# Patient Record
Sex: FEMALE | Race: WHITE | NOT HISPANIC OR LATINO | Employment: OTHER | URBAN - METROPOLITAN AREA
[De-identification: names, ages, dates, MRNs, and addresses within clinical notes are randomized per-mention and may not be internally consistent; named-entity substitution may affect disease eponyms.]

---

## 2017-03-10 ENCOUNTER — OFFICE VISIT (OUTPATIENT)
Dept: FAMILY MEDICINE | Facility: CLINIC | Age: 72
End: 2017-03-10
Payer: MEDICARE

## 2017-03-10 VITALS
BODY MASS INDEX: 36.49 KG/M2 | HEIGHT: 63 IN | RESPIRATION RATE: 18 BRPM | HEART RATE: 82 BPM | DIASTOLIC BLOOD PRESSURE: 82 MMHG | WEIGHT: 205.94 LBS | SYSTOLIC BLOOD PRESSURE: 136 MMHG

## 2017-03-10 DIAGNOSIS — M85.80 OSTEOPENIA: Primary | ICD-10-CM

## 2017-03-10 DIAGNOSIS — M85.80 OSTEOPENIA: ICD-10-CM

## 2017-03-10 PROCEDURE — 99999 PR PBB SHADOW E&M-EST. PATIENT-LVL III: CPT | Mod: PBBFAC,,, | Performed by: FAMILY MEDICINE

## 2017-03-10 PROCEDURE — 1157F ADVNC CARE PLAN IN RCRD: CPT | Mod: S$GLB,,, | Performed by: FAMILY MEDICINE

## 2017-03-10 PROCEDURE — 1159F MED LIST DOCD IN RCRD: CPT | Mod: S$GLB,,, | Performed by: FAMILY MEDICINE

## 2017-03-10 PROCEDURE — 99213 OFFICE O/P EST LOW 20 MIN: CPT | Mod: S$GLB,,, | Performed by: FAMILY MEDICINE

## 2017-03-10 PROCEDURE — 1160F RVW MEDS BY RX/DR IN RCRD: CPT | Mod: S$GLB,,, | Performed by: FAMILY MEDICINE

## 2017-03-10 PROCEDURE — 1126F AMNT PAIN NOTED NONE PRSNT: CPT | Mod: S$GLB,,, | Performed by: FAMILY MEDICINE

## 2017-03-10 RX ORDER — OMEPRAZOLE 40 MG/1
40 CAPSULE, DELAYED RELEASE ORAL EVERY MORNING
Qty: 90 CAPSULE | Refills: 3 | Status: SHIPPED | OUTPATIENT
Start: 2017-03-10 | End: 2017-03-10 | Stop reason: SDUPTHER

## 2017-03-10 RX ORDER — METOPROLOL SUCCINATE 50 MG/1
50 TABLET, EXTENDED RELEASE ORAL DAILY
Qty: 90 TABLET | Refills: 3 | Status: SHIPPED | OUTPATIENT
Start: 2017-03-10 | End: 2017-03-10 | Stop reason: SDUPTHER

## 2017-03-10 RX ORDER — METOPROLOL SUCCINATE 50 MG/1
50 TABLET, EXTENDED RELEASE ORAL DAILY
Qty: 90 TABLET | Refills: 3 | Status: SHIPPED | OUTPATIENT
Start: 2017-03-10 | End: 2018-03-19 | Stop reason: SDUPTHER

## 2017-03-10 RX ORDER — IBANDRONATE SODIUM 150 MG/1
150 TABLET, FILM COATED ORAL
Qty: 3 TABLET | Refills: 3 | Status: CANCELLED | OUTPATIENT
Start: 2017-03-10 | End: 2017-04-09

## 2017-03-10 RX ORDER — METOPROLOL SUCCINATE 50 MG/1
50 TABLET, EXTENDED RELEASE ORAL DAILY
Qty: 90 TABLET | Refills: 3 | Status: CANCELLED | OUTPATIENT
Start: 2017-03-10

## 2017-03-10 RX ORDER — OMEPRAZOLE 40 MG/1
40 CAPSULE, DELAYED RELEASE ORAL EVERY MORNING
Qty: 90 CAPSULE | Refills: 3 | Status: CANCELLED | OUTPATIENT
Start: 2017-03-10

## 2017-03-10 RX ORDER — OMEPRAZOLE 40 MG/1
40 CAPSULE, DELAYED RELEASE ORAL EVERY MORNING
Qty: 90 CAPSULE | Refills: 3 | Status: SHIPPED | OUTPATIENT
Start: 2017-03-10 | End: 2018-03-19 | Stop reason: SDUPTHER

## 2017-03-10 RX ORDER — IBANDRONATE SODIUM 150 MG/1
150 TABLET, FILM COATED ORAL
Qty: 3 TABLET | Refills: 3 | Status: SHIPPED | OUTPATIENT
Start: 2017-03-10 | End: 2017-03-10 | Stop reason: SDUPTHER

## 2017-03-10 RX ORDER — IBANDRONATE SODIUM 150 MG/1
150 TABLET, FILM COATED ORAL
Qty: 3 TABLET | Refills: 3 | Status: SHIPPED | OUTPATIENT
Start: 2017-03-10 | End: 2017-06-02

## 2017-03-10 NOTE — PROGRESS NOTES
Subjective:       Patient ID: Nicole Medina is a 71 y.o. female    Chief Complaint: Osteopenia (follow up; hm due: tetanus, zoster, flu)    HPI  Here today to review treatment for osteopenia.    No new complaints    ROS      Objective:   Physical Exam   Constitutional: She is oriented to person, place, and time. She appears well-developed and well-nourished.   Neurological: She is alert and oriented to person, place, and time.   Vitals reviewed.        Assessment:       1. Osteopenia           Plan:       Osteopenia  - Continue current therapy

## 2017-03-10 NOTE — MR AVS SNAPSHOT
[facility]  [address]  [facility] [address]  Phone: [phone]  Fax: [phone]                  Nicole Medina   3/10/2017 9:00 AM   Office Visit    Description:  Female : 1945   Provider:  Milan Jain MD   Department:  [facility]           Reason for Visit     Osteopenia           Diagnoses this Visit        Comments    Osteopenia    -  Primary            To Do List           Goals (5 Years of Data)     None      Follow-Up and Disposition     Return in about 6 months (around 9/10/2017).    Follow-up and Disposition History       These Medications        Disp Refills Start End    metoprolol succinate (TOPROL XL) 50 MG 24 hr tablet 90 tablet 3 3/10/2017     Take 1 tablet (50 mg total) by mouth once daily. - Oral    Pharmacy: [facility] [place] [id number] AT [facility] [address] Ph #: [phone]       ibandronate (BONIVA) 150 mg tablet 3 tablet 3 3/10/2017 2017    Take 1 tablet (150 mg total) by mouth every 30 days. - Oral    Pharmacy: [place] [id number] AT [facility] OF [facility] [address] Ph #: [phone]       omeprazole (PRILOSEC) 40 MG capsule 90 capsule 3 3/10/2017     Take 1 capsule (40 mg total) by mouth every morning. - Oral    Pharmacy: [place] [id number] AT [facility] OF [facility] [address] Ph #: [phone]         [facility] On Call     [facility] On [facility] Nurse Care Line -  Assistance  Registered nurses in the [facility] Center provide clinical advisement, health education, appointment booking, and other advisory services.  Call for this free service at [phone].             Medications           Message regarding Medications     Verify the changes and/or additions to your medication regime listed below are the same as discussed with your clinician today.  If any of these changes or additions are  "incorrect, please notify your healthcare provider.        STOP taking these medications     DYMISTA 137-50 mcg/spray Spry            Verify that the below list of medications is an accurate representation of the medications you are currently taking.  If none reported, the list may be blank. If incorrect, please contact your healthcare provider. Carry this list with you in case of emergency.           Current Medications     albuterol (ACCUNEB) 0.63 mg/3 mL Nebu Take 2.5 mg by nebulization every 8 (eight) hours.    albuterol (PROVENTIL HFA) 90 mcg/actuation inhaler Inhale 2 puffs into the lungs every 6 (six) hours as needed for Wheezing.    albuterol (PROVENTIL) 2.5 mg /3 mL (0.083 %) nebulizer solution Take 2.5 mg by nebulization every 6 (six) hours as needed for Wheezing.    aspirin (ASPIRIN CHILDRENS) 81 MG Chew Take 81 mg by mouth once daily.    DULERA 200-5 mcg/actuation inhaler     fluticasone (FLONASE) 50 mcg/actuation nasal spray     metoprolol succinate (TOPROL XL) 50 MG 24 hr tablet Take 1 tablet (50 mg total) by mouth once daily.    omeprazole (PRILOSEC) 40 MG capsule Take 1 capsule (40 mg total) by mouth every morning.    predniSONE (DELTASONE) 5 MG tablet     ibandronate (BONIVA) 150 mg tablet Take 1 tablet (150 mg total) by mouth every 30 days.           Clinical Reference Information           Your Vitals Were     BP Pulse Resp Height Weight BMI    136/82 (BP Location: Right arm, Patient Position: Sitting, BP Method: Manual) 82 18 5' 3" (1.6 m) 93.4 kg (205 lb 14.6 oz) 36.48 kg/m2      Blood Pressure          Most Recent Value    BP  136/82      Allergies as of 3/10/2017     Latex      Immunizations Administered on Date of Encounter - 3/10/2017     None      [facility] Sign-Up     Activating your [facility] account is as easy as 1-2-3!     1) Visit [email or web address], select Sign Up Now, enter this activation code and your date of birth, then select Next.  [email or web address]  Expires: 4/24/2017  9:43 AM  "     2) Create a username and password to use when you visit [facility] in the future and select a security question in case you lose your password and select Next.    3) Enter your e-mail address and click Sign Up!    Additional Information  If you have questions, please e-mail [email or web address] or call [phone] to talk to our [employer] staff. Remember, [facility] is NOT to be used for urgent needs. For medical emergencies, dial 911.         Language Assistance Services     ATTENTION: Language assistance services are available, free of charge. Please call [phone].      ATENCIÓN: Si habla español, tiene a rosas disposición servicios gratuitos de asistencia lingüística. Llame al [phone].     CHÚ Ý: N?u b?n nói Ti?ng Vi?t, có các d?ch v? h? tr? ngôn ng? mi?n phí dành cho b?n. G?i s? [phone].         [facility] complies with applicable Federal civil rights laws and does not discriminate on the basis of race, color, national origin, age, disability, or sex.

## 2017-06-02 PROBLEM — G45.9 TRANSIENT CEREBRAL ISCHEMIA: Status: ACTIVE | Noted: 2017-06-02

## 2017-06-03 PROBLEM — I63.9 ACUTE CVA (CEREBROVASCULAR ACCIDENT): Status: ACTIVE | Noted: 2017-06-02

## 2017-06-05 ENCOUNTER — TELEPHONE (OUTPATIENT)
Dept: NEUROLOGY | Facility: CLINIC | Age: 72
End: 2017-06-05

## 2017-06-05 ENCOUNTER — TELEPHONE (OUTPATIENT)
Dept: FAMILY MEDICINE | Facility: CLINIC | Age: 72
End: 2017-06-05

## 2017-06-05 NOTE — TELEPHONE ENCOUNTER
----- Message from Missy Jain sent at 6/5/2017  8:10 AM CDT -----  Contact:  - Jenaro Adam  States that the patient was in the hospital overnight for observation and an MRI was done.  The patient had a TIA and Dr Simons requested for the patient to be seen within a week.  You can call Jenaro back at [phone].  Thank you

## 2017-06-05 NOTE — TELEPHONE ENCOUNTER
Called pt  Jenaro, pt was in [facility] for TIA, and Dr. Simons and Dr. Magallanes said that pt needs to see Dr. Jain with in a week or two from her discharge. Was able to schedule this week as a pt canceled. He verbally understood.

## 2017-06-05 NOTE — TELEPHONE ENCOUNTER
----- Message from Jennifer Saleem sent at 6/5/2017  1:15 PM CDT -----  Contact: Jenaro  Patient's  is calling again as waiting on call back regarding hospital follow up visit from [facility] admitted 6/2/17 discharged 6/3/17; Dx TIA. States only to see Dr Jain. Please call [phone]. Thanks!

## 2017-06-05 NOTE — TELEPHONE ENCOUNTER
----- Message from Jennifer Saleem sent at 6/5/2017  1:20 PM CDT -----  Contact: Jenaro  Patient's  is checking on appointment from [facility] stay from 6/2/17 to 6/3/17; Dx TIA. Please call [phone]. Thanks!

## 2017-06-07 ENCOUNTER — OFFICE VISIT (OUTPATIENT)
Dept: FAMILY MEDICINE | Facility: CLINIC | Age: 72
End: 2017-06-07
Payer: MEDICARE

## 2017-06-07 VITALS
BODY MASS INDEX: 35.47 KG/M2 | HEIGHT: 63 IN | DIASTOLIC BLOOD PRESSURE: 68 MMHG | WEIGHT: 200.19 LBS | HEART RATE: 64 BPM | RESPIRATION RATE: 18 BRPM | SYSTOLIC BLOOD PRESSURE: 138 MMHG

## 2017-06-07 DIAGNOSIS — I63.9 ACUTE CVA (CEREBROVASCULAR ACCIDENT): Primary | ICD-10-CM

## 2017-06-07 PROCEDURE — 1126F AMNT PAIN NOTED NONE PRSNT: CPT | Mod: S$GLB,,, | Performed by: FAMILY MEDICINE

## 2017-06-07 PROCEDURE — 1159F MED LIST DOCD IN RCRD: CPT | Mod: S$GLB,,, | Performed by: FAMILY MEDICINE

## 2017-06-07 PROCEDURE — 99214 OFFICE O/P EST MOD 30 MIN: CPT | Mod: S$GLB,,, | Performed by: FAMILY MEDICINE

## 2017-06-07 PROCEDURE — 99999 PR PBB SHADOW E&M-EST. PATIENT-LVL III: CPT | Mod: PBBFAC,,, | Performed by: FAMILY MEDICINE

## 2017-06-07 RX ORDER — ATORVASTATIN CALCIUM 40 MG/1
40 TABLET, FILM COATED ORAL DAILY
Qty: 90 TABLET | Refills: 3 | Status: SHIPPED | OUTPATIENT
Start: 2017-06-07 | End: 2017-06-07 | Stop reason: SDUPTHER

## 2017-06-07 RX ORDER — ATORVASTATIN CALCIUM 40 MG/1
40 TABLET, FILM COATED ORAL DAILY
Qty: 90 TABLET | Refills: 3 | Status: SHIPPED | OUTPATIENT
Start: 2017-06-07 | End: 2018-01-02 | Stop reason: SDUPTHER

## 2017-06-11 NOTE — PROGRESS NOTES
"Subjective:       Patient ID: Nicole Medina is a 71 y.o. female    Chief Complaint: Transient Ischemic Attack (pt was in [facility] this past weekend for a TIA)    HPI  Patient with recent episode of left arm numbness/weakness with discoordination followed by slurring of her speech.  She was seen at [facility] where she underwent MRI that demonstrated recent CVA.  She was started on Lipitor and ASA 325mg.  She also underwent normal carotid evaluation and normal 2D ECHO.  She continues with mild intermittent "tingling" in her left arm, ulnar distribution    Review of Systems      Objective:   Physical Exam   Constitutional: She is oriented to person, place, and time. She appears well-developed and well-nourished.   HENT:   Head: Normocephalic and atraumatic.   Eyes: Conjunctivae and EOM are normal. Pupils are equal, round, and reactive to light. No scleral icterus.   Neck: Normal range of motion. Neck supple. No thyromegaly present.   Cardiovascular: Normal rate, regular rhythm and normal heart sounds.  Exam reveals no gallop and no friction rub.    No murmur heard.  Pulmonary/Chest: Effort normal and breath sounds normal. No respiratory distress. She has no wheezes. She has no rales.   Lymphadenopathy:     She has no cervical adenopathy.   Neurological: She is alert and oriented to person, place, and time. She displays normal reflexes. No cranial nerve deficit. She exhibits normal muscle tone. Coordination normal.   Vitals reviewed.        Assessment:       1. Acute CVA (cerebrovascular accident)           Plan:       Acute CVA (cerebrovascular accident)  - Continue ASA 325mg  - Continue risk factor modification (diet, exercise, weight loss, blood pressure control, lipid control)  - Return in about 6 months (around 12/7/2017).    Other orders  -     Discontinue: atorvastatin (LIPITOR) 40 MG tablet; Take 1 tablet (40 mg total) by mouth once daily.  Dispense: 90 tablet; Refill: 3  -     atorvastatin (LIPITOR) 40 MG tablet; Take 1 " tablet (40 mg total) by mouth once daily.  Dispense: 90 tablet; Refill: 3

## 2017-07-08 ENCOUNTER — OFFICE VISIT (OUTPATIENT)
Dept: OPTOMETRY | Facility: CLINIC | Age: 72
End: 2017-07-08
Payer: MEDICARE

## 2017-07-08 DIAGNOSIS — H52.203 HYPEROPIA WITH ASTIGMATISM AND PRESBYOPIA, BILATERAL: ICD-10-CM

## 2017-07-08 DIAGNOSIS — H52.03 HYPEROPIA WITH ASTIGMATISM AND PRESBYOPIA, BILATERAL: ICD-10-CM

## 2017-07-08 DIAGNOSIS — D86.9 SARCOIDOSIS: ICD-10-CM

## 2017-07-08 DIAGNOSIS — H25.13 NUCLEAR SCLEROSIS, BILATERAL: Primary | ICD-10-CM

## 2017-07-08 DIAGNOSIS — H52.4 HYPEROPIA WITH ASTIGMATISM AND PRESBYOPIA, BILATERAL: ICD-10-CM

## 2017-07-08 PROCEDURE — 92014 COMPRE OPH EXAM EST PT 1/>: CPT | Mod: S$GLB,,, | Performed by: OPTOMETRIST

## 2017-07-08 PROCEDURE — 99499 UNLISTED E&M SERVICE: CPT | Mod: S$GLB,,, | Performed by: OPTOMETRIST

## 2017-07-08 PROCEDURE — 99999 PR PBB SHADOW E&M-EST. PATIENT-LVL III: CPT | Mod: PBBFAC,,, | Performed by: OPTOMETRIST

## 2017-07-08 RX ORDER — BUDESONIDE AND FORMOTEROL FUMARATE DIHYDRATE 160; 4.5 UG/1; UG/1
2 AEROSOL RESPIRATORY (INHALATION) 2 TIMES DAILY PRN
COMMUNITY
Start: 2017-06-21 | End: 2021-09-30 | Stop reason: SDUPTHER

## 2017-07-08 RX ORDER — IBANDRONATE SODIUM 150 MG/1
150 TABLET, FILM COATED ORAL
COMMUNITY
End: 2018-03-19 | Stop reason: SDUPTHER

## 2017-07-08 NOTE — PROGRESS NOTES
HPI     Macular Degeneration    Additional comments: macular deg, pt family has//           Comments   No blurred va // TIA on 6/2/2017//  No flashes// pos for floaters in the   past x none in a while//  Was working outside in the yard//  No assoc blur with TIA         Last edited by Jorge Mason, OD on 7/8/2017  9:47 AM. (History)        ROS     Negative for: Constitutional, Gastrointestinal, Neurological, Skin,   Genitourinary, Musculoskeletal, HENT, Endocrine, Cardiovascular, Eyes,   Respiratory, Psychiatric, Allergic/Imm, Heme/Lymph    Last edited by Jorge Mason, OD on 7/8/2017  9:26 AM. (History)        Assessment /Plan     For exam results, see Encounter Report.    Nuclear sclerosis, bilateral    Sarcoidosis    Hyperopia with astigmatism and presbyopia, bilateral      1. Refer to Dr. Higginbotham for cataract evaluation and possible removal.   2. No ocular signs of inflammation.  3. Spec Rx given. In case decides against surgery.

## 2017-07-21 ENCOUNTER — OFFICE VISIT (OUTPATIENT)
Dept: OPHTHALMOLOGY | Facility: CLINIC | Age: 72
End: 2017-07-21
Payer: MEDICARE

## 2017-07-21 DIAGNOSIS — H25.11 NUCLEAR SCLEROTIC CATARACT OF RIGHT EYE: Primary | ICD-10-CM

## 2017-07-21 DIAGNOSIS — H18.463 PELLUCID MARGINAL DEGENERATION OF BOTH CORNEAS: ICD-10-CM

## 2017-07-21 DIAGNOSIS — H25.12 NUCLEAR SCLEROTIC CATARACT OF LEFT EYE: ICD-10-CM

## 2017-07-21 PROCEDURE — 92025 CPTRIZED CORNEAL TOPOGRAPHY: CPT | Mod: S$GLB,,, | Performed by: OPHTHALMOLOGY

## 2017-07-21 PROCEDURE — 92136 OPHTHALMIC BIOMETRY: CPT | Mod: LT,S$GLB,, | Performed by: OPHTHALMOLOGY

## 2017-07-21 PROCEDURE — 92014 COMPRE OPH EXAM EST PT 1/>: CPT | Mod: S$GLB,,, | Performed by: OPHTHALMOLOGY

## 2017-07-21 PROCEDURE — 99999 PR PBB SHADOW E&M-EST. PATIENT-LVL I: CPT | Mod: PBBFAC,,, | Performed by: OPHTHALMOLOGY

## 2017-07-21 RX ORDER — KETOROLAC TROMETHAMINE 5 MG/ML
1 SOLUTION OPHTHALMIC 3 TIMES DAILY
Qty: 5 ML | Refills: 1 | Status: SHIPPED | OUTPATIENT
Start: 2017-07-21 | End: 2018-06-13

## 2017-07-21 RX ORDER — OFLOXACIN 3 MG/ML
1 SOLUTION/ DROPS OPHTHALMIC 3 TIMES DAILY
Qty: 5 ML | Refills: 1 | Status: SHIPPED | OUTPATIENT
Start: 2017-07-21 | End: 2017-08-20

## 2017-07-21 RX ORDER — PREDNISOLONE ACETATE 10 MG/ML
1 SUSPENSION/ DROPS OPHTHALMIC 3 TIMES DAILY
Qty: 5 ML | Refills: 1 | Status: SHIPPED | OUTPATIENT
Start: 2017-07-21 | End: 2017-08-21

## 2017-07-21 NOTE — LETTER
July 21, 2017      Jorge Mason, OD  [address]  [place] [address]           [facility]  [address]  [place] [address]  Phone: [phone]  Fax: [phone]          Patient: Nicole Medina   MR Number: [id number]   YOB: 1945   Date of Visit: 7/21/2017       Dear Dr. Jorge Mason:    Thank you for referring Nicole Medina to me for evaluation. Attached you will find relevant portions of my assessment and plan of care.    If you have questions, please do not hesitate to call me. I look forward to following Nicole Medina along with you.    Sincerely,    Veena Higginbotham MD    Enclosure  CC:  No Recipients    If you would like to receive this communication electronically, please contact [email or web address] or [phone] to request more information on [employer] Link access.    For providers and/or their staff who would like to refer a patient to [facility], please contact us through our one-stop-shop provider referral line, [facility] , at [phone].    If you feel you have received this communication in error or would no longer like to receive these types of communications, please e-mail [email or web address]

## 2017-07-21 NOTE — PROGRESS NOTES
HPI     Jorge Mason, OD    Last edited by Kristian Wright MA on 7/21/2017 10:27 AM. (History)            Assessment /Plan     For exam results, see Encounter Report.    Nuclear sclerotic cataract of right eye    Nuclear sclerotic cataract of left eye  -     IOL Master - OU - Both Eyes  -     Computerized corneal topography    Pellucid marginal degeneration of both corneas    Other orders  -     prednisoLONE acetate (PRED FORTE) 1 % DrpS; Place 1 drop into the left eye 3 (three) times daily.  Dispense: 5 mL; Refill: 1  -     ofloxacin (OCUFLOX) 0.3 % ophthalmic solution; Place 1 drop into the left eye 3 (three) times daily.  Dispense: 5 mL; Refill: 1  -     ketorolac 0.5% (ACULAR) 0.5 % Drop; Place 1 drop into the left eye 3 (three) times daily.  Dispense: 5 mL; Refill: 1      Visually significant nuclear sclerotic cataract   - Interfering with activities of daily living.  Pt desires cataract surgery for Va rehabilitation.   - R/B/A discussed and pt agrees to proceed with surgery.   - IOL options discussed according to patient's goals and concomitant ocular pathology; and pt content with monofocal lens.    - Target: plano.    Pellucid marginal degeneration  - discussed risks with TORIC IOL and irreg astig -- pt understands VA may not be perfect after sx and may still need glasses at certain times, wishes to proceed with TORIC

## 2017-07-24 ENCOUNTER — TELEPHONE (OUTPATIENT)
Dept: OPHTHALMOLOGY | Facility: CLINIC | Age: 72
End: 2017-07-24

## 2017-07-24 NOTE — TELEPHONE ENCOUNTER
----- Message from Adriana Huizar MA sent at 7/24/2017  3:35 PM CDT -----  Contact: pt   Hey Cheryle!  Did you call this pt?  Adriana   ----- Message -----  From: Adriana Gutierrez  Sent: 7/24/2017   2:38 PM  To: Neftaly DOUGLAS Staff    Missed call   Call back

## 2017-07-24 NOTE — TELEPHONE ENCOUNTER
Called pt to schedule her for cat sx in [place] with Dr Higginbotham. Penciled her in on 9/12 but if she can get cleared before 8/8 she will got then. Pt did have TIA on 6/2 and will ck with Luli in surgery if there is a waiting period for this and call pt back tomorrow to let her know.

## 2017-07-25 ENCOUNTER — TELEPHONE (OUTPATIENT)
Dept: FAMILY MEDICINE | Facility: CLINIC | Age: 72
End: 2017-07-25

## 2017-07-25 ENCOUNTER — TELEPHONE (OUTPATIENT)
Dept: OPHTHALMOLOGY | Facility: CLINIC | Age: 72
End: 2017-07-25

## 2017-07-25 NOTE — TELEPHONE ENCOUNTER
----- Message from Adriana Gutierrez sent at 7/25/2017  9:23 AM CDT -----  Contact:    gurdeep    Sept 12, surgery cataract  Needs clearace   Call back   Please schedule

## 2017-07-25 NOTE — TELEPHONE ENCOUNTER
Called pt, pt has surgery 9/12/17, needs pre op. Scheduled pre op with pcp and she verbally understood.

## 2017-07-25 NOTE — TELEPHONE ENCOUNTER
----- Message from Veena Higginbotham MD sent at 7/21/2017 12:50 PM CDT -----  pls call to schedule sx on NS. Pt wants MAYUR MCCANN. Thanks.

## 2017-07-25 NOTE — TELEPHONE ENCOUNTER
Mayorga pt today to let her know that there is no waiting period with a TIA for cat sx. Pt wants to keep surgery on 9/13 and will send out drop sheet today. Also told her to come in on any Tue or Wed to pay the 1st payment of $750 for her toric lens. Have the  let me know she is here and I would come up to help with payment.

## 2017-07-25 NOTE — TELEPHONE ENCOUNTER
----- Message from Pavithra Gibbons sent at 7/24/2017  4:17 PM CDT -----  Contact: [phone]  Patient is requesting a call back from the nurse requesting preop clearance for eye surgery, need an appt sooner than what's available.    Please call the patient upon request at phone number [phone].

## 2017-08-02 ENCOUNTER — TELEPHONE (OUTPATIENT)
Dept: OPHTHALMOLOGY | Facility: CLINIC | Age: 72
End: 2017-08-02

## 2017-08-14 ENCOUNTER — TELEPHONE (OUTPATIENT)
Dept: OPHTHALMOLOGY | Facility: CLINIC | Age: 72
End: 2017-08-14

## 2017-08-14 DIAGNOSIS — H25.12 NUCLEAR SCLEROTIC CATARACT OF LEFT EYE: Primary | ICD-10-CM

## 2017-08-21 ENCOUNTER — OFFICE VISIT (OUTPATIENT)
Dept: NEUROLOGY | Facility: CLINIC | Age: 72
End: 2017-08-21
Payer: MEDICARE

## 2017-08-21 ENCOUNTER — TELEPHONE (OUTPATIENT)
Dept: OPHTHALMOLOGY | Facility: CLINIC | Age: 72
End: 2017-08-21

## 2017-08-21 VITALS
WEIGHT: 205.88 LBS | HEART RATE: 69 BPM | DIASTOLIC BLOOD PRESSURE: 60 MMHG | HEIGHT: 63 IN | BODY MASS INDEX: 36.48 KG/M2 | RESPIRATION RATE: 20 BRPM | SYSTOLIC BLOOD PRESSURE: 124 MMHG

## 2017-08-21 DIAGNOSIS — H25.13 NUCLEAR SCLEROSIS, BILATERAL: ICD-10-CM

## 2017-08-21 DIAGNOSIS — I63.511 ACUTE ISCHEMIC RIGHT MCA STROKE: Primary | ICD-10-CM

## 2017-08-21 DIAGNOSIS — E78.5 HYPERLIPIDEMIA LDL GOAL <70: ICD-10-CM

## 2017-08-21 DIAGNOSIS — I10 ESSENTIAL HYPERTENSION: ICD-10-CM

## 2017-08-21 PROCEDURE — 1159F MED LIST DOCD IN RCRD: CPT | Mod: S$GLB,,, | Performed by: PSYCHIATRY & NEUROLOGY

## 2017-08-21 PROCEDURE — 99999 PR PBB SHADOW E&M-EST. PATIENT-LVL III: CPT | Mod: PBBFAC,,, | Performed by: PSYCHIATRY & NEUROLOGY

## 2017-08-21 PROCEDURE — 3008F BODY MASS INDEX DOCD: CPT | Mod: S$GLB,,, | Performed by: PSYCHIATRY & NEUROLOGY

## 2017-08-21 PROCEDURE — 1126F AMNT PAIN NOTED NONE PRSNT: CPT | Mod: S$GLB,,, | Performed by: PSYCHIATRY & NEUROLOGY

## 2017-08-21 PROCEDURE — 99214 OFFICE O/P EST MOD 30 MIN: CPT | Mod: S$GLB,,, | Performed by: PSYCHIATRY & NEUROLOGY

## 2017-08-21 PROCEDURE — 99499 UNLISTED E&M SERVICE: CPT | Mod: S$GLB,,, | Performed by: PSYCHIATRY & NEUROLOGY

## 2017-08-21 NOTE — LETTER
August 21, 2017      Donavan Simons Jr., MD  [address] [facility] [address] [place] [address]           [facility]  [address] [facility] [address] [place] [address]  Phone: [phone]  Fax: [phone]          Patient: Nicole Medina   MR Number: [id number]   YOB: 1945   Date of Visit: 8/21/2017       Dear Dr. Donavan Simons Jr.:    Thank you for referring Nicole Medina to me for evaluation. Attached you will find relevant portions of my assessment and plan of care.    If you have questions, please do not hesitate to call me. I look forward to following Nicole Medina along with you.    Sincerely,    Justus Babb, DO    Enclosure  CC:  No Recipients    If you would like to receive this communication electronically, please contact [email or web address] or [phone] to request more information on [employer] Link access.    For providers and/or their staff who would like to refer a patient to [facility], please contact us through our one-stop-shop provider referral line, [facility], at [phone].    If you feel you have received this communication in error or would no longer like to receive these types of communications, please e-mail [email or web address]

## 2017-08-21 NOTE — PROGRESS NOTES
Subjective:         Patient ID: Nicole Medina is a 72 y.o.  female who presents for follow up of mild right MCA stroke    Initial History of Present Illness:  Reviewed hospital records from June 2017.  She presented to [facility] with a mild headache, intermittent slurred speech and left arm weakness and numbness which resolved after a few hours.  Mildly hypertensive in the emergency room, EKG showed sinus rhythm.    Studies obtained during her hospital admission included MRI of the brain which showed a slight subacute right MCA infarct in the right precentral gyrus.     MRA of the head and neck were normal.  Carotid ultrasound was also performed which did not show any significant stenosis.  LDL was 101.  She was placed on aspirin 325 mg and atorvastatin 40 mg.  Had recent follow-up with Dr. Jain encouraging lifestyle and risk factor modification.    She is here today with her .  Sx had started just the morning of presentation; left arm kept falling from her hip (she tends to put her hand on her hip when talking).  Lasted 15 minutes. 1-2 hours later, speech became slurred,  could not understand her.  Again lasted 10 minutes and speech resolved. Got to [facility] around 2 PM.     About a week prior, she had a very brief (5 seconds or so) sharp pain right side of the head.       Interval history since last visit:    Review of patient's allergies indicates:   Allergen Reactions    Latex      Current Outpatient Prescriptions   Medication Sig Dispense Refill    albuterol (PROVENTIL HFA) 90 mcg/actuation inhaler Inhale 2 puffs into the lungs every 6 (six) hours as needed for Wheezing.      albuterol (PROVENTIL) 2.5 mg /3 mL (0.083 %) nebulizer solution Take 2.5 mg by nebulization every 6 (six) hours as needed for Wheezing.      aspirin 325 MG tablet Take 1 tablet (325 mg total) by mouth once daily.  0    atorvastatin (LIPITOR) 40 MG tablet Take 1 tablet (40 mg total) by mouth once daily.  90 tablet 3    calcium-vitamin D 600 mg(1,500mg) -400 unit Tab Take by mouth.      cholecalciferol, vitamin D3, (VITAMIN D3) 5,000 unit Tab Take 5,000 Units by mouth once daily.      fluticasone (FLONASE) 50 mcg/actuation nasal spray       ibandronate (BONIVA) 150 mg tablet Take 150 mg by mouth every 30 days.      ketorolac 0.5% (ACULAR) 0.5 % Drop Place 1 drop into the left eye 3 (three) times daily. 5 mL 1    metoprolol succinate (TOPROL XL) 50 MG 24 hr tablet Take 1 tablet (50 mg total) by mouth once daily. 90 tablet 3    mv,Ca,min-folic acid-vit K1 (ONE-A-DAY WOMEN'S 50 PLUS) 400-20 mcg Tab Take 1 tablet by mouth once daily at 6am.      omeprazole (PRILOSEC) 40 MG capsule Take 1 capsule (40 mg total) by mouth every morning. 90 capsule 3    predniSONE (DELTASONE) 5 MG tablet       SYMBICORT 160-4.5 mcg/actuation HFAA       prednisoLONE acetate (PRED FORTE) 1 % DrpS Place 1 drop into the left eye 3 (three) times daily. 5 mL 1     No current facility-administered medications for this visit.          Review of Systems  Review of Systems   Constitutional: Negative for chills, fatigue and fever.   HENT: Negative for congestion.    Eyes: Negative for visual disturbance.   Respiratory: Negative for cough, shortness of breath and wheezing.    Cardiovascular: Negative for chest pain and palpitations.   Gastrointestinal: Negative for abdominal pain, constipation, diarrhea, nausea and vomiting.   Endocrine: Negative for cold intolerance and heat intolerance.   Genitourinary: Negative for difficulty urinating, dysuria and hematuria.   Musculoskeletal: Negative for arthralgias and myalgias.   Skin: Negative for rash and wound.   Allergic/Immunologic: Negative for immunocompromised state.   Neurological: Negative for dizziness, tremors, seizures, weakness, numbness and headaches.   Hematological: Does not bruise/bleed easily.   Psychiatric/Behavioral: Negative for dysphoric mood and sleep disturbance. The patient is  not nervous/anxious.        Objective:        Vitals:    08/21/17 1309   BP: 124/60   Pulse: 69   Resp: 20     Body mass index is 36.47 kg/m².  Neurologic Exam     Mental Status   Oriented to person, place, and time.   Attention: normal. Concentration: normal.   Speech: speech is normal   Level of consciousness: alert  Knowledge: good.   Able to name object. Able to repeat. Normal comprehension.     Cranial Nerves   Cranial nerves II through XII intact.     Motor Exam   Overall muscle tone: normal  Right arm tone: normal  Left arm tone: normal  Right arm pronator drift: absent    Strength   Right deltoid: 5/5  Left deltoid: 5/5  Right biceps: 5/5  Left biceps: 5/5  Right triceps: 5/5  Left triceps: 5/5  Right wrist flexion: 5/5  Left wrist flexion: 5/5  Right wrist extension: 5/5  Left wrist extension: 5/5  Right interossei: 5/5  Left interossei: 5/5    Sensory Exam   Right arm light touch: normal  Left arm light touch: normal  Right arm proprioception: normal  Left arm proprioception: normal  Right arm pinprick: normal  Left arm pinprick: normal    Gait, Coordination, and Reflexes     Gait  Gait: normal    Tremor   Resting tremor: absent    Reflexes   Right brachioradialis: 2+  Left brachioradialis: 2+  Right biceps: 2+  Left biceps: 2+  Right triceps: 2+  Left triceps: 2+      Physical Exam   Constitutional: She is oriented to person, place, and time. She appears well-developed and well-nourished.   HENT:   Head: Normocephalic and atraumatic.   Cardiovascular: Normal rate.    Neurological: She is oriented to person, place, and time. Gait normal.   Reflex Scores:       Tricep reflexes are 2+ on the right side and 2+ on the left side.       Bicep reflexes are 2+ on the right side and 2+ on the left side.       Brachioradialis reflexes are 2+ on the right side and 2+ on the left side.  Psychiatric: She has a normal mood and affect. Her speech is normal and behavior is normal. Judgment and thought content normal.    Vitals reviewed.        Data Review:  Narrative     CPT: 86245 (without)         INDICATION   Acute onset left upper extremity weakness.     TECHNIQUE   Sequences performed included axial and sagittal T1 weighted, axial T2   weighted, axial FLAIR, axial proton density, and axial ADC and diffusion   weighted images.      FINDINGS   There is a focal area of restricted diffusion within the right precentral   gyrus in the mid convexity best seen image 38 diffusion sequence. There is   corresponding FLAIR hyperintensity in this region.   There is normal brain formation. There is scattered periventricular and   subcortical T2 FLAIR hyperintensity. There is susceptibility artifact to   suggest hemorrhage. There is no hydrocephalus. There is no herniation. There   is intra or extra-axial fluid collection. Sella is. There is no evidence of   intracranial hypotension. Bilateral orbits are normal. The paranasal sinuses   and mastoid air cells normally developed and well aerated.     IMPRESSION   1. Subacute infarct within the right precentral gyrus in the mid convexity.   There is no evidence of hemorrhage.   2. Chronic microvascular involutional changes.         Electronically signed by: Henok Carreno (Jun 02, 2017 20:49:30)       Assessment:       Personally reviewed MRI and MRA images.  Small region of restricted diffusion with corresponding dark area on ADC s/w small acute to subacute infarct. Few scatted nonspecific white matter ischemic changes subcortically (chronic). There is no major vessel stenosis, nut on comparison of right to left sided intracranial vessels there is some slight narrowing and a mild degree of slightly decreased filling on the right compared to the left in the M3 branches         1. Acute ischemic right MCA stroke    2. Nuclear sclerosis, bilateral    3. Essential hypertension    4. Hyperlipidemia LDL goal <70            Plan:         Problem List Items Addressed This Visit        Neuro    Acute  ischemic right MCA stroke - Primary    Current Assessment & Plan     Most likely small vessel etiology, small subacute (not chronic) right MCA distribution cerebral infarct.  Agree with current medication regimen, glucose control, blood pressure control, lifestyle modification efforts.     Clinically presented with TIA, however in this setting it is not unusual in 30-50% of cases for there to be an area of acute ischemia on MRI despite resolution of clinical symptoms.     Considering the fact that this was in fact a stroke, not a TIA, I would recommend that she wait for 9 months to have her elective eye surgery as she is going to have to be off the aspirin prior to the procedure.             Ophtho    Nuclear sclerosis - Both Eyes       Cardiac/Vascular    Essential hypertension    Current Assessment & Plan     Well controlled         Hyperlipidemia LDL goal <70    Current Assessment & Plan     Continue atorvastatin           Other Visit Diagnoses    None.         Return in about 4 months (around 12/21/2017).       Thank you very much for the opportunity to assist in this patient's care.  If you have any questions or concerns, please do not hesitate to contact me at any time.     Sincerely,  Justus Babb, DO

## 2017-08-21 NOTE — ASSESSMENT & PLAN NOTE
Most likely small vessel etiology, small subacute (not chronic) right MCA distribution cerebral infarct.  Agree with current medication regimen, glucose control, blood pressure control, lifestyle modification efforts.     Clinically presented with TIA, however in this setting it is not unusual in 30-50% of cases for there to be an area of acute ischemia on MRI despite resolution of clinical symptoms.     Considering the fact that this was in fact a stroke, not a TIA, I would recommend that she wait for 9 months to have her elective eye surgery as she is going to have to be off the aspirin prior to the procedure.

## 2017-08-22 NOTE — PROGRESS NOTES
Patient, Nicole Medina (MRN #[id number]), presented with a recorded BMI of 36.47 kg/m^2 and a documented comorbidity(s):  - Hypertension  - Hyperlipidemia  to which the severe obesity is a contributing factor. This is consistent with the definition of severe obesity (BMI 35.0-35.9) with comorbidity (ICD-10 E66.01, Z68.35). The patient's severe obesity was monitored, evaluated, addressed and/or treated. This addendum to the medical record is made on 08/22/2017.

## 2017-09-11 ENCOUNTER — TELEPHONE (OUTPATIENT)
Dept: OPHTHALMOLOGY | Facility: CLINIC | Age: 72
End: 2017-09-11

## 2017-09-11 NOTE — TELEPHONE ENCOUNTER
----- Message from Cheryle Quintana sent at 8/30/2017  8:58 AM CDT -----  Call pt in Jan 2018 to schedule for reeval. Signed consent in holding file.

## 2017-09-17 PROBLEM — I63.511 ACUTE ISCHEMIC RIGHT MCA STROKE: Status: RESOLVED | Noted: 2017-06-02 | Resolved: 2017-09-17

## 2017-09-18 ENCOUNTER — OFFICE VISIT (OUTPATIENT)
Dept: FAMILY MEDICINE | Facility: CLINIC | Age: 72
End: 2017-09-18
Payer: MEDICARE

## 2017-09-18 VITALS
BODY MASS INDEX: 36.6 KG/M2 | WEIGHT: 206.56 LBS | HEART RATE: 70 BPM | DIASTOLIC BLOOD PRESSURE: 82 MMHG | SYSTOLIC BLOOD PRESSURE: 120 MMHG | RESPIRATION RATE: 18 BRPM | HEIGHT: 63 IN

## 2017-09-18 DIAGNOSIS — I10 ESSENTIAL HYPERTENSION: Primary | ICD-10-CM

## 2017-09-18 DIAGNOSIS — Z86.73 HISTORY OF CVA (CEREBROVASCULAR ACCIDENT): ICD-10-CM

## 2017-09-18 PROCEDURE — 99499 UNLISTED E&M SERVICE: CPT | Mod: S$GLB,,, | Performed by: FAMILY MEDICINE

## 2017-09-18 PROCEDURE — 99213 OFFICE O/P EST LOW 20 MIN: CPT | Mod: 25,S$GLB,, | Performed by: FAMILY MEDICINE

## 2017-09-18 PROCEDURE — 3008F BODY MASS INDEX DOCD: CPT | Mod: S$GLB,,, | Performed by: FAMILY MEDICINE

## 2017-09-18 PROCEDURE — 1159F MED LIST DOCD IN RCRD: CPT | Mod: S$GLB,,, | Performed by: FAMILY MEDICINE

## 2017-09-18 PROCEDURE — G0008 ADMIN INFLUENZA VIRUS VAC: HCPCS | Mod: S$GLB,,, | Performed by: FAMILY MEDICINE

## 2017-09-18 PROCEDURE — 99999 PR PBB SHADOW E&M-EST. PATIENT-LVL III: CPT | Mod: PBBFAC,,, | Performed by: FAMILY MEDICINE

## 2017-09-18 PROCEDURE — 90662 IIV NO PRSV INCREASED AG IM: CPT | Mod: S$GLB,,, | Performed by: FAMILY MEDICINE

## 2017-09-18 PROCEDURE — 1126F AMNT PAIN NOTED NONE PRSNT: CPT | Mod: S$GLB,,, | Performed by: FAMILY MEDICINE

## 2017-09-18 NOTE — PROGRESS NOTES
Subjective:       Patient ID: Nicole Medina is a 72 y.o. female    Chief Complaint: No chief complaint on file.    HPI  Here today for interval evaluation  No evidence of CVA recurrence  Seen by Neurology, recommends risk factor modification.  Blood pressure stable.    Review of Systems      Objective:   Physical Exam   Constitutional: She is oriented to person, place, and time. She appears well-developed and well-nourished.   Neurological: She is alert and oriented to person, place, and time.   Vitals reviewed.    Results for orders placed or performed during the hospital encounter of 06/02/17   CBC auto differential   Result Value Ref Range    WBC 9.69 3.90 - 12.70 K/uL    RBC 4.97 4.00 - 5.40 M/uL    Hemoglobin 13.8 12.0 - 16.0 g/dL    Hematocrit 43.0 37.0 - 48.5 %    MCV 87 82 - 98 fL    MCH 27.8 27.0 - 31.0 pg    MCHC 32.1 32.0 - 36.0 %    RDW 13.9 11.5 - 14.5 %    Platelets 234 150 - 350 K/uL    MPV 10.9 9.2 - 12.9 fL    Gran # 6.1 1.8 - 7.7 K/uL    Lymph # 2.3 1.0 - 4.8 K/uL    Mono # 0.7 0.3 - 1.0 K/uL    Eos # 0.5 0.0 - 0.5 K/uL    Baso # 0.07 0.00 - 0.20 K/uL    nRBC 0 0 /100 WBC    Gran% 63.4 38.0 - 73.0 %    Lymph% 24.1 18.0 - 48.0 %    Mono% 7.1 4.0 - 15.0 %    Eosinophil% 4.7 0.0 - 8.0 %    Basophil% 0.7 0.0 - 1.9 %    Differential Method Automated    Comprehensive metabolic panel   Result Value Ref Range    Sodium 144 136 - 145 mmol/L    Potassium 3.9 3.5 - 5.1 mmol/L    Chloride 102 95 - 110 mmol/L    CO2 32 (H) 22 - 31 mmol/L    Glucose 104 70 - 110 mg/dL    BUN, Bld 18 7 - 18 mg/dL    Creatinine 0.70 0.50 - 1.40 mg/dL    Calcium 9.3 8.4 - 10.2 mg/dL    Total Protein 8.0 6.0 - 8.4 g/dL    Albumin 4.4 3.5 - 5.2 g/dL    Total Bilirubin 0.6 0.2 - 1.3 mg/dL    Alkaline Phosphatase 65 38 - 145 U/L    AST 28 14 - 36 U/L    ALT 30 10 - 44 U/L    Anion Gap 10 8 - 16 mmol/L    eGFR if African American >60 >60 mL/min/1.73 m^2    eGFR if non African American >60 >60 mL/min/1.73 m^2   Protime-INR   Result Value  Ref Range    PT 13.4 11.8 - 14.7 sec    INR 1.1    Troponin I   Result Value Ref Range    Troponin I <0.012 0.012 - 0.034 ng/mL   Troponin I   Result Value Ref Range    Troponin I <0.012 0.012 - 0.034 ng/mL   Troponin I   Result Value Ref Range    Troponin I <0.012 0.012 - 0.034 ng/mL   CBC auto differential   Result Value Ref Range    WBC 7.77 3.90 - 12.70 K/uL    RBC 4.46 4.00 - 5.40 M/uL    Hemoglobin 12.2 12.0 - 16.0 g/dL    Hematocrit 39.5 37.0 - 48.5 %    MCV 89 82 - 98 fL    MCH 27.4 27.0 - 31.0 pg    MCHC 30.9 (L) 32.0 - 36.0 %    RDW 13.9 11.5 - 14.5 %    Platelets 212 150 - 350 K/uL    MPV 11.1 9.2 - 12.9 fL    Gran # 4.0 1.8 - 7.7 K/uL    Lymph # 2.5 1.0 - 4.8 K/uL    Mono # 0.6 0.3 - 1.0 K/uL    Eos # 0.5 0.0 - 0.5 K/uL    Baso # 0.05 0.00 - 0.20 K/uL    nRBC 0 0 /100 WBC    Gran% 52.1 38.0 - 73.0 %    Lymph% 32.4 18.0 - 48.0 %    Mono% 8.2 4.0 - 15.0 %    Eosinophil% 6.7 0.0 - 8.0 %    Basophil% 0.6 0.0 - 1.9 %    Differential Method Automated    Comprehensive metabolic panel   Result Value Ref Range    Sodium 144 136 - 145 mmol/L    Potassium 4.5 3.5 - 5.1 mmol/L    Chloride 104 95 - 110 mmol/L    CO2 31 22 - 31 mmol/L    Glucose 88 70 - 110 mg/dL    BUN, Bld 18 7 - 18 mg/dL    Creatinine 0.64 0.50 - 1.40 mg/dL    Calcium 8.9 8.4 - 10.2 mg/dL    Total Protein 7.0 6.0 - 8.4 g/dL    Albumin 3.7 3.5 - 5.2 g/dL    Total Bilirubin 0.6 0.2 - 1.3 mg/dL    Alkaline Phosphatase 57 38 - 145 U/L    AST 26 14 - 36 U/L    ALT 36 10 - 44 U/L    Anion Gap 9 8 - 16 mmol/L    eGFR if African American >60 >60 mL/min/1.73 m^2    eGFR if non African American >60 >60 mL/min/1.73 m^2   Lipid panel   Result Value Ref Range    Cholesterol 164 120 - 199 mg/dL    Triglycerides 55 30 - 150 mg/dL    HDL 52 40 - 75 mg/dL    LDL Cholesterol 101.0 63.0 - 159.0 mg/dL    HDL/Chol Ratio 31.7 20.0 - 50.0 %    Total Cholesterol/HDL Ratio 3.2 2.0 - 5.0    Non-HDL Cholesterol 112 mg/dL   Hemoglobin A1c   Result Value Ref Range     Hemoglobin A1C 6.0 (H) 0.0 - 5.6 %    Estimated Avg Glucose 126 68 - 131 mg/dL   Lipid panel   Result Value Ref Range    Cholesterol 164 120 - 199 mg/dL    Triglycerides 55 30 - 150 mg/dL    HDL 54 40 - 75 mg/dL    LDL Cholesterol 99.0 63.0 - 159.0 mg/dL    HDL/Chol Ratio 32.9 20.0 - 50.0 %    Total Cholesterol/HDL Ratio 3.0 2.0 - 5.0    Non-HDL Cholesterol 110 mg/dL   TSH   Result Value Ref Range    TSH 3.440 0.400 - 4.000 uIU/mL   2D echo with color flow doppler and bubble contrast   Result Value Ref Range    EF 60 55 - 65    Mitral Valve Regurgitation MILD     Aortic Valve Regurgitation MILD     Est. PA Systolic Pressure 35.72     Pericardial Effusion NONE     Mitral Valve Mobility NORMAL     Tricuspid Valve Regurgitation MILD    POCT glucose   Result Value Ref Range    POCT Glucose 100 70 - 110 mg/dL          Assessment:       1. Essential hypertension     2. History of CVA (cerebrovascular accident)           Plan:       Essential hypertension with History of CVA (cerebrovascular accident)  - Continue current therapy  - Serial blood pressure monitoring  - Diet and exercise education.   - Return in about 6 months (around 3/18/2018).

## 2017-12-05 ENCOUNTER — OFFICE VISIT (OUTPATIENT)
Dept: NEUROLOGY | Facility: CLINIC | Age: 72
End: 2017-12-05
Payer: MEDICARE

## 2017-12-05 VITALS
BODY MASS INDEX: 35.98 KG/M2 | HEIGHT: 63 IN | HEART RATE: 82 BPM | SYSTOLIC BLOOD PRESSURE: 157 MMHG | RESPIRATION RATE: 19 BRPM | DIASTOLIC BLOOD PRESSURE: 70 MMHG | WEIGHT: 203.06 LBS

## 2017-12-05 DIAGNOSIS — E78.5 HYPERLIPIDEMIA LDL GOAL <70: ICD-10-CM

## 2017-12-05 DIAGNOSIS — Z86.73 HISTORY OF ISCHEMIC RIGHT MCA STROKE: Primary | ICD-10-CM

## 2017-12-05 DIAGNOSIS — I10 ESSENTIAL HYPERTENSION: ICD-10-CM

## 2017-12-05 PROCEDURE — 99213 OFFICE O/P EST LOW 20 MIN: CPT | Mod: S$GLB,,, | Performed by: PSYCHIATRY & NEUROLOGY

## 2017-12-05 PROCEDURE — 99999 PR PBB SHADOW E&M-EST. PATIENT-LVL III: CPT | Mod: PBBFAC,,, | Performed by: PSYCHIATRY & NEUROLOGY

## 2017-12-05 PROCEDURE — 99499 UNLISTED E&M SERVICE: CPT | Mod: S$GLB,,, | Performed by: PSYCHIATRY & NEUROLOGY

## 2017-12-05 NOTE — PROGRESS NOTES
Subjective:         Patient ID: Nicole Medina is a 72 y.o.  female who presents for follow up of mild right MCA stroke    Initial / Last History of Present Illness:  Reviewed hospital records from June 2017.  She presented to [facility] with a mild headache, intermittent slurred speech and left arm weakness and numbness which resolved after a few hours.  Mildly hypertensive in the emergency room, EKG showed sinus rhythm.     Studies obtained during her hospital admission included MRI of the brain which showed a slight subacute right MCA infarct in the right precentral gyrus.      MRA of the head and neck were normal.  Carotid ultrasound was also performed which did not show any significant stenosis.  LDL was 101.  She was placed on aspirin 325 mg and atorvastatin 40 mg.  Had recent follow-up with Dr. Jain encouraging lifestyle and risk factor modification.     She is here today with her .  Sx had started just the morning of presentation; left arm kept falling from her hip (she tends to put her hand on her hip when talking).  Lasted 15 minutes. 1-2 hours later, speech became slurred,  could not understand her.  Again lasted 10 minutes and speech resolved. Got to [facility] around 2 PM.      About a week prior, she had a very brief (5 seconds or so) sharp pain right side of the head.     Interval history since last visit:    Following our last visit, recommend she delay eye surgery (cataracts) given recent stroke.  Scheduled for Jan 2018.     Notes indicate she is taking Lipitor 40 mg, aspirin 325 mg, metoprolol 50 mg.    Has been very busy with home renovations,  says she is like a tornado.      Review of patient's allergies indicates:   Allergen Reactions    Latex      Current Outpatient Prescriptions   Medication Sig Dispense Refill    albuterol (PROVENTIL HFA) 90 mcg/actuation inhaler Inhale 2 puffs into the lungs every 6 (six) hours as needed for Wheezing.      albuterol  (PROVENTIL) 2.5 mg /3 mL (0.083 %) nebulizer solution Take 2.5 mg by nebulization every 6 (six) hours as needed for Wheezing.      aspirin 325 MG tablet Take 1 tablet (325 mg total) by mouth once daily.  0    atorvastatin (LIPITOR) 40 MG tablet Take 1 tablet (40 mg total) by mouth once daily. 90 tablet 3    calcium-vitamin D 600 mg(1,500mg) -400 unit Tab Take by mouth.      cholecalciferol, vitamin D3, (VITAMIN D3) 5,000 unit Tab Take 5,000 Units by mouth once daily.      fluticasone (FLONASE) 50 mcg/actuation nasal spray       ibandronate (BONIVA) 150 mg tablet Take 150 mg by mouth every 30 days.      ketorolac 0.5% (ACULAR) 0.5 % Drop Place 1 drop into the left eye 3 (three) times daily. 5 mL 1    metoprolol succinate (TOPROL XL) 50 MG 24 hr tablet Take 1 tablet (50 mg total) by mouth once daily. 90 tablet 3    mv,Ca,min-folic acid-vit K1 (ONE-A-DAY WOMEN'S 50 PLUS) 400-20 mcg Tab Take 1 tablet by mouth once daily at 6am.      omeprazole (PRILOSEC) 40 MG capsule Take 1 capsule (40 mg total) by mouth every morning. 90 capsule 3    predniSONE (DELTASONE) 5 MG tablet       SYMBICORT 160-4.5 mcg/actuation HFAA        No current facility-administered medications for this visit.          Review of Systems  Review of Systems    Objective:        Vitals:    12/05/17 1359   BP: (!) 157/70   Pulse: 82   Resp: 19     Body mass index is 35.97 kg/m².  Neurologic Exam     Mental Status   Oriented to person, place, and time.   Attention: normal. Concentration: normal.   Speech: speech is normal   Level of consciousness: alert  Knowledge: good.   Able to name object. Able to repeat. Normal comprehension.     Cranial Nerves   Cranial nerves II through XII intact.     Motor Exam   Overall muscle tone: normal  Right arm tone: normal  Left arm tone: normal  Right arm pronator drift: absent    Strength   Right deltoid: 5/5  Left deltoid: 5/5  Right biceps: 5/5  Left biceps: 5/5  Right triceps: 5/5  Left triceps: 5/5  Right  wrist flexion: 5/5  Left wrist flexion: 5/5  Right wrist extension: 5/5  Left wrist extension: 5/5  Right interossei: 5/5  Left interossei: 5/5    Sensory Exam   Right arm light touch: normal  Left arm light touch: normal  Right arm proprioception: normal  Left arm proprioception: normal  Right arm pinprick: normal  Left arm pinprick: normal    Gait, Coordination, and Reflexes     Gait  Gait: normal    Tremor   Resting tremor: absent    Reflexes   Right brachioradialis: 2+  Left brachioradialis: 2+  Right biceps: 2+  Left biceps: 2+  Right triceps: 2+  Left triceps: 2+      Physical Exam   Constitutional: She is oriented to person, place, and time.   Neurological: She is oriented to person, place, and time. Gait normal.   Reflex Scores:       Tricep reflexes are 2+ on the right side and 2+ on the left side.       Bicep reflexes are 2+ on the right side and 2+ on the left side.       Brachioradialis reflexes are 2+ on the right side and 2+ on the left side.  Psychiatric: Her speech is normal.         Data Review:    Assessment:        1. History of ischemic right MCA stroke    2. Hyperlipidemia LDL goal <70    3. Essential hypertension           Plan:   Try taking a supplement called [id number]  200 mg once a day, which can help with muscle cramps and specifically that related to statin medicines.        Problem List Items Addressed This Visit        Neuro    History of ischemic right MCA stroke - Primary    Current Assessment & Plan     Clinically doing very well, encouraged continued risk factor modification            Cardiac/Vascular    Essential hypertension    Hyperlipidemia LDL goal <70    Relevant Orders    Lipid panel (Completed)          Return in about 3 months (around 3/5/2018).       Thank you very much for the opportunity to assist in this patient's care.  If you have any questions or concerns, please do not hesitate to contact me at any time.     Sincerely,  Justus Babb, DO

## 2017-12-06 ENCOUNTER — LAB VISIT (OUTPATIENT)
Dept: LAB | Facility: HOSPITAL | Age: 72
End: 2017-12-06
Attending: PSYCHIATRY & NEUROLOGY
Payer: MEDICARE

## 2017-12-06 DIAGNOSIS — E78.5 HYPERLIPIDEMIA LDL GOAL <70: ICD-10-CM

## 2017-12-06 LAB
CHOLEST SERPL-MCNC: 143 MG/DL
CHOLEST/HDLC SERPL: 2 {RATIO}
HDLC SERPL-MCNC: 73 MG/DL
HDLC SERPL: 51 %
LDLC SERPL CALC-MCNC: 59.6 MG/DL
NONHDLC SERPL-MCNC: 70 MG/DL
TRIGL SERPL-MCNC: 52 MG/DL

## 2017-12-06 PROCEDURE — 80061 LIPID PANEL: CPT

## 2017-12-06 PROCEDURE — 36415 COLL VENOUS BLD VENIPUNCTURE: CPT | Mod: PO

## 2017-12-14 ENCOUNTER — TELEPHONE (OUTPATIENT)
Dept: OPHTHALMOLOGY | Facility: CLINIC | Age: 72
End: 2017-12-14

## 2017-12-14 NOTE — TELEPHONE ENCOUNTER
----- Message from Adriana Huizar MA sent at 12/14/2017 10:57 AM CST -----  Contact: self      ----- Message -----  From: Natty Guo  Sent: 12/14/2017  10:53 AM  To: [facility]GL[facility] Staff    Patient called regarding her procedure being cancelled. Stating cleared by her physician. Please contact [phone] (home)

## 2017-12-26 ENCOUNTER — TELEPHONE (OUTPATIENT)
Dept: NEUROLOGY | Facility: CLINIC | Age: 72
End: 2017-12-26

## 2017-12-26 NOTE — TELEPHONE ENCOUNTER
----- Message from Laron Kerns sent at 12/26/2017 11:33 AM CST -----  Contact: pt   Pt is calling for her test results  Call Back#[phone]  Thanks

## 2017-12-26 NOTE — TELEPHONE ENCOUNTER
Patient is requesting results of Lipid panel.  Does she need to stay on the Lipitor?  Please advise.

## 2018-01-02 RX ORDER — ATORVASTATIN CALCIUM 10 MG/1
10 TABLET, FILM COATED ORAL DAILY
Qty: 90 TABLET | Refills: 3 | Status: SHIPPED | OUTPATIENT
Start: 2018-01-02 | End: 2018-01-03 | Stop reason: SDUPTHER

## 2018-01-02 NOTE — TELEPHONE ENCOUNTER
Her LDL was very good - just below 60.  Still want her on Lipitor but will drop the dose from 40 to 10, should help quite a bit with any side effects.

## 2018-01-03 ENCOUNTER — TELEPHONE (OUTPATIENT)
Dept: NEUROLOGY | Facility: CLINIC | Age: 73
End: 2018-01-03

## 2018-01-03 NOTE — TELEPHONE ENCOUNTER
----- Message from Janette Turner sent at 1/3/2018 10:10 AM CST -----  Contact: Self  Patient is returning your call, please call [phone].  Thank you!

## 2018-01-03 NOTE — TELEPHONE ENCOUNTER
----- Message from Pavithra Gibbons sent at 1/2/2018  4:54 PM CST -----  Contact: [phone]  Patient is returning nurse's phone call.  Please call patient back at [phone].

## 2018-01-04 RX ORDER — ATORVASTATIN CALCIUM 10 MG/1
10 TABLET, FILM COATED ORAL DAILY
Qty: 90 TABLET | Refills: 3 | Status: SHIPPED | OUTPATIENT
Start: 2018-01-04 | End: 2018-03-19 | Stop reason: SDUPTHER

## 2018-01-09 ENCOUNTER — TELEPHONE (OUTPATIENT)
Dept: NEUROLOGY | Facility: CLINIC | Age: 73
End: 2018-01-09

## 2018-01-09 NOTE — TELEPHONE ENCOUNTER
----- Message from Ruby Portillo sent at 1/8/2018 11:31 AM CST -----  Patient states that she need to speak to you about her medications.  Please call patient at [phone].

## 2018-01-09 NOTE — TELEPHONE ENCOUNTER
----- Message from Ledy Alvarado sent at 1/8/2018  2:06 PM CST -----  Contact: [phone]  Pt is requesting a call back in regards to her medications that keep going to the wrong place Please call pt at your earliest convenience.  Thanks !

## 2018-01-09 NOTE — TELEPHONE ENCOUNTER
Spoke with [facility] scripts Lipitor 10 mg 1 daily with a 90 day supply and 3 refills called to the pharmacy.

## 2018-02-28 ENCOUNTER — OFFICE VISIT (OUTPATIENT)
Dept: NEUROLOGY | Facility: CLINIC | Age: 73
End: 2018-02-28
Payer: MEDICARE

## 2018-02-28 VITALS
DIASTOLIC BLOOD PRESSURE: 58 MMHG | HEART RATE: 66 BPM | BODY MASS INDEX: 36.35 KG/M2 | RESPIRATION RATE: 18 BRPM | HEIGHT: 63 IN | WEIGHT: 205.13 LBS | SYSTOLIC BLOOD PRESSURE: 126 MMHG

## 2018-02-28 DIAGNOSIS — Z86.73 HISTORY OF ISCHEMIC RIGHT MCA STROKE: Primary | ICD-10-CM

## 2018-02-28 DIAGNOSIS — E78.5 HYPERLIPIDEMIA LDL GOAL <70: ICD-10-CM

## 2018-02-28 DIAGNOSIS — I10 ESSENTIAL HYPERTENSION: ICD-10-CM

## 2018-02-28 PROCEDURE — 3074F SYST BP LT 130 MM HG: CPT | Mod: S$GLB,,, | Performed by: PSYCHIATRY & NEUROLOGY

## 2018-02-28 PROCEDURE — 99999 PR PBB SHADOW E&M-EST. PATIENT-LVL V: CPT | Mod: PBBFAC,,, | Performed by: PSYCHIATRY & NEUROLOGY

## 2018-02-28 PROCEDURE — 99213 OFFICE O/P EST LOW 20 MIN: CPT | Mod: S$GLB,,, | Performed by: PSYCHIATRY & NEUROLOGY

## 2018-02-28 PROCEDURE — 99499 UNLISTED E&M SERVICE: CPT | Mod: S$GLB,,, | Performed by: PSYCHIATRY & NEUROLOGY

## 2018-02-28 PROCEDURE — 3078F DIAST BP <80 MM HG: CPT | Mod: S$GLB,,, | Performed by: PSYCHIATRY & NEUROLOGY

## 2018-02-28 RX ORDER — ONDANSETRON 8 MG/1
1 TABLET, ORALLY DISINTEGRATING ORAL DAILY PRN
COMMUNITY
Start: 2018-02-23 | End: 2018-11-22

## 2018-02-28 NOTE — PROGRESS NOTES
Subjective:          Patient ID: Nicole Medina is a 72 y.o.  female who presents for follow up of R MCA stroke    Initial / Last History of Present Illness 8/21/17:    Reviewed hospital records from June 2017.  [place] with a mild headache, intermittent slurred speech and left arm weakness and numbness which resolved after a few hours.  Mildly hypertensive in the emergency room, EKG showed sinus rhythm.     Studies obtained during her hospital admission included MRI of the brain which showed a slight subacute right MCA infarct in the right precentral gyrus.      MRA of the head and neck were normal.  Carotid ultrasound was also performed which did not show any significant stenosis.  LDL was 101.  She was placed on aspirin 325 mg and atorvastatin 40 mg.  Had recent follow-up with Dr. Jain encouraging lifestyle and risk factor modification.     She is here today with her .  Sx had started just the morning of presentation; left arm kept falling from her hip (she tends to put her hand on her hip when talking).  Lasted 15 minutes. 1-2 hours later, speech became slurred,  could not understand her.  Again lasted 10 minutes and speech resolved. Got to [facility] around 2 PM.      About a week prior, she had a very brief (5 seconds or so) sharp pain right side of the head.     12/12/17:    Following our last visit, recommend she delay eye surgery (cataracts) given recent stroke.  Scheduled for Jan 2018.      Notes indicate she is taking Lipitor 40 mg, aspirin 325 mg, metoprolol 50 mg.     Has been very busy with home renovations,  says she is like a tornado.     Interval history since last visit:    At last visit, recommended CoQ10 supplement, otherwise was doing well with respect to modification. We had recommended delaying her cataract surgery due to the stroke, recommended delay for 9 months post event. This will place her for having cataract surgery this coming  month, March 2018.     She is scheduled for March 27.  No further stroke sx, no residual symptoms.      She checks her BP at home, 130's over 60-70's    LDL is within range, < 70, taking CO-q-10    On aspirin 325, lipitor 10, metoprolol XL 50 daily    She and her  go to the gym 3 days a week, eat a healthy diet    Review of patient's allergies indicates:   Allergen Reactions    Latex      Current Outpatient Prescriptions   Medication Sig Dispense Refill    albuterol (PROVENTIL HFA) 90 mcg/actuation inhaler Inhale 2 puffs into the lungs every 6 (six) hours as needed for Wheezing.      albuterol (PROVENTIL) 2.5 mg /3 mL (0.083 %) nebulizer solution Take 2.5 mg by nebulization every 6 (six) hours as needed for Wheezing.      aspirin 325 MG tablet Take 1 tablet (325 mg total) by mouth once daily.  0    atorvastatin (LIPITOR) 10 MG tablet Take 1 tablet (10 mg total) by mouth once daily. 90 tablet 3    calcium-vitamin D 600 mg(1,500mg) -400 unit Tab Take by mouth.      cholecalciferol, vitamin D3, (VITAMIN D3) 5,000 unit Tab Take 5,000 Units by mouth once daily.      fluticasone (FLONASE) 50 mcg/actuation nasal spray       ibandronate (BONIVA) 150 mg tablet Take 150 mg by mouth every 30 days.      ketorolac 0.5% (ACULAR) 0.5 % Drop Place 1 drop into the left eye 3 (three) times daily. 5 mL 1    metoprolol succinate (TOPROL XL) 50 MG 24 hr tablet Take 1 tablet (50 mg total) by mouth once daily. 90 tablet 3    mv,Ca,min-folic acid-vit K1 (ONE-A-DAY WOMEN'S 50 PLUS) 400-20 mcg Tab Take 1 tablet by mouth once daily at 6am.      omeprazole (PRILOSEC) 40 MG capsule Take 1 capsule (40 mg total) by mouth every morning. 90 capsule 3    ondansetron (ZOFRAN-ODT) 8 MG TbDL Take 1 tablet by mouth daily as needed.      predniSONE (DELTASONE) 5 MG tablet       SYMBICORT 160-4.5 mcg/actuation HFAA        No current facility-administered medications for this visit.          Review of Systems  Review of Systems     Objective:        Vitals:    02/28/18 0919   BP: (!) 126/58   Pulse: 66   Resp:      Body mass index is 36.33 kg/m².  Neurologic Exam     Mental Status   Oriented to person, place, and time.   Registration: recalls 3 of 3 objects. Recall at 5 minutes: recalls 3 of 3 objects.   Attention: normal. Concentration: normal.   Speech: speech is normal   Level of consciousness: alert  Knowledge: good and consistent with education. Able to perform simple calculations.   Able to name object. Able to repeat. Normal comprehension.   Speech fluent and appropriate, no neglect or apraxia     Cranial Nerves     CN II   Visual fields full to confrontation.     CN III, IV, VI   Pupils are equal, round, and reactive to light.  Extraocular motions are normal.   Right pupil: Reactivity: brisk.   Left pupil: Reactivity: brisk.   CN III: no CN III palsy  CN VI: no CN VI palsy  Nystagmus: none   Diplopia: none  Ophthalmoparesis: none  Conjugate gaze: present  Vestibulo-ocular reflex: present    CN V   Facial sensation intact.     CN VII   Facial expression full, symmetric.     CN VIII   Hearing: intact    CN IX, X   Palate: symmetric    CN XI   CN XI normal.     CN XII   Tongue deviation: none    Motor Exam   Muscle bulk: normal  Overall muscle tone: normal  Right arm pronator drift: absent  Left arm pronator drift: absent    Strength   Strength 5/5 except as noted.     Sensory Exam   Light touch normal.   Vibration normal.   Proprioception normal.   Pinprick normal.     Gait, Coordination, and Reflexes     Gait  Gait: normal    Coordination   Romberg: negative    Tremor   Resting tremor: absent  Action tremor: absent    Reflexes   Reflexes 2+ except as noted.   Right plantar: normal  Left plantar: normal      Physical Exam   Constitutional: She is oriented to person, place, and time.   Eyes: EOM are normal. Pupils are equal, round, and reactive to light.   Neurological: She is oriented to person, place, and time. She has a normal  Romberg Test. Gait normal.   Psychiatric: Her speech is normal.         Data Review:    Results for CHAPARRO GEORGES (MRN [id number]) as of 2/28/2018 08:59   Ref. Range 12/6/2017 07:54   Cholesterol Latest Ref Range: 120 - 199 mg/dL 143   HDL Latest Ref Range: 40 - 75 mg/dL 73   LDL Cholesterol Latest Ref Range: 63.0 - 159.0 mg/dL 59.6 (L)   Total Cholesterol/HDL Ratio Latest Ref Range: 2.0 - 5.0  2.0   Triglycerides Latest Ref Range: 30 - 150 mg/dL 52     Assessment:        1. History of ischemic right MCA stroke    2. Hyperlipidemia LDL goal <70    3. Essential hypertension           Plan:         Problem List Items Addressed This Visit        Neuro    History of ischemic right MCA stroke - Primary    Current Assessment & Plan     Clinically doing very well.  Risk factors are being addressed.  She is clear from a neurological standpoint to proceed with her cataract surgery this coming month.  Okay to hold aspirin for duration indicated by her ophthalmologist prior to the procedure, resume once safe to do so.            Cardiac/Vascular    Essential hypertension    Hyperlipidemia LDL goal <70          Follow-up in about 6 months (around 8/28/2018).       Thank you very much for the opportunity to assist in this patient's care.  If you have any questions or concerns, please do not hesitate to contact me at any time.     Sincerely,  Justus Babb, DO

## 2018-03-02 NOTE — ASSESSMENT & PLAN NOTE
Clinically doing very well.  Risk factors are being addressed.  She is clear from a neurological standpoint to proceed with her cataract surgery this coming month.  Okay to hold aspirin for duration indicated by her ophthalmologist prior to the procedure, resume once safe to do so.

## 2018-03-14 ENCOUNTER — OFFICE VISIT (OUTPATIENT)
Dept: OPHTHALMOLOGY | Facility: CLINIC | Age: 73
End: 2018-03-14
Payer: MEDICARE

## 2018-03-14 DIAGNOSIS — H18.463 PELLUCID MARGINAL DEGENERATION OF BOTH CORNEAS: ICD-10-CM

## 2018-03-14 DIAGNOSIS — H25.12 NUCLEAR SCLEROTIC CATARACT OF LEFT EYE: ICD-10-CM

## 2018-03-14 DIAGNOSIS — H25.11 NUCLEAR SCLEROTIC CATARACT OF RIGHT EYE: Primary | ICD-10-CM

## 2018-03-14 PROCEDURE — 99999 PR PBB SHADOW E&M-EST. PATIENT-LVL III: CPT | Mod: PBBFAC,,, | Performed by: OPHTHALMOLOGY

## 2018-03-14 PROCEDURE — 92136 OPHTHALMIC BIOMETRY: CPT | Mod: LT,S$GLB,, | Performed by: OPHTHALMOLOGY

## 2018-03-14 PROCEDURE — 92014 COMPRE OPH EXAM EST PT 1/>: CPT | Mod: S$GLB,,, | Performed by: OPHTHALMOLOGY

## 2018-03-14 PROCEDURE — 92025 CPTRIZED CORNEAL TOPOGRAPHY: CPT | Mod: S$GLB,,, | Performed by: OPHTHALMOLOGY

## 2018-03-14 RX ORDER — OFLOXACIN 3 MG/ML
1 SOLUTION/ DROPS OPHTHALMIC 3 TIMES DAILY
Qty: 5 ML | Refills: 1 | Status: SHIPPED | OUTPATIENT
Start: 2018-03-14 | End: 2018-04-13

## 2018-03-14 NOTE — PROGRESS NOTES
HPI     Pre-op Exam    Additional comments: phaco iol OS to be done 3/27/2018//  [facility]   3/19/2018//           Comments   [facility] ref for cat eval    Sarcoidosis   Dry eye- uses OTC a. tears   NSC OU  irreg astig    Pt here for a re-ch of cat.  Had cx'd sx due to minor stroke.  Pt is   missing Ofloxacin will meed that called in to [facility]  [facility]//    Per [facility] IOL's. [place] and va.//       Last edited by Veena Higginbotham MD on 3/14/2018 10:04 AM. (History)            Assessment /Plan     For exam results, see Encounter Report.    Nuclear sclerotic cataract of right eye    Nuclear sclerotic cataract of left eye  -     IOL Master - OU - Both Eyes  -     Computerized corneal topography    Pellucid marginal degeneration of both corneas    Other orders  -     ofloxacin (OCUFLOX) 0.3 % ophthalmic solution; Place 1 drop into the left eye 3 (three) times daily.  Dispense: 5 mL; Refill: 1      Visually significant nuclear sclerotic cataract   - Interfering with activities of daily living.  Pt desires cataract surgery for Va rehabilitation.   - R/B/A discussed and pt agrees to proceed with surgery.   - IOL options discussed according to patient's goals and concomitant ocular pathology; and pt content with TORIC lens.    - Target: plano.    Pellucid marginal degeneration  - discussed risks with TORIC IOL and irreg astig -- pt understands VA may not be perfect after sx and may still need glasses at certain times, wishes to proceed with TORIC    OS first =- [id number]  27.0 @ 6-9    (OD - [id number] 27.0 @ 148 - 157' )

## 2018-03-15 ENCOUNTER — TELEPHONE (OUTPATIENT)
Dept: OPHTHALMOLOGY | Facility: CLINIC | Age: 73
End: 2018-03-15

## 2018-03-15 DIAGNOSIS — H25.12 NUCLEAR SCLEROTIC CATARACT OF LEFT EYE: Primary | ICD-10-CM

## 2018-03-19 ENCOUNTER — OFFICE VISIT (OUTPATIENT)
Dept: FAMILY MEDICINE | Facility: CLINIC | Age: 73
End: 2018-03-19
Payer: MEDICARE

## 2018-03-19 VITALS
HEART RATE: 62 BPM | RESPIRATION RATE: 16 BRPM | BODY MASS INDEX: 36.83 KG/M2 | SYSTOLIC BLOOD PRESSURE: 128 MMHG | WEIGHT: 207.88 LBS | DIASTOLIC BLOOD PRESSURE: 72 MMHG | HEIGHT: 63 IN

## 2018-03-19 DIAGNOSIS — I70.0 AORTIC ATHEROSCLEROSIS: ICD-10-CM

## 2018-03-19 DIAGNOSIS — D86.0 SARCOIDOSIS, LUNG: ICD-10-CM

## 2018-03-19 DIAGNOSIS — I10 ESSENTIAL HYPERTENSION: ICD-10-CM

## 2018-03-19 DIAGNOSIS — Z01.818 PREOP EXAMINATION: Primary | ICD-10-CM

## 2018-03-19 DIAGNOSIS — I47.10 PSVT (PAROXYSMAL SUPRAVENTRICULAR TACHYCARDIA): ICD-10-CM

## 2018-03-19 DIAGNOSIS — H26.9 CATARACT, UNSPECIFIED CATARACT TYPE, UNSPECIFIED LATERALITY: ICD-10-CM

## 2018-03-19 PROCEDURE — 3074F SYST BP LT 130 MM HG: CPT | Mod: CPTII,S$GLB,, | Performed by: FAMILY MEDICINE

## 2018-03-19 PROCEDURE — 99999 PR PBB SHADOW E&M-EST. PATIENT-LVL III: CPT | Mod: PBBFAC,,, | Performed by: FAMILY MEDICINE

## 2018-03-19 PROCEDURE — 99499 UNLISTED E&M SERVICE: CPT | Mod: S$GLB,,, | Performed by: FAMILY MEDICINE

## 2018-03-19 PROCEDURE — 99214 OFFICE O/P EST MOD 30 MIN: CPT | Mod: S$GLB,,, | Performed by: FAMILY MEDICINE

## 2018-03-19 PROCEDURE — 3078F DIAST BP <80 MM HG: CPT | Mod: CPTII,S$GLB,, | Performed by: FAMILY MEDICINE

## 2018-03-19 RX ORDER — OMEPRAZOLE 40 MG/1
40 CAPSULE, DELAYED RELEASE ORAL EVERY MORNING
Qty: 90 CAPSULE | Refills: 3 | Status: SHIPPED | OUTPATIENT
Start: 2018-03-19 | End: 2019-03-25 | Stop reason: SDUPTHER

## 2018-03-19 RX ORDER — METOPROLOL SUCCINATE 50 MG/1
50 TABLET, EXTENDED RELEASE ORAL DAILY
Qty: 90 TABLET | Refills: 3 | Status: SHIPPED | OUTPATIENT
Start: 2018-03-19 | End: 2019-03-25 | Stop reason: SDUPTHER

## 2018-03-19 RX ORDER — ATORVASTATIN CALCIUM 10 MG/1
10 TABLET, FILM COATED ORAL DAILY
Qty: 90 TABLET | Refills: 3 | Status: SHIPPED | OUTPATIENT
Start: 2018-03-19 | End: 2019-03-25

## 2018-03-19 RX ORDER — IBANDRONATE SODIUM 150 MG/1
150 TABLET, FILM COATED ORAL
Qty: 3 TABLET | Refills: 3 | Status: SHIPPED | OUTPATIENT
Start: 2018-03-19 | End: 2019-03-25 | Stop reason: SDUPTHER

## 2018-03-19 NOTE — PROGRESS NOTES
Patient, Nicole Medina (MRN #[id number]), presented with a recorded BMI of 36.83 kg/m^2 and a documented comorbidity(s):  - Hypertension  to which the severe obesity is a contributing factor. This is consistent with the definition of severe obesity (BMI 35.0-35.9) with comorbidity (ICD-10 E66.01, Z68.35). The patient's severe obesity was monitored, evaluated, addressed and/or treated. This addendum to the medical record is made on 03/19/2018.

## 2018-03-19 NOTE — PROGRESS NOTES
HPI  Nicole Medina is a 72 y.o. female with multiple medical diagnoses as listed in the medical history and problem list that presents for Hypertension (follow up) and Pre-op Exam (cataract surgery )  .      HPI  Here for preoperative clearance prior to cataract surgery with Dr. Higginbotham    PAST MEDICAL HISTORY:  Past Medical History:   Diagnosis Date    Acute ischemic right MCA stroke 6/2/2017    Cataract     Essential hypertension 8/21/2017    GERD (gastroesophageal reflux disease)     Presbyopia     PSVT (paroxysmal supraventricular tachycardia)     Sarcoidosis     Sarcoidosis of lung 2/10/2015    Sciatica     [facility]//    Vertigo        PAST SURGICAL HISTORY:  Past Surgical History:   Procedure Laterality Date    CHOLECYSTECTOMY      TOTAL ABDOMINAL HYSTERECTOMY      VARICOSE VEIN SURGERY         SOCIAL HISTORY:  Social History     Social History    Marital status:      Spouse name: N/A    Number of children: N/A    Years of education: N/A     Occupational History    Not on file.     Social History Main Topics    Smoking status: Former Smoker    Smokeless tobacco: Never Used    Alcohol use No    Drug use: No    Sexual activity: Not on file     Other Topics Concern    Not on file     Social History Narrative    No narrative on file       FAMILY HISTORY:  Family History   Problem Relation Age of Onset    Cataracts Mother     Macular degeneration Mother     Cancer Mother      lymphoma    Macular degeneration Sister     Cancer Sister      breast    Diabetes Father     Hypertension Father     Thyroid disease Daughter     Cancer Daughter      thyroid    Amblyopia Neg Hx     Blindness Neg Hx     Glaucoma Neg Hx     Retinal detachment Neg Hx     Strabismus Neg Hx     Stroke Neg Hx        ALLERGIES AND MEDICATIONS: updated and reviewed.  Review of patient's allergies indicates:   Allergen Reactions    Latex       Current Outpatient Prescriptions   Medication Sig Dispense Refill    albuterol (PROVENTIL HFA) 90 mcg/actuation inhaler Inhale 2 puffs into the lungs every 6 (six) hours as needed for Wheezing.      albuterol (PROVENTIL) 2.5 mg /3 mL (0.083 %) nebulizer solution Take 2.5 mg by nebulization every 6 (six) hours as needed for Wheezing.      aspirin 325 MG tablet Take 1 tablet (325 mg total) by mouth once daily.  0    atorvastatin (LIPITOR) 10 MG tablet Take 1 tablet (10 mg total) by mouth once daily. 90 tablet 3    calcium-vitamin D 600 mg(1,500mg) -400 unit Tab Take by mouth.      cholecalciferol, vitamin D3, (VITAMIN D3) 5,000 unit Tab Take 5,000 Units by mouth once daily.      fluticasone (FLONASE) 50 mcg/actuation nasal spray       ibandronate (BONIVA) 150 mg tablet Take 150 mg by mouth every 30 days.      ketorolac 0.5% (ACULAR) 0.5 % Drop Place 1 drop into the left eye 3 (three) times daily. 5 mL 1    metoprolol succinate (TOPROL XL) 50 MG 24 hr tablet Take 1 tablet (50 mg total) by mouth once daily. 90 tablet 3    mv,Ca,min-folic acid-vit K1 (ONE-A-DAY WOMEN'S 50 PLUS) 400-20 mcg Tab Take 1 tablet by mouth once daily at 6am.      ofloxacin (OCUFLOX) 0.3 % ophthalmic solution Place 1 drop into the left eye 3 (three) times daily. 5 mL 1    omeprazole (PRILOSEC) 40 MG capsule Take 1 capsule (40 mg total) by mouth every morning. 90 capsule 3    ondansetron (ZOFRAN-ODT) 8 MG TbDL Take 1 tablet by mouth daily as needed.      predniSONE (DELTASONE) 5 MG tablet       SYMBICORT 160-4.5 mcg/actuation HFAA        No current facility-administered medications for this visit.        ROS  Review of Systems   Constitutional: Negative for fatigue, fever and unexpected weight change.   HENT: Negative for congestion, hearing loss, rhinorrhea and sore throat.    Eyes: Negative for visual disturbance.   Respiratory: Negative for cough, chest tightness, shortness of breath and wheezing.    Cardiovascular: Negative  "for chest pain, palpitations and leg swelling.   Gastrointestinal: Negative for abdominal distention, abdominal pain, blood in stool, constipation, diarrhea, nausea and vomiting.   Genitourinary: Negative for difficulty urinating, dysuria, frequency, hematuria, menstrual problem, pelvic pain, urgency and vaginal bleeding.   Musculoskeletal: Negative for back pain, joint swelling and neck pain.   Skin: Negative for rash.   Neurological: Negative for dizziness, tremors, weakness, light-headedness, numbness and headaches.   Psychiatric/Behavioral: Negative for confusion, dysphoric mood and sleep disturbance. The patient is not nervous/anxious.        Physical Exam  Vitals:    03/19/18 1123   BP: 128/72   Pulse: 62   Resp: 16    Body mass index is 36.83 kg/m².  Weight: 94.3 kg (207 lb 14.3 oz)   Height: 5' 3" (160 cm)     Physical Exam   Constitutional: She is oriented to person, place, and time. She appears well-developed and well-nourished.   HENT:   Head: Normocephalic and atraumatic.   Right Ear: External ear normal.   Left Ear: External ear normal.   Nose: Nose normal.   Mouth/Throat: Oropharynx is clear and moist.   Eyes: Conjunctivae and EOM are normal. Pupils are equal, round, and reactive to light. No scleral icterus.   Neck: Normal range of motion. Neck supple. No JVD present. No thyromegaly present.   Cardiovascular: Normal rate, regular rhythm and normal heart sounds.  Exam reveals no gallop and no friction rub.    No murmur heard.  Pulmonary/Chest: Effort normal and breath sounds normal. She has no wheezes. She has no rales.   Abdominal: Soft. Bowel sounds are normal. She exhibits no distension and no mass. There is no tenderness. There is no rebound and no guarding.   Musculoskeletal: Normal range of motion. She exhibits no edema.   Lymphadenopathy:     She has no cervical adenopathy.   Neurological: She is alert and oriented to person, place, and time. She has normal strength. No cranial nerve deficit or " sensory deficit.   Skin: Skin is warm and dry. No rash noted.   Vitals reviewed.      Health Maintenance       Date Due Completion Date    TETANUS VACCINE 06/27/1965 ---    Zoster Vaccine 06/27/2007 ---    Colonoscopy 11/22/2017 11/22/2010    DEXA SCAN 02/10/2018 2/10/2015    Override on 2/7/2013: (N/S)    Override on 8/5/2008: Done (Dr. Luna Pedro - osteopenia by this report from [facility].)    Mammogram 01/23/2018 1/23/2017    Override on 6/9/2015: (N/S) (per patient report)    Override on 2/7/2013: (N/S)    Override on 7/29/2009: Done (Dr. Luna Pedro - routine follow up in one year was recommended (in [facility] report from [facility]))    High Dose Statin 03/14/2019 3/14/2018    Lipid Panel 12/06/2022 12/6/2017          Assessment & Plan    Preop examination for Cataract, unspecified cataract type, unspecified laterality  - Patient cleared for surgery and anesthesia    Essential hypertension with PSVT (paroxysmal supraventricular tachycardia) and Aortic atherosclerosis  - Continue current therapy  - Serial blood pressure monitoring  - Diet and exercise education.    Sarcoidosis, lung  - Stable, Continue current therapy  - Continue Pulmonary    Other orders  -     metoprolol succinate (TOPROL XL) 50 MG 24 hr tablet; Take 1 tablet (50 mg total) by mouth once daily.  Dispense: 90 tablet; Refill: 3  -     ibandronate (BONIVA) 150 mg tablet; Take 1 tablet (150 mg total) by mouth every 30 days.  -     omeprazole (PRILOSEC) 40 MG capsule; Take 1 capsule (40 mg total) by mouth every morning.  Dispense: 90 capsule; Refill: 3  -     atorvastatin (LIPITOR) 10 MG tablet; Take 1 tablet (10 mg total) by mouth once daily.  Dispense: 90 tablet; Refill: 3        Follow-up in about 6 months (around 9/19/2018).

## 2018-03-26 ENCOUNTER — ANESTHESIA EVENT (OUTPATIENT)
Dept: SURGERY | Facility: HOSPITAL | Age: 73
End: 2018-03-26
Payer: MEDICARE

## 2018-03-27 ENCOUNTER — ANESTHESIA (OUTPATIENT)
Dept: SURGERY | Facility: HOSPITAL | Age: 73
End: 2018-03-27
Payer: MEDICARE

## 2018-03-27 ENCOUNTER — SURGERY (OUTPATIENT)
Age: 73
End: 2018-03-27

## 2018-03-27 ENCOUNTER — HOSPITAL ENCOUNTER (OUTPATIENT)
Facility: HOSPITAL | Age: 73
Discharge: HOME | End: 2018-03-27
Attending: OPHTHALMOLOGY | Admitting: OPHTHALMOLOGY
Payer: MEDICARE

## 2018-03-27 VITALS
HEIGHT: 63 IN | SYSTOLIC BLOOD PRESSURE: 163 MMHG | TEMPERATURE: 97 F | BODY MASS INDEX: 33.66 KG/M2 | DIASTOLIC BLOOD PRESSURE: 73 MMHG | WEIGHT: 190 LBS | OXYGEN SATURATION: 98 % | RESPIRATION RATE: 16 BRPM | HEART RATE: 74 BPM

## 2018-03-27 DIAGNOSIS — H25.10 SENILE NUCLEAR SCLEROSIS: ICD-10-CM

## 2018-03-27 DIAGNOSIS — H25.12 NUCLEAR SENILE CATARACT OF LEFT EYE: ICD-10-CM

## 2018-03-27 DIAGNOSIS — H25.12 NUCLEAR SCLEROTIC CATARACT OF LEFT EYE: Primary | ICD-10-CM

## 2018-03-27 PROCEDURE — 66984 XCAPSL CTRC RMVL W/O ECP: CPT | Mod: LT,,, | Performed by: OPHTHALMOLOGY

## 2018-03-27 PROCEDURE — 37000009 HC ANESTHESIA EA ADD 15 MINS: Mod: PO | Performed by: OPHTHALMOLOGY

## 2018-03-27 PROCEDURE — 63600175 PHARM REV CODE 636 W HCPCS: Mod: PO | Performed by: NURSE ANESTHETIST, CERTIFIED REGISTERED

## 2018-03-27 PROCEDURE — 71000033 HC RECOVERY, INTIAL HOUR: Mod: PO | Performed by: OPHTHALMOLOGY

## 2018-03-27 PROCEDURE — D9220A PRA ANESTHESIA: Mod: ANES,,, | Performed by: ANESTHESIOLOGY

## 2018-03-27 PROCEDURE — 63600175 PHARM REV CODE 636 W HCPCS: Mod: PO | Performed by: OPHTHALMOLOGY

## 2018-03-27 PROCEDURE — V2787 ASTIGMATISM-CORRECT FUNCTION: HCPCS | Mod: PO | Performed by: OPHTHALMOLOGY

## 2018-03-27 PROCEDURE — 99499 UNLISTED E&M SERVICE: CPT | Mod: ,,, | Performed by: OPHTHALMOLOGY

## 2018-03-27 PROCEDURE — 36000707: Mod: PO | Performed by: OPHTHALMOLOGY

## 2018-03-27 PROCEDURE — D9220A PRA ANESTHESIA: Mod: CRNA,,, | Performed by: NURSE ANESTHETIST, CERTIFIED REGISTERED

## 2018-03-27 PROCEDURE — 36000706: Mod: PO | Performed by: OPHTHALMOLOGY

## 2018-03-27 PROCEDURE — 25000003 PHARM REV CODE 250: Mod: PO | Performed by: OPHTHALMOLOGY

## 2018-03-27 PROCEDURE — 37000008 HC ANESTHESIA 1ST 15 MINUTES: Mod: PO | Performed by: OPHTHALMOLOGY

## 2018-03-27 DEVICE — IMPLANTABLE DEVICE: Type: IMPLANTABLE DEVICE | Site: EYE | Status: FUNCTIONAL

## 2018-03-27 RX ORDER — PROPARACAINE HYDROCHLORIDE 5 MG/ML
1 SOLUTION/ DROPS OPHTHALMIC
Status: DISPENSED | OUTPATIENT
Start: 2018-03-27 | End: 2018-03-27

## 2018-03-27 RX ORDER — ONDANSETRON 2 MG/ML
INJECTION INTRAMUSCULAR; INTRAVENOUS
Status: DISCONTINUED | OUTPATIENT
Start: 2018-03-27 | End: 2018-03-27

## 2018-03-27 RX ORDER — EPINEPHRINE 1 MG/ML
INJECTION INTRAMUSCULAR; INTRAVENOUS; SUBCUTANEOUS
Status: DISCONTINUED | OUTPATIENT
Start: 2018-03-27 | End: 2018-03-27 | Stop reason: HOSPADM

## 2018-03-27 RX ORDER — LIDOCAINE HYDROCHLORIDE 10 MG/ML
INJECTION, SOLUTION EPIDURAL; INFILTRATION; INTRACAUDAL; PERINEURAL
Status: DISCONTINUED | OUTPATIENT
Start: 2018-03-27 | End: 2018-03-27 | Stop reason: HOSPADM

## 2018-03-27 RX ORDER — TROPICAMIDE 10 MG/ML
1 SOLUTION/ DROPS OPHTHALMIC
Status: ACTIVE | OUTPATIENT
Start: 2018-03-27 | End: 2018-03-27

## 2018-03-27 RX ORDER — PROPARACAINE HYDROCHLORIDE 5 MG/ML
1 SOLUTION/ DROPS OPHTHALMIC ONCE
Status: COMPLETED | OUTPATIENT
Start: 2018-03-27 | End: 2018-03-27

## 2018-03-27 RX ORDER — PREDNISOLONE ACETATE 10 MG/ML
SUSPENSION/ DROPS OPHTHALMIC
Status: DISCONTINUED | OUTPATIENT
Start: 2018-03-27 | End: 2018-03-27 | Stop reason: HOSPADM

## 2018-03-27 RX ORDER — PHENYLEPHRINE HYDROCHLORIDE 25 MG/ML
1 SOLUTION/ DROPS OPHTHALMIC
Status: DISPENSED | OUTPATIENT
Start: 2018-03-27 | End: 2018-03-27

## 2018-03-27 RX ORDER — SODIUM CHLORIDE, SODIUM LACTATE, POTASSIUM CHLORIDE, CALCIUM CHLORIDE 600; 310; 30; 20 MG/100ML; MG/100ML; MG/100ML; MG/100ML
INJECTION, SOLUTION INTRAVENOUS CONTINUOUS
Status: DISCONTINUED | OUTPATIENT
Start: 2018-03-27 | End: 2018-03-27 | Stop reason: HOSPADM

## 2018-03-27 RX ORDER — KETOROLAC TROMETHAMINE 5 MG/ML
1 SOLUTION OPHTHALMIC ONCE
Status: COMPLETED | OUTPATIENT
Start: 2018-03-27 | End: 2018-03-27

## 2018-03-27 RX ORDER — MIDAZOLAM HYDROCHLORIDE 1 MG/ML
INJECTION, SOLUTION INTRAMUSCULAR; INTRAVENOUS
Status: DISCONTINUED | OUTPATIENT
Start: 2018-03-27 | End: 2018-03-27

## 2018-03-27 RX ORDER — LIDOCAINE HYDROCHLORIDE 40 MG/ML
1 INJECTION, SOLUTION RETROBULBAR
Status: ACTIVE | OUTPATIENT
Start: 2018-03-27 | End: 2018-03-27

## 2018-03-27 RX ORDER — ACETAMINOPHEN 325 MG/1
650 TABLET ORAL EVERY 4 HOURS PRN
Status: DISCONTINUED | OUTPATIENT
Start: 2018-03-27 | End: 2018-03-27 | Stop reason: HOSPADM

## 2018-03-27 RX ORDER — MOXIFLOXACIN 5 MG/ML
SOLUTION/ DROPS OPHTHALMIC
Status: DISCONTINUED | OUTPATIENT
Start: 2018-03-27 | End: 2018-03-27 | Stop reason: HOSPADM

## 2018-03-27 RX ORDER — OFLOXACIN 3 MG/ML
1 SOLUTION/ DROPS OPHTHALMIC
Status: DISPENSED | OUTPATIENT
Start: 2018-03-27 | End: 2018-03-27

## 2018-03-27 RX ORDER — LIDOCAINE HYDROCHLORIDE 10 MG/ML
0.5 INJECTION, SOLUTION EPIDURAL; INFILTRATION; INTRACAUDAL; PERINEURAL ONCE AS NEEDED
Status: DISCONTINUED | OUTPATIENT
Start: 2018-03-27 | End: 2018-03-27 | Stop reason: HOSPADM

## 2018-03-27 RX ADMIN — TROPICAMIDE 1 DROP: 10 SOLUTION/ DROPS OPHTHALMIC at 08:03

## 2018-03-27 RX ADMIN — OFLOXACIN 1 DROP: 3 SOLUTION/ DROPS OPHTHALMIC at 08:03

## 2018-03-27 RX ADMIN — MIDAZOLAM HYDROCHLORIDE 1 MG: 1 INJECTION, SOLUTION INTRAMUSCULAR; INTRAVENOUS at 09:03

## 2018-03-27 RX ADMIN — PREDNISOLONE ACETATE 1 DROP: 10 SUSPENSION OPHTHALMIC at 09:03

## 2018-03-27 RX ADMIN — SODIUM HYALURONATE 0.8 MG: 10 INJECTION INTRAOCULAR at 09:03

## 2018-03-27 RX ADMIN — Medication 0.5 ML: at 09:03

## 2018-03-27 RX ADMIN — LIDOCAINE HYDROCHLORIDE 1 ML: 10 INJECTION, SOLUTION EPIDURAL; INFILTRATION; INTRACAUDAL; PERINEURAL at 09:03

## 2018-03-27 RX ADMIN — PHENYLEPHRINE HYDROCHLORIDE 1 DROP: 25 SOLUTION/ DROPS OPHTHALMIC at 08:03

## 2018-03-27 RX ADMIN — PROPARACAINE HYDROCHLORIDE 1 DROP: 5 SOLUTION/ DROPS OPHTHALMIC at 08:03

## 2018-03-27 RX ADMIN — ONDANSETRON 4 MG: 2 INJECTION, SOLUTION INTRAMUSCULAR; INTRAVENOUS at 09:03

## 2018-03-27 RX ADMIN — LIDOCAINE HYDROCHLORIDE 4 MG: 40 INJECTION, SOLUTION RETROBULBAR; TOPICAL at 08:03

## 2018-03-27 RX ADMIN — EPINEPHRINE 0.3 MG: 1 INJECTION, SOLUTION INTRAMUSCULAR; INTRAVENOUS; SUBCUTANEOUS at 09:03

## 2018-03-27 RX ADMIN — BALANCED SALT SOLUTION 500 ML: 6.4; .75; .48; .3; 3.9; 1.7 SOLUTION OPHTHALMIC at 09:03

## 2018-03-27 RX ADMIN — KETOROLAC TROMETHAMINE 1 DROP: 5 SOLUTION/ DROPS OPHTHALMIC at 08:03

## 2018-03-27 RX ADMIN — SODIUM CHLORIDE, SODIUM LACTATE, POTASSIUM CHLORIDE, CALCIUM CHLORIDE: 600; 310; 30; 20 INJECTION, SOLUTION INTRAVENOUS at 08:03

## 2018-03-27 RX ADMIN — MOXIFLOXACIN HYDROCHLORIDE 1 DROP: 5 SOLUTION/ DROPS OPHTHALMIC at 09:03

## 2018-03-27 NOTE — ANESTHESIA PREPROCEDURE EVALUATION
03/27/2018  Nicole Medina is a 72 y.o., female.    Anesthesia Evaluation      I have reviewed the Medications.   Prednisone    Review of Systems  Anesthesia Hx:  No problems with previous Anesthesia   Social:  Non-Smoker, No Alcohol Use    Cardiovascular:   Hypertension Dysrhythmias (PSVT) hyperlipidemia    Pulmonary:  Pulmonary Normal sarcoidosis   Renal/:  Renal/ Normal     Hepatic/GI:   GERD    Neurological:   CVA    Endocrine:  Endocrine Normal        Physical Exam  General:  Obesity                 Anesthesia Plan  Type of Anesthesia, risks & benefits discussed:  Anesthesia Type:  MAC  Patient's Preference:   Intra-op Monitoring Plan:   Intra-op Monitoring Plan Comments:   Post Op Pain Control Plan:   Post Op Pain Control Plan Comments:   Induction:    Beta Blocker:  Patient is on a Beta-Blocker and has received one dose within the past 24 hours (No further documentation required).       Informed Consent: Patient understands risks and agrees with Anesthesia plan.  Questions answered. Anesthesia consent signed with patient.  ASA Score: 3     Day of Surgery Review of History & Physical:            Ready For Surgery From Anesthesia Perspective.

## 2018-03-27 NOTE — DISCHARGE SUMMARY
BRIEF DISCHARGE NOTE:    Reason for hospitalization -  Cataract surgery     Final Diagnosis - Visually significant Cataract    Procedures and treatment provided - Status post phacoemulsification with placement of intraocular lens     Diet - Advance to regular as tolerated    Activity - as tolerated    Disposition at the end of the case - Good.    Discharge: to home    The patient tolerated the procedure well and knows to follow up with me tomorrow morning in the eye clinic, sooner if needed.    Patient and family instructions (as appropriate) - Given to patient on discharge    Veena Higginbotham MD

## 2018-03-27 NOTE — H&P
History    Chief complaint:  Painless progressive vision loss    Present Ilness/Diagnosis: Nuclear sclerotic Cataract    Past Medical History:  has a past medical history of Acute ischemic right MCA stroke (6/2/2017); Cataract; Essential hypertension (8/21/2017); GERD (gastroesophageal reflux disease); On home oxygen therapy; Presbyopia; PSVT (paroxysmal supraventricular tachycardia); Sarcoidosis; Sarcoidosis of lung (2/10/2015); Sciatica; TIA (transient ischemic attack) (06/02/2017); and Vertigo.    Family History/Social History: refer to chart    Allergies:   Review of patient's allergies indicates:   Allergen Reactions    Latex        Current Medications: No current facility-administered medications for this encounter.     Current Outpatient Prescriptions:     albuterol (PROVENTIL HFA) 90 mcg/actuation inhaler, Inhale 2 puffs into the lungs every 6 (six) hours as needed for Wheezing., Disp: , Rfl:     aspirin 325 MG tablet, Take 1 tablet (325 mg total) by mouth once daily., Disp: , Rfl: 0    atorvastatin (LIPITOR) 10 MG tablet, Take 1 tablet (10 mg total) by mouth once daily., Disp: 90 tablet, Rfl: 3    calcium-vitamin D 600 mg(1,500mg) -400 unit Tab, Take by mouth., Disp: , Rfl:     cholecalciferol, vitamin D3, (VITAMIN D3) 5,000 unit Tab, Take 5,000 Units by mouth once daily., Disp: , Rfl:     fluticasone (FLONASE) 50 mcg/actuation nasal spray, , Disp: , Rfl:     metoprolol succinate (TOPROL XL) 50 MG 24 hr tablet, Take 1 tablet (50 mg total) by mouth once daily., Disp: 90 tablet, Rfl: 3    mv,Ca,min-folic acid-vit K1 (ONE-A-DAY WOMEN'S 50 PLUS) 400-20 mcg Tab, Take 1 tablet by mouth once daily at 6am., Disp: , Rfl:     omeprazole (PRILOSEC) 40 MG capsule, Take 1 capsule (40 mg total) by mouth every morning., Disp: 90 capsule, Rfl: 3    SYMBICORT 160-4.5 mcg/actuation HFAA, , Disp: , Rfl:     albuterol (PROVENTIL) 2.5 mg /3 mL (0.083 %) nebulizer solution, Take 2.5 mg by nebulization every 6 (six)  hours as needed for Wheezing., Disp: , Rfl:     ibandronate (BONIVA) 150 mg tablet, Take 1 tablet (150 mg total) by mouth every 30 days., Disp: 3 tablet, Rfl: 3    ketorolac 0.5% (ACULAR) 0.5 % Drop, Place 1 drop into the left eye 3 (three) times daily., Disp: 5 mL, Rfl: 1    ofloxacin (OCUFLOX) 0.3 % ophthalmic solution, Place 1 drop into the left eye 3 (three) times daily., Disp: 5 mL, Rfl: 1    ondansetron (ZOFRAN-ODT) 8 MG TbDL, Take 1 tablet by mouth daily as needed., Disp: , Rfl:     predniSONE (DELTASONE) 5 MG tablet, , Disp: , Rfl:     Physical Exam    BP: Vital signs stable  General: No apparent distress  HEENT: nuclear sclerotic cataract  Lungs: adequate respirations  Heart: + pulses  Abdomen: soft  Rectal/pelvic: deferred    Impression: Visually significant Cataract    Plan: Phacoemulsification with implantation of Intraocular lens

## 2018-03-27 NOTE — OP NOTE
DATE OF PROCEDURE: 03/27/2018    SURGEON: MIKAYLA SCHRADER MD    PREOPERATIVE DIAGNOSIS:  1. Senile nuclear sclerotic cataract left eye. 2. Astigmatism left eye.    POSTOPERATIVE DIAGNOSIS: Senile nuclear sclerotic cataract left eye. 2. Astigmatism left eye.    PROCEDURE PERFORMED:  Phacoemulsification with placement of TORIC intraocular lens, left eye.    IMPLANT:  [id number] POWER 27.0    ANESTHESIA:  Topical and MAC    COMPLICATIONS: none    ESTIMATED BLOOD LOSS: <1cc    SPECIMENS: none    INDICATIONS FOR PROCEDURE:  This patient presented to the clinic with decreased vision in the left eye and was found to have a cataract.  The risks, benefits, and alternatives were discussed and the patient agreed to proceed with phacoemulsification and implantation of a lens in the left eye. A TORIC IOL was decided upon to mitigate the patient's pre-existing astigmatism.     PROCEDURE IN DETAIL:  The patient was met in the preop holding area.  Consent was confirmed to be signed.  The operative site was marked.  The patient was brought into the operating room by the anesthesia team and placed under monitored anesthesia care.  The left eye was marked at 3 and 9 o'clock with the patient upright prior to prepping the patient. Then the left eye was prepped and draped in a sterile ophthalmic fashion.  A Felix speculum was placed into the left eye.  The appropriate axis was marked at the corneal limbus for the TORIC IOL lens position.  A paracentesis site was made and 1% preservative-free lidocaine was injected into the anterior chamber.  Viscoelastic  material was injected into the anterior chamber.  A keratome blade was used to make a clear corneal incision.  A cystotome was used to initiate the continuous curvilinear capsulorrhexis which was completed with Utrata forceps.  BSS on a king cannula was used to perform hydrodissection.  The phacoemulsification tip was introduced into the eye and the nucleus was removed in a standard  divide-and-conquer fashion.  Remaining cortical material was removed from the eye using irrigation-aspiration.  The capsular bag was filled with viscoelastic material and the intraocular lens was injected and positioned into place, aligning the correct axis.. Remaining viscoelastic material was removed from the eye using irrigation and aspiration.  The corneal wounds were hydrated.  The eye was filled to physiologic pressure. The wounds were found to be watertight. Drops of Vigamox and prednisilone were placed into the eye.  The eye was washed, dried, and shielded.  The patient tolerated the procedure well and knows to follow up with me tomorrow morning, sooner if needed.

## 2018-03-27 NOTE — DISCHARGE INSTRUCTIONS
Recovery After Procedural Sedation (Adult)  You have been given medicine by vein to make you sleep during your surgery. This may have included both a pain medicine and sleeping medicine. Most of the effects have worn off. But you may still have some drowsiness for the next 6 to 8 hours.  Home care  Follow these guidelines when you get home:  · For the next 8 hours, you should be watched by a responsible adult. This person should make sure your condition is not getting worse.  · Don't drink any alcohol for the next 24 hours.  · Don't drive, operate dangerous machinery, or make important business or personal decisions during the next 24 hours.  Note: Your healthcare provider may tell you not to take any medicine by mouth for pain or sleep in the next 4 hours. These medicines may react with the medicines you were given in the hospital. This could cause a much stronger response than usual.  Follow-up care  Follow up with your healthcare provider if you are not alert and back to your usual level of activity within 12 hours.  When to seek medical advice  Call your healthcare provider right away if any of these occur:  · Drowsiness gets worse  · Weakness or dizziness gets worse  · Repeated vomiting  · You can't be awakened   Date Last Reviewed: 10/18/2016  © 1071-9228 [employer]. [address] [place] [address], [place] [address]. All rights reserved. This information is not intended as a substitute for professional medical care. Always follow your healthcare professional's instructions.

## 2018-03-27 NOTE — TRANSFER OF CARE
"Anesthesia Transfer of Care Note    Patient: Nicole Medina    Procedure(s) Performed: Procedure(s) (LRB):  PHACOEMULSIFICATION-ASPIRATION-CATARACT (Left)  INSERTION-INTRAOCULAR LENS (IOL) (Left)    Patient location: PACU    Anesthesia Type: MAC    Transport from OR: Transported from OR on room air with adequate spontaneous ventilation    Post pain: adequate analgesia    Post assessment: no apparent anesthetic complications    Post vital signs: stable    Level of consciousness: awake    Nausea/Vomiting: no nausea/vomiting    Complications: none    Transfer of care protocol was followed      Last vitals:   Visit Vitals  BP (!) 180/84 (BP Location: Right arm, Patient Position: Sitting)   Pulse 81   Temp 36.3 °C (97.3 °F) (Skin)   Resp 16   Ht 5' 3" (1.6 m)   Wt 86.2 kg (190 lb)   SpO2 97%   Breastfeeding? No   BMI 33.66 kg/m²     "

## 2018-03-28 ENCOUNTER — OFFICE VISIT (OUTPATIENT)
Dept: OPHTHALMOLOGY | Facility: CLINIC | Age: 73
End: 2018-03-28
Payer: MEDICARE

## 2018-03-28 DIAGNOSIS — Z98.42 STATUS POST CATARACT EXTRACTION AND INSERTION OF INTRAOCULAR LENS, LEFT: Primary | ICD-10-CM

## 2018-03-28 DIAGNOSIS — H18.463 PELLUCID MARGINAL DEGENERATION OF BOTH CORNEAS: ICD-10-CM

## 2018-03-28 DIAGNOSIS — H25.11 NUCLEAR SCLEROTIC CATARACT OF RIGHT EYE: ICD-10-CM

## 2018-03-28 DIAGNOSIS — Z96.1 STATUS POST CATARACT EXTRACTION AND INSERTION OF INTRAOCULAR LENS, LEFT: Primary | ICD-10-CM

## 2018-03-28 PROCEDURE — 92136 OPHTHALMIC BIOMETRY: CPT | Mod: 26,RT,S$GLB, | Performed by: OPHTHALMOLOGY

## 2018-03-28 PROCEDURE — 99024 POSTOP FOLLOW-UP VISIT: CPT | Mod: S$GLB,,, | Performed by: OPHTHALMOLOGY

## 2018-03-28 PROCEDURE — 99999 PR PBB SHADOW E&M-EST. PATIENT-LVL III: CPT | Mod: PBBFAC,,, | Performed by: OPHTHALMOLOGY

## 2018-03-28 PROCEDURE — 92025 CPTRIZED CORNEAL TOPOGRAPHY: CPT | Mod: S$GLB,,, | Performed by: OPHTHALMOLOGY

## 2018-03-28 NOTE — PROGRESS NOTES
HPI     Post-op Evaluation    Additional comments: 1 day s/p phaco iol OS 3/27           Ludivina Mason ref for cat samantha     s/p phaco iol OS 3/27/18 - TORIC  hyperopia    Sarcoidosis   Dry eye- uses OTC a. tears   NSC OD  irreg astig    Pt states no pain or irritation today. Va still a little blurry today.    Gtts: Prednisolone, Ketorolac, Ofloxacin TID OS       Last edited by Veena Higginbotham MD on 3/28/2018  1:29 PM. (History)            Assessment /Plan     For exam results, see Encounter Report.    Status post cataract extraction and insertion of intraocular lens, left    Nuclear sclerotic cataract of right eye  -     IOL Master - OD - Right Eye  -     Computerized corneal topography    Pellucid marginal degeneration of both corneas      Slit Lamp Exam  L/L - normal  C/s - quiet  Cornea - clear  A/C - 1+ cell  Lens - PCIOL    POD #1 s/p phaco/IOL  - doing well  - continue the following drops:    vigamox or ocuflox TID x 1 wk then stop  Pred forte or durezol or dexamethasone TID x  4 wks  Ketorolac TID until runs out    Appropriate precautions and post op medications reviewed.  Patient instructed to call or come in if symptoms of redness, decreased vision, or pain are experienced.    -f/up 1-2wks, sooner PRN --> with Dr. Mason. ARx OS. Consent signed for  OD.    Visually significant nuclear sclerotic cataract   - Interfering with activities of daily living.  Pt desires cataract surgery for Va rehabilitation.   - R/B/A discussed and pt agrees to proceed with surgery.   - IOL options discussed according to patient's goals and concomitant ocular pathology; and pt content with monofocal lens.    - Target: plano.      (OD - ZSH935 27.0 @ 148 - 157' )

## 2018-04-02 ENCOUNTER — TELEPHONE (OUTPATIENT)
Dept: OPHTHALMOLOGY | Facility: CLINIC | Age: 73
End: 2018-04-02

## 2018-04-02 DIAGNOSIS — H25.11 NUCLEAR SCLEROTIC CATARACT OF RIGHT EYE: Primary | ICD-10-CM

## 2018-04-04 ENCOUNTER — OFFICE VISIT (OUTPATIENT)
Dept: OPTOMETRY | Facility: CLINIC | Age: 73
End: 2018-04-04
Payer: MEDICARE

## 2018-04-04 DIAGNOSIS — Z98.890 POST-OPERATIVE STATE: Primary | ICD-10-CM

## 2018-04-04 PROCEDURE — 99999 PR PBB SHADOW E&M-EST. PATIENT-LVL II: CPT | Mod: PBBFAC,,, | Performed by: OPTOMETRIST

## 2018-04-04 PROCEDURE — 99024 POSTOP FOLLOW-UP VISIT: CPT | Mod: S$GLB,,, | Performed by: OPTOMETRIST

## 2018-04-04 NOTE — PROGRESS NOTES
HPI     1 week s/p phaco iol OS 3/27. Pt denies any eye pain. Using gtts as   directed  Doing well, happy with results    Gtts: Prednisolone, KetorolacTID OS    Last edited by Jorge Mason, OD on 4/4/2018  3:02 PM. (History)            Assessment /Plan     For exam results, see Encounter Report.    Post-operative state      1. Doing well, happy with results, iop fine, no pain, RTC for other eye with Neftaly.

## 2018-04-16 ENCOUNTER — TELEPHONE (OUTPATIENT)
Dept: FAMILY MEDICINE | Facility: CLINIC | Age: 73
End: 2018-04-16

## 2018-05-03 NOTE — H&P
History    Chief complaint:  Painless progressive vision loss    Present Ilness/Diagnosis: Nuclear sclerotic Cataract    Past Medical History:  has a past medical history of Acute ischemic right MCA stroke (6/2/2017); Cataract; Essential hypertension (8/21/2017); GERD (gastroesophageal reflux disease); On home oxygen therapy; Presbyopia; PSVT (paroxysmal supraventricular tachycardia); Sarcoidosis; Sarcoidosis of lung (2/10/2015); Sciatica; TIA (transient ischemic attack) (06/02/2017); and Vertigo.    Family History/Social History: refer to chart    Allergies:   Review of patient's allergies indicates:   Allergen Reactions    Latex        Current Medications: No current facility-administered medications for this encounter.     Current Outpatient Prescriptions:     albuterol (PROVENTIL HFA) 90 mcg/actuation inhaler, Inhale 2 puffs into the lungs every 6 (six) hours as needed for Wheezing., Disp: , Rfl:     albuterol (PROVENTIL) 2.5 mg /3 mL (0.083 %) nebulizer solution, Take 2.5 mg by nebulization every 6 (six) hours as needed for Wheezing., Disp: , Rfl:     aspirin 325 MG tablet, Take 1 tablet (325 mg total) by mouth once daily., Disp: , Rfl: 0    atorvastatin (LIPITOR) 10 MG tablet, Take 1 tablet (10 mg total) by mouth once daily., Disp: 90 tablet, Rfl: 3    calcium-vitamin D 600 mg(1,500mg) -400 unit Tab, Take by mouth., Disp: , Rfl:     cholecalciferol, vitamin D3, (VITAMIN D3) 5,000 unit Tab, Take 5,000 Units by mouth once daily., Disp: , Rfl:     fluticasone (FLONASE) 50 mcg/actuation nasal spray, , Disp: , Rfl:     ibandronate (BONIVA) 150 mg tablet, Take 1 tablet (150 mg total) by mouth every 30 days., Disp: 3 tablet, Rfl: 3    ketorolac 0.5% (ACULAR) 0.5 % Drop, Place 1 drop into the left eye 3 (three) times daily., Disp: 5 mL, Rfl: 1    metoprolol succinate (TOPROL XL) 50 MG 24 hr tablet, Take 1 tablet (50 mg total) by mouth once daily., Disp: 90 tablet, Rfl: 3    mv,Ca,min-folic acid-vit K1  (ONE-A-DAY WOMEN'S 50 PLUS) 400-20 mcg Tab, Take 1 tablet by mouth once daily at 6am., Disp: , Rfl:     omeprazole (PRILOSEC) 40 MG capsule, Take 1 capsule (40 mg total) by mouth every morning., Disp: 90 capsule, Rfl: 3    ondansetron (ZOFRAN-ODT) 8 MG TbDL, Take 1 tablet by mouth daily as needed., Disp: , Rfl:     predniSONE (DELTASONE) 5 MG tablet, , Disp: , Rfl:     SYMBICORT 160-4.5 mcg/actuation HFAA, , Disp: , Rfl:     Physical Exam    BP: Vital signs stable  General: No apparent distress  HEENT: nuclear sclerotic cataract  Lungs: adequate respirations  Heart: + pulses  Abdomen: soft  Rectal/pelvic: deferred    Impression: Visually significant Cataract    Plan: Phacoemulsification with implantation of Intraocular lens

## 2018-05-07 ENCOUNTER — ANESTHESIA EVENT (OUTPATIENT)
Dept: SURGERY | Facility: HOSPITAL | Age: 73
End: 2018-05-07
Payer: MEDICARE

## 2018-05-07 RX ORDER — PREDNISOLONE ACETATE 10 MG/ML
1 SUSPENSION/ DROPS OPHTHALMIC 3 TIMES DAILY
COMMUNITY
End: 2018-06-13

## 2018-05-07 RX ORDER — OFLOXACIN 3 MG/ML
1 SOLUTION/ DROPS OPHTHALMIC 3 TIMES DAILY
COMMUNITY
End: 2018-11-22

## 2018-05-08 ENCOUNTER — SURGERY (OUTPATIENT)
Age: 73
End: 2018-05-08

## 2018-05-08 ENCOUNTER — HOSPITAL ENCOUNTER (OUTPATIENT)
Facility: HOSPITAL | Age: 73
Discharge: HOME | End: 2018-05-08
Attending: OPHTHALMOLOGY | Admitting: OPHTHALMOLOGY
Payer: MEDICARE

## 2018-05-08 ENCOUNTER — ANESTHESIA (OUTPATIENT)
Dept: SURGERY | Facility: HOSPITAL | Age: 73
End: 2018-05-08
Payer: MEDICARE

## 2018-05-08 VITALS
BODY MASS INDEX: 32.44 KG/M2 | TEMPERATURE: 98 F | WEIGHT: 190 LBS | DIASTOLIC BLOOD PRESSURE: 83 MMHG | HEIGHT: 64 IN | SYSTOLIC BLOOD PRESSURE: 174 MMHG | OXYGEN SATURATION: 100 % | RESPIRATION RATE: 18 BRPM | HEART RATE: 88 BPM

## 2018-05-08 DIAGNOSIS — H25.10 SENILE NUCLEAR SCLEROSIS: ICD-10-CM

## 2018-05-08 DIAGNOSIS — H25.11 NUCLEAR SENILE CATARACT OF RIGHT EYE: Primary | ICD-10-CM

## 2018-05-08 PROCEDURE — 37000009 HC ANESTHESIA EA ADD 15 MINS: Mod: PO | Performed by: OPHTHALMOLOGY

## 2018-05-08 PROCEDURE — D9220A PRA ANESTHESIA: Mod: CRNA,,, | Performed by: NURSE ANESTHETIST, CERTIFIED REGISTERED

## 2018-05-08 PROCEDURE — 36000707: Mod: PO | Performed by: OPHTHALMOLOGY

## 2018-05-08 PROCEDURE — 63600175 PHARM REV CODE 636 W HCPCS: Mod: PO | Performed by: NURSE ANESTHETIST, CERTIFIED REGISTERED

## 2018-05-08 PROCEDURE — 37000008 HC ANESTHESIA 1ST 15 MINUTES: Mod: PO | Performed by: OPHTHALMOLOGY

## 2018-05-08 PROCEDURE — 71000033 HC RECOVERY, INTIAL HOUR: Mod: PO | Performed by: OPHTHALMOLOGY

## 2018-05-08 PROCEDURE — 66984 XCAPSL CTRC RMVL W/O ECP: CPT | Mod: 79,RT,, | Performed by: OPHTHALMOLOGY

## 2018-05-08 PROCEDURE — D9220A PRA ANESTHESIA: Mod: ANES,,, | Performed by: ANESTHESIOLOGY

## 2018-05-08 PROCEDURE — 36000706: Mod: PO | Performed by: OPHTHALMOLOGY

## 2018-05-08 PROCEDURE — V2787 ASTIGMATISM-CORRECT FUNCTION: HCPCS | Mod: PO | Performed by: OPHTHALMOLOGY

## 2018-05-08 PROCEDURE — 25000003 PHARM REV CODE 250: Mod: PO | Performed by: OPHTHALMOLOGY

## 2018-05-08 PROCEDURE — 99499 UNLISTED E&M SERVICE: CPT | Mod: ,,, | Performed by: OPHTHALMOLOGY

## 2018-05-08 PROCEDURE — 63600175 PHARM REV CODE 636 W HCPCS: Mod: PO | Performed by: OPHTHALMOLOGY

## 2018-05-08 RX ORDER — ONDANSETRON 2 MG/ML
INJECTION INTRAMUSCULAR; INTRAVENOUS
Status: DISCONTINUED | OUTPATIENT
Start: 2018-05-08 | End: 2018-05-08

## 2018-05-08 RX ORDER — SODIUM CHLORIDE 0.9 % (FLUSH) 0.9 %
3 SYRINGE (ML) INJECTION
Status: DISCONTINUED | OUTPATIENT
Start: 2018-05-08 | End: 2018-05-08 | Stop reason: HOSPADM

## 2018-05-08 RX ORDER — MOXIFLOXACIN 5 MG/ML
1 SOLUTION/ DROPS OPHTHALMIC
Status: DISCONTINUED | OUTPATIENT
Start: 2018-05-08 | End: 2018-05-08

## 2018-05-08 RX ORDER — LIDOCAINE HYDROCHLORIDE 10 MG/ML
INJECTION, SOLUTION EPIDURAL; INFILTRATION; INTRACAUDAL; PERINEURAL
Status: DISCONTINUED | OUTPATIENT
Start: 2018-05-08 | End: 2018-05-08 | Stop reason: HOSPADM

## 2018-05-08 RX ORDER — EPINEPHRINE 1 MG/ML
INJECTION INTRAMUSCULAR; INTRAVENOUS; SUBCUTANEOUS
Status: DISCONTINUED | OUTPATIENT
Start: 2018-05-08 | End: 2018-05-08 | Stop reason: HOSPADM

## 2018-05-08 RX ORDER — LIDOCAINE HYDROCHLORIDE 10 MG/ML
1 INJECTION, SOLUTION EPIDURAL; INFILTRATION; INTRACAUDAL; PERINEURAL ONCE
Status: DISCONTINUED | OUTPATIENT
Start: 2018-05-08 | End: 2018-05-08 | Stop reason: HOSPADM

## 2018-05-08 RX ORDER — KETOROLAC TROMETHAMINE 5 MG/ML
1 SOLUTION OPHTHALMIC ONCE
Status: COMPLETED | OUTPATIENT
Start: 2018-05-08 | End: 2018-05-08

## 2018-05-08 RX ORDER — LIDOCAINE HYDROCHLORIDE 40 MG/ML
1 INJECTION, SOLUTION RETROBULBAR
Status: COMPLETED | OUTPATIENT
Start: 2018-05-08 | End: 2018-05-08

## 2018-05-08 RX ORDER — ACETAMINOPHEN 325 MG/1
650 TABLET ORAL EVERY 4 HOURS PRN
Status: DISCONTINUED | OUTPATIENT
Start: 2018-05-08 | End: 2018-05-08 | Stop reason: HOSPADM

## 2018-05-08 RX ORDER — OFLOXACIN 3 MG/ML
1 SOLUTION/ DROPS OPHTHALMIC
Status: COMPLETED | OUTPATIENT
Start: 2018-05-08 | End: 2018-05-08

## 2018-05-08 RX ORDER — MIDAZOLAM HYDROCHLORIDE 1 MG/ML
INJECTION, SOLUTION INTRAMUSCULAR; INTRAVENOUS
Status: DISCONTINUED | OUTPATIENT
Start: 2018-05-08 | End: 2018-05-08

## 2018-05-08 RX ORDER — TROPICAMIDE 10 MG/ML
1 SOLUTION/ DROPS OPHTHALMIC
Status: COMPLETED | OUTPATIENT
Start: 2018-05-08 | End: 2018-05-08

## 2018-05-08 RX ORDER — PROPARACAINE HYDROCHLORIDE 5 MG/ML
1 SOLUTION/ DROPS OPHTHALMIC
Status: DISCONTINUED | OUTPATIENT
Start: 2018-05-08 | End: 2018-05-08 | Stop reason: HOSPADM

## 2018-05-08 RX ORDER — PREDNISOLONE ACETATE 10 MG/ML
SUSPENSION/ DROPS OPHTHALMIC
Status: DISCONTINUED | OUTPATIENT
Start: 2018-05-08 | End: 2018-05-08 | Stop reason: HOSPADM

## 2018-05-08 RX ORDER — PHENYLEPHRINE HYDROCHLORIDE 25 MG/ML
1 SOLUTION/ DROPS OPHTHALMIC
Status: COMPLETED | OUTPATIENT
Start: 2018-05-08 | End: 2018-05-08

## 2018-05-08 RX ORDER — MOXIFLOXACIN 5 MG/ML
SOLUTION/ DROPS OPHTHALMIC
Status: DISCONTINUED | OUTPATIENT
Start: 2018-05-08 | End: 2018-05-08 | Stop reason: HOSPADM

## 2018-05-08 RX ORDER — SODIUM CHLORIDE, SODIUM LACTATE, POTASSIUM CHLORIDE, CALCIUM CHLORIDE 600; 310; 30; 20 MG/100ML; MG/100ML; MG/100ML; MG/100ML
INJECTION, SOLUTION INTRAVENOUS CONTINUOUS
Status: DISCONTINUED | OUTPATIENT
Start: 2018-05-08 | End: 2018-05-08 | Stop reason: HOSPADM

## 2018-05-08 RX ADMIN — LIDOCAINE HYDROCHLORIDE: 40 INJECTION, SOLUTION RETROBULBAR; TOPICAL at 01:05

## 2018-05-08 RX ADMIN — TROPICAMIDE 1 DROP: 10 SOLUTION/ DROPS OPHTHALMIC at 12:05

## 2018-05-08 RX ADMIN — MOXIFLOXACIN HYDROCHLORIDE 1 DROP: 5 SOLUTION/ DROPS OPHTHALMIC at 01:05

## 2018-05-08 RX ADMIN — PREDNISOLONE ACETATE 1 DROP: 10 SUSPENSION OPHTHALMIC at 01:05

## 2018-05-08 RX ADMIN — KETOROLAC TROMETHAMINE 1 DROP: 5 SOLUTION/ DROPS OPHTHALMIC at 12:05

## 2018-05-08 RX ADMIN — BALANCED SALT SOLUTION 15 ML: 6.4; .75; .48; .3; 3.9; 1.7 SOLUTION OPHTHALMIC at 01:05

## 2018-05-08 RX ADMIN — LIDOCAINE HYDROCHLORIDE 1 ML: 10 INJECTION, SOLUTION EPIDURAL; INFILTRATION; INTRACAUDAL; PERINEURAL at 01:05

## 2018-05-08 RX ADMIN — PHENYLEPHRINE HYDROCHLORIDE 1 DROP: 25 SOLUTION/ DROPS OPHTHALMIC at 12:05

## 2018-05-08 RX ADMIN — MIDAZOLAM HYDROCHLORIDE 1 MG: 1 INJECTION, SOLUTION INTRAMUSCULAR; INTRAVENOUS at 01:05

## 2018-05-08 RX ADMIN — SODIUM HYALURONATE 0.85 MG: 10 INJECTION INTRAOCULAR at 01:05

## 2018-05-08 RX ADMIN — OFLOXACIN 1 DROP: 3 SOLUTION/ DROPS OPHTHALMIC at 01:05

## 2018-05-08 RX ADMIN — PROPARACAINE HYDROCHLORIDE 1 DROP: 5 SOLUTION/ DROPS OPHTHALMIC at 12:05

## 2018-05-08 RX ADMIN — ONDANSETRON 4 MG: 2 INJECTION, SOLUTION INTRAMUSCULAR; INTRAVENOUS at 01:05

## 2018-05-08 RX ADMIN — SODIUM CHLORIDE, SODIUM LACTATE, POTASSIUM CHLORIDE, CALCIUM CHLORIDE: 600; 310; 30; 20 INJECTION, SOLUTION INTRAVENOUS at 01:05

## 2018-05-08 RX ADMIN — EPINEPHRINE 0.3 MG: 1 INJECTION, SOLUTION INTRAMUSCULAR; INTRAVENOUS; SUBCUTANEOUS at 01:05

## 2018-05-08 RX ADMIN — Medication 0.5 ML: at 01:05

## 2018-05-08 NOTE — ANESTHESIA PREPROCEDURE EVALUATION
05/08/2018  Nicoel Medina is a 72 y.o., female.    Anesthesia Evaluation    I have reviewed the Patient Summary Reports.    I have reviewed the Nursing Notes.   I have reviewed the Medications.     Review of Systems  Anesthesia Hx:  No problems with previous Anesthesia    Social:  Non-Smoker    Cardiovascular:   Hypertension, poorly controlled    Hepatic/GI:   GERD, poorly controlled    Neurological:   TIA, CVA Neuromuscular Disease,    Endocrine:   Diabetes        Physical Exam  General:  Obesity    Airway/Jaw/Neck:  Airway Findings: Mouth Opening: Normal Tongue: Normal  General Airway Assessment: Adult, Good  Mallampati: II  Improves to II with phonation.  TM Distance: 4-6 cm      Dental:  Dental Findings: In tact   Chest/Lungs:  Chest/Lungs Findings: Clear to auscultation, Normal Respiratory Rate     Heart/Vascular:  Heart Findings: Rate: Normal  Rhythm: Regular Rhythm  Sounds: Normal  Heart murmur: negative       Mental Status:  Mental Status Findings:  Cooperative, Alert and Oriented         Anesthesia Plan  Type of Anesthesia, risks & benefits discussed:  Anesthesia Type:  MAC  Patient's Preference:   Intra-op Monitoring Plan: standard ASA monitors  Intra-op Monitoring Plan Comments:   Post Op Pain Control Plan:   Post Op Pain Control Plan Comments:   Induction:    Beta Blocker:  Patient is on a Beta-Blocker and has received one dose within the past 24 hours (No further documentation required).       Informed Consent: Patient understands risks and agrees with Anesthesia plan.  Questions answered. Anesthesia consent signed with patient.  ASA Score: 3     Day of Surgery Review of History & Physical: I have interviewed and examined the patient. I have reviewed the patient's H&P dated:  There are no significant changes.          Ready For Surgery From Anesthesia Perspective.

## 2018-05-08 NOTE — OP NOTE
DATE OF PROCEDURE: 05/08/2018    SURGEON: MIKAYLA SCHRADER MD    PREOPERATIVE DIAGNOSIS:  1. Senile nuclear sclerotic cataract right eye. 2. Astigmatism right eye.    POSTOPERATIVE DIAGNOSIS: 1. Senile nuclear sclerotic cataract right eye. 2. Astigmatism right eye.    PROCEDURE PERFORMED:  Phacoemulsification with placement of TORIC intraocular lens, right eye.    IMPLANT:  [id number] POWER 27.0    ANESTHESIA:  Topical and MAC    COMPLICATIONS: none    ESTIMATED BLOOD LOSS: <1cc    SPECIMENS: none    INDICATIONS FOR PROCEDURE:  This patient presented to the clinic with decreased vision in the right eye and was found to have a cataract.  The risks, benefits, and alternatives were discussed and the patient agreed to proceed with phacoemulsification and implantation of a lens in the right eye. A TORIC IOL was decided upon to mitigate the patient's pre-existing astigmatism.     PROCEDURE IN DETAIL:  The patient was met in the preop holding area.  Consent was confirmed to be signed.  The operative site was marked.  The patient was brought into the operating room by the anesthesia team and placed under monitored anesthesia care.  The right eye was marked at 3 and 9 o'clock with the patient upright prior to prepping the patient. Then the right eye was prepped and draped in a sterile ophthalmic fashion.  A Felix speculum was placed into the right eye.  The appropriate axis was marked at the corneal limbus for the TORIC IOL lens position.  A paracentesis site was made and 1% preservative-free lidocaine was injected into the anterior chamber.  Viscoelastic  material was injected into the anterior chamber.  A keratome blade was used to make a clear corneal incision.  A cystotome was used to initiate the continuous curvilinear capsulorrhexis which was completed with Utrata forceps.  BSS on a king cannula was used to perform hydrodissection.  The phacoemulsification tip was introduced into the eye and the nucleus was removed in a  standard divide-and-conquer fashion.  Remaining cortical material was removed from the eye using irrigation-aspiration.  The capsular bag was filled with viscoelastic material and the intraocular lens was injected and positioned into place, aligning the correct axis.. Remaining viscoelastic material was removed from the eye using irrigation and aspiration.  The corneal wounds were hydrated.  The eye was filled to physiologic pressure. The wounds were found to be watertight. Drops of Vigamox and prednisilone were placed into the eye.  The eye was washed, dried, and shielded.  The patient tolerated the procedure well and knows to follow up with me tomorrow morning, sooner if needed.

## 2018-05-08 NOTE — TRANSFER OF CARE
"Anesthesia Transfer of Care Note    Patient: Nicole Medina    Procedure(s) Performed: Procedure(s) (LRB):  PHACOEMULSIFICATION-ASPIRATION-CATARACT (Right)  INSERTION-INTRAOCULAR LENS (IOL) (Right)    Patient location: PACU    Anesthesia Type: MAC    Transport from OR: Transported from OR on room air with adequate spontaneous ventilation    Post pain: adequate analgesia    Post assessment: no apparent anesthetic complications    Post vital signs: stable    Level of consciousness: awake, alert and oriented    Nausea/Vomiting: no nausea/vomiting    Complications: none    Transfer of care protocol was followed      Last vitals:   Visit Vitals  BP (!) 176/87 (BP Location: Right arm, Patient Position: Lying)   Pulse 84   Temp 36.4 °C (97.5 °F) (Skin)   Resp 18   Ht 5' 4" (1.626 m)   Wt 86.2 kg (190 lb)   SpO2 100%   Breastfeeding? No   BMI 32.61 kg/m²     "

## 2018-05-08 NOTE — DISCHARGE INSTRUCTIONS
Recovery After Procedural Sedation (Adult)  You have been given medicine by vein to make you sleep during your surgery. This may have included both a pain medicine and sleeping medicine. Most of the effects have worn off. But you may still have some drowsiness for the next 6 to 8 hours.  Home care  Follow these guidelines when you get home:  · For the next 8 hours, you should be watched by a responsible adult. This person should make sure your condition is not getting worse.  · Don't drink any alcohol for the next 24 hours.  · Don't drive, operate dangerous machinery, or make important business or personal decisions during the next 24 hours.  Note: Your healthcare provider may tell you not to take any medicine by mouth for pain or sleep in the next 4 hours. These medicines may react with the medicines you were given in the hospital. This could cause a much stronger response than usual.  Follow-up care  Follow up with your healthcare provider if you are not alert and back to your usual level of activity within 12 hours.  When to seek medical advice  Call your healthcare provider right away if any of these occur:  · Drowsiness gets worse  · Weakness or dizziness gets worse  · Repeated vomiting  · You can't be awakened   Date Last Reviewed: 10/18/2016  © 0224-7920 [employer]. [address] [place] [address], [place] [address]. All rights reserved. This information is not intended as a substitute for professional medical care. Always follow your healthcare professional's instructions.    See eye instructions

## 2018-05-09 ENCOUNTER — OFFICE VISIT (OUTPATIENT)
Dept: OPHTHALMOLOGY | Facility: CLINIC | Age: 73
End: 2018-05-09
Payer: MEDICARE

## 2018-05-09 DIAGNOSIS — Z96.1 STATUS POST CATARACT EXTRACTION AND INSERTION OF INTRAOCULAR LENS, RIGHT: Primary | ICD-10-CM

## 2018-05-09 DIAGNOSIS — Z96.1 STATUS POST CATARACT EXTRACTION AND INSERTION OF INTRAOCULAR LENS, LEFT: ICD-10-CM

## 2018-05-09 DIAGNOSIS — Z98.42 STATUS POST CATARACT EXTRACTION AND INSERTION OF INTRAOCULAR LENS, LEFT: ICD-10-CM

## 2018-05-09 DIAGNOSIS — Z98.41 STATUS POST CATARACT EXTRACTION AND INSERTION OF INTRAOCULAR LENS, RIGHT: Primary | ICD-10-CM

## 2018-05-09 PROCEDURE — 99024 POSTOP FOLLOW-UP VISIT: CPT | Mod: S$GLB,,, | Performed by: OPHTHALMOLOGY

## 2018-05-09 PROCEDURE — 99999 PR PBB SHADOW E&M-EST. PATIENT-LVL III: CPT | Mod: PBBFAC,,, | Performed by: OPHTHALMOLOGY

## 2018-05-09 NOTE — PROGRESS NOTES
HPI     [place] ref     S/p phaco iol OD 5/8/18- TORIC  s/p phaco iol OS 3/27/18 - TORIC   Sarcoidosis   Dry eye- uses OTC a. tears   irreg astig     Pt denies any eye pain. Using gtts as directed.     Last edited by Veena Higginbotham MD on 5/9/2018  1:24 PM. (History)            Assessment /Plan     For exam results, see Encounter Report.    Status post cataract extraction and insertion of intraocular lens, right    Status post cataract extraction and insertion of intraocular lens, left      Slit Lamp Exam  L/L - normal  C/s - quiet  Cornea - clear  A/C - 1+ cell  Lens - PCIOL    POD #1 s/p phaco/IOL  - doing well  - continue the following drops:    vigamox or ocuflox TID x 1 wk then stop  Pred forte or durezol or dexamethasone TID x  4 wks  Ketorolac TID until runs out    Appropriate precautions and post op medications reviewed.  Patient instructed to call or come in if symptoms of redness, decreased vision, or pain are experienced.    -f/up 4 wks, sooner PRN.     arx / mrx OU, DFE OU with me

## 2018-06-13 ENCOUNTER — OFFICE VISIT (OUTPATIENT)
Dept: OPHTHALMOLOGY | Facility: CLINIC | Age: 73
End: 2018-06-13
Payer: MEDICARE

## 2018-06-13 ENCOUNTER — TELEPHONE (OUTPATIENT)
Dept: FAMILY MEDICINE | Facility: CLINIC | Age: 73
End: 2018-06-13

## 2018-06-13 DIAGNOSIS — H18.463 PELLUCID MARGINAL DEGENERATION OF BOTH CORNEAS: ICD-10-CM

## 2018-06-13 DIAGNOSIS — Z96.1 STATUS POST CATARACT EXTRACTION AND INSERTION OF INTRAOCULAR LENS, LEFT: ICD-10-CM

## 2018-06-13 DIAGNOSIS — Z98.41 STATUS POST CATARACT EXTRACTION AND INSERTION OF INTRAOCULAR LENS, RIGHT: Primary | ICD-10-CM

## 2018-06-13 DIAGNOSIS — Z96.1 STATUS POST CATARACT EXTRACTION AND INSERTION OF INTRAOCULAR LENS, RIGHT: Primary | ICD-10-CM

## 2018-06-13 DIAGNOSIS — Z98.42 STATUS POST CATARACT EXTRACTION AND INSERTION OF INTRAOCULAR LENS, LEFT: ICD-10-CM

## 2018-06-13 PROCEDURE — 99024 POSTOP FOLLOW-UP VISIT: CPT | Mod: S$GLB,,, | Performed by: OPHTHALMOLOGY

## 2018-06-13 PROCEDURE — 99999 PR PBB SHADOW E&M-EST. PATIENT-LVL III: CPT | Mod: PBBFAC,,, | Performed by: OPHTHALMOLOGY

## 2018-06-13 NOTE — PROGRESS NOTES
HPI     Post-op Evaluation    Additional comments: 1 month s/p phaco iol OU           Comments   Is[facility] ref     S/p phaco iol OD 5/8/18- TORIC   s/p phaco iol OS 3/27/18 - TORIC   Sarcoidosis   Dry eye- uses OTC a. tears   irreg astig     Pt states seeing well at dist at near. Uses some OTC for small print.        Last edited by Cheryle Quintana on 6/13/2018  2:17 PM. (History)            Assessment /Plan     For exam results, see Encounter Report.    Status post cataract extraction and insertion of intraocular lens, right    Status post cataract extraction and insertion of intraocular lens, left    Pellucid marginal degeneration of both corneas      S/p phaco iol OD 5/8/18- TORIC   s/p phaco iol OS 3/27/18 - TORIC     Doing well, content with VA sc.  Irreg K astig however pt doing well s/p TORIC and VA sc.    F/up 1 yr dr. Mason, sooner PRN

## 2018-08-31 ENCOUNTER — TELEPHONE (OUTPATIENT)
Dept: FAMILY MEDICINE | Facility: CLINIC | Age: 73
End: 2018-08-31

## 2018-09-05 ENCOUNTER — TELEPHONE (OUTPATIENT)
Dept: FAMILY MEDICINE | Facility: CLINIC | Age: 73
End: 2018-09-05

## 2018-09-05 DIAGNOSIS — I10 ESSENTIAL HYPERTENSION: ICD-10-CM

## 2018-09-05 DIAGNOSIS — E78.5 HYPERLIPIDEMIA LDL GOAL <70: Primary | ICD-10-CM

## 2018-09-05 DIAGNOSIS — Z00.00 BLOOD TESTS FOR ROUTINE GENERAL PHYSICAL EXAMINATION: ICD-10-CM

## 2018-09-05 NOTE — TELEPHONE ENCOUNTER
----- Message from Kristie Kim sent at 9/5/2018  1:47 PM CDT -----  Contact: self [phone]  Please enter the lab orders for the tests she needs to have performed before her annual visit in March 2019.  Also please notify her so she can schedule them.  Thank you!

## 2018-09-13 ENCOUNTER — OFFICE VISIT (OUTPATIENT)
Dept: NEUROLOGY | Facility: CLINIC | Age: 73
End: 2018-09-13
Payer: MEDICARE

## 2018-09-13 VITALS
HEIGHT: 64 IN | WEIGHT: 212.81 LBS | DIASTOLIC BLOOD PRESSURE: 64 MMHG | BODY MASS INDEX: 36.33 KG/M2 | SYSTOLIC BLOOD PRESSURE: 132 MMHG | HEART RATE: 68 BPM | RESPIRATION RATE: 18 BRPM

## 2018-09-13 DIAGNOSIS — M25.511 CHRONIC PAIN OF BOTH SHOULDERS: ICD-10-CM

## 2018-09-13 DIAGNOSIS — Z86.73 HISTORY OF ISCHEMIC RIGHT MCA STROKE: Primary | ICD-10-CM

## 2018-09-13 DIAGNOSIS — I10 ESSENTIAL HYPERTENSION: ICD-10-CM

## 2018-09-13 DIAGNOSIS — E78.5 HYPERLIPIDEMIA LDL GOAL <70: ICD-10-CM

## 2018-09-13 DIAGNOSIS — M25.512 CHRONIC PAIN OF BOTH SHOULDERS: ICD-10-CM

## 2018-09-13 DIAGNOSIS — G89.29 CHRONIC PAIN OF BOTH SHOULDERS: ICD-10-CM

## 2018-09-13 PROCEDURE — 3075F SYST BP GE 130 - 139MM HG: CPT | Mod: CPTII,,, | Performed by: PSYCHIATRY & NEUROLOGY

## 2018-09-13 PROCEDURE — 99999 PR PBB SHADOW E&M-EST. PATIENT-LVL III: CPT | Mod: PBBFAC,,, | Performed by: PSYCHIATRY & NEUROLOGY

## 2018-09-13 PROCEDURE — 3078F DIAST BP <80 MM HG: CPT | Mod: CPTII,,, | Performed by: PSYCHIATRY & NEUROLOGY

## 2018-09-13 PROCEDURE — 1101F PT FALLS ASSESS-DOCD LE1/YR: CPT | Mod: CPTII,,, | Performed by: PSYCHIATRY & NEUROLOGY

## 2018-09-13 PROCEDURE — 99213 OFFICE O/P EST LOW 20 MIN: CPT | Mod: PBBFAC,PN | Performed by: PSYCHIATRY & NEUROLOGY

## 2018-09-13 PROCEDURE — 99214 OFFICE O/P EST MOD 30 MIN: CPT | Mod: S$PBB,,, | Performed by: PSYCHIATRY & NEUROLOGY

## 2018-09-13 RX ORDER — NAPROXEN SODIUM 220 MG/1
81 TABLET, FILM COATED ORAL DAILY
Qty: 30 TABLET | Refills: 11 | Status: ON HOLD | OUTPATIENT
Start: 2018-09-13 | End: 2023-10-10 | Stop reason: HOSPADM

## 2018-09-13 NOTE — PROGRESS NOTES
Subjective:          Patient ID: Nicole Medina is a 73 y.o.  female who presents for follow up of R MCA stroke    Initial / Last History of Present Illness 8/21/17:     Reviewed hospital records from June 2017.  She presented to [facility] with a mild headache, intermittent slurred speech and left arm weakness and numbness which resolved after a few hours.  Mildly hypertensive in the emergency room, EKG showed sinus rhythm.     Studies obtained during her hospital admission included MRI of the brain which showed a slight subacute right MCA infarct in the right precentral gyrus.      MRA of the head and neck were normal.  Carotid ultrasound was also performed which did not show any significant stenosis.  LDL was 101.  She was placed on aspirin 325 mg and atorvastatin 40 mg.  Had recent follow-up with Dr. Jain encouraging lifestyle and risk factor modification.     She is here today with her .  Sx had started just the morning of presentation; left arm kept falling from her hip (she tends to put her hand on her hip when talking).  Lasted 15 minutes. 1-2 hours later, speech became slurred,  could not understand her.  Again lasted 10 minutes and speech resolved. Got to [facility] around 2 PM.      About a week prior, she had a very brief (5 seconds or so) sharp pain right side of the head.      12/12/17:     Following our last visit, recommend she delay eye surgery (cataracts) given recent stroke.  Scheduled for Jan 2018.      Notes indicate she is taking Lipitor 40 mg, aspirin 325 mg, metoprolol 50 mg.     Has been very busy with home renovations,  says she is like a tornado.      Interval history 2/28/18:     At last visit, recommended CoQ10 supplement, otherwise was doing well with respect to modification. We had recommended delaying her cataract surgery due to the stroke, recommended delay for 9 months post event. This will place her for having cataract surgery this coming month,  March 2018.      She is scheduled for March 27.  No further stroke sx, no residual symptoms.       She checks her BP at home, 130's over 60-70's     LDL is within range, < 70, taking CO-q-10     On aspirin 325, lipitor 10, metoprolol XL 50 daily     She and her  go to the gym 3 days a week, eat a healthy diet    Interval history 9/13/2018:    Has surgery for cataract in the left eye March 27, 2018.  No complications. Right eye cataract surgery done 5/8/18, also without complication.     No concerns regarding the old stroke. No residual deficits, her  is not seeing any concerns. Does not see any changes in her day to day activity or capacity to function. Speech is completely resolved. She does have some GI irritation and GERD.  Asked about reducing 325 mg to 81 mg of aspirin.    She has some intermittent pain radiating down both arms on occasion.  May be daily, worse with activity, but not too severe, can often ignore it. Occasional electrical shock on the right scalp, also some tingling left posterior scalp.    Review of patient's allergies indicates:   Allergen Reactions    Latex Itching and Swelling     Itching and swelling to site (local reaction)     Current Outpatient Medications   Medication Sig Dispense Refill    albuterol (PROVENTIL HFA) 90 mcg/actuation inhaler Inhale 2 puffs into the lungs every 6 (six) hours as needed for Wheezing.      albuterol (PROVENTIL) 2.5 mg /3 mL (0.083 %) nebulizer solution Take 2.5 mg by nebulization every 6 (six) hours as needed for Wheezing.      atorvastatin (LIPITOR) 10 MG tablet Take 1 tablet (10 mg total) by mouth once daily. 90 tablet 3    calcium-vitamin D 600 mg(1,500mg) -400 unit Tab Take by mouth.      cholecalciferol, vitamin D3, (VITAMIN D3) 5,000 unit Tab Take 5,000 Units by mouth once daily.      fluticasone (FLONASE) 50 mcg/actuation nasal spray       ibandronate (BONIVA) 150 mg tablet Take 1 tablet (150 mg total) by mouth every 30 days. 3  tablet 3    metoprolol succinate (TOPROL XL) 50 MG 24 hr tablet Take 1 tablet (50 mg total) by mouth once daily. 90 tablet 3    mv,Ca,min-folic acid-vit K1 (ONE-A-DAY WOMEN'S 50 PLUS) 400-20 mcg Tab Take 1 tablet by mouth once daily at 6am.      ofloxacin (OCUFLOX) 0.3 % ophthalmic solution 1 drop 3 (three) times daily.      omeprazole (PRILOSEC) 40 MG capsule Take 1 capsule (40 mg total) by mouth every morning. 90 capsule 3    ondansetron (ZOFRAN-ODT) 8 MG TbDL Take 1 tablet by mouth daily as needed.      predniSONE (DELTASONE) 5 MG tablet Take 5 mg by mouth once daily.       SYMBICORT 160-4.5 mcg/actuation HFAA Inhale 2 puffs into the lungs every 12 (twelve) hours.       aspirin 81 MG Chew Take 1 tablet (81 mg total) by mouth once daily. 30 tablet 11     No current facility-administered medications for this visit.          Review of Systems  Review of Systems    Objective:        Vitals:    09/13/18 1013   BP: 132/64   Pulse: 68   Resp: 18     Body mass index is 36.53 kg/m².  Neurologic Exam     Mental Status   Oriented to person, place, and time.   Registration: recalls 3 of 3 objects. Recall at 5 minutes: recalls 3 of 3 objects.   Attention: normal. Concentration: normal.   Speech: speech is normal   Level of consciousness: alert  Knowledge: good and consistent with education. Able to perform simple calculations.   Able to name object. Able to repeat. Normal comprehension.   Speech fluent and appropriate, no neglect or apraxia     Cranial Nerves   Cranial nerves II through XII intact.     CN II   Visual fields full to confrontation.     CN III, IV, VI   Pupils are equal, round, and reactive to light.  Extraocular motions are normal.   Right pupil: Reactivity: brisk.   Left pupil: Reactivity: brisk.   CN III: no CN III palsy  CN VI: no CN VI palsy  Nystagmus: none   Diplopia: none  Ophthalmoparesis: none  Conjugate gaze: present  Vestibulo-ocular reflex: present    CN V   Facial sensation intact.     CN  VII   Facial expression full, symmetric.     CN VIII   Hearing: intact    CN IX, X   Palate: symmetric    CN XI   CN XI normal.     CN XII   Tongue deviation: none    Motor Exam   Muscle bulk: normal  Overall muscle tone: normal  Right arm pronator drift: absent  Left arm pronator drift: absent    Strength   Strength 5/5 except as noted.     Sensory Exam   Light touch normal.   Vibration normal.   Proprioception normal.   Pinprick normal.     Gait, Coordination, and Reflexes     Gait  Gait: normal    Coordination   Romberg: negative    Tremor   Resting tremor: absent  Action tremor: absent    Reflexes   Right brachioradialis: 2+  Left brachioradialis: 2+  Right biceps: 2+  Left biceps: 2+  Right triceps: 2+  Left triceps: 2+  Right patellar: 2+  Left patellar: 2+  Right achilles: 2+  Left achilles: 2+  Right plantar: normal  Left plantar: normal      Physical Exam   Constitutional: She is oriented to person, place, and time.   Eyes: EOM are normal. Pupils are equal, round, and reactive to light.   Neurological: She is oriented to person, place, and time. She has a normal Romberg Test. Gait normal.   Reflex Scores:       Tricep reflexes are 2+ on the right side and 2+ on the left side.       Bicep reflexes are 2+ on the right side and 2+ on the left side.       Brachioradialis reflexes are 2+ on the right side and 2+ on the left side.       Patellar reflexes are 2+ on the right side and 2+ on the left side.       Achilles reflexes are 2+ on the right side and 2+ on the left side.  Psychiatric: Her speech is normal.         Data Review:    Assessment:        1. History of ischemic right MCA stroke    2. Hyperlipidemia LDL goal <70    3. Essential hypertension           Plan:         Problem List Items Addressed This Visit        Neuro    History of ischemic right MCA stroke - Primary    Relevant Medications    aspirin 81 MG Chew       Cardiac/Vascular    Essential hypertension    Hyperlipidemia LDL goal <70        I  think it would be fine to switch from the aspirin 325 to 81 mg, find one that is coated (enteric coated aspirin).     It's not clear that the pain in your shoulders and down your arms is related to a nerve impingement or not - I think the first step would be to work on some physical therapy or at least doing some exercise on your own.  If we need to do more specific testing, I would recommend an EMG and nerve conduction studies.      Follow-up in about 1 year (around 9/13/2019).       Thank you very much for the opportunity to assist in this patient's care.  If you have any questions or concerns, please do not hesitate to contact me at any time.     Sincerely,  Justus Babb, DO

## 2018-09-13 NOTE — PATIENT INSTRUCTIONS
I think it would be fine to switch from the aspirin 325 to 81 mg, find one that is coated (enteric coated aspirin).     It's not clear that the pain in your shoulders and down your arms is related to a nerve impingement or not - I think the first step would be to work on some physical therapy or at least doing some exercise on your own.  If we need to do more specific testing, I would recommend an EMG and nerve conduction studies.

## 2018-09-17 ENCOUNTER — PATIENT MESSAGE (OUTPATIENT)
Dept: NEUROLOGY | Facility: CLINIC | Age: 73
End: 2018-09-17

## 2018-09-17 DIAGNOSIS — M25.512 BILATERAL SHOULDER PAIN, UNSPECIFIED CHRONICITY: Primary | ICD-10-CM

## 2018-09-17 DIAGNOSIS — M25.511 BILATERAL SHOULDER PAIN, UNSPECIFIED CHRONICITY: Primary | ICD-10-CM

## 2018-09-19 ENCOUNTER — CLINICAL SUPPORT (OUTPATIENT)
Dept: REHABILITATION | Facility: HOSPITAL | Age: 73
End: 2018-09-19
Attending: PSYCHIATRY & NEUROLOGY
Payer: MEDICARE

## 2018-09-19 DIAGNOSIS — G89.29 CHRONIC PAIN OF BOTH SHOULDERS: ICD-10-CM

## 2018-09-19 DIAGNOSIS — M54.2 NECK PAIN: ICD-10-CM

## 2018-09-19 DIAGNOSIS — M25.512 CHRONIC PAIN OF BOTH SHOULDERS: ICD-10-CM

## 2018-09-19 DIAGNOSIS — M25.511 CHRONIC PAIN OF BOTH SHOULDERS: ICD-10-CM

## 2018-09-19 PROCEDURE — G8984 CARRY CURRENT STATUS: HCPCS | Mod: CK,PO | Performed by: PHYSICAL THERAPIST

## 2018-09-19 PROCEDURE — 97161 PT EVAL LOW COMPLEX 20 MIN: CPT | Mod: PO | Performed by: PHYSICAL THERAPIST

## 2018-09-19 PROCEDURE — G8985 CARRY GOAL STATUS: HCPCS | Mod: CI,PO | Performed by: PHYSICAL THERAPIST

## 2018-09-19 NOTE — PLAN OF CARE
[facility]  Physical Therapy Initial Evaluation    Name: Nicole Medina  Clinic Number: [id number]    Therapy Diagnosis:   Encounter Diagnoses   Name Primary?    Chronic pain of both shoulders     Neck pain      Physician: Justus Babb DO    Physician Orders: PT Eval and Treat   Medical Diagnosis from Referral: M25.511,M25.512 (ICD-10-CM) - Bilateral shoulder pain, unspecified chronicity  Evaluation Date: 9/19/2018  Authorization Period Expiration: 12/31/2018  Plan of Care Expiration: 10/31/2018  Visit # / Visits authorized: 1/ 20    Time In: 8:05   Time Out: 8:55  Total Billable Time: 50 minutes    Precautions: Standard    Subjective   Date of onset: About 2 months ago  History of current condition - Lety reports: An insidious onset of left shoulder and neck pain that started about 2 months ago. She is unsure of any specific causative factors. The pain is intermittent in nature and worsened with reaching certain ways. She denies any numbness/tingling into the UE's.      _       _    Past Medical History:   Diagnosis Date    Acute ischemic right MCA stroke 6/2/2017    Cataract     done ou    Essential hypertension 8/21/2017    GERD (gastroesophageal reflux disease)     History of seasonal allergies     Hyperlipidemia     On home oxygen therapy     while sleeping    Presbyopia     PSVT (paroxysmal supraventricular tachycardia)     Sarcoidosis     Sciatica     TIA [facility]transient ischemic attack) 06/02/2017    [facility]//    Vertigo      Nicole Medina  has a past surgical history that includes Total abdominal hysterectomy; Cholecystectomy; Varicose vein surgery (Bilateral); Hand surgery (Right); Tonsillectomy; Cataract extraction w/  intraocular lens implant (Left, 03/27/2018); Cataract extraction w/  intraocular lens implant (Right, 05/08/2018); PHACOEMULSIFICATION-ASPIRATION-CATARACT (Right, 5/8/2018); INSERTION-INTRAOCULAR LENS (IOL) (Right, 5/8/2018);  PHACOEMULSIFICATION-ASPIRATION-CATARACT (Left, 3/27/2018); and INSERTION-INTRAOCULAR LENS (IOL) (Left, 3/27/2018).    Nicole has a current medication list which includes the following prescription(s): albuterol, albuterol, aspirin, atorvastatin, calcium-vitamin d, cholecalciferol (vitamin d3), fluticasone, ibandronate, metoprolol succinate, mv,ca,min-folic acid-vit k1, ofloxacin, omeprazole, ondansetron, prednisone, and symbicort.    Review of patient's allergies indicates:   Allergen Reactions    Latex Itching and Swelling     Itching and swelling to site (local reaction)        Imaging, none:     Prior Therapy: No  Social History: lives with their spouse  Occupation: retired   Prior Level of Function: No limitations related to neck and shoulder   Current Level of Function: Limited with gardening activities, reaching overhead     Pain:  Current 0/10, worst 5/10, best 0/10   Location: bilateral neck  and shoulder   Description: Aching  Aggravating Factors: reaching backwards, sitting for long periods of time   Easing Factors: rest        Pts goals: Decrease pain to allow her to garden     Objective   Mental status: oriented x3  Posture/ Alignment: Protruded Head, Protracted Scapula, reduced cervical lordosis, increased thoracic kyphosis     ROM: Cervical ROM limited 50% in extension, rotation, side flexion; all non-panful    AROM Right Left Comment   Shoulder Flexion: 127 degrees 145 degrees *   Shoulder Abduction: 135 degrees 127 degrees *   PROM Right Left Comment   Shoulder Flexion: 160 degrees 160 degrees    Shoulder Abduction: 140 degrees 140 degrees    Shoulder ER, 90°ABD: 70 degrees 70 degrees    Shoulder IR, 90° ABD: 70 degrees 70 degrees    *pain    Strength:      Right Left Comment   Shoulder flexion: 5/5 5/5    Shoulder Abduction: 4+/5 4+/5    Shoulder ER: 5/5 5/5    Shoulder IR: 5/5 5/5    Lower Trap: 3+/5 3+/5    Middle Trap: 4/5 4/5     5/5 5/5      Special Tests:     - Neers: right negative  -  Maravilla-Billy: right positive  - ER Lag: right negative  - Drop Arm: right negative  - Full/Empty Can: right negative        Palpation:  Increased tone Upper traps, LS, cervical paraspinals     Pt/family was provided educational information, including: role of PT, goals for PT, scheduling - pt verbalized understanding. Discussed insurance plan with pt.     CMS Impairment/Limitation/Restriction for FOTO Shoulder Survey    Therapist reviewed FOTO scores for Nicole Medina on 9/19/2018.   FOTO documents entered into [email or web address] - see Media section.    Limitation Score: 42%  Category: Carrying    Current : CK = at least 40% but < 60% impaired, limited or restricted  Goal: CI = at least 1% but < 20% impaired, limited or restricted             TREATMENT   Treatment Time In: 8:50   Treatment Time Out: 8:55  Total Treatment time separate from Evaluation: 5 minutes    Lety received therapeutic exercises to develop ROM and posture for 5 minutes including:  HEP setup and instruction     Home Exercises and Patient Education Provided    Education provided:   - HEP  - Nature of condition  - Expected duration of treatment     Written Home Exercises Provided: yes.  Exercises were reviewed and Lety was able to demonstrate them prior to the end of the session.  Lety demonstrated good  understanding of the education provided.     See EMR under Patient Instructions for exercises provided 9/19/2018.    Assessment   Nicole is a 73 y.o. female referred to outpatient Physical Therapy with a medical diagnosis of Bilateral shoulder pain, unspecified chronicity. Pt presents with limited cervical and B shoulder ROM, postural abnormalities, pain and weakness. Her symptoms appear to be mechanical in nature and referred from the neck.     Pt prognosis is Good.   Pt will benefit from skilled outpatient Physical Therapy to address the deficits stated above and in the chart below, provide pt/family education, and to maximize pt's level of  independence.     Plan of care discussed with patient: Yes  Pt's spiritual, cultural and educational needs considered and patient is agreeable to the plan of care and goals as stated below:     Anticipated Barriers for therapy: None at this time     Medical Necessity is demonstrated by the following  History  Co-morbidities and personal factors that may impact the plan of care Co-morbidities:   high BMI, history of CVA and HTN    Personal Factors:   no deficits     low   Examination  Body Structures and Functions, activity limitations and participation restrictions that may impact the plan of care Body Regions:   neck  upper extremities    Body Systems:    ROM  strength    Participation Restrictions:   None    Activity limitations:   Learning and applying knowledge  no deficits    General Tasks and Commands  no deficits    Communication  no deficits    Mobility  lifting and carrying objects    Self care  no deficits    Domestic Life  doing house work (cleaning house, washing dishes, laundry)    Interactions/Relationships  no deficits    Life Areas  no deficits    Community and Social Life  recreation and leisure         low   Clinical Presentation stable and uncomplicated low   Decision Making/ Complexity Score: low     Goals:  Short Term Goals: 3 weeks   1) Pt will be I with established HEP  2) Shoulder ROM will improve by 5% in all planes limited  3) Strength will improve by 1/2 grade    Long Term Goals: 6 weeks   1) Pt will have sufficient strength and ROM to perform all ADL and gardening activities  2) Pain will be 4/10 at its worst   3) Pt will have < 20% limitation on the FOTO      Plan   Plan of care Certification: 9/19/2018 to 10/31/2018.    Outpatient Physical Therapy 2 times weekly for 6 weeks to include the following interventions: Manual Therapy, Moist Heat/ Ice, Patient Education, Therapeutic Exercise and dry needling.     Neo Herrera, PT

## 2018-09-19 NOTE — PATIENT INSTRUCTIONS
AROM: Neck Extension        Bend head backward. Hold ____ seconds.  Repeat ____ times per set. Do ____ sets per session. Do ____ sessions per day.     https://[employer]/300     Copyright © [employer]. All rights reserved.   AROM: Lateral Neck Flexion        Slowly tilt head toward one shoulder, then the other. Hold each position ____ seconds.  Repeat ____ times per set. Do ____ sets per session. Do ____ sessions per day.     https://[employer]/296     Copyright © [employer]. All rights reserved.   Flexibility: Upper Trapezius Stretch        Gently grasp right side of head while reaching behind back with other hand. Tilt head away until a gentle stretch is felt. Hold ____ seconds.  Repeat ____ times per set. Do ____ sets per session. Do ____ sessions per day.     [email or web address]     Copyright © [employer]. All rights reserved.   Scapular Retraction: Elbow Flexion (Standing)        With elbows bent to 90°, pinch shoulder blades together and rotate arms out, keeping elbows bent.  Repeat ____ times per set. Do ____ sets per session. Do ____ sessions per day.     [email or web address]     Copyright © [employer]. All rights reserved.

## 2018-09-25 ENCOUNTER — CLINICAL SUPPORT (OUTPATIENT)
Dept: REHABILITATION | Facility: HOSPITAL | Age: 73
End: 2018-09-25
Attending: FAMILY MEDICINE
Payer: MEDICARE

## 2018-09-25 DIAGNOSIS — M25.511 CHRONIC PAIN OF BOTH SHOULDERS: ICD-10-CM

## 2018-09-25 DIAGNOSIS — M54.2 NECK PAIN: ICD-10-CM

## 2018-09-25 DIAGNOSIS — M25.512 CHRONIC PAIN OF BOTH SHOULDERS: ICD-10-CM

## 2018-09-25 DIAGNOSIS — G89.29 CHRONIC PAIN OF BOTH SHOULDERS: ICD-10-CM

## 2018-09-25 PROCEDURE — 97110 THERAPEUTIC EXERCISES: CPT | Mod: PO | Performed by: PHYSICAL THERAPIST

## 2018-09-25 PROCEDURE — 97140 MANUAL THERAPY 1/> REGIONS: CPT | Mod: PO | Performed by: PHYSICAL THERAPIST

## 2018-09-25 NOTE — PROGRESS NOTES
"  Physical Therapy Daily Treatment Note     Name: Nicole Medina  Clinic Number: [id number]    Therapy Diagnosis:   Encounter Diagnoses   Name Primary?    Chronic pain of both shoulders     Neck pain      Physician: Justus Babb DO    Visit Date: 9/25/2018  Physician Orders: PT Eval and Treat   Medical Diagnosis from Referral: M25.511,M25.512 (ICD-10-CM) - Bilateral shoulder pain, unspecified chronicity  Evaluation Date: 9/19/2018  Authorization Period Expiration: 12/31/2018  Plan of Care Expiration: 10/31/2018  Visit # / Visits authorized: 2/ 20    Time In: 8:00 AM  Time Out: 8:55  Total Billable Time: 30 minutes    Precautions: Standard    Subjective     Pt reports: Feeling ok today. Not a lot of pain  She was compliant with home exercise program.  Response to previous treatment: Initial eval   Functional change: Too early to tell     Pain: 4/10  Location: right neck      Objective     Lety received therapeutic exercises to develop strength, flexibility and posture for 35 minutes including:  Cervical AROM x 3 2 min ea   UT stretch 3x30"  LS stretch 3x30"  Corner stretch 3x30"  Rows red theraband 3x10   B ER red theraband 3x10   Towel stretch 5 min    Lety received the following manual therapy techniques: Soft tissue Mobilization were applied to the: Cervical region for 15 minutes, including:  IASTM to B cervical paraspinals, UT patterns    Home Exercises Provided and Patient Education Provided     Education provided:   - On possibility of post exercise soreness     Written Home Exercises Provided: Patient instructed to cont prior HEP.  Exercises were reviewed and Lety was able to demonstrate them prior to the end of the session.  Lety demonstrated good  understanding of the education provided.     See EMR under Patient Instructions for exercises provided prior visit.    Assessment     No increase in pain following initiation of exercise activities. Reduced tone in UT following manual techniques   Lety " is progressing well towards her goals.   Pt prognosis is Good.     Pt will continue to benefit from skilled outpatient physical therapy to address the deficits listed in the problem list box on initial evaluation, provide pt/family education and to maximize pt's level of independence in the home and community environment.     Pt's spiritual, cultural and educational needs considered and pt agreeable to plan of care and goals.    Anticipated barriers to physical therapy:     Goals:   Short Term Goals: 3 weeks   1) Pt will be I with established HEP  2) Shoulder ROM will improve by 5% in all planes limited  3) Strength will improve by 1/2 grade     Long Term Goals: 6 weeks   1) Pt will have sufficient strength and ROM to perform all ADL and gardening activities  2) Pain will be 4/10 at its worst   3) Pt will have < 20% limitation on the FOTO    Plan     Continue with established POC     Neo Herrera, PT

## 2018-10-02 ENCOUNTER — CLINICAL SUPPORT (OUTPATIENT)
Dept: REHABILITATION | Facility: HOSPITAL | Age: 73
End: 2018-10-02
Payer: MEDICARE

## 2018-10-02 DIAGNOSIS — M25.511 CHRONIC PAIN OF BOTH SHOULDERS: ICD-10-CM

## 2018-10-02 DIAGNOSIS — G89.29 CHRONIC PAIN OF BOTH SHOULDERS: ICD-10-CM

## 2018-10-02 DIAGNOSIS — M25.512 CHRONIC PAIN OF BOTH SHOULDERS: ICD-10-CM

## 2018-10-02 DIAGNOSIS — M54.2 NECK PAIN: ICD-10-CM

## 2018-10-02 PROCEDURE — 97110 THERAPEUTIC EXERCISES: CPT | Mod: PO | Performed by: PHYSICAL THERAPIST

## 2018-10-02 NOTE — PROGRESS NOTES
"  Physical Therapy Daily Treatment Note     Name: Nicole Medina  Clinic Number: [id number]    Therapy Diagnosis:   Encounter Diagnoses   Name Primary?    Chronic pain of both shoulders     Neck pain      Physician: Justus Babb DO    Visit Date: 10/2/2018  Physician Orders: PT Eval and Treat   Medical Diagnosis from Referral: M25.511,M25.512 (ICD-10-CM) - Bilateral shoulder pain, unspecified chronicity  Evaluation Date: 9/19/2018  Authorization Period Expiration: 12/31/2018  Plan of Care Expiration: 10/31/2018  Visit # / Visits authorized: 3/ 20    Time In: 8:00 AM  Time Out: 8:55  Total Billable Time: 30 minutes    Precautions: Standard    Subjective     Pt reports: Feeling ok today. Not a lot of pain  She was compliant with home exercise program.  Response to previous treatment: Initial eval   Functional change: Too early to tell     Pain: 2/10  Location: right neck      Objective     Lety received therapeutic exercises to develop strength, flexibility and posture for 40 minutes including:  Cervical AROM x 3 2 min ea   UT stretch 3x30"  LS stretch 3x30"  Corner stretch 3x30"  Rows red theraband 3x10   B ER red theraband 3x10   Towel stretch 5 min    Lety received the following manual therapy techniques: Soft tissue Mobilization were applied to the: Cervical region for 15 minutes, including: (Not performed)  IASTM to B cervical paraspinals, UT patterns    Home Exercises Provided and Patient Education Provided     Education provided:   - On possibility of post exercise soreness     Written Home Exercises Provided: Patient instructed to cont prior HEP.  Exercises were reviewed and Lety was able to demonstrate them prior to the end of the session.  Lety demonstrated good  understanding of the education provided.     See EMR under Patient Instructions for exercises provided prior visit.    Assessment     Required frequent instruction for correct technique during theraband exercises. Positive response to " initial session. She will benefit from continued progression of postural strengthening.   Lety is progressing well towards her goals.   Pt prognosis is Good.     Pt will continue to benefit from skilled outpatient physical therapy to address the deficits listed in the problem list box on initial evaluation, provide pt/family education and to maximize pt's level of independence in the home and community environment.     Pt's spiritual, cultural and educational needs considered and pt agreeable to plan of care and goals.    Anticipated barriers to physical therapy:     Goals:   Short Term Goals: 3 weeks   1) Pt will be I with established HEP  2) Shoulder ROM will improve by 5% in all planes limited  3) Strength will improve by 1/2 grade     Long Term Goals: 6 weeks   1) Pt will have sufficient strength and ROM to perform all ADL and gardening activities  2) Pain will be 4/10 at its worst   3) Pt will have < 20% limitation on the FOTO    Plan     Continue with established POC     Neo Herrera, PT

## 2018-10-05 ENCOUNTER — CLINICAL SUPPORT (OUTPATIENT)
Dept: REHABILITATION | Facility: HOSPITAL | Age: 73
End: 2018-10-05
Payer: MEDICARE

## 2018-10-05 DIAGNOSIS — G89.29 CHRONIC PAIN OF BOTH SHOULDERS: ICD-10-CM

## 2018-10-05 DIAGNOSIS — M54.2 NECK PAIN: ICD-10-CM

## 2018-10-05 DIAGNOSIS — M25.512 CHRONIC PAIN OF BOTH SHOULDERS: ICD-10-CM

## 2018-10-05 DIAGNOSIS — M25.511 CHRONIC PAIN OF BOTH SHOULDERS: ICD-10-CM

## 2018-10-05 PROCEDURE — 97110 THERAPEUTIC EXERCISES: CPT | Mod: PO | Performed by: PHYSICAL THERAPIST

## 2018-10-05 NOTE — PROGRESS NOTES
"  Physical Therapy Daily Treatment Note     Name: Nicole Medina  Clinic Number: [id number]    Therapy Diagnosis:   Encounter Diagnoses   Name Primary?    Chronic pain of both shoulders     Neck pain      Physician: Justus Babb DO    Visit Date: 10/5/2018  Physician Orders: PT Eval and Treat   Medical Diagnosis from Referral: M25.511,M25.512 (ICD-10-CM) - Bilateral shoulder pain, unspecified chronicity  Evaluation Date: 9/19/2018  Authorization Period Expiration: 12/31/2018  Plan of Care Expiration: 10/31/2018  Visit # / Visits authorized: 4/ 20    Time In: 9:05 AM  Time Out: 9:50 AM  Total Billable Time: 30 minutes    Precautions: Standard    Subjective     Pt reports: Doing well today.   She was compliant with home exercise program.  Response to previous treatment: Felt good after last session  Functional change: Too early to tell     Pain: 0/10  Location: right neck      Objective     Lety received therapeutic exercises to develop strength, flexibility and posture for 40 minutes including:  Cervical AROM x 3 2 min ea   UT stretch 3x30"  LS stretch 3x30"  Corner stretch 3x30"  Rows red theraband 3x10   B ER red theraband 3x10   Towel stretch 5 min    Lety received the following manual therapy techniques: Soft tissue Mobilization were applied to the: Cervical region for 15 minutes, including: (Not performed)  IASTM to B cervical paraspinals, UT patterns    Home Exercises Provided and Patient Education Provided     Education provided:   - On possibility of post exercise soreness     Written Home Exercises Provided: Patient instructed to cont prior HEP.  Exercises were reviewed and Lety was able to demonstrate them prior to the end of the session.  Lety demonstrated good  understanding of the education provided.     See EMR under Patient Instructions for exercises provided prior visit.    Assessment   Pain has been reduced since onset of treatment. She will benefit from manual techniques at least 1x " weekly. She is ready for progression of exercise activities.   Lety is progressing well towards her goals.   Pt prognosis is Good.     Pt will continue to benefit from skilled outpatient physical therapy to address the deficits listed in the problem list box on initial evaluation, provide pt/family education and to maximize pt's level of independence in the home and community environment.     Pt's spiritual, cultural and educational needs considered and pt agreeable to plan of care and goals.    Anticipated barriers to physical therapy:     Goals:   Short Term Goals: 3 weeks   1) Pt will be I with established HEP  2) Shoulder ROM will improve by 5% in all planes limited  3) Strength will improve by 1/2 grade     Long Term Goals: 6 weeks   1) Pt will have sufficient strength and ROM to perform all ADL and gardening activities  2) Pain will be 4/10 at its worst   3) Pt will have < 20% limitation on the FOTO    Plan     Continue with established POC     Neo Herrera, PT

## 2018-10-09 ENCOUNTER — CLINICAL SUPPORT (OUTPATIENT)
Dept: REHABILITATION | Facility: HOSPITAL | Age: 73
End: 2018-10-09
Payer: MEDICARE

## 2018-10-09 DIAGNOSIS — M54.2 NECK PAIN: ICD-10-CM

## 2018-10-09 DIAGNOSIS — G89.29 CHRONIC PAIN OF BOTH SHOULDERS: ICD-10-CM

## 2018-10-09 DIAGNOSIS — M25.512 CHRONIC PAIN OF BOTH SHOULDERS: ICD-10-CM

## 2018-10-09 DIAGNOSIS — M25.511 CHRONIC PAIN OF BOTH SHOULDERS: ICD-10-CM

## 2018-10-09 PROCEDURE — 97140 MANUAL THERAPY 1/> REGIONS: CPT | Mod: PO | Performed by: PHYSICAL THERAPIST

## 2018-10-09 PROCEDURE — 97110 THERAPEUTIC EXERCISES: CPT | Mod: PO | Performed by: PHYSICAL THERAPIST

## 2018-10-09 NOTE — PROGRESS NOTES
"  Physical Therapy Daily Treatment Note     Name: Niocle Medina  Clinic Number: [id number]    Therapy Diagnosis:   Encounter Diagnoses   Name Primary?    Chronic pain of both shoulders     Neck pain      Physician: Justus Babb DO    Visit Date: 10/9/2018  Physician Orders: PT Eval and Treat   Medical Diagnosis from Referral: M25.511,M25.512 (ICD-10-CM) - Bilateral shoulder pain, unspecified chronicity  Evaluation Date: 9/19/2018  Authorization Period Expiration: 12/31/2018  Plan of Care Expiration: 10/31/2018  Visit # / Visits authorized: 5/ 20    Time In: 9:00 AM  Time Out: 9:50 AM  Total Billable Time: 30 minutes    Precautions: Standard    Subjective     Pt reports: Feeling good today, minimal pain   She was compliant with home exercise program.  Response to previous treatment: Felt good after last session  Functional change: Improved ADL performance     Pain: 0/10  Location: right neck      Objective     Lety received therapeutic exercises to develop strength, flexibility and posture for 40 minutes including:  Cervical AROM x 3 2 min ea   UT stretch 3x30"  LS stretch 3x30"  Corner stretch 3x30"  Rows red theraband 3x10   B ER red theraband 3x10   Towel stretch 5 min    Lety received the following manual therapy techniques: Soft tissue Mobilization were applied to the: Cervical region for 15 minutes, including:   IASTM to B cervical paraspinals, UT patterns    Home Exercises Provided and Patient Education Provided     Education provided:   - On possibility of post exercise soreness     Written Home Exercises Provided: Patient instructed to cont prior HEP.  Exercises were reviewed and Lety was able to demonstrate them prior to the end of the session.  Lety demonstrated good  understanding of the education provided.     See EMR under Patient Instructions for exercises provided prior visit.    Assessment   Reduced tone following manual techniques. Overall, pain levels have been reduced. This has led to " improved ADL performance.   Lety is progressing well towards her goals.   Pt prognosis is Good.     Pt will continue to benefit from skilled outpatient physical therapy to address the deficits listed in the problem list box on initial evaluation, provide pt/family education and to maximize pt's level of independence in the home and community environment.     Pt's spiritual, cultural and educational needs considered and pt agreeable to plan of care and goals.    Anticipated barriers to physical therapy:     Goals:   Short Term Goals: 3 weeks   1) Pt will be I with established HEP  2) Shoulder ROM will improve by 5% in all planes limited  3) Strength will improve by 1/2 grade     Long Term Goals: 6 weeks   1) Pt will have sufficient strength and ROM to perform all ADL and gardening activities  2) Pain will be 4/10 at its worst   3) Pt will have < 20% limitation on the FOTO    Plan     Continue with established POC     Neo Herrera, PT

## 2018-10-10 ENCOUNTER — CLINICAL SUPPORT (OUTPATIENT)
Dept: REHABILITATION | Facility: HOSPITAL | Age: 73
End: 2018-10-10
Payer: MEDICARE

## 2018-10-10 DIAGNOSIS — M25.512 CHRONIC PAIN OF BOTH SHOULDERS: ICD-10-CM

## 2018-10-10 DIAGNOSIS — G89.29 CHRONIC PAIN OF BOTH SHOULDERS: ICD-10-CM

## 2018-10-10 DIAGNOSIS — M25.511 CHRONIC PAIN OF BOTH SHOULDERS: ICD-10-CM

## 2018-10-10 DIAGNOSIS — M54.2 NECK PAIN: ICD-10-CM

## 2018-10-10 PROCEDURE — 97110 THERAPEUTIC EXERCISES: CPT | Mod: PO | Performed by: PHYSICAL THERAPIST

## 2018-10-10 NOTE — PROGRESS NOTES
"  Physical Therapy Daily Treatment Note     Name: Nicole Medina  Clinic Number: [id number]    Therapy Diagnosis:   Encounter Diagnoses   Name Primary?    Chronic pain of both shoulders     Neck pain      Physician: Justus Babb DO    Visit Date: 10/10/2018  Physician Orders: PT Eval and Treat   Medical Diagnosis from Referral: M25.511,M25.512 (ICD-10-CM) - Bilateral shoulder pain, unspecified chronicity  Evaluation Date: 9/19/2018  Authorization Period Expiration: 12/31/2018  Plan of Care Expiration: 10/31/2018  Visit # / Visits authorized: 5/ 20    Time In: 11:00 AM  Time Out: 11:40 AM  Total Billable Time: 30 minutes    Precautions: Standard    Subjective     Pt reports: Having minimal pain today. I am getting better   She was compliant with home exercise program.  Response to previous treatment: Felt good after last session  Functional change: Improved ADL performance     Pain: 0/10  Location: right neck      Objective     Lety received therapeutic exercises to develop strength, flexibility and posture for 42 minutes including:  Cervical AROM x 3 2 min ea   UT stretch 3x30"  LS stretch 3x30"  Corner stretch 3x30"  Rows red theraband 3x10   B ER red theraband 3x10   Towel stretch 5 min  Horizontal abd. 3x10 red theraband     Lety received the following manual therapy techniques: Soft tissue Mobilization were applied to the: Cervical region for 15 minutes, including: (Not performed)  IASTM to B cervical paraspinals, UT patterns    Home Exercises Provided and Patient Education Provided     Education provided:   - On possibility of post exercise soreness     Written Home Exercises Provided: Patient instructed to cont prior HEP.  Exercises were reviewed and Lety was able to demonstrate them prior to the end of the session.  Lety demonstrated good  understanding of the education provided.     See EMR under Patient Instructions for exercises provided prior visit.    Assessment   Significant reduction in " overall symptoms. She is progressing towards established goals  Lety is progressing well towards her goals.   Pt prognosis is Good.     Pt will continue to benefit from skilled outpatient physical therapy to address the deficits listed in the problem list box on initial evaluation, provide pt/family education and to maximize pt's level of independence in the home and community environment.     Pt's spiritual, cultural and educational needs considered and pt agreeable to plan of care and goals.    Anticipated barriers to physical therapy:     Goals:   Short Term Goals: 3 weeks   1) Pt will be I with established HEP  2) Shoulder ROM will improve by 5% in all planes limited  3) Strength will improve by 1/2 grade     Long Term Goals: 6 weeks   1) Pt will have sufficient strength and ROM to perform all ADL and gardening activities  2) Pain will be 4/10 at its worst   3) Pt will have < 20% limitation on the FOTO    Plan     Continue with established POC     Neo Herrera, PT

## 2018-10-16 ENCOUNTER — CLINICAL SUPPORT (OUTPATIENT)
Dept: REHABILITATION | Facility: HOSPITAL | Age: 73
End: 2018-10-16
Payer: MEDICARE

## 2018-10-16 DIAGNOSIS — M25.511 CHRONIC PAIN OF BOTH SHOULDERS: ICD-10-CM

## 2018-10-16 DIAGNOSIS — G89.29 CHRONIC PAIN OF BOTH SHOULDERS: ICD-10-CM

## 2018-10-16 DIAGNOSIS — M54.2 NECK PAIN: ICD-10-CM

## 2018-10-16 DIAGNOSIS — M25.512 CHRONIC PAIN OF BOTH SHOULDERS: ICD-10-CM

## 2018-10-16 PROCEDURE — 97110 THERAPEUTIC EXERCISES: CPT | Mod: PO | Performed by: PHYSICAL THERAPIST

## 2018-10-16 NOTE — PROGRESS NOTES
"  Physical Therapy Daily Treatment Note     Name: Nicole Medina  Clinic Number: [id number]    Therapy Diagnosis:   Encounter Diagnoses   Name Primary?    Chronic pain of both shoulders     Neck pain      Physician: Justus Babb DO    Visit Date: 10/16/2018  Physician Orders: PT Eval and Treat   Medical Diagnosis from Referral: M25.511,M25.512 (ICD-10-CM) - Bilateral shoulder pain, unspecified chronicity  Evaluation Date: 9/19/2018  Authorization Period Expiration: 12/31/2018  Plan of Care Expiration: 10/31/2018  Visit # / Visits authorized: 5/ 20    Time In: 9:00 AM  Time Out: 9:48 AM  Total Billable Time: 30 minutes    Precautions: Standard    Subjective     Pt reports: Having less pain in her arms and being able to reach further without pain.   She was compliant with home exercise program.  Response to previous treatment: Felt good after last session  Functional change: Improved ADL performance     Pain: 0/10  Location: right neck      Objective     Lety received therapeutic exercises to develop strength, flexibility and posture for 42 minutes including:  Cervical AROM x 3 2 min ea   UT stretch 3x30"  LS stretch 3x30"  Corner stretch 3x30"  Rows red theraband 3x10   B ER red theraband 3x10   Towel stretch 5 min  Horizontal abd. 3x10 red theraband     Lety received the following manual therapy techniques: Soft tissue Mobilization were applied to the: Cervical region for 15 minutes, including: (Not performed)  IASTM to B cervical paraspinals, UT patterns    Home Exercises Provided and Patient Education Provided     Education provided:   - On possibility of post exercise soreness     Written Home Exercises Provided: Patient instructed to cont prior HEP.  Exercises were reviewed and Lety was able to demonstrate them prior to the end of the session.  Lety demonstrated good  understanding of the education provided.     See EMR under Patient Instructions for exercises provided prior visit.    Assessment "   Significant reduction in pain levels. This has led to an improvement in functional ADL performance. If she continues with symptoms reduction, she will be appropriate for D/C next session.   Lety is progressing well towards her goals.   Pt prognosis is Good.     Pt will continue to benefit from skilled outpatient physical therapy to address the deficits listed in the problem list box on initial evaluation, provide pt/family education and to maximize pt's level of independence in the home and community environment.     Pt's spiritual, cultural and educational needs considered and pt agreeable to plan of care and goals.    Anticipated barriers to physical therapy:     Goals:   Short Term Goals: 3 weeks   1) Pt will be I with established HEP  2) Shoulder ROM will improve by 5% in all planes limited  3) Strength will improve by 1/2 grade     Long Term Goals: 6 weeks   1) Pt will have sufficient strength and ROM to perform all ADL and gardening activities  2) Pain will be 4/10 at its worst   3) Pt will have < 20% limitation on the FOTO    Plan     Likely D/C next session    Neo Herrera, PT

## 2018-10-21 NOTE — TELEPHONE ENCOUNTER
----- Message from Adriana Huizar MA sent at 8/21/2017  2:35 PM CDT -----      ----- Message -----  From: Layla Salgado  Sent: 8/21/2017   2:34 PM  To: Neftaly DOUGLAS Staff    Patients spouse Jenaro is requesting to speak to nurse, concerning patient surgery, contact him at [phone].    Thank you   
Called pt concerning cat sx scheduled with Dr Higginbotham on 9/12. Spoke with Jenaro her  and was told that pt had a stroke in June 2017 but was just seen by Justus Babb in [facility] today. Justus wants her to wait 9 months before she has any elective surgery so he dose not want her to have her cat sx until March 2018. Will cancel surgery on 9/12 and do cat eval recall for next year.   
temporal

## 2018-10-23 ENCOUNTER — CLINICAL SUPPORT (OUTPATIENT)
Dept: REHABILITATION | Facility: HOSPITAL | Age: 73
End: 2018-10-23
Payer: MEDICARE

## 2018-10-23 DIAGNOSIS — M54.2 NECK PAIN: ICD-10-CM

## 2018-10-23 DIAGNOSIS — G89.29 CHRONIC PAIN OF BOTH SHOULDERS: ICD-10-CM

## 2018-10-23 DIAGNOSIS — M25.511 CHRONIC PAIN OF BOTH SHOULDERS: ICD-10-CM

## 2018-10-23 DIAGNOSIS — M25.512 CHRONIC PAIN OF BOTH SHOULDERS: ICD-10-CM

## 2018-10-23 PROCEDURE — G8984 CARRY CURRENT STATUS: HCPCS | Mod: CJ,PO | Performed by: PHYSICAL THERAPIST

## 2018-10-23 PROCEDURE — 97110 THERAPEUTIC EXERCISES: CPT | Mod: PO | Performed by: PHYSICAL THERAPIST

## 2018-10-23 PROCEDURE — G8986 CARRY D/C STATUS: HCPCS | Mod: CJ,PO | Performed by: PHYSICAL THERAPIST

## 2018-10-23 NOTE — PLAN OF CARE
[facility]  Physical Therapy Discharge Summary     Name: Nicole Medina  Clinic Number: [id number]    Therapy Diagnosis:   Encounter Diagnoses   Name Primary?    Chronic pain of both shoulders     Neck pain      Physician: Justus Babb DO    Physician Orders: PT Eval and Treat   Medical Diagnosis from Referral: M25.511,M25.512 (ICD-10-CM) - Bilateral shoulder pain, unspecified chronicity  Evaluation Date: 10/23/2018  Authorization Period Expiration: 12/31/2018  Plan of Care Expiration: 10/31/2018  Visit # / Visits authorized: 8/ 20    Time In: 9:00   Time Out: 9:40  Total Billable Time: 40 minutes    Precautions: Standard    Subjective   Date of onset: About 2 months ago  History of current condition - Lety reports: Feeling much better overall. I only have discomfort when I overdue things.     Past Medical History:   Diagnosis Date    Acute ischemic right MCA stroke 6/2/2017    Cataract     done ou    Essential hypertension 8/21/2017    GERD (gastroesophageal reflux disease)     History of seasonal allergies     Hyperlipidemia     On home oxygen therapy     while sleeping    Presbyopia     PSVT (paroxysmal supraventricular tachycardia)     Sarcoidosis     Sciatica     TIA (transient ischemic attack) 06/02/2017    [facility]//    Vertigo      Nicole Medina  has a past surgical history that includes Total abdominal hysterectomy; Cholecystectomy; Varicose vein surgery (Bilateral); Hand surgery (Right); Tonsillectomy; Cataract extraction w/  intraocular lens implant (Left, 03/27/2018); Cataract extraction w/  intraocular lens implant (Right, 05/08/2018); PHACOEMULSIFICATION-ASPIRATION-CATARACT (Right, 5/8/2018); INSERTION-INTRAOCULAR LENS (IOL) (Right, 5/8/2018); PHACOEMULSIFICATION-ASPIRATION-CATARACT (Left, 3/27/2018); and INSERTION-INTRAOCULAR LENS (IOL) (Left, 3/27/2018).    Nicole has a current medication list which includes the following prescription(s):  albuterol, albuterol, aspirin, atorvastatin, calcium-vitamin d, cholecalciferol (vitamin d3), fluticasone, ibandronate, metoprolol succinate, mv,ca,min-folic acid-vit k1, ofloxacin, omeprazole, ondansetron, prednisone, and symbicort.    Review of patient's allergies indicates:   Allergen Reactions    Latex Itching and Swelling     Itching and swelling to site (local reaction)        Imaging, none:     Prior Therapy: No  Social History: lives with their spouse  Occupation: retired   Prior Level of Function: No limitations related to neck and shoulder   Current Level of Function: Improved ability to perform overhead reaching activities    Pain:  Current 0/10, worst 3/10, best 0/10   Location: bilateral neck  and shoulder   Description: Aching  Aggravating Factors: reaching backwards, sitting for long periods of time   Easing Factors: rest        Pts goals: Decrease pain to allow her to garden     Objective   Mental status: oriented x3  Posture/ Alignment: Protruded Head, Protracted Scapula, reduced cervical lordosis, increased thoracic kyphosis     ROM: Cervical ROM limited by 25% in extension, rotation, side flexion; all non-panful    AROM Right Left Comment   Shoulder Flexion: 135 degrees 145 degrees *   Shoulder Abduction: 150 degrees 160 degrees *   PROM Right Left Comment   Shoulder Flexion: 160 degrees 160 degrees    Shoulder Abduction: 140 degrees 140 degrees    Shoulder ER, 90°ABD: 70 degrees 70 degrees    Shoulder IR, 90° ABD: 70 degrees 70 degrees    *pain    Strength:      Right Left Comment   Shoulder flexion: 5/5 5/5    Shoulder Abduction: 5/5 5/5    Shoulder ER: 5/5 5/5    Shoulder IR: 5/5 5/5    Lower Trap: 4/5 4/5    Middle Trap: 4/5 4/5     5/5 5/5      Special Tests:     - Neers: right negative  - Maravilla-Billy: right negative  - ER Lag: right negative  - Drop Arm: right negative  - Full/Empty Can: right negative        Palpation:  Increased tone Upper traps, LS, cervical paraspinals      Pt/family was provided educational information, including: role of PT, goals for PT, scheduling - pt verbalized understanding. Discussed insurance plan with pt.     CMS Impairment/Limitation/Restriction for FOTO Shoulder Survey    Therapist reviewed FOTO scores for Nicole Medina on 10/23/2018.   FOTO documents entered into [employer] - see Media section.    Limitation Score: 21%  Category: Carrying    Current : CJ = at least 20% but < 40% impaired, limited or restricted  Goal: CI = at least 1% but < 20% impaired, limited or restricted             TREATMENT   See note for today's treatment     Home Exercises and Patient Education Provided    Education provided:   - HEP  - Nature of condition  - Expected duration of treatment     Written Home Exercises Provided: yes.  Exercises were reviewed and Lety was able to demonstrate them prior to the end of the session.  Lety demonstrated good  understanding of the education provided.     See EMR under Patient Instructions for exercises provided 9/19/2018.    Assessment   Nicole is a 73 y.o. female referred to outpatient Physical Therapy with a medical diagnosis of Bilateral shoulder pain, unspecified chronicity. Pt has made improvements in strength, ROM, and functional use of B UE. She is appropriate for discharge at this time. She will benefit from continued performance of her HEP to maintain gains.     Pt prognosis is Good.   Pt will benefit from skilled outpatient Physical Therapy to address the deficits stated above and in the chart below, provide pt/family education, and to maximize pt's level of independence.     Plan of care discussed with patient: Yes  Pt's spiritual, cultural and educational needs considered and patient is agreeable to the plan of care and goals as stated below:     Anticipated Barriers for therapy: None at this time     Medical Necessity is demonstrated by the following  History  Co-morbidities and personal factors that may impact the plan of care  Co-morbidities:   high BMI, history of CVA and HTN    Personal Factors:   no deficits     low   Examination  Body Structures and Functions, activity limitations and participation restrictions that may impact the plan of care Body Regions:   neck  upper extremities    Body Systems:    ROM  strength    Participation Restrictions:   None    Activity limitations:   Learning and applying knowledge  no deficits    General Tasks and Commands  no deficits    Communication  no deficits    Mobility  lifting and carrying objects    Self care  no deficits    Domestic Life  doing house work (cleaning house, washing dishes, laundry)    Interactions/Relationships  no deficits    Life Areas  no deficits    Community and Social Life  recreation and leisure         low   Clinical Presentation stable and uncomplicated low   Decision Making/ Complexity Score: low     Goals:  Short Term Goals: 3 weeks All goals met   1) Pt will be I with established HEP  2) Shoulder ROM will improve by 5% in all planes limited  3) Strength will improve by 1/2 grade    Long Term Goals: 6 weeks   1) Pt will have sufficient strength and ROM to perform all ADL and gardening activities Met 10/23/18  2) Pain will be 4/10 at its worst Met 10/23/18  3) Pt will have < 20% limitation on the FOTO Not met      Plan   Plan of care Certification: 10/23/2018 to 10/31/2018.    Discharge today with instructions to continue HEP. Goals met and pt. Is appropriate for discharge.      Neo Herrera, PT

## 2018-10-23 NOTE — PROGRESS NOTES
"  Physical Therapy Daily Treatment Note     Name: Nicole Medina  Clinic Number: [id number]    Therapy Diagnosis:   Encounter Diagnoses   Name Primary?    Chronic pain of both shoulders     Neck pain      Physician: Justus Babb DO    Visit Date: 10/23/2018  Physician Orders: PT Eval and Treat   Medical Diagnosis from Referral: M25.511,M25.512 (ICD-10-CM) - Bilateral shoulder pain, unspecified chronicity  Evaluation Date: 9/19/2018  Authorization Period Expiration: 12/31/2018  Plan of Care Expiration: 10/31/2018  Visit # / Visits authorized: 8/ 20    Time In: 9:00 AM  Time Out: 9:48 AM  Total Billable Time: 30 minutes    Precautions: Standard    Subjective     Pt reports: Having less pain in her arms and being able to reach further without pain.   She was compliant with home exercise program.  Response to previous treatment: Felt good after last session  Functional change: Improved ADL performance     Pain: 0/10  Location: right neck      Objective     Lety received therapeutic exercises to develop strength, flexibility and posture for 40 minutes including:  Cervical AROM x 3 2 min ea   UT stretch 3x30"  LS stretch 3x30"  Corner stretch 3x30"  Rows red theraband 3x10   B ER red theraband 3x10   Towel stretch 5 min  Horizontal abd. 3x10 red theraband     Lety received the following manual therapy techniques:     Home Exercises Provided and Patient Education Provided     Education provided:   - On possibility of post exercise soreness     Written Home Exercises Provided: Patient instructed to cont prior HEP.  Exercises were reviewed and Lety was able to demonstrate them prior to the end of the session.  Lety demonstrated good  understanding of the education provided.     See EMR under Patient Instructions for exercises provided prior visit.    Assessment   See treatment section .   Lety is progressing well towards her goals.   Pt prognosis is Good.     Pt will continue to benefit from skilled outpatient " physical therapy to address the deficits listed in the problem list box on initial evaluation, provide pt/family education and to maximize pt's level of independence in the home and community environment.     Pt's spiritual, cultural and educational needs considered and pt agreeable to plan of care and goals.    Anticipated barriers to physical therapy:     Goals:   Short Term Goals: 3 weeks   1) Pt will be I with established HEP  2) Shoulder ROM will improve by 5% in all planes limited  3) Strength will improve by 1/2 grade     Long Term Goals: 6 weeks   1) Pt will have sufficient strength and ROM to perform all ADL and gardening activities  2) Pain will be 4/10 at its worst   3) Pt will have < 20% limitation on the FOTO    Plan     Discharge today     Neo Herrera, PT

## 2018-11-22 PROBLEM — T18.108A ESOPHAGEAL FOREIGN BODY, INITIAL ENCOUNTER: Status: ACTIVE | Noted: 2018-11-22

## 2019-01-04 ENCOUNTER — TELEPHONE (OUTPATIENT)
Dept: OPHTHALMOLOGY | Facility: CLINIC | Age: 74
End: 2019-01-04

## 2019-01-04 NOTE — TELEPHONE ENCOUNTER
Called pt concerning eye problem. Scheduled her with Dr Higginbotham for blurred va with light sensitive.

## 2019-01-04 NOTE — TELEPHONE ENCOUNTER
----- Message from Adriana Huizar MA sent at 1/4/2019 10:40 AM CST -----      ----- Message -----  From: Helena Gutierrez  Sent: 1/4/2019   9:28 AM  To: Neftaly DOUGLAS Staff    Type:  Sooner Apoointment Request    Caller is requesting a sooner appointment.  Caller declined first available appointment listed below.  Caller will not accept being placed on the waitlist and is requesting a message be sent to doctor.    Name of Caller:  Self When is the first available appointment?  NA   Symptoms: NA   Best Call Back Number: [phone]/cell [phone] Additional Information:  Patient had cataract surgery, having problem with both eyes, patient requesting to schedule an appointment.

## 2019-01-09 ENCOUNTER — OFFICE VISIT (OUTPATIENT)
Dept: OPHTHALMOLOGY | Facility: CLINIC | Age: 74
End: 2019-01-09
Payer: MEDICARE

## 2019-01-09 DIAGNOSIS — H26.491 POSTERIOR CAPSULAR OPACIFICATION VISUALLY SIGNIFICANT, RIGHT EYE: Primary | ICD-10-CM

## 2019-01-09 DIAGNOSIS — H26.492 POSTERIOR CAPSULAR OPACIFICATION VISUALLY SIGNIFICANT, LEFT EYE: ICD-10-CM

## 2019-01-09 DIAGNOSIS — H18.463 PELLUCID MARGINAL DEGENERATION OF BOTH CORNEAS: ICD-10-CM

## 2019-01-09 PROCEDURE — 92014 PR EYE EXAM, EST PATIENT,COMPREHESV: ICD-10-PCS | Mod: 57,S$GLB,, | Performed by: OPHTHALMOLOGY

## 2019-01-09 PROCEDURE — 99999 PR PBB SHADOW E&M-EST. PATIENT-LVL III: CPT | Mod: PBBFAC,,, | Performed by: OPHTHALMOLOGY

## 2019-01-09 PROCEDURE — 66821 AFTER CATARACT LASER SURGERY: CPT | Mod: 50,S$GLB,, | Performed by: OPHTHALMOLOGY

## 2019-01-09 PROCEDURE — 66821 YAG CAPSULOTOMY - OS - LEFT EYE: ICD-10-PCS | Mod: 50,S$GLB,, | Performed by: OPHTHALMOLOGY

## 2019-01-09 PROCEDURE — 92014 COMPRE OPH EXAM EST PT 1/>: CPT | Mod: 57,S$GLB,, | Performed by: OPHTHALMOLOGY

## 2019-01-09 PROCEDURE — 99999 PR PBB SHADOW E&M-EST. PATIENT-LVL III: ICD-10-PCS | Mod: PBBFAC,,, | Performed by: OPHTHALMOLOGY

## 2019-01-09 RX ORDER — PNEUMOCOCCAL VACCINE POLYVALENT 25; 25; 25; 25; 25; 25; 25; 25; 25; 25; 25; 25; 25; 25; 25; 25; 25; 25; 25; 25; 25; 25; 25 UG/.5ML; UG/.5ML; UG/.5ML; UG/.5ML; UG/.5ML; UG/.5ML; UG/.5ML; UG/.5ML; UG/.5ML; UG/.5ML; UG/.5ML; UG/.5ML; UG/.5ML; UG/.5ML; UG/.5ML; UG/.5ML; UG/.5ML; UG/.5ML; UG/.5ML; UG/.5ML; UG/.5ML; UG/.5ML; UG/.5ML
INJECTION, SOLUTION INTRAMUSCULAR; SUBCUTANEOUS
Refills: 0 | COMMUNITY
Start: 2018-10-05 | End: 2019-08-07

## 2019-01-09 RX ORDER — PREDNISOLONE ACETATE 10 MG/ML
1 SUSPENSION/ DROPS OPHTHALMIC 4 TIMES DAILY
Qty: 5 ML | Refills: 0 | Status: SHIPPED | OUTPATIENT
Start: 2019-01-09 | End: 2019-01-13

## 2019-01-09 NOTE — PROGRESS NOTES
HPI     Eye Problem      Additional comments: Blurred va OS with light sensitivity              Comments     [facility] ref     S/p phaco iol OD 5/8/18- [facility]   s/p phaco iol OS 3/27/18 - [facility]   Sarcoidosis   Dry eye- uses OTC a. tears   irreg astig /pellucid    Pt states has been having some blurred va and light sensitivity OS > OD x   2 wks. No pain.           Last edited by Veena Higginbotham MD on 1/9/2019  1:53 PM. (History)            Assessment /Plan     For exam results, see Encounter Report.    Posterior capsular opacification visually significant, right eye  -     Yag Capsulotomy - OS - Left Eye  -     Yag Capsulotomy - OD - Right Eye    Posterior capsular opacification visually significant, left eye  -     Yag Capsulotomy - OS - Left Eye  -     Yag Capsulotomy - OD - Right Eye    Pellucid marginal degeneration of both corneas    Other orders  -     prednisoLONE acetate (PRED FORTE) 1 % DrpS; Place 1 drop into both eyes 4 (four) times daily. 1 drop left eye four times a day for four days then stop. for 4 days  Dispense: 5 mL; Refill: 0      Visually significant posterior capsular opacity present.    - discussed risks, benefits, and alternatives to laser surgery - pt wishes to proceed with yag laser  - Informed consent obtained and correct eye(s) verified with patient.  - Intraocular Pressure to be taken 10- 30 minutes post procedure.   - PF QID x 4 days then d/c  - f/up as scheduled    DIAGNOSIS: Visually significant posterior capsular opacity    PROCEDURE: YAG Laser Capsulotomy     COMPLICATIONS: none     DESCRIPTION OF PROCEDURE IN DETAIL:  1 drop of topical Proparacaine and Iopidine instilled, and eye(s) dilated with 1% Tropicamide 2.5% Phenylephrine. YAG laser applied to posterior capsule in cruciate pattern.      DISPOSITION:  Patient tolerated procedure well.      Will call pt next wk to ensure doing well s/p yag cap OU    F/up REE with cammie -

## 2019-01-15 ENCOUNTER — PATIENT MESSAGE (OUTPATIENT)
Dept: FAMILY MEDICINE | Facility: CLINIC | Age: 74
End: 2019-01-15

## 2019-01-15 ENCOUNTER — TELEPHONE (OUTPATIENT)
Dept: OPHTHALMOLOGY | Facility: CLINIC | Age: 74
End: 2019-01-15

## 2019-01-15 NOTE — TELEPHONE ENCOUNTER
----- Message from Veena Higginbotham MD sent at 1/9/2019  2:21 PM CST -----  Will call pt next wk to ensure doing well s/p yag cap OU    F/up REE with cammie -

## 2019-01-15 NOTE — TELEPHONE ENCOUNTER
Called pt per Dr Higginbotham to see how she is doing post yag cap surgery OU. Pt says she did see some little black specks in OD yesterday but not today. Let her know that was normal and they should go away in time. Vision doing well OU.

## 2019-01-17 ENCOUNTER — TELEPHONE (OUTPATIENT)
Dept: FAMILY MEDICINE | Facility: CLINIC | Age: 74
End: 2019-01-17

## 2019-01-17 DIAGNOSIS — E78.5 HYPERLIPIDEMIA, UNSPECIFIED HYPERLIPIDEMIA TYPE: Primary | ICD-10-CM

## 2019-01-17 NOTE — TELEPHONE ENCOUNTER
----- Message from Norah Sierra sent at 1/17/2019  8:48 AM CST -----  Pt was scheduled for 03/20 th / the Dr is requesting to reschedule / now she would like to schedule labs and the new appt/ please call pt at [phone]

## 2019-01-17 NOTE — TELEPHONE ENCOUNTER
Spoke with pt and rescheduled her appointment. Also informed her that orders were put in for lab work. Pt verbalized understanding.

## 2019-01-17 NOTE — TELEPHONE ENCOUNTER
----- Message from Helena Gutierrez sent at 1/17/2019  1:59 PM CST -----  Type:  Patient Returning Call    Who Called:  Self   Who Left Message for Patient: Magalie   Does the patient know what this is regarding?:  NA  Best Call Back Number:  [phone]  Additional Information:

## 2019-03-11 ENCOUNTER — LAB VISIT (OUTPATIENT)
Dept: LAB | Facility: HOSPITAL | Age: 74
End: 2019-03-11
Attending: FAMILY MEDICINE
Payer: MEDICARE

## 2019-03-11 DIAGNOSIS — Z00.00 BLOOD TESTS FOR ROUTINE GENERAL PHYSICAL EXAMINATION: ICD-10-CM

## 2019-03-11 DIAGNOSIS — E78.5 HYPERLIPIDEMIA, UNSPECIFIED HYPERLIPIDEMIA TYPE: ICD-10-CM

## 2019-03-11 LAB
ALBUMIN SERPL BCP-MCNC: 3.6 G/DL
ALP SERPL-CCNC: 78 U/L
ALT SERPL W/O P-5'-P-CCNC: 18 U/L
ANION GAP SERPL CALC-SCNC: 7 MMOL/L
AST SERPL-CCNC: 21 U/L
BILIRUB SERPL-MCNC: 0.7 MG/DL
BUN SERPL-MCNC: 13 MG/DL
CALCIUM SERPL-MCNC: 9.6 MG/DL
CHLORIDE SERPL-SCNC: 102 MMOL/L
CHOLEST SERPL-MCNC: 157 MG/DL
CHOLEST/HDLC SERPL: 2 {RATIO}
CO2 SERPL-SCNC: 30 MMOL/L
CREAT SERPL-MCNC: 0.7 MG/DL
EST. GFR  (AFRICAN AMERICAN): >60 ML/MIN/1.73 M^2
EST. GFR  (NON AFRICAN AMERICAN): >60 ML/MIN/1.73 M^2
GLUCOSE SERPL-MCNC: 100 MG/DL
HDLC SERPL-MCNC: 77 MG/DL
HDLC SERPL: 49 %
LDLC SERPL CALC-MCNC: 69 MG/DL
NONHDLC SERPL-MCNC: 80 MG/DL
POTASSIUM SERPL-SCNC: 4.3 MMOL/L
PROT SERPL-MCNC: 7.8 G/DL
SODIUM SERPL-SCNC: 139 MMOL/L
TRIGL SERPL-MCNC: 55 MG/DL

## 2019-03-11 PROCEDURE — 80053 COMPREHEN METABOLIC PANEL: CPT | Mod: HCNC

## 2019-03-11 PROCEDURE — 80061 LIPID PANEL: CPT | Mod: HCNC

## 2019-03-11 PROCEDURE — 36415 COLL VENOUS BLD VENIPUNCTURE: CPT | Mod: HCNC,PO

## 2019-03-25 ENCOUNTER — OFFICE VISIT (OUTPATIENT)
Dept: FAMILY MEDICINE | Facility: CLINIC | Age: 74
End: 2019-03-25
Payer: MEDICARE

## 2019-03-25 ENCOUNTER — HOSPITAL ENCOUNTER (OUTPATIENT)
Dept: RADIOLOGY | Facility: HOSPITAL | Age: 74
Discharge: HOME | End: 2019-03-25
Attending: FAMILY MEDICINE
Payer: MEDICARE

## 2019-03-25 VITALS
SYSTOLIC BLOOD PRESSURE: 139 MMHG | DIASTOLIC BLOOD PRESSURE: 70 MMHG | HEART RATE: 83 BPM | WEIGHT: 207.69 LBS | BODY MASS INDEX: 36.79 KG/M2 | OXYGEN SATURATION: 98 %

## 2019-03-25 DIAGNOSIS — D86.0 SARCOIDOSIS, LUNG: ICD-10-CM

## 2019-03-25 DIAGNOSIS — Z00.00 ROUTINE HEALTH MAINTENANCE: Primary | ICD-10-CM

## 2019-03-25 DIAGNOSIS — I47.10 PSVT (PAROXYSMAL SUPRAVENTRICULAR TACHYCARDIA): ICD-10-CM

## 2019-03-25 DIAGNOSIS — I70.0 AORTIC ATHEROSCLEROSIS: ICD-10-CM

## 2019-03-25 DIAGNOSIS — E66.01 MORBID OBESITY: ICD-10-CM

## 2019-03-25 DIAGNOSIS — R42 VERTIGO: ICD-10-CM

## 2019-03-25 DIAGNOSIS — I10 ESSENTIAL HYPERTENSION: ICD-10-CM

## 2019-03-25 PROCEDURE — 3078F DIAST BP <80 MM HG: CPT | Mod: HCNC,CPTII,S$GLB, | Performed by: FAMILY MEDICINE

## 2019-03-25 PROCEDURE — 3078F PR MOST RECENT DIASTOLIC BLOOD PRESSURE < 80 MM HG: ICD-10-PCS | Mod: HCNC,CPTII,S$GLB, | Performed by: FAMILY MEDICINE

## 2019-03-25 PROCEDURE — 99397 PER PM REEVAL EST PAT 65+ YR: CPT | Mod: HCNC,S$GLB,, | Performed by: FAMILY MEDICINE

## 2019-03-25 PROCEDURE — 99999 PR PBB SHADOW E&M-EST. PATIENT-LVL III: ICD-10-PCS | Mod: PBBFAC,HCNC,, | Performed by: FAMILY MEDICINE

## 2019-03-25 PROCEDURE — 99397 PR PREVENTIVE VISIT,EST,65 & OVER: ICD-10-PCS | Mod: HCNC,S$GLB,, | Performed by: FAMILY MEDICINE

## 2019-03-25 PROCEDURE — 3075F SYST BP GE 130 - 139MM HG: CPT | Mod: HCNC,CPTII,S$GLB, | Performed by: FAMILY MEDICINE

## 2019-03-25 PROCEDURE — 99999 PR PBB SHADOW E&M-EST. PATIENT-LVL III: CPT | Mod: PBBFAC,HCNC,, | Performed by: FAMILY MEDICINE

## 2019-03-25 PROCEDURE — 71250 CT THORAX DX C-: CPT | Mod: 26,HCNC,, | Performed by: RADIOLOGY

## 2019-03-25 PROCEDURE — 71250 CT CHEST WITHOUT CONTRAST: ICD-10-PCS | Mod: 26,HCNC,, | Performed by: RADIOLOGY

## 2019-03-25 PROCEDURE — 71250 CT THORAX DX C-: CPT | Mod: TC,HCNC,PO

## 2019-03-25 PROCEDURE — 3075F PR MOST RECENT SYSTOLIC BLOOD PRESS GE 130-139MM HG: ICD-10-PCS | Mod: HCNC,CPTII,S$GLB, | Performed by: FAMILY MEDICINE

## 2019-03-25 PROCEDURE — 99499 RISK ADDL DX/OHS AUDIT: ICD-10-PCS | Mod: HCNC,S$GLB,, | Performed by: FAMILY MEDICINE

## 2019-03-25 PROCEDURE — 99499 UNLISTED E&M SERVICE: CPT | Mod: HCNC,S$GLB,, | Performed by: FAMILY MEDICINE

## 2019-03-25 RX ORDER — IBANDRONATE SODIUM 150 MG/1
150 TABLET, FILM COATED ORAL
Qty: 3 TABLET | Refills: 3 | Status: SHIPPED | OUTPATIENT
Start: 2019-03-25 | End: 2020-04-27 | Stop reason: SDUPTHER

## 2019-03-25 RX ORDER — ATORVASTATIN CALCIUM 20 MG/1
20 TABLET, FILM COATED ORAL DAILY
Qty: 90 TABLET | Refills: 3 | Status: SHIPPED | OUTPATIENT
Start: 2019-03-25 | End: 2020-04-27 | Stop reason: SDUPTHER

## 2019-03-25 RX ORDER — OMEPRAZOLE 40 MG/1
40 CAPSULE, DELAYED RELEASE ORAL EVERY MORNING
Qty: 90 CAPSULE | Refills: 3 | Status: SHIPPED | OUTPATIENT
Start: 2019-03-25 | End: 2020-04-27 | Stop reason: SDUPTHER

## 2019-03-25 RX ORDER — METOPROLOL SUCCINATE 50 MG/1
50 TABLET, EXTENDED RELEASE ORAL DAILY
Qty: 90 TABLET | Refills: 3 | Status: SHIPPED | OUTPATIENT
Start: 2019-03-25 | End: 2020-04-27 | Stop reason: SDUPTHER

## 2019-03-25 NOTE — PROGRESS NOTES
HPI  Nicole Medina is a 73 y.o. female with multiple medical diagnoses as listed in the medical history and problem list that presents for Annual Exam  .      HPI  Here today for routine health maintenance.    PAST MEDICAL HISTORY:  Past Medical History:   Diagnosis Date    Acute ischemic right MCA stroke 6/2/2017    Cataract     done ou    Essential hypertension 8/21/2017    GERD (gastroesophageal reflux disease)     History of seasonal allergies     Hyperlipidemia     On home oxygen therapy     while sleeping    Presbyopia     PSVT (paroxysmal supraventricular tachycardia)     Sarcoidosis     Sciatica     TIA (transient ischemic attack) 06/02/2017    [facility]//    Vertigo        PAST SURGICAL HISTORY:  Past Surgical History:   Procedure Laterality Date    CATARACT EXTRACTION W/  INTRAOCULAR LENS IMPLANT Left 03/27/2018    Dr Higginbotham    CATARACT EXTRACTION W/  INTRAOCULAR LENS IMPLANT Right 05/08/2018    Dr Higginbotham    CHOLECYSTECTOMY      DILATION, ESOPHAGUS N/A 11/22/2018    [facility]    ESOPHAGOGASTRODUODENOSCOPY (EGD) N/A 11/22/2018    [facility]    HAND SURGERY Right     trauma repair    INSERTION-INTRAOCULAR LENS (IOL) Right 5/8/2018    [facility] at [facility]    INSERTION-INTRAOCULAR LENS (IOL) Left 3/27/2018    [facility] at [facility]    PHACOEMULSIFICATION-ASPIRATION-CATARACT Right 5/8/2018    [facility]    PHACOEMULSIFICATION-ASPIRATION-CATARACT Left 3/27/2018    [facility] at [facility]    TONSILLECTOMY      TOTAL ABDOMINAL HYSTERECTOMY      VARICOSE VEIN SURGERY Bilateral     bilateral legs       SOCIAL HISTORY:  Social History     Socioeconomic History    Marital status:      Spouse name: Not on file    Number of children: Not on file    Years of education: Not on file    Highest  education level: Not on file   Occupational History    Not on file   Social Needs    Financial resource strain: Not on file    Food insecurity:     Worry: Not on file     Inability: Not on file    Transportation needs:     Medical: Not on file     Non-medical: Not on file   Tobacco Use    Smoking status: Former Smoker    Smokeless tobacco: Never Used    Tobacco comment: as teenager   Substance and Sexual Activity    Alcohol use: No    Drug use: No    Sexual activity: Not on file   Lifestyle    Physical activity:     Days per week: Not on file     Minutes per session: Not on file    Stress: Not on file   Relationships    Social connections:     Talks on phone: Not on file     Gets together: Not on file     Attends Rastafarian service: Not on file     Active member of club or organization: Not on file     Attends meetings of clubs or organizations: Not on file     Relationship status: Not on file    Intimate partner violence:     Fear of current or ex partner: Not on file     Emotionally abused: Not on file     Physically abused: Not on file     Forced sexual activity: Not on file   Other Topics Concern    Not on file   Social History Narrative    Not on file       FAMILY HISTORY:  Family History   Problem Relation Age of Onset    Cataracts Mother     Macular degeneration Mother     Cancer Mother         lymphoma    Macular degeneration Sister     Cancer Sister         breast    Breast cancer Sister 68    Diabetes Father     Hypertension Father     Thyroid disease Daughter     Cancer Daughter         thyroid    Breast cancer Maternal Grandmother     Amblyopia Neg Hx     Blindness Neg Hx     Glaucoma Neg Hx     Retinal detachment Neg Hx     Strabismus Neg Hx     Stroke Neg Hx        ALLERGIES AND MEDICATIONS: updated and reviewed.  Review of patient's allergies indicates:   Allergen Reactions    Latex Itching and Swelling     Itching and swelling to site (local reaction)     Current  Outpatient Medications   Medication Sig Dispense Refill    albuterol (PROVENTIL HFA) 90 mcg/actuation inhaler Inhale 2 puffs into the lungs every 6 (six) hours as needed for Wheezing.      albuterol (PROVENTIL) 2.5 mg /3 mL (0.083 %) nebulizer solution Take 2.5 mg by nebulization every 6 (six) hours as needed for Wheezing.      aspirin 81 MG Chew Take 1 tablet (81 mg total) by mouth once daily. 30 tablet 11    atorvastatin (LIPITOR) 20 MG tablet Take 1 tablet (20 mg total) by mouth once daily. 90 tablet 3    calcium-vitamin D 600 mg(1,500mg) -400 unit Tab Take by mouth.      cholecalciferol, vitamin D3, (VITAMIN D3) 5,000 unit Tab Take 5,000 Units by mouth once daily.      fluticasone (FLONASE) 50 mcg/actuation nasal spray       FLUZONE HIGH-DOSE 2018-19, PF, 180 mcg/0.5 mL vaccine ADM 0.5ML IM UTD  0    ibandronate (BONIVA) 150 mg tablet Take 1 tablet (150 mg total) by mouth every 30 days. 3 tablet 3    metoprolol succinate (TOPROL XL) 50 MG 24 hr tablet Take 1 tablet (50 mg total) by mouth once daily. 90 tablet 3    mv,Ca,min-folic acid-vit K1 (ONE-A-DAY WOMEN'S 50 PLUS) 400-20 mcg Tab Take 1 tablet by mouth once daily at 6am.      omeprazole (PRILOSEC) 40 MG capsule Take 1 capsule (40 mg total) by mouth every morning. 90 capsule 3    PNEUMOVAX 23 25 mcg/0.5 mL ADM 0.5ML IM UTD  0    sucralfate (CARAFATE) 1 gram tablet Take 1 tablet (1 g total) by mouth 4 (four) times daily before meals and nightly. 120 tablet 11    SYMBICORT 160-4.5 mcg/actuation HFAA Inhale 2 puffs into the lungs every 12 (twelve) hours.        No current facility-administered medications for this visit.        ROS  Review of Systems   Constitutional: Negative for fatigue, fever and unexpected weight change.   HENT: Positive for trouble swallowing (recent esophageal stenosis that required EGD and dilatation). Negative for congestion, hearing loss, rhinorrhea and sore throat.    Eyes: Negative for visual disturbance.   Respiratory:  Negative for cough, chest tightness, shortness of breath and wheezing.    Cardiovascular: Negative for chest pain, palpitations and leg swelling.   Gastrointestinal: Negative for abdominal distention, abdominal pain, blood in stool, constipation, diarrhea, nausea and vomiting.   Genitourinary: Negative for difficulty urinating, dysuria, frequency, hematuria, menstrual problem, pelvic pain, urgency and vaginal bleeding.   Musculoskeletal: Negative for back pain, joint swelling and neck pain.   Skin: Negative for rash.   Neurological: Positive for dizziness (with light sensitivity.  worse with turning head and present for several years.). Negative for tremors, weakness, light-headedness, numbness and headaches.   Psychiatric/Behavioral: Negative for confusion, dysphoric mood and sleep disturbance. The patient is not nervous/anxious.        Physical Exam  Vitals:    03/25/19 0918   BP: 139/70   Pulse: 83    Body mass index is 36.79 kg/m².  Weight: 94.2 kg (207 lb 10.8 oz)         Physical Exam   Constitutional: She is oriented to person, place, and time. She appears well-developed and well-nourished.   HENT:   Head: Normocephalic and atraumatic.   Right Ear: External ear normal.   Left Ear: External ear normal.   Nose: Nose normal.   Mouth/Throat: Oropharynx is clear and moist.   Eyes: Pupils are equal, round, and reactive to light. Conjunctivae and EOM are normal. No scleral icterus.   Neck: Normal range of motion. Neck supple. No JVD present. No thyromegaly present.   Cardiovascular: Normal rate, regular rhythm and normal heart sounds. Exam reveals no gallop and no friction rub.   No murmur heard.  Pulmonary/Chest: Effort normal and breath sounds normal. She has no wheezes. She has no rales.   Abdominal: Soft. Bowel sounds are normal. She exhibits no distension and no mass. There is no tenderness. There is no rebound and no guarding.   Musculoskeletal: Normal range of motion. She exhibits no edema.   Lymphadenopathy:      She has no cervical adenopathy.   Neurological: She is alert and oriented to person, place, and time. She has normal strength. No cranial nerve deficit or sensory deficit.   Skin: Skin is warm and dry. No rash noted.   Vitals reviewed.    Results for orders placed or performed in visit on 03/11/19   Comprehensive metabolic panel   Result Value Ref Range    Sodium 139 136 - 145 mmol/L    Potassium 4.3 3.5 - 5.1 mmol/L    Chloride 102 95 - 110 mmol/L    CO2 30 (H) 23 - 29 mmol/L    Glucose 100 70 - 110 mg/dL    BUN, Bld 13 8 - 23 mg/dL    Creatinine 0.7 0.5 - 1.4 mg/dL    Calcium 9.6 8.7 - 10.5 mg/dL    Total Protein 7.8 6.0 - 8.4 g/dL    Albumin 3.6 3.5 - 5.2 g/dL    Total Bilirubin 0.7 0.1 - 1.0 mg/dL    Alkaline Phosphatase 78 55 - 135 U/L    AST 21 10 - 40 U/L    ALT 18 10 - 44 U/L    Anion Gap 7 (L) 8 - 16 mmol/L    eGFR if African American >60.0 >60 mL/min/1.73 m^2    eGFR if non African American >60.0 >60 mL/min/1.73 m^2   Lipid panel   Result Value Ref Range    Cholesterol 157 120 - 199 mg/dL    Triglycerides 55 30 - 150 mg/dL    HDL 77 (H) 40 - 75 mg/dL    LDL Cholesterol 69.0 63.0 - 159.0 mg/dL    HDL/Chol Ratio 49.0 20.0 - 50.0 %    Total Cholesterol/HDL Ratio 2.0 2.0 - 5.0    Non-HDL Cholesterol 80 mg/dL         Health Maintenance       Date Due Completion Date    TETANUS VACCINE 06/27/1965 ---    High Dose Statin 06/27/1968 ---    Zoster Vaccine 06/27/2007 ---    Colonoscopy 11/22/2017 11/22/2010    Influenza Vaccine 08/01/2018 9/18/2017    Override on 10/1/2015: Done    Override on 11/21/2013: Done (LINKS)    Aspirin/Antiplatelet Therapy 01/09/2020 1/9/2019    Mammogram 03/14/2020 3/14/2019    Override on 6/9/2015: (N/S) (per patient report)    Override on 2/7/2013: (N/S)    Override on 7/29/2009: Done (Dr. Luna Pedro - routine follow up in one year was recommended (in [facility] report from [facility]))    DEXA SCAN 03/14/2022 3/14/2019    Override on 2/7/2013: (N/S)    Override on  8/5/2008: Done (Dr. Luna Pedro - osteopenia by this report from [facility].)    Lipid Panel 03/11/2024 3/11/2019          Assessment & Plan    Routine health maintenance  - Health maintenance reviewed  - Diet and exercise education.    Essential hypertension with Aortic atherosclerosis and PSVT (paroxysmal supraventricular tachycardia) secondary to Morbid obesity  - Continue current therapy  - Serial blood pressure monitoring  - Diet and exercise education.    Sarcoidosis, lung  -     CT Chest Without Contrast; Future; Expected date: 03/25/2019  - Continue Pulmonary    Vertigo  -     Ambulatory Referral to Physical/Occupational Therapy    Other orders  -     atorvastatin (LIPITOR) 20 MG tablet; Take 1 tablet (20 mg total) by mouth once daily.  Dispense: 90 tablet; Refill: 3  -     ibandronate (BONIVA) 150 mg tablet; Take 1 tablet (150 mg total) by mouth every 30 days.  Dispense: 3 tablet; Refill: 3  -     metoprolol succinate (TOPROL XL) 50 MG 24 hr tablet; Take 1 tablet (50 mg total) by mouth once daily.  Dispense: 90 tablet; Refill: 3  -     omeprazole (PRILOSEC) 40 MG capsule; Take 1 capsule (40 mg total) by mouth every morning.  Dispense: 90 capsule; Refill: 3        Follow up in about 6 months (around 9/25/2019).

## 2019-03-28 ENCOUNTER — CLINICAL SUPPORT (OUTPATIENT)
Dept: REHABILITATION | Facility: HOSPITAL | Age: 74
End: 2019-03-28
Attending: FAMILY MEDICINE
Payer: MEDICARE

## 2019-03-28 DIAGNOSIS — R42 VERTIGO: ICD-10-CM

## 2019-03-28 PROBLEM — M25.512 SHOULDER PAIN, BILATERAL: Status: RESOLVED | Noted: 2018-09-19 | Resolved: 2019-03-28

## 2019-03-28 PROBLEM — M25.511 SHOULDER PAIN, BILATERAL: Status: RESOLVED | Noted: 2018-09-19 | Resolved: 2019-03-28

## 2019-03-28 PROBLEM — M54.2 NECK PAIN: Status: RESOLVED | Noted: 2018-09-19 | Resolved: 2019-03-28

## 2019-03-28 PROCEDURE — 97161 PT EVAL LOW COMPLEX 20 MIN: CPT | Mod: HCNC,PO | Performed by: PHYSICAL THERAPIST

## 2019-03-28 PROCEDURE — 97110 THERAPEUTIC EXERCISES: CPT | Mod: HCNC,PO | Performed by: PHYSICAL THERAPIST

## 2019-03-28 PROCEDURE — 97140 MANUAL THERAPY 1/> REGIONS: CPT | Mod: HCNC,PO | Performed by: PHYSICAL THERAPIST

## 2019-03-28 NOTE — PLAN OF CARE
[facility]  Physical Therapy Initial Evaluation    Name: Nicole Medina  Clinic Number: [id number]    Therapy Diagnosis:   Encounter Diagnosis   Name Primary?    Vertigo      Physician: Milan Jain MD    Physician Orders: PT Eval and Treat   Medical Diagnosis from Referral: Vertigo  Evaluation Date: 3/28/2019  Authorization Period Expiration: 12/31/19  Plan of Care Expiration: 5/24/19  Visit # / Visits authorized: 1/ 1    Time In: 8:00AM  Time Out: 9:00AM  Total Billable Time: 60 minutes    Precautions: Standard    Subjective   Date of onset: At least 5 years  History of current condition - Lety reports: She has episodes of dizziness usually with getting out of bed or turning in bed, especially to right side.  She also has had episodes of dizziness with looking way up or way down.  She has no complaints of nausea,  She is aware of sensitivity to light as well.  She notes some balance disturbance with walking in dark hallway at night.  Most episodes are brief and go away once she stays still.  She had one episode about 4 months ago that lasted through the day.  She has not had any falls or near falls.  She reports no difficulty with driving.  She also reports having a CVA  in June 2017 with no residual affects.     Medical History:   Past Medical History:   Diagnosis Date    Acute ischemic right MCA stroke 6/2/2017    Cataract     done ou    Essential hypertension 8/21/2017    GERD (gastroesophageal reflux disease)     History of seasonal allergies     Hyperlipidemia     On home oxygen therapy     while sleeping    Presbyopia     PSVT (paroxysmal supraventricular tachycardia)     Sarcoidosis     Sciatica     TIA (transient ischemic attack) 06/02/2017    [facility]//    Vertigo        Surgical History:   Nicole Medina  has a past surgical history that includes Total abdominal hysterectomy; Cholecystectomy; Varicose vein surgery (Bilateral); Hand surgery  (Right); Tonsillectomy; Cataract extraction w/  intraocular lens implant (Left, 03/27/2018); Cataract extraction w/  intraocular lens implant (Right, 05/08/2018); Esophagogastroduodenoscopy (N/A, 11/22/2018); and Esophageal dilation (N/A, 11/22/2018).    Medications:   Nicole has a current medication list which includes the following prescription(s): albuterol, albuterol, aspirin, atorvastatin, calcium-vitamin d, cholecalciferol (vitamin d3), fluticasone, fluzone high-dose 2018-19 (pf), ibandronate, metoprolol succinate, mv,ca,min-folic acid-vit k1, omeprazole, pneumovax 23, sucralfate, and symbicort.    Allergies:   Review of patient's allergies indicates:   Allergen Reactions    Latex Itching and Swelling     Itching and swelling to site (local reaction)        Imaging, none    Prior Therapy: PT for shoulder  Social History:  lives with their spouse  Occupation: Homemaker  Prior Level of Function: No difficulty with turning in bed, looking up or down  Current Level of Function: Difficulty with turning in bed, looking up and looking down    Pain:  Current 0/10, worst 0/10, best 0/10   Location:  head   Description: dizziness  Aggravating Factors: turning in bed, looking up/down  Easing Factors: Remaining still    Pts goals: To be able to turn in bed, look up and look down without dizziness    Objective     Eye Head Coordination:  Smooth Pursuit: good motion quality  Saccadic eye movements:  does not elicit symptoms  Convergence/divergence: Intact  Vestibular Ocular Reflex: Intact    Tracking with Head movements:    Head movements/object stationary: does not elicit symptoms  Head movement/moving object in phase:does not elicit symptoms  Head movement/moving object out of phase:does not elicit symptoms  Head and object stationary, body rotates does not elicit symptoms      Motion Provoked Symptoms  ( Intensity: 0-10 scale  0=no Sx, 10=severe Sx)     Intensity Duration   Baseline symptoms 0/10    Sitting - Supine  2/10    Supine - L side 0/10    Supine - R side 4/10    Supine - Sitting 2/10    Left Hallpike 0/10    Left Hallpike - Sitting  0/10     Right Hallpike 4/10    Right Hallpike - Sitting 4/10      TINETTI BALANCE ASSESSMENT TOOL    Tai ME, Matt TF, Emma R, Fall Risk Index for elderly patients based on number of chronic disabilities.Am J Med [id number]      BALANCE SECTION  Patient is seated in hard, armless chair;    1.Sitting Balance:   Steady; safe = 1  2.Rises from chair :  Able, without using arms = 2  3.Attempts to arise :  Able to arise, 1 attempt = 2  4.Immediate standing Balance (first 5 seconds) : Steady without walker or other support = 2  5.Standing balance: Narrow stance without support = 2  6.Nudged : Steady = 2  7.Eyes closed: Steady = 1  8.Turning 360 degrees:  Steady = 1  9.Sitting Down : Safe, smooth motion = 2    Balance Score:  16/16    GAIT SECTION  Patient stands with therapist, walks across room (+/- aids), first at usual pace, then at rapid pace.    10.Initiation of Gait (Immediately after told to go.): No hesitancy = 1  11.Step length and height :  Step through R=1 and Step through L=1  12.Foot Clearance :  Land R  foot clears floor=2  13.Step symmetry: Right & Left step length appear equal = 1  14.Step continuity : Steps appear continuous = 1  15.Path: Straight without walking aid = 2  16.Trunk : No sway, no flexion, no use of arms, and no use of walking aid = 2  17.Walking Time: Heels apart = 0    Gait Score: 11/12  Balance score:16/16  Total Score=Balance + Gait Score: 27/28     Risk Indicators:    Tinetti Tool Score   Risk of Falls   ?18    High   19-23   Moderate   ?24   Low      CMS Impairment/Limitation/Restriction for FOTO  Survey- not performed at this time    Therapist reviewed FOTO scores for Nicole Medina on 3/28/2019.   FOTO documents entered into [employer] - see Media section.    Limitation Score:        TREATMENT   Treatment Time In: 8:30AM  Treatment Time Out:  9:00AM  Total Treatment time separate from Evaluation: 30 minutes    Lety received therapeutic exercises to develop vestibular correction for 20 minutes including:  Ward-Daroff ex x5 ea 1 min  Education 10 min    Lety received the following manual therapy techniques: Epley Maneuver were applied to the: right side for 10 minutes, including:  Epley maneuver, right involved    Home Exercises and Patient Education Provided    Education provided:   - Instructed pt in instructions following Epley Maneuver for first 24 hours.  She can begin Gordy-Daroff exercise in 3 days.    Written Home Exercises Provided: yes.  Exercises were reviewed and Lety was able to demonstrate them prior to the end of the session.  Lety demonstrated good  understanding of the education provided.     See EMR under Patient Instructions for exercises provided 3/28/2019.    Assessment   Nicole is a 73 y.o. female referred to outpatient Physical Therapy with a medical diagnosis of vertigo. Pt presents with positive [place] Hallpike to right and positive dizziness with bending over to floor.  Could not reproduce sx of dizziness with Vestibular-Occular activities at this time.    Pt prognosis is Excellent.   Pt will benefit from skilled outpatient Physical Therapy to address the deficits stated above and in the chart below, provide pt/family education, and to maximize pt's level of independence.     Plan of care discussed with patient: Yes  Pt's spiritual, cultural and educational needs considered and patient is agreeable to the plan of care and goals as stated below:     Anticipated Barriers for therapy: none    Medical Necessity is demonstrated by the following  History  Co-morbidities and personal factors that may impact the plan of care Co-morbidities:   difficulty sleeping, history of CVA and sarcoidosis    Personal Factors:   no deficits     low   Examination  Body Structures and Functions, activity limitations and participation restrictions  that may impact the plan of care Body Regions:   head  neck    Body Systems:    balance  gait  transfers  transitions    Participation Restrictions:   none    Activity limitations:   Learning and applying knowledge  no deficits    General Tasks and Commands  no deficits    Communication  no deficits    Mobility  turning in bed, looking up, looking down    Self care  no deficits    Domestic Life  no deficits    Interactions/Relationships  no deficits    Life Areas  no deficits    Community and Social Life  no deficits         low   Clinical Presentation stable and uncomplicated low   Decision Making/ Complexity Score: low     Goals:  Short Term Goals: 4 weeks   Pt will have decreased dizziness with turning in bed, less than 2/10  Pt will be able to look up and down with dizziness less than 2/10  Pt will be independent in HEP to control dizziness    Long Term Goals: 8 weeks   Pt will have no episodes of dizziness with turning in bed  Pt will have no episodes of dizziness with looking up or down  Pt will be independent with HEP for sx management    Plan   Plan of care Certification: 3/28/2019 to 5/24/19.    Outpatient Physical Therapy 1 times weekly for 8 weeks to include the following interventions: Manual Therapy, Neuromuscular Re-ed, Patient Education, Therapeutic Activites and Therapeutic Exercise.     Shelli Rodriguez, PT

## 2019-03-28 NOTE — PATIENT INSTRUCTIONS
Ed White Exercise    Sit on the side of a bed. Turn your head 45 degrees to the LEFT. Hold this rotated neck position and then lie down on your RIGHT side. Stay on your side for 1 minute, then return to the seated position again while maintaining your neck rotated in the same position.     Once seated again, turn your head 45 degrees to the RIGHT. Hold this rotated neck position and then lie down on your LEFT side. Stay on your side for 1 minute, then return to the seated position again.    Repeat 5x ea. 1-2x/day

## 2019-04-05 ENCOUNTER — CLINICAL SUPPORT (OUTPATIENT)
Dept: REHABILITATION | Facility: HOSPITAL | Age: 74
End: 2019-04-05
Attending: FAMILY MEDICINE
Payer: MEDICARE

## 2019-04-05 DIAGNOSIS — R42 VERTIGO: ICD-10-CM

## 2019-04-05 PROCEDURE — 97140 MANUAL THERAPY 1/> REGIONS: CPT | Mod: HCNC,PO | Performed by: PHYSICAL THERAPIST

## 2019-04-05 PROCEDURE — 97110 THERAPEUTIC EXERCISES: CPT | Mod: HCNC,PO | Performed by: PHYSICAL THERAPIST

## 2019-04-05 NOTE — PROGRESS NOTES
Physical Therapy Daily Treatment Note     Name: Nicole Medina  Clinic Number: [id number]    Therapy Diagnosis:   Encounter Diagnosis   Name Primary?    Vertigo      Physician: Milan Jain MD    Visit Date: 4/5/2019  Physician Orders: PT Eval and Treat   Medical Diagnosis from Referral: Vertigo  Evaluation Date: 3/28/2019  Authorization Period Expiration: 12/31/19  Plan of Care Expiration: 5/24/19  Visit # / Visits authorized: 2/ 20    Time In: 9:45AM  Time Out: 10:20AM  Total Billable Time: 35 minutes    Precautions: Standard    Subjective     Pt reports: One brief episode of dizziness with getting out of bed since last visit. It was milder than usual and short lived  She was compliant with home exercise program.  Response to previous treatment: excellent  Functional change: Getting out of bed more frequently without dizziness    Pain: 0/10  Location: head       Objective     Lety received therapeutic exercises to develop vestibular correction for 20 minutes including:  Ward-Christopher ex x5 ea 1 min     Lety received the following manual therapy techniques: Epley Maneuver were applied to the: right side for 15 minutes, including:  Epley maneuver, right involved       Home Exercises Provided and Patient Education Provided     Education provided:   - Pt instructed in avoidance of head up/down movements for next 24 hours and trying to sleep semi-reclined for first night following treatment.    Written Home Exercises Provided: Patient instructed to cont prior HEP.  Exercises were reviewed and Lety was able to demonstrate them prior to the end of the session.  Lety demonstrated good  understanding of the education provided.     See EMR under Patient Instructions for exercises provided prior visit.    Assessment     Dizziness still reproducible with Eleele-Hallpike to right, but not as severe. Some nystagmus with turning to right.   Lety is progressing well towards her goals.   Pt prognosis is Excellent.     Pt  will continue to benefit from skilled outpatient physical therapy to address the deficits listed in the problem list box on initial evaluation, provide pt/family education and to maximize pt's level of independence in the home and community environment.     Pt's spiritual, cultural and educational needs considered and pt agreeable to plan of care and goals.    Anticipated barriers to physical therapy: none    Goals:   Short Term Goals: 4 weeks   Pt will have decreased dizziness with turning in bed, less than 2/10. PROGRESSING  Pt will be able to look up and down with dizziness less than 2/10. PROGRESSING  Pt will be independent in HEP to control dizziness. PROGRESSING     Long Term Goals: 8 weeks   Pt will have no episodes of dizziness with turning in bed. PROGRESSING  Pt will have no episodes of dizziness with looking up or down PROGRESSING  Pt will be independent with HEP for sx management. PROGRESSING       Plan     Cont per POC.    Shelli Rodriguez, PT

## 2019-04-15 ENCOUNTER — PATIENT MESSAGE (OUTPATIENT)
Dept: FAMILY MEDICINE | Facility: CLINIC | Age: 74
End: 2019-04-15

## 2019-04-15 ENCOUNTER — TELEPHONE (OUTPATIENT)
Dept: FAMILY MEDICINE | Facility: CLINIC | Age: 74
End: 2019-04-15

## 2019-04-15 DIAGNOSIS — D86.9 SARCOIDOSIS: Primary | ICD-10-CM

## 2019-04-15 DIAGNOSIS — E04.1 THYROID NODULE: Primary | ICD-10-CM

## 2019-04-15 DIAGNOSIS — R93.89 ABNORMAL CHEST CT: ICD-10-CM

## 2019-04-15 NOTE — TELEPHONE ENCOUNTER
----- Message from Ewelina Chance sent at 4/15/2019  1:09 PM CDT -----  Contact: Jenaro,   Type: Needs Medical Advice--2nd request    Who Called:  Jenaro,   Best Call Back Number: [phone]  Additional Information: needs to speak with Dr. Jain regarding CT scan she had in March 2019--they need to speak with him today--please advise--thank you

## 2019-04-15 NOTE — TELEPHONE ENCOUNTER
----- Message from Norah Sierra sent at 4/15/2019  7:43 AM CDT -----  Type:  Sooner Apoointment Request    Caller is requesting a sooner appointment.     Name of Caller:  / Jenaro Medina  When is the first available appointment?     Symptoms:  Had a ct scan (03-25-19)  shows a lot of  problems   Best Call Back Number:  [phone]      Additional Information:  He states he only wants her to see Dr Jain to discuss results / requesting a phone call this morning as early as possible / feels this is an urgent situation

## 2019-04-15 NOTE — TELEPHONE ENCOUNTER
Phoned pt's spouse, Jenaro, in regards to messages. Please advise on time and day for an appt and referral for 2nd opinion for pulmonary. Jenaro aware that the pulmonary doctor referral may be in [place]. Please review and advise. Thank you. CLC

## 2019-04-16 ENCOUNTER — PATIENT MESSAGE (OUTPATIENT)
Dept: FAMILY MEDICINE | Facility: CLINIC | Age: 74
End: 2019-04-16

## 2019-04-16 ENCOUNTER — CLINICAL SUPPORT (OUTPATIENT)
Dept: REHABILITATION | Facility: HOSPITAL | Age: 74
End: 2019-04-16
Attending: FAMILY MEDICINE
Payer: MEDICARE

## 2019-04-16 DIAGNOSIS — R42 VERTIGO: ICD-10-CM

## 2019-04-16 PROCEDURE — 97140 MANUAL THERAPY 1/> REGIONS: CPT | Mod: HCNC,PO | Performed by: PHYSICAL THERAPIST

## 2019-04-16 PROCEDURE — 97110 THERAPEUTIC EXERCISES: CPT | Mod: HCNC,PO | Performed by: PHYSICAL THERAPIST

## 2019-04-16 NOTE — TELEPHONE ENCOUNTER
Notify patient that this appears to be related to sarcoidosis, but needs further evaluation.  Reviewed records from [facility].  Will set up a 2nd opinion.  Orders entered. Schedule to see Dr. Boston in [place] or [facility].

## 2019-04-16 NOTE — PROGRESS NOTES
Physical Therapy Daily Treatment Note     Name: Nicole Medina  Clinic Number: [id number]    Therapy Diagnosis:   Encounter Diagnosis   Name Primary?    Vertigo      Physician: Milan Jain MD    Visit Date: 4/16/2019  Physician Orders: PT Eval and Treat   Medical Diagnosis from Referral: Vertigo  Evaluation Date: 3/28/2019  Authorization Period Expiration: 12/31/19  Plan of Care Expiration: 5/24/19  Visit # / Visits authorized: 3/ 20    Time In: 8:50AM  Time Out: 9:20AM  Total Billable Time: 30 minutes    Precautions: Standard    Subjective     Pt reports:Had one episode of mild dizziness getting out of bed this week.  She was compliant with home exercise program.  Response to previous treatment: excellent  Functional change: Able to look up without dizziness.    Pain: 0/10  Location: head       Objective     Lety received therapeutic exercises to develop vestibular correction for 15 minutes including:  Ward-Christopher ex x5 ea 30 sec     Lety received the following manual therapy techniques: Epley Maneuver were applied to the: right side for 15 minutes, including:  Epley maneuver, right involved       Home Exercises Provided and Patient Education Provided     Education provided:   - Pt instructed in avoidance of head up/down movements for next 24 hours and trying to sleep semi-reclined for first night following treatment.    Written Home Exercises Provided: Patient instructed to cont prior HEP.  Exercises were reviewed and Lety was able to demonstrate them prior to the end of the session.  Lety demonstrated good  understanding of the education provided.     See EMR under Patient Instructions for exercises provided prior visit.    Assessment     Mild dizziness with turning head to right, but less often and less severe.  Lety is progressing well towards her goals.   Pt prognosis is Excellent.     Pt will continue to benefit from skilled outpatient physical therapy to address the deficits listed in the  problem list box on initial evaluation, provide pt/family education and to maximize pt's level of independence in the home and community environment.     Pt's spiritual, cultural and educational needs considered and pt agreeable to plan of care and goals.    Anticipated barriers to physical therapy: none    Goals:   Short Term Goals: 4 weeks   Pt will have decreased dizziness with turning in bed, less than 2/10. PROGRESSING  Pt will be able to look up and down with dizziness less than 2/10. MET 4/16/19  Pt will be independent in HEP to control dizziness. PROGRESSING     Long Term Goals: 8 weeks   Pt will have no episodes of dizziness with turning in bed. PROGRESSING  Pt will have no episodes of dizziness with looking up or down MET 4/16/19  Pt will be independent with HEP for sx management. PROGRESSING       Plan     Cont per POC.    Shelli Rodriguez, PT

## 2019-04-23 ENCOUNTER — CLINICAL SUPPORT (OUTPATIENT)
Dept: REHABILITATION | Facility: HOSPITAL | Age: 74
End: 2019-04-23
Payer: MEDICARE

## 2019-04-23 DIAGNOSIS — R42 VERTIGO: ICD-10-CM

## 2019-04-23 PROCEDURE — 97110 THERAPEUTIC EXERCISES: CPT | Mod: HCNC,PO | Performed by: PHYSICAL THERAPIST

## 2019-04-23 PROCEDURE — 97140 MANUAL THERAPY 1/> REGIONS: CPT | Mod: HCNC,PO | Performed by: PHYSICAL THERAPIST

## 2019-04-23 NOTE — PROGRESS NOTES
Physical Therapy Daily Treatment Note     Name: Nicole Medina  Clinic Number: [id number]    Therapy Diagnosis:   Encounter Diagnosis   Name Primary?    Vertigo      Physician: Milan Jain MD    Visit Date: 4/23/2019  Physician Orders: PT Eval and Treat   Medical Diagnosis from Referral: Vertigo  Evaluation Date: 3/28/2019  Authorization Period Expiration: 12/31/19  Plan of Care Expiration: 5/24/19  Visit # / Visits authorized: 4/ 20    Time In: 8:50AM  Time Out: 9:20AM  Total Billable Time: 30 minutes    Precautions: Standard    Subjective     Pt reports: No episodes of dizziness  She was compliant with home exercise program.  Response to previous treatment: excellent  Functional change: Able to turn in bed, get out of bed and look up/down without dizziness    Pain: 0/10  Location: head       Objective     Lety received therapeutic exercises for vestibular correction for 15 minutes including:  Ward-Christopher ex x5 ea 30 sec     Lety received the following manual therapy techniques: Epley Maneuver were applied to the: right side for 15 minutes, including:  Epley maneuver, right involved       Home Exercises Provided and Patient Education Provided     Education provided:   - Pt instructed in avoidance of head up/down movements for next 24 hours and trying to sleep semi-reclined for first night following treatment.    Written Home Exercises Provided: Patient instructed to cont prior HEP.  Exercises were reviewed and Lety was able to demonstrate them prior to the end of the session.  Lety demonstrated good  understanding of the education provided.     See EMR under Patient Instructions for exercises provided prior visit.    Assessment     Negative Quynh Halpike. No further dizziness with turning head or looking up/down.  Discussed continuing exercises every other day. If no dizziness, try 1x/week. If she gets any dizziness to resume 2x/day.  Lety is progressing well towards her goals.   Pt prognosis is  Excellent.     Pt will continue to benefit from skilled outpatient physical therapy to address the deficits listed in the problem list box on initial evaluation, provide pt/family education and to maximize pt's level of independence in the home and community environment.     Pt's spiritual, cultural and educational needs considered and pt agreeable to plan of care and goals.    Anticipated barriers to physical therapy: none    Goals:   Short Term Goals: 4 weeks   Pt will have decreased dizziness with turning in bed, less than 2/10. MET 4/23/19  Pt will be able to look up and down with dizziness less than 2/10. MET 4/16/19  Pt will be independent in HEP to control dizziness. MET 4/23/19     Long Term Goals: 8 weeks   Pt will have no episodes of dizziness with turning in bed. MET 4/23/19  Pt will have no episodes of dizziness with looking up or down MET 4/16/19  Pt will be independent with HEP for sx management.MET 4/23/19       Plan     Pt to cont with HEP and monitor for sx.  If sx return, schedule physical therapy appointment. Otherwise, will discharge if pt remains sx free.    Shelli Rodriguez, PT

## 2019-04-24 ENCOUNTER — HOSPITAL ENCOUNTER (OUTPATIENT)
Dept: RADIOLOGY | Facility: HOSPITAL | Age: 74
Discharge: HOME | End: 2019-04-24
Attending: FAMILY MEDICINE
Payer: MEDICARE

## 2019-04-24 DIAGNOSIS — E04.1 THYROID NODULE: ICD-10-CM

## 2019-04-24 PROCEDURE — 76536 US EXAM OF HEAD AND NECK: CPT | Mod: TC,HCNC,PO

## 2019-04-24 PROCEDURE — 76536 US SOFT TISSUE HEAD NECK THYROID: ICD-10-PCS | Mod: 26,HCNC,, | Performed by: RADIOLOGY

## 2019-04-24 PROCEDURE — 76536 US EXAM OF HEAD AND NECK: CPT | Mod: 26,HCNC,, | Performed by: RADIOLOGY

## 2019-05-07 ENCOUNTER — OFFICE VISIT (OUTPATIENT)
Dept: PULMONOLOGY | Facility: CLINIC | Age: 74
End: 2019-05-07
Payer: MEDICARE

## 2019-05-07 VITALS
SYSTOLIC BLOOD PRESSURE: 158 MMHG | WEIGHT: 211.88 LBS | DIASTOLIC BLOOD PRESSURE: 74 MMHG | OXYGEN SATURATION: 96 % | BODY MASS INDEX: 37.54 KG/M2 | HEIGHT: 63 IN | HEART RATE: 75 BPM

## 2019-05-07 DIAGNOSIS — R91.1 PULMONARY NODULE: ICD-10-CM

## 2019-05-07 PROCEDURE — 99999 PR PBB SHADOW E&M-EST. PATIENT-LVL III: ICD-10-PCS | Mod: PBBFAC,HCNC,, | Performed by: INTERNAL MEDICINE

## 2019-05-07 PROCEDURE — 99999 PR PBB SHADOW E&M-EST. PATIENT-LVL III: CPT | Mod: PBBFAC,HCNC,, | Performed by: INTERNAL MEDICINE

## 2019-05-07 PROCEDURE — 99204 PR OFFICE/OUTPT VISIT, NEW, LEVL IV, 45-59 MIN: ICD-10-PCS | Mod: HCNC,S$GLB,, | Performed by: INTERNAL MEDICINE

## 2019-05-07 PROCEDURE — 99204 OFFICE O/P NEW MOD 45 MIN: CPT | Mod: HCNC,S$GLB,, | Performed by: INTERNAL MEDICINE

## 2019-05-07 PROCEDURE — 3078F PR MOST RECENT DIASTOLIC BLOOD PRESSURE < 80 MM HG: ICD-10-PCS | Mod: HCNC,CPTII,S$GLB, | Performed by: INTERNAL MEDICINE

## 2019-05-07 PROCEDURE — 3078F DIAST BP <80 MM HG: CPT | Mod: HCNC,CPTII,S$GLB, | Performed by: INTERNAL MEDICINE

## 2019-05-07 PROCEDURE — 1101F PT FALLS ASSESS-DOCD LE1/YR: CPT | Mod: HCNC,CPTII,S$GLB, | Performed by: INTERNAL MEDICINE

## 2019-05-07 PROCEDURE — 1101F PR PT FALLS ASSESS DOC 0-1 FALLS W/OUT INJ PAST YR: ICD-10-PCS | Mod: HCNC,CPTII,S$GLB, | Performed by: INTERNAL MEDICINE

## 2019-05-07 PROCEDURE — 3077F SYST BP >= 140 MM HG: CPT | Mod: HCNC,CPTII,S$GLB, | Performed by: INTERNAL MEDICINE

## 2019-05-07 PROCEDURE — 3077F PR MOST RECENT SYSTOLIC BLOOD PRESSURE >= 140 MM HG: ICD-10-PCS | Mod: HCNC,CPTII,S$GLB, | Performed by: INTERNAL MEDICINE

## 2019-05-07 NOTE — LETTER
May 9, 2019      Milan Jain MD  [address] [facility] [address] [place] [address]           [facility]  [address] [facility] [address]  [place] [address]  Phone: [phone]  Fax: [phone]          Patient: Nicole Medina   MR Number: [id number]   YOB: 1945   Date of Visit: 5/7/2019       Dear Dr. Milan Jain:    Thank you for referring Nicole Medina to me for evaluation. Attached you will find relevant portions of my assessment and plan of care.    If you have questions, please do not hesitate to call me. I look forward to following Nicole Medina along with you.    Sincerely,    Baron Lo MD    Enclosure  CC:  No Recipients    If you would like to receive this communication electronically, please contact [email or web address] or [phone] to request more information on [employer] Link access.    For providers and/or their staff who would like to refer a patient to [facility], please contact us through our one-stop-shop provider referral line, [facility], [place], at [phone].    If you feel you have received this communication in error or would no longer like to receive these types of communications, please e-mail [email or web address]

## 2019-05-09 PROBLEM — R91.1 PULMONARY NODULE: Status: ACTIVE | Noted: 2019-05-09

## 2019-05-10 NOTE — PROGRESS NOTES
Subjective:       Patient ID: Nicole Medina is a 73 y.o. female.    Chief Complaint: Consult (sarcoidosis)    Nicole Medina is a 73 y.o. female PMH sarcoidosis presents for evaluation of pulmonary nodule.  Patient was diagnosed via lymph node biopsy.  She has previously been on steroids for a chronic cough related to her sarcoid, but is now off.  She reports no respiratory symptoms. Denies cough/fever/chills/hemotpysis/weight loss/chest pain/LE edema.  Minimal smoking history.  Noted to have a Ground glass nodule in 2016.  CT recently repeated.  Nodule is slightly large and maybe a little more dense than previous exam. Sees a pulmonologist on the [facility], who recommended f/u in 6 months.    Review of Systems   Constitutional: Negative.    HENT: Negative.    Eyes: Negative.    Respiratory: Negative.    Cardiovascular: Negative.    Genitourinary: Negative.    Endocrine: endocrine negative   Musculoskeletal: Negative.    Skin: Negative.    Gastrointestinal: Negative.    Neurological: Negative.    Psychiatric/Behavioral: Negative.        Objective:      Physical Exam   Constitutional: She is oriented to person, place, and time. She appears well-developed and well-nourished. No distress.   HENT:   Head: Normocephalic.   Right Ear: External ear normal.   Left Ear: External ear normal.   Neck: Normal range of motion. Neck supple. No JVD present.   Cardiovascular: Normal rate and regular rhythm.   Pulmonary/Chest: Normal expansion. No respiratory distress. She has no wheezes.   Abdominal: Soft. She exhibits no distension. There is no hepatosplenomegaly. There is no tenderness.   Musculoskeletal: She exhibits no edema or deformity.   Neurological: She is alert and oriented to person, place, and time.   Skin: Skin is warm. She is not diaphoretic. No erythema.     Personal Diagnostic Review    CT chest reviewed    Assessment:       1.   Pulmonary nodule       Outpatient Encounter Medications as of 5/7/2019   Medication Sig  Dispense Refill    albuterol (PROVENTIL HFA) 90 mcg/actuation inhaler Inhale 2 puffs into the lungs every 6 (six) hours as needed for Wheezing.      albuterol (PROVENTIL) 2.5 mg /3 mL (0.083 %) nebulizer solution Take 2.5 mg by nebulization every 6 (six) hours as needed for Wheezing.      aspirin 81 MG Chew Take 1 tablet (81 mg total) by mouth once daily. 30 tablet 11    atorvastatin (LIPITOR) 20 MG tablet Take 1 tablet (20 mg total) by mouth once daily. 90 tablet 3    calcium-vitamin D 600 mg(1,500mg) -400 unit Tab Take by mouth.      cholecalciferol, vitamin D3, (VITAMIN D3) 5,000 unit Tab Take 5,000 Units by mouth once daily.      fluticasone (FLONASE) 50 mcg/actuation nasal spray       FLUZONE HIGH-DOSE 2018-19, PF, 180 mcg/0.5 mL vaccine ADM 0.5ML IM UTD  0    ibandronate (BONIVA) 150 mg tablet Take 1 tablet (150 mg total) by mouth every 30 days. 3 tablet 3    metoprolol succinate (TOPROL XL) 50 MG 24 hr tablet Take 1 tablet (50 mg total) by mouth once daily. 90 tablet 3    mv,Ca,min-folic acid-vit K1 (ONE-A-DAY WOMEN'S 50 PLUS) 400-20 mcg Tab Take 1 tablet by mouth once daily at 6am.      omeprazole (PRILOSEC) 40 MG capsule Take 1 capsule (40 mg total) by mouth every morning. 90 capsule 3    PNEUMOVAX 23 25 mcg/0.5 mL ADM 0.5ML IM UTD  0    sucralfate (CARAFATE) 1 gram tablet Take 1 tablet (1 g total) by mouth 4 (four) times daily before meals and nightly. 120 tablet 11    SYMBICORT 160-4.5 mcg/actuation HFAA Inhale 2 puffs into the lungs every 12 (twelve) hours.        No facility-administered encounter medications on file as of 5/7/2019.      No orders of the defined types were placed in this encounter.    Plan:     Problem List Items Addressed This Visit        Pulmonary nodule         Overview     --minimal increase in size of GGO nodule over 2 year interval.  Transthoracic biopsy would have low yield. Discussed options at length with patient.  Given small interval increase in size and  history of sarcoid will plain to repeat a CT in 6 months as was planned by her primary pulmonologist. She will f/u with her pulmonologist on the [facility].             Baron Lo MD  Department of Pulmonary & Critical Care  Cell: [phone]

## 2019-06-05 ENCOUNTER — PES CALL (OUTPATIENT)
Dept: ADMINISTRATIVE | Facility: CLINIC | Age: 74
End: 2019-06-05

## 2019-07-10 ENCOUNTER — OFFICE VISIT (OUTPATIENT)
Dept: URGENT CARE | Facility: CLINIC | Age: 74
End: 2019-07-10
Payer: MEDICARE

## 2019-07-10 VITALS
RESPIRATION RATE: 16 BRPM | OXYGEN SATURATION: 98 % | HEART RATE: 85 BPM | HEIGHT: 63 IN | TEMPERATURE: 98 F | BODY MASS INDEX: 37.39 KG/M2 | WEIGHT: 211 LBS

## 2019-07-10 DIAGNOSIS — M54.6 ACUTE RIGHT-SIDED THORACIC BACK PAIN: Primary | ICD-10-CM

## 2019-07-10 DIAGNOSIS — M25.511 ACUTE PAIN OF RIGHT SHOULDER: ICD-10-CM

## 2019-07-10 PROCEDURE — 96372 PR INJECTION,THERAP/PROPH/DIAG2ST, IM OR SUBCUT: ICD-10-PCS | Mod: S$GLB,,, | Performed by: NURSE PRACTITIONER

## 2019-07-10 PROCEDURE — 1101F PT FALLS ASSESS-DOCD LE1/YR: CPT | Mod: CPTII,S$GLB,, | Performed by: NURSE PRACTITIONER

## 2019-07-10 PROCEDURE — 1101F PR PT FALLS ASSESS DOC 0-1 FALLS W/OUT INJ PAST YR: ICD-10-PCS | Mod: CPTII,S$GLB,, | Performed by: NURSE PRACTITIONER

## 2019-07-10 PROCEDURE — 99214 PR OFFICE/OUTPT VISIT, EST, LEVL IV, 30-39 MIN: ICD-10-PCS | Mod: 25,S$GLB,, | Performed by: NURSE PRACTITIONER

## 2019-07-10 PROCEDURE — 99214 OFFICE O/P EST MOD 30 MIN: CPT | Mod: 25,S$GLB,, | Performed by: NURSE PRACTITIONER

## 2019-07-10 PROCEDURE — 96372 THER/PROPH/DIAG INJ SC/IM: CPT | Mod: S$GLB,,, | Performed by: NURSE PRACTITIONER

## 2019-07-10 RX ORDER — KETOROLAC TROMETHAMINE 30 MG/ML
30 INJECTION, SOLUTION INTRAMUSCULAR; INTRAVENOUS
Status: COMPLETED | OUTPATIENT
Start: 2019-07-10 | End: 2019-07-10

## 2019-07-10 RX ORDER — METHOCARBAMOL 500 MG/1
500 TABLET, FILM COATED ORAL 4 TIMES DAILY
Qty: 40 TABLET | Refills: 0 | Status: SHIPPED | OUTPATIENT
Start: 2019-07-10 | End: 2019-07-20

## 2019-07-10 RX ORDER — METHYLPREDNISOLONE 4 MG/1
TABLET ORAL
Qty: 1 PACKAGE | Refills: 0 | Status: SHIPPED | OUTPATIENT
Start: 2019-07-10 | End: 2019-08-07

## 2019-07-10 RX ADMIN — KETOROLAC TROMETHAMINE 30 MG: 30 INJECTION, SOLUTION INTRAMUSCULAR; INTRAVENOUS at 07:07

## 2019-07-10 NOTE — PROGRESS NOTES
"Subjective:       Patient ID: Nicole Medina is a 74 y.o. female.    Vitals:  height is 5' 3" (1.6 m) and weight is 95.7 kg (211 lb). Her temperature is 97.7 °F (36.5 °C). Her pulse is 85. Her respiration is 16 and oxygen saturation is 98%.     Chief Complaint: Shoulder Pain (right )    Yesterday was playing with dog and pulled something in right shoulder/arm while pulling back on dog's collar as he was lundging forward. Denies falling. Says its a intermittent ache. Has been using heating pad and took tylenol #3, diclofenac topically but no change./    Shoulder Pain    The pain is present in the neck, back and right shoulder. This is a new problem. The current episode started yesterday. There has been no history of extremity trauma. The problem occurs constantly. The problem has been gradually worsening. The quality of the pain is described as aching. Pertinent negatives include no fever or headaches.       Constitution: Negative for chills, fatigue and fever.   HENT: Negative for congestion and sore throat.    Neck: Negative for painful lymph nodes.   Cardiovascular: Negative for chest pain and leg swelling.   Eyes: Negative for double vision and blurred vision.   Respiratory: Negative for cough and shortness of breath.    Gastrointestinal: Negative for nausea, vomiting, constipation and diarrhea.   Genitourinary: Negative for dysuria, frequency, urgency and history of kidney stones.   Musculoskeletal: Positive for pain. Negative for joint pain, joint swelling, muscle cramps and muscle ache.   Skin: Negative for color change, pale, rash, erythema and bruising.   Allergic/Immunologic: Negative for seasonal allergies.   Neurological: Negative for dizziness, history of vertigo, light-headedness, passing out and headaches.   Hematologic/Lymphatic: Negative for swollen lymph nodes.   Psychiatric/Behavioral: Negative for nervous/anxious, sleep disturbance and depression. The patient is not nervous/anxious.      "   Objective:      Physical Exam   Constitutional: She is oriented to person, place, and time. She appears well-developed and well-nourished. She is cooperative.  Non-toxic appearance. She does not appear ill. No distress.   HENT:   Head: Normocephalic and atraumatic. Head is without abrasion, without contusion and without laceration.   Right Ear: Hearing, tympanic membrane, external ear and ear canal normal. No hemotympanum.   Left Ear: Hearing, tympanic membrane, external ear and ear canal normal. No hemotympanum.   Nose: Nose normal. No mucosal edema, rhinorrhea or nasal deformity. No epistaxis. Right sinus exhibits no maxillary sinus tenderness and no frontal sinus tenderness. Left sinus exhibits no maxillary sinus tenderness and no frontal sinus tenderness.   Mouth/Throat: Uvula is midline, oropharynx is clear and moist and mucous membranes are normal. No trismus in the jaw. Normal dentition. No uvula swelling. No posterior oropharyngeal erythema.   Eyes: Pupils are equal, round, and reactive to light. Conjunctivae, EOM and lids are normal. Right eye exhibits no discharge. Left eye exhibits no discharge. No scleral icterus.   Sclera clear bilat   Neck: Trachea normal, normal range of motion, full passive range of motion without pain and phonation normal. Neck supple. No spinous process tenderness and no muscular tenderness present. No neck rigidity. No tracheal deviation present.   Cardiovascular: Normal rate, regular rhythm, normal heart sounds, intact distal pulses and normal pulses.   Pulmonary/Chest: Effort normal and breath sounds normal. No respiratory distress.   Abdominal: Soft. Normal appearance and bowel sounds are normal. She exhibits no distension, no pulsatile midline mass and no mass. There is no tenderness.   Musculoskeletal: She exhibits no edema or deformity.        Cervical back: She exhibits normal range of motion, no tenderness and no bony tenderness.        Thoracic back: She exhibits  decreased range of motion, tenderness, pain and spasm. She exhibits no bony tenderness.        Lumbar back: She exhibits normal range of motion, no tenderness and no bony tenderness.        Back:    NO MIDLINE TENDERNESS, PAIN WITH FLEXION, LATERAL ROTATION/FLEXION   Neurological: She is alert and oriented to person, place, and time. She has normal strength. No cranial nerve deficit or sensory deficit. She exhibits normal muscle tone. She displays no seizure activity. Coordination normal. GCS eye subscore is 4. GCS verbal subscore is 5. GCS motor subscore is 6.   Skin: Skin is warm, dry and intact. Capillary refill takes less than 2 seconds. No abrasion, no bruising, no burn, no ecchymosis and no laceration noted. She is not diaphoretic. No erythema. No pallor.   Psychiatric: She has a normal mood and affect. Her speech is normal and behavior is normal. Judgment and thought content normal. Cognition and memory are normal.   Nursing note and vitals reviewed.      Assessment:       1. Acute right-sided thoracic back pain    2. Acute pain of right shoulder        Plan:     PATIENT DECLINED SPINE IMAGING AT THIS TIME. ADVISED TO FOLLOW UP WITH UC OR PCP IF SYMPTOMS NOT IMPROVING.     Acute right-sided thoracic back pain  -     ketorolac injection 30 mg  -     methylPREDNISolone (MEDROL DOSEPACK) 4 mg tablet; use as directed  Dispense: 1 Package; Refill: 0  -     methocarbamol (ROBAXIN) 500 MG Tab; Take 1 tablet (500 mg total) by mouth 4 (four) times daily. for 10 days  Dispense: 40 tablet; Refill: 0    Acute pain of right shoulder  -     ketorolac injection 30 mg      Patient Instructions   Follow up with your doctor in a few days.  Return to the urgent care or go to the ER if symptoms get worse.    START ORAL STEROID PACK TOMORROW.  ROBAXIN AS NEEDED FOR MUSCLE RELAXER-OANH CAUSE DROWSINESS.  WARM/HOT SHOWER 5-10 MIN FOLLOWED BY GENTLE RANGE OF MOTION.  FOLLOW UP IF NOT IMPROVING.      Back Sprain or Strain    Injury to  the muscles (strain) or ligaments (sprain) around the spine can be troubling. Injury may occur after a sudden forceful twisting or bending force such as in a car accident, after a simple awkward movement, or after lifting something heavy with poor body positioning. In any case, muscle spasm is often present and adds to the pain.  Thankfully, most people feel better in 1 to 2 weeks, and most of the rest in 1 to 2 months. Most people can remain active. Unless you had a forceful or traumatic physical injury such as a car accident or fall, X-rays may not be ordered for the first evaluation of a back sprain or strain. If pain continues and does not respond to medical treatment, your healthcare provider may then order X-rays and other tests.  Home care  The following guidelines will help you care for your injury at home:  · When in bed, try to find a comfortable position. A firm mattress is best. Try lying flat on your back with pillows under your knees. You can also try lying on your side with your knees bent up toward your chest and a pillow between your knees.  · Don't sit for long periods. Try not to take long car rides or take other trips that have you sitting for a long time. This puts more stress on the lower back than standing or walking.  · During the first 24 to 72 hours after an injury or flare-up, apply an ice pack to the painful area for 20 minutes. Then remove it for 20 minutes. Do this for 60 to 90 minutes, or several times a day. This will reduce swelling and pain. Be sure to wrap the ice pack in a thin towel or plastic to protect your skin.  · You can start with ice, then switch to heat. Heat from a hot shower, hot bath, or heating pad reduces pain and works well for muscle spasms. Put heat on the painful area for 20 minutes, then remove for 20 minutes. Do this for 60 to 90 minutes, or several times a day. Do not use a heating pad while sleeping. It can burn the skin.  · You can alternate the ice and  heat. Talk with your healthcare provider to find out the best treatment or therapy for your back pain.  · Therapeutic massage will help relax the back muscles without stretching them.  · Be aware of safe lifting methods. Do not lift anything over 15 pounds until all of the pain is gone.  Medicines  Talk to your healthcare provider before using medicines, especially if you have other health problems or are taking other medicines.  · You may use acetaminophen or ibuprofen to control pain, unless another pain medicine was prescribed. If you have chronic conditions like diabetes, liver or kidney disease, stomach ulcers, or gastrointestinal bleeding, or are taking blood-thinner medicines, talk with your doctor before taking any medicines.  · Be careful if you are given prescription medicines, narcotics, or medicine for muscle spasm. They can cause drowsiness, and affect your coordination, reflexes, and judgment. Do not drive or operate heavy machinery when taking these types of medicines. Only take pain medicine as prescribed by your healthcare provider.  Follow-up care  Follow up with your healthcare provider, or as advised. You may need physical therapy or more tests if your symptoms get worse.  If you had X-rays your healthcare provider may be checking for any broken bones, breaks, or fractures. Bruises and sprains can sometimes hurt as much as a fracture. These injuries can take time to heal completely. If your symptoms dont improve or they get worse, talk with your healthcare provider. You may need a repeat X-ray or other tests.  Call 911  Call for emergency care if any of the following occur:  · Trouble breathing  · Confused  · Very drowsy or trouble awakening  · Fainting or loss of consciousness  · Rapid or very slow heart rate  · Loss of bowel or bladder control  When to seek medical advice  Call your healthcare provider right away if any of the following occur:  · Pain gets worse or spreads to your arms or  legs  · Weakness or numbness in one or both arms or legs  · Numbness in the groin or genital area  Date Last Reviewed: 6/1/2016  © 6824-7210 [employer]. [address] [place] [address], [place] [address]. All rights reserved. This information is not intended as a substitute for professional medical care. Always follow your healthcare professional's instructions.

## 2019-07-11 NOTE — PATIENT INSTRUCTIONS
Follow up with your doctor in a few days.  Return to the urgent care or go to the ER if symptoms get worse.    START ORAL STEROID PACK TOMORROW.  ROBAXIN AS NEEDED FOR MUSCLE RELAXER-OANH CAUSE DROWSINESS.  WARM/HOT SHOWER 5-10 MIN FOLLOWED BY GENTLE RANGE OF MOTION.  FOLLOW UP IF NOT IMPROVING.      Back Sprain or Strain    Injury to the muscles (strain) or ligaments (sprain) around the spine can be troubling. Injury may occur after a sudden forceful twisting or bending force such as in a car accident, after a simple awkward movement, or after lifting something heavy with poor body positioning. In any case, muscle spasm is often present and adds to the pain.  Thankfully, most people feel better in 1 to 2 weeks, and most of the rest in 1 to 2 months. Most people can remain active. Unless you had a forceful or traumatic physical injury such as a car accident or fall, X-rays may not be ordered for the first evaluation of a back sprain or strain. If pain continues and does not respond to medical treatment, your healthcare provider may then order X-rays and other tests.  Home care  The following guidelines will help you care for your injury at home:  · When in bed, try to find a comfortable position. A firm mattress is best. Try lying flat on your back with pillows under your knees. You can also try lying on your side with your knees bent up toward your chest and a pillow between your knees.  · Don't sit for long periods. Try not to take long car rides or take other trips that have you sitting for a long time. This puts more stress on the lower back than standing or walking.  · During the first 24 to 72 hours after an injury or flare-up, apply an ice pack to the painful area for 20 minutes. Then remove it for 20 minutes. Do this for 60 to 90 minutes, or several times a day. This will reduce swelling and pain. Be sure to wrap the ice pack in a thin towel or plastic to protect your skin.  · You can start with ice, then  switch to heat. Heat from a hot shower, hot bath, or heating pad reduces pain and works well for muscle spasms. Put heat on the painful area for 20 minutes, then remove for 20 minutes. Do this for 60 to 90 minutes, or several times a day. Do not use a heating pad while sleeping. It can burn the skin.  · You can alternate the ice and heat. Talk with your healthcare provider to find out the best treatment or therapy for your back pain.  · Therapeutic massage will help relax the back muscles without stretching them.  · Be aware of safe lifting methods. Do not lift anything over 15 pounds until all of the pain is gone.  Medicines  Talk to your healthcare provider before using medicines, especially if you have other health problems or are taking other medicines.  · You may use acetaminophen or ibuprofen to control pain, unless another pain medicine was prescribed. If you have chronic conditions like diabetes, liver or kidney disease, stomach ulcers, or gastrointestinal bleeding, or are taking blood-thinner medicines, talk with your doctor before taking any medicines.  · Be careful if you are given prescription medicines, narcotics, or medicine for muscle spasm. They can cause drowsiness, and affect your coordination, reflexes, and judgment. Do not drive or operate heavy machinery when taking these types of medicines. Only take pain medicine as prescribed by your healthcare provider.  Follow-up care  Follow up with your healthcare provider, or as advised. You may need physical therapy or more tests if your symptoms get worse.  If you had X-rays your healthcare provider may be checking for any broken bones, breaks, or fractures. Bruises and sprains can sometimes hurt as much as a fracture. These injuries can take time to heal completely. If your symptoms dont improve or they get worse, talk with your healthcare provider. You may need a repeat X-ray or other tests.  Call 911  Call for emergency care if any of the following  occur:  · Trouble breathing  · Confused  · Very drowsy or trouble awakening  · Fainting or loss of consciousness  · Rapid or very slow heart rate  · Loss of bowel or bladder control  When to seek medical advice  Call your healthcare provider right away if any of the following occur:  · Pain gets worse or spreads to your arms or legs  · Weakness or numbness in one or both arms or legs  · Numbness in the groin or genital area  Date Last Reviewed: 6/1/2016 © 2000-2017 [employer]. [address] [place] [id number]. All rights reserved. This information is not intended as a substitute for professional medical care. Always follow your healthcare professional's instructions.

## 2019-07-18 ENCOUNTER — OFFICE VISIT (OUTPATIENT)
Dept: OPTOMETRY | Facility: CLINIC | Age: 74
End: 2019-07-18
Payer: MEDICARE

## 2019-07-18 DIAGNOSIS — Z96.1 PSEUDOPHAKIA OF BOTH EYES: Primary | ICD-10-CM

## 2019-07-18 DIAGNOSIS — H52.7 REFRACTIVE ERROR: ICD-10-CM

## 2019-07-18 PROCEDURE — 99999 PR PBB SHADOW E&M-EST. PATIENT-LVL II: ICD-10-PCS | Mod: PBBFAC,HCNC,, | Performed by: OPTOMETRIST

## 2019-07-18 PROCEDURE — 92014 COMPRE OPH EXAM EST PT 1/>: CPT | Mod: HCNC,S$GLB,, | Performed by: OPTOMETRIST

## 2019-07-18 PROCEDURE — 99999 PR PBB SHADOW E&M-EST. PATIENT-LVL II: CPT | Mod: PBBFAC,HCNC,, | Performed by: OPTOMETRIST

## 2019-07-18 PROCEDURE — 92014 PR EYE EXAM, EST PATIENT,COMPREHESV: ICD-10-PCS | Mod: HCNC,S$GLB,, | Performed by: OPTOMETRIST

## 2019-07-18 NOTE — PROGRESS NOTES
HPI     Routine eye exam--dle-1/9/19 [place]    Pt denies any changes since last visit. Denies any eye pain. No blurred   vision. No floaters or flashes.     Last edited by Nazia Aggarwal on 7/18/2019  8:12 AM. (History)            Assessment /Plan     For exam results, see Encounter Report.    Pseudophakia of both eyes    Refractive error      1. Monitor condition. Patient to report any changes. RTC 1 year recheck.  2. New Spec Rx given. Different lens options discussed with patient. RTC 1 year full exam.

## 2019-08-01 ENCOUNTER — TELEPHONE (OUTPATIENT)
Dept: NEUROLOGY | Facility: CLINIC | Age: 74
End: 2019-08-01

## 2019-08-01 NOTE — TELEPHONE ENCOUNTER
----- Message from Layla Salgado sent at 8/1/2019  3:54 PM CDT -----  Type: Needs Medical Advice    Who Called:  Patient   Symptoms (please be specific):  Having headaches, water eyes,  How long has patient had these symptoms:  A few days  Pharmacy name and phone #:    Best Call Back Number:[phone] cell  Additional Information: patient states she was instructed to call if she had any symptoms.

## 2019-08-04 ENCOUNTER — PATIENT MESSAGE (OUTPATIENT)
Dept: NEUROLOGY | Facility: CLINIC | Age: 74
End: 2019-08-04

## 2019-08-05 ENCOUNTER — PATIENT MESSAGE (OUTPATIENT)
Dept: NEUROLOGY | Facility: CLINIC | Age: 74
End: 2019-08-05

## 2019-08-07 ENCOUNTER — OFFICE VISIT (OUTPATIENT)
Dept: NEUROLOGY | Facility: CLINIC | Age: 74
End: 2019-08-07
Payer: MEDICARE

## 2019-08-07 ENCOUNTER — LAB VISIT (OUTPATIENT)
Dept: LAB | Facility: HOSPITAL | Age: 74
End: 2019-08-07
Attending: PSYCHIATRY & NEUROLOGY
Payer: MEDICARE

## 2019-08-07 VITALS
WEIGHT: 212.94 LBS | HEIGHT: 63 IN | SYSTOLIC BLOOD PRESSURE: 151 MMHG | DIASTOLIC BLOOD PRESSURE: 72 MMHG | HEART RATE: 77 BPM | RESPIRATION RATE: 16 BRPM | BODY MASS INDEX: 37.73 KG/M2

## 2019-08-07 DIAGNOSIS — R42 VERTIGO: ICD-10-CM

## 2019-08-07 DIAGNOSIS — Z86.73 HISTORY OF ISCHEMIC RIGHT MCA STROKE: ICD-10-CM

## 2019-08-07 DIAGNOSIS — G44.209 ACUTE NON INTRACTABLE TENSION-TYPE HEADACHE: ICD-10-CM

## 2019-08-07 DIAGNOSIS — I10 ESSENTIAL HYPERTENSION: ICD-10-CM

## 2019-08-07 DIAGNOSIS — Z86.73 HISTORY OF ISCHEMIC RIGHT MCA STROKE: Primary | ICD-10-CM

## 2019-08-07 LAB
ALBUMIN SERPL BCP-MCNC: 3.4 G/DL (ref 3.5–5.2)
ALP SERPL-CCNC: 73 U/L (ref 55–135)
ALT SERPL W/O P-5'-P-CCNC: 17 U/L (ref 10–44)
ANION GAP SERPL CALC-SCNC: 9 MMOL/L (ref 8–16)
AST SERPL-CCNC: 22 U/L (ref 10–40)
BASOPHILS # BLD AUTO: 0.06 K/UL (ref 0–0.2)
BASOPHILS NFR BLD: 0.7 % (ref 0–1.9)
BILIRUB SERPL-MCNC: 0.3 MG/DL (ref 0.1–1)
BUN SERPL-MCNC: 15 MG/DL (ref 8–23)
CALCIUM SERPL-MCNC: 9.8 MG/DL (ref 8.7–10.5)
CHLORIDE SERPL-SCNC: 104 MMOL/L (ref 95–110)
CO2 SERPL-SCNC: 29 MMOL/L (ref 23–29)
CREAT SERPL-MCNC: 0.7 MG/DL (ref 0.5–1.4)
DIFFERENTIAL METHOD: ABNORMAL
EOSINOPHIL # BLD AUTO: 0.5 K/UL (ref 0–0.5)
EOSINOPHIL NFR BLD: 5.2 % (ref 0–8)
ERYTHROCYTE [DISTWIDTH] IN BLOOD BY AUTOMATED COUNT: 14 % (ref 11.5–14.5)
ERYTHROCYTE [SEDIMENTATION RATE] IN BLOOD BY WESTERGREN METHOD: 97 MM/HR (ref 0–20)
EST. GFR  (AFRICAN AMERICAN): >60 ML/MIN/1.73 M^2
EST. GFR  (NON AFRICAN AMERICAN): >60 ML/MIN/1.73 M^2
GLUCOSE SERPL-MCNC: 71 MG/DL (ref 70–110)
HCT VFR BLD AUTO: 41 % (ref 37–48.5)
HGB BLD-MCNC: 12.2 G/DL (ref 12–16)
IMM GRANULOCYTES # BLD AUTO: 0.01 K/UL (ref 0–0.04)
IMM GRANULOCYTES NFR BLD AUTO: 0.1 % (ref 0–0.5)
LYMPHOCYTES # BLD AUTO: 2.6 K/UL (ref 1–4.8)
LYMPHOCYTES NFR BLD: 28.6 % (ref 18–48)
MCH RBC QN AUTO: 27 PG (ref 27–31)
MCHC RBC AUTO-ENTMCNC: 29.8 G/DL (ref 32–36)
MCV RBC AUTO: 91 FL (ref 82–98)
MONOCYTES # BLD AUTO: 1 K/UL (ref 0.3–1)
MONOCYTES NFR BLD: 10.5 % (ref 4–15)
NEUTROPHILS # BLD AUTO: 5 K/UL (ref 1.8–7.7)
NEUTROPHILS NFR BLD: 54.9 % (ref 38–73)
NRBC BLD-RTO: 0 /100 WBC
PLATELET # BLD AUTO: 192 K/UL (ref 150–350)
PMV BLD AUTO: 13 FL (ref 9.2–12.9)
POTASSIUM SERPL-SCNC: 4.3 MMOL/L (ref 3.5–5.1)
PROT SERPL-MCNC: 7.7 G/DL (ref 6–8.4)
RBC # BLD AUTO: 4.52 M/UL (ref 4–5.4)
SODIUM SERPL-SCNC: 142 MMOL/L (ref 136–145)
WBC # BLD AUTO: 9.18 K/UL (ref 3.9–12.7)

## 2019-08-07 PROCEDURE — 36415 COLL VENOUS BLD VENIPUNCTURE: CPT | Mod: HCNC,PO

## 2019-08-07 PROCEDURE — 1101F PR PT FALLS ASSESS DOC 0-1 FALLS W/OUT INJ PAST YR: ICD-10-PCS | Mod: HCNC,CPTII,S$GLB, | Performed by: PSYCHIATRY & NEUROLOGY

## 2019-08-07 PROCEDURE — 99999 PR PBB SHADOW E&M-EST. PATIENT-LVL IV: ICD-10-PCS | Mod: PBBFAC,HCNC,, | Performed by: PSYCHIATRY & NEUROLOGY

## 2019-08-07 PROCEDURE — 3078F DIAST BP <80 MM HG: CPT | Mod: HCNC,CPTII,S$GLB, | Performed by: PSYCHIATRY & NEUROLOGY

## 2019-08-07 PROCEDURE — 85651 RBC SED RATE NONAUTOMATED: CPT | Mod: HCNC,PO

## 2019-08-07 PROCEDURE — 1101F PT FALLS ASSESS-DOCD LE1/YR: CPT | Mod: HCNC,CPTII,S$GLB, | Performed by: PSYCHIATRY & NEUROLOGY

## 2019-08-07 PROCEDURE — 99214 PR OFFICE/OUTPT VISIT, EST, LEVL IV, 30-39 MIN: ICD-10-PCS | Mod: HCNC,S$GLB,, | Performed by: PSYCHIATRY & NEUROLOGY

## 2019-08-07 PROCEDURE — 3077F SYST BP >= 140 MM HG: CPT | Mod: HCNC,CPTII,S$GLB, | Performed by: PSYCHIATRY & NEUROLOGY

## 2019-08-07 PROCEDURE — 99214 OFFICE O/P EST MOD 30 MIN: CPT | Mod: HCNC,S$GLB,, | Performed by: PSYCHIATRY & NEUROLOGY

## 2019-08-07 PROCEDURE — 80053 COMPREHEN METABOLIC PANEL: CPT | Mod: HCNC

## 2019-08-07 PROCEDURE — 99999 PR PBB SHADOW E&M-EST. PATIENT-LVL IV: CPT | Mod: PBBFAC,HCNC,, | Performed by: PSYCHIATRY & NEUROLOGY

## 2019-08-07 PROCEDURE — 85025 COMPLETE CBC W/AUTO DIFF WBC: CPT | Mod: HCNC

## 2019-08-07 PROCEDURE — 3077F PR MOST RECENT SYSTOLIC BLOOD PRESSURE >= 140 MM HG: ICD-10-PCS | Mod: HCNC,CPTII,S$GLB, | Performed by: PSYCHIATRY & NEUROLOGY

## 2019-08-07 PROCEDURE — 3078F PR MOST RECENT DIASTOLIC BLOOD PRESSURE < 80 MM HG: ICD-10-PCS | Mod: HCNC,CPTII,S$GLB, | Performed by: PSYCHIATRY & NEUROLOGY

## 2019-08-07 NOTE — PROGRESS NOTES
Subjective:          Patient ID: Nicole Medina is a 74 y.o.  female who presents for follow up of R MCA stroke     Initial / Last History of Present Illness 8/21/17:     Reviewed hospital records from June 2017.  She presented to [facility] with a mild headache, intermittent slurred speech and left arm weakness and numbness which resolved after a few hours.  Mildly hypertensive in the emergency room, EKG showed sinus rhythm.     Studies obtained during her hospital admission included MRI of the brain which showed a slight subacute right MCA infarct in the right precentral gyrus.      MRA of the head and neck were normal.  Carotid ultrasound was also performed which did not show any significant stenosis.  LDL was 101.  She was placed on aspirin 325 mg and atorvastatin 40 mg.  Had recent follow-up with Dr. Jain encouraging lifestyle and risk factor modification.     She is here today with her .  Sx had started just the morning of presentation; left arm kept falling from her hip (she tends to put her hand on her hip when talking).  Lasted 15 minutes. 1-2 hours later, speech became slurred,  could not understand her.  Again lasted 10 minutes and speech resolved. Got to [facility] around 2 PM.      About a week prior, she had a very brief (5 seconds or so) sharp pain right side of the head.      12/12/17:     Following our last visit, recommend she delay eye surgery (cataracts) given recent stroke.  Scheduled for Jan 2018.      Notes indicate she is taking Lipitor 40 mg, aspirin 325 mg, metoprolol 50 mg.     Has been very busy with home renovations,  says she is like a tornado.      Interval history 2/28/18:     At last visit, recommended CoQ10 supplement, otherwise was doing well with respect to modification. We had recommended delaying her cataract surgery due to the stroke, recommended delay for 9 months post event. This will place her for having cataract surgery this coming month,  "March 2018.      She is scheduled for March 27.  No further stroke sx, no residual symptoms.       She checks her BP at home, 130's over 60-70's     LDL is within range, < 70, taking CO-q-10     On aspirin 325, lipitor 10, metoprolol XL 50 daily     She and her  go to the gym 3 days a week, eat a healthy diet     Interval history 9/13/2018:     Has surgery for cataract in the left eye March 27, 2018.  No complications. Right eye cataract surgery done 5/8/18, also without complication.      No concerns regarding the old stroke. No residual deficits, her  is not seeing any concerns. Does not see any changes in her day to day activity or capacity to function. Speech is completely resolved. She does have some GI irritation and GERD.  Asked about reducing 325 mg to 81 mg of aspirin.     She has some intermittent pain radiating down both arms on occasion.  May be daily, worse with activity, but not too severe, can often ignore it. Occasional electrical shock on the right scalp, also some tingling left posterior scalp.    Interval history 8/7/2019:    Here today with her .  Dx sarcoidosis 2014; concerned she may have CNS involvement.     Vertigo - episodic, roughly 10 years ago.  Room is spinning, eyes feel like trying to keep up. Told "my crystals were scrambled" - usually triggered by rolling over to her RIGHT when lying down; rarely may feel it when standing up.  Has had success with PT.  Last episode 8-12 months ago    HA  - pressure, front of her head above eyes, headache over the top of her head like vice .  Onset month ago, 2-3 x/week.  Resting helps, can last several hours.  Mild HA.  Pressure in her eyes, feel glossy or as if she had drops in her eyes.  + light sensitive.  Not actually watering.  Mild blurring.       Review of patient's allergies indicates:   Allergen Reactions    Latex Itching and Swelling     Itching and swelling to site (local reaction)     Current Outpatient " Medications   Medication Sig Dispense Refill    albuterol (PROVENTIL HFA) 90 mcg/actuation inhaler Inhale 2 puffs into the lungs every 6 (six) hours as needed for Wheezing.      albuterol (PROVENTIL) 2.5 mg /3 mL (0.083 %) nebulizer solution Take 2.5 mg by nebulization every 6 (six) hours as needed for Wheezing.      aspirin 81 MG Chew Take 1 tablet (81 mg total) by mouth once daily. 30 tablet 11    atorvastatin (LIPITOR) 20 MG tablet Take 1 tablet (20 mg total) by mouth once daily. 90 tablet 3    calcium-vitamin D 600 mg(1,500mg) -400 unit Tab Take by mouth.      cholecalciferol, vitamin D3, (VITAMIN D3) 5,000 unit Tab Take 5,000 Units by mouth once daily.      fluticasone (FLONASE) 50 mcg/actuation nasal spray       FLUZONE HIGH-DOSE 2018-19, PF, 180 mcg/0.5 mL vaccine ADM 0.5ML IM UTD  0    ibandronate (BONIVA) 150 mg tablet Take 1 tablet (150 mg total) by mouth every 30 days. 3 tablet 3    metoprolol succinate (TOPROL XL) 50 MG 24 hr tablet Take 1 tablet (50 mg total) by mouth once daily. 90 tablet 3    mv,Ca,min-folic acid-vit K1 (ONE-A-DAY WOMEN'S 50 PLUS) 400-20 mcg Tab Take 1 tablet by mouth once daily at 6am.      omeprazole (PRILOSEC) 40 MG capsule Take 1 capsule (40 mg total) by mouth every morning. 90 capsule 3    SYMBICORT 160-4.5 mcg/actuation HFAA Inhale 2 puffs into the lungs every 12 (twelve) hours.        No current facility-administered medications for this visit.          Review of Systems  Review of Systems    Objective:        Vitals:    08/07/19 1109   BP: (!) 151/72   Pulse: 77   Resp: 16     Body mass index is 37.72 kg/m².  Neurologic Exam     Mental Status   Oriented to person, place, and time.   Level of consciousness: alert  Knowledge: good.   Able to name object. Able to repeat. Normal comprehension.     Cranial Nerves     CN II   Visual fields full to confrontation.     CN III, IV, VI   Pupils are equal, round, and reactive to light.  Extraocular motions are normal.   CN  III: no CN III palsy  CN VI: no CN VI palsy  Nystagmus: none     CN V   Facial sensation intact.     CN VII   Facial expression full, symmetric.     CN VIII   CN VIII normal.     CN IX, X   CN IX normal.   CN X normal.     CN XI   CN XI normal.     CN XII   CN XII normal.     Motor Exam   Muscle bulk: normal  Overall muscle tone: normal  Right arm pronator drift: absent  Left arm pronator drift: absent    Strength   Strength 5/5 throughout.     Sensory Exam   Light touch normal.   Pinprick normal.     Gait, Coordination, and Reflexes     Coordination   Finger to nose coordination: normal    Tremor   Resting tremor: absent  Action tremor: absent    Reflexes   Right brachioradialis: 1+  Left brachioradialis: 1+  Right biceps: 1+  Left biceps: 1+  Right triceps: 1+  Left triceps: 1+  Right patellar: 1+  Left patellar: 1+  Right achilles: 1+  Left achilles: 1+      Physical Exam   Constitutional: She is oriented to person, place, and time.   Eyes: Pupils are equal, round, and reactive to light. EOM are normal.   Neurological: She is oriented to person, place, and time. She has normal strength. She has a normal Finger-Nose-Finger Test.   Reflex Scores:       Tricep reflexes are 1+ on the right side and 1+ on the left side.       Bicep reflexes are 1+ on the right side and 1+ on the left side.       Brachioradialis reflexes are 1+ on the right side and 1+ on the left side.       Patellar reflexes are 1+ on the right side and 1+ on the left side.       Achilles reflexes are 1+ on the right side and 1+ on the left side.        Data Review:    Assessment:        1. History of ischemic right MCA stroke    2. Essential hypertension    3. Vertigo    4. Acute non intractable tension-type headache           Plan:         Problem List Items Addressed This Visit        Neuro    History of ischemic right MCA stroke - Primary    Relevant Orders    Sedimentation rate (Completed)    Comprehensive metabolic panel (Completed)    CBC auto  differential (Completed)    MRI Brain W WO Contrast (Completed)    Acute non intractable tension-type headache    Current Assessment & Plan     I think it is unlikely this presentation is related to CNS sarcoidosis, but in the setting of new headaches in a 73 yo and longstanding vertigo it is reasonable to check for any evidence of meningeal or nodular enhancement         Relevant Orders    Sedimentation rate (Completed)    Comprehensive metabolic panel (Completed)    CBC auto differential (Completed)    MRI Brain W WO Contrast (Completed)       ENT    RESOLVED: Vertigo    Relevant Orders    MRI Brain W WO Contrast (Completed)       Cardiac/Vascular    Essential hypertension          Follow up in about 3 months (around 11/7/2019).       Thank you very much for the opportunity to assist in this patient's care.  If you have any questions or concerns, please do not hesitate to contact me at any time.     Sincerely,  Justus Babb, DO

## 2019-08-21 ENCOUNTER — DOCUMENTATION ONLY (OUTPATIENT)
Dept: REHABILITATION | Facility: HOSPITAL | Age: 74
End: 2019-08-21

## 2019-08-21 DIAGNOSIS — R42 VERTIGO: ICD-10-CM

## 2019-08-21 NOTE — PROGRESS NOTES
Outpatient Therapy Discharge Summary     Name: Nicole Medina  [facility] Number: [id number]    Therapy Diagnosis:   Encounter Diagnosis   Name Primary?    Vertigo      Physician: Milan Jain MD     Visit Date: 4/23/2019  Physician Orders: PT Eval and Treat   Medical Diagnosis from Referral: Vertigo  Evaluation Date: 3/28/2019      Date of Last visit: 4/23/19  Total Visits Received: 4  Cancelled Visits: 0  No Show Visits: 0    Assessment    Goals:   Short Term Goals: 4 weeks   Pt will have decreased dizziness with turning in bed, less than 2/10. MET 4/23/19  Pt will be able to look up and down with dizziness less than 2/10. MET 4/16/19  Pt will be independent in HEP to control dizziness. MET 4/23/19     Long Term Goals: 8 weeks   Pt will have no episodes of dizziness with turning in bed. MET 4/23/19  Pt will have no episodes of dizziness with looking up or down MET 4/16/19  Pt will be independent with HEP for sx management.MET 4/23/19         Discharge reason: Patient is now asymptomatic    Plan   This patient is discharged from Physical Therapy

## 2019-08-25 PROBLEM — G44.209 ACUTE NON INTRACTABLE TENSION-TYPE HEADACHE: Status: ACTIVE | Noted: 2019-08-25

## 2019-08-25 NOTE — ASSESSMENT & PLAN NOTE
I think it is unlikely this presentation is related to CNS sarcoidosis, but in the setting of new headaches in a 75 yo and longstanding vertigo it is reasonable to check for any evidence of meningeal or nodular enhancement

## 2019-08-29 ENCOUNTER — OFFICE VISIT (OUTPATIENT)
Dept: NEUROLOGY | Facility: CLINIC | Age: 74
End: 2019-08-29
Payer: MEDICARE

## 2019-08-29 VITALS
HEART RATE: 80 BPM | WEIGHT: 213.38 LBS | RESPIRATION RATE: 16 BRPM | DIASTOLIC BLOOD PRESSURE: 75 MMHG | BODY MASS INDEX: 37.81 KG/M2 | HEIGHT: 63 IN | SYSTOLIC BLOOD PRESSURE: 179 MMHG

## 2019-08-29 DIAGNOSIS — R42 VERTIGO: ICD-10-CM

## 2019-08-29 DIAGNOSIS — I10 ESSENTIAL HYPERTENSION: ICD-10-CM

## 2019-08-29 DIAGNOSIS — G44.209 ACUTE NON INTRACTABLE TENSION-TYPE HEADACHE: ICD-10-CM

## 2019-08-29 DIAGNOSIS — Z86.73 HISTORY OF ISCHEMIC RIGHT MCA STROKE: Primary | ICD-10-CM

## 2019-08-29 PROCEDURE — 1101F PR PT FALLS ASSESS DOC 0-1 FALLS W/OUT INJ PAST YR: ICD-10-PCS | Mod: HCNC,CPTII,S$GLB, | Performed by: PSYCHIATRY & NEUROLOGY

## 2019-08-29 PROCEDURE — 3078F DIAST BP <80 MM HG: CPT | Mod: HCNC,CPTII,S$GLB, | Performed by: PSYCHIATRY & NEUROLOGY

## 2019-08-29 PROCEDURE — 99213 OFFICE O/P EST LOW 20 MIN: CPT | Mod: HCNC,S$GLB,, | Performed by: PSYCHIATRY & NEUROLOGY

## 2019-08-29 PROCEDURE — 99499 UNLISTED E&M SERVICE: CPT | Mod: HCNC,S$GLB,, | Performed by: PSYCHIATRY & NEUROLOGY

## 2019-08-29 PROCEDURE — 3078F PR MOST RECENT DIASTOLIC BLOOD PRESSURE < 80 MM HG: ICD-10-PCS | Mod: HCNC,CPTII,S$GLB, | Performed by: PSYCHIATRY & NEUROLOGY

## 2019-08-29 PROCEDURE — 99213 PR OFFICE/OUTPT VISIT, EST, LEVL III, 20-29 MIN: ICD-10-PCS | Mod: HCNC,S$GLB,, | Performed by: PSYCHIATRY & NEUROLOGY

## 2019-08-29 PROCEDURE — 99999 PR PBB SHADOW E&M-EST. PATIENT-LVL III: CPT | Mod: PBBFAC,HCNC,, | Performed by: PSYCHIATRY & NEUROLOGY

## 2019-08-29 PROCEDURE — 99999 PR PBB SHADOW E&M-EST. PATIENT-LVL III: ICD-10-PCS | Mod: PBBFAC,HCNC,, | Performed by: PSYCHIATRY & NEUROLOGY

## 2019-08-29 PROCEDURE — 99499 RISK ADDL DX/OHS AUDIT: ICD-10-PCS | Mod: HCNC,S$GLB,, | Performed by: PSYCHIATRY & NEUROLOGY

## 2019-08-29 PROCEDURE — 3077F SYST BP >= 140 MM HG: CPT | Mod: HCNC,CPTII,S$GLB, | Performed by: PSYCHIATRY & NEUROLOGY

## 2019-08-29 PROCEDURE — 1101F PT FALLS ASSESS-DOCD LE1/YR: CPT | Mod: HCNC,CPTII,S$GLB, | Performed by: PSYCHIATRY & NEUROLOGY

## 2019-08-29 PROCEDURE — 3077F PR MOST RECENT SYSTOLIC BLOOD PRESSURE >= 140 MM HG: ICD-10-PCS | Mod: HCNC,CPTII,S$GLB, | Performed by: PSYCHIATRY & NEUROLOGY

## 2019-08-29 RX ORDER — LOSARTAN POTASSIUM 25 MG/1
25 TABLET ORAL DAILY
Qty: 30 TABLET | Refills: 1 | Status: SHIPPED | OUTPATIENT
Start: 2019-08-29 | End: 2019-10-08 | Stop reason: ALTCHOICE

## 2019-08-29 RX ORDER — LOSARTAN POTASSIUM 25 MG/1
25 TABLET ORAL DAILY
Qty: 90 TABLET | Refills: 3 | Status: SHIPPED | OUTPATIENT
Start: 2019-08-29 | End: 2019-10-08 | Stop reason: SDUPTHER

## 2019-08-29 NOTE — PROGRESS NOTES
Subjective:          Patient ID: Nicole Medina is a 74 y.o.  female who presents for follow up of R MCA stroke     Initial / Last History of Present Illness 8/21/17:     Reviewed hospital records from June 2017.  She presented to [facility] with a mild headache, intermittent slurred speech and left arm weakness and numbness which resolved after a few hours.  Mildly hypertensive in the emergency room, EKG showed sinus rhythm.     Studies obtained during her hospital admission included MRI of the brain which showed a slight subacute right MCA infarct in the right precentral gyrus.      MRA of the head and neck were normal.  Carotid ultrasound was also performed which did not show any significant stenosis.  LDL was 101.  She was placed on aspirin 325 mg and atorvastatin 40 mg.  Had recent follow-up with Dr. Jain encouraging lifestyle and risk factor modification.     She is here today with her .  Sx had started just the morning of presentation; left arm kept falling from her hip (she tends to put her hand on her hip when talking).  Lasted 15 minutes. 1-2 hours later, speech became slurred,  could not understand her.  Again lasted 10 minutes and speech resolved. Got to [facility] around 2 PM.      About a week prior, she had a very brief (5 seconds or so) sharp pain right side of the head.      12/12/17:     Following our last visit, recommend she delay eye surgery (cataracts) given recent stroke.  Scheduled for Jan 2018.      Notes indicate she is taking Lipitor 40 mg, aspirin 325 mg, metoprolol 50 mg.     Has been very busy with home renovations,  says she is like a tornado.      Interval history 2/28/18:     At last visit, recommended CoQ10 supplement, otherwise was doing well with respect to modification. We had recommended delaying her cataract surgery due to the stroke, recommended delay for 9 months post event. This will place her for having cataract surgery this coming month,  "March 2018.      She is scheduled for March 27.  No further stroke sx, no residual symptoms.       She checks her BP at home, 130's over 60-70's     LDL is within range, < 70, taking CO-q-10     On aspirin 325, lipitor 10, metoprolol XL 50 daily     She and her  go to the gym 3 days a week, eat a healthy diet     Interval history 9/13/2018:     Has surgery for cataract in the left eye March 27, 2018.  No complications. Right eye cataract surgery done 5/8/18, also without complication.      No concerns regarding the old stroke. No residual deficits, her  is not seeing any concerns. Does not see any changes in her day to day activity or capacity to function. Speech is completely resolved. She does have some GI irritation and GERD.  Asked about reducing 325 mg to 81 mg of aspirin.     She has some intermittent pain radiating down both arms on occasion.  May be daily, worse with activity, but not too severe, can often ignore it. Occasional electrical shock on the right scalp, also some tingling left posterior scalp.     Interval history 8/7/2019:     Here today with her .  Dx sarcoidosis 2014; concerned she may have CNS involvement.      Vertigo - episodic, roughly 10 years ago.  Room is spinning, eyes feel like trying to keep up. Told "my crystals were scrambled" - usually triggered by rolling over to her RIGHT when lying down; rarely may feel it when standing up.  Has had success with PT.  Last episode 8-12 months ago     HA  - pressure, front of her head above eyes, headache over the top of her head like vice . Onset month ago, 2-3 x/week.  Resting helps, can last several hours.  Mild HA.  Pressure in her eyes, feel glossy or as if she had drops in her eyes.  + light sensitive.  Not actually watering.  Mild blurring.      Interval history 8/29/2019:    Last visit, concerned about CNS sarcoidosis.  MRI done with and without contrast on August 14, 2019 did not show any evidence of such. She has " not had any recurrent episodes of vertigo.  Still with variable headache    Review of patient's allergies indicates:   Allergen Reactions    Latex Itching and Swelling     Itching and swelling to site (local reaction)     Current Outpatient Medications   Medication Sig Dispense Refill    albuterol (PROVENTIL HFA) 90 mcg/actuation inhaler Inhale 2 puffs into the lungs every 6 (six) hours as needed for Wheezing.      albuterol (PROVENTIL) 2.5 mg /3 mL (0.083 %) nebulizer solution Take 2.5 mg by nebulization every 6 (six) hours as needed for Wheezing.      aspirin 81 MG Chew Take 1 tablet (81 mg total) by mouth once daily. 30 tablet 11    atorvastatin (LIPITOR) 20 MG tablet Take 1 tablet (20 mg total) by mouth once daily. 90 tablet 3    calcium-vitamin D 600 mg(1,500mg) -400 unit Tab Take by mouth.      cholecalciferol, vitamin D3, (VITAMIN D3) 5,000 unit Tab Take 5,000 Units by mouth once daily.      fluticasone (FLONASE) 50 mcg/actuation nasal spray       FLUZONE HIGH-DOSE 2018-19, PF, 180 mcg/0.5 mL vaccine ADM 0.5ML IM UTD  0    ibandronate (BONIVA) 150 mg tablet Take 1 tablet (150 mg total) by mouth every 30 days. 3 tablet 3    metoprolol succinate (TOPROL XL) 50 MG 24 hr tablet Take 1 tablet (50 mg total) by mouth once daily. 90 tablet 3    mv,Ca,min-folic acid-vit K1 (ONE-A-DAY WOMEN'S 50 PLUS) 400-20 mcg Tab Take 1 tablet by mouth once daily at 6am.      omeprazole (PRILOSEC) 40 MG capsule Take 1 capsule (40 mg total) by mouth every morning. 90 capsule 3    SYMBICORT 160-4.5 mcg/actuation HFAA Inhale 2 puffs into the lungs every 12 (twelve) hours.       losartan (COZAAR) 25 MG tablet Take 1 tablet (25 mg total) by mouth once daily. 30 tablet 1    losartan (COZAAR) 25 MG tablet Take 1 tablet (25 mg total) by mouth once daily. 90 tablet 3     No current facility-administered medications for this visit.          Review of Systems  Review of Systems    Objective:        Vitals:    08/29/19 1337    BP: (!) 179/75   Pulse: 80   Resp: 16     Body mass index is 37.8 kg/m².  Neurologic Exam     Mental Status   Oriented to person, place, and time.   Level of consciousness: alert    Cranial Nerves     CN II   Visual fields full to confrontation.     CN III, IV, VI   Pupils are equal, round, and reactive to light.  Extraocular motions are normal.   CN III: no CN III palsy    CN V   Facial sensation intact.     CN VII   Facial expression full, symmetric.     Motor Exam   Muscle bulk: normal  Right arm pronator drift: absent  Left arm pronator drift: absent    Strength   Strength 5/5 throughout.       Physical Exam   Constitutional: She is oriented to person, place, and time. She appears well-developed and well-nourished.   HENT:   Head: Normocephalic and atraumatic.   Eyes: Pupils are equal, round, and reactive to light. EOM are normal.   Cardiovascular: Normal rate.   Pulmonary/Chest: Effort normal.   Neurological: She is oriented to person, place, and time. She has normal strength.   Psychiatric: She has a normal mood and affect. Her behavior is normal. Judgment and thought content normal.   Vitals reviewed.        Data Review:    MRI brain 8/14/19:  Impression       1. No acute intracranial abnormality.  2. Stable mild chronic microvascular ischemic change.     Results for ELMER GEORGES (MRN [id number]) as of 8/29/2019 14:10   Ref. Range 8/7/2019 12:51   WBC Latest Ref Range: 3.90 - 12.70 K/uL 9.18   RBC Latest Ref Range: 4.00 - 5.40 M/uL 4.52   Hemoglobin Latest Ref Range: 12.0 - 16.0 g/dL 12.2   Hematocrit Latest Ref Range: 37.0 - 48.5 % 41.0   MCV Latest Ref Range: 82 - 98 fL 91   MCH Latest Ref Range: 27.0 - 31.0 pg 27.0   MCHC Latest Ref Range: 32.0 - 36.0 g/dL 29.8 (L)   RDW Latest Ref Range: 11.5 - 14.5 % 14.0   Platelets Latest Ref Range: 150 - 350 K/uL 192   MPV Latest Ref Range: 9.2 - 12.9 fL 13.0 (H)   Gran% Latest Ref Range: 38.0 - 73.0 % 54.9   Gran # (ANC) Latest Ref Range: 1.8 - 7.7 K/uL 5.0   Lymph%  Latest Ref Range: 18.0 - 48.0 % 28.6   Lymph # Latest Ref Range: 1.0 - 4.8 K/uL 2.6   Mono% Latest Ref Range: 4.0 - 15.0 % 10.5   Mono # Latest Ref Range: 0.3 - 1.0 K/uL 1.0   Eosinophil% Latest Ref Range: 0.0 - 8.0 % 5.2   Eos # Latest Ref Range: 0.0 - 0.5 K/uL 0.5   Basophil% Latest Ref Range: 0.0 - 1.9 % 0.7   Baso # Latest Ref Range: 0.00 - 0.20 K/uL 0.06   nRBC Latest Ref Range: 0 /100 WBC 0   Differential Method Unknown Automated   Immature Grans (Abs) Latest Ref Range: 0.00 - 0.04 K/uL 0.01   Immature Granulocytes Latest Ref Range: 0.0 - 0.5 % 0.1   Sed Rate Latest Ref Range: 0 - 20 mm/Hr 97 (H)   Sodium Latest Ref Range: 136 - 145 mmol/L 142   Potassium Latest Ref Range: 3.5 - 5.1 mmol/L 4.3   Chloride Latest Ref Range: 95 - 110 mmol/L 104   CO2 Latest Ref Range: 23 - 29 mmol/L 29   Anion Gap Latest Ref Range: 8 - 16 mmol/L 9   BUN, Bld Latest Ref Range: 8 - 23 mg/dL 15   Creatinine Latest Ref Range: 0.5 - 1.4 mg/dL 0.7   eGFR if non African American Latest Ref Range: >60 mL/min/1.73 m^2 >60.0   eGFR if African American Latest Ref Range: >60 mL/min/1.73 m^2 >60.0   Glucose Latest Ref Range: 70 - 110 mg/dL 71   Calcium Latest Ref Range: 8.7 - 10.5 mg/dL 9.8   Alkaline Phosphatase Latest Ref Range: 55 - 135 U/L 73   PROTEIN TOTAL Latest Ref Range: 6.0 - 8.4 g/dL 7.7   Albumin Latest Ref Range: 3.5 - 5.2 g/dL 3.4 (L)   BILIRUBIN TOTAL Latest Ref Range: 0.1 - 1.0 mg/dL 0.3   AST Latest Ref Range: 10 - 40 U/L 22   ALT Latest Ref Range: 10 - 44 U/L 17     Assessment:        1. History of ischemic right MCA stroke    2. Essential hypertension    3. Vertigo    4. Acute non intractable tension-type headache           Plan:         Problem List Items Addressed This Visit        Neuro    History of ischemic right MCA stroke - Primary    Acute non intractable tension-type headache       ENT    Vertigo    Overview     Episodic positional vertigo  Rolling over to the right            Cardiac/Vascular    Essential  hypertension    Relevant Medications    losartan (COZAAR) 25 MG tablet    losartan (COZAAR) 25 MG tablet          Follow up in about 4 months (around 1/8/2020).       Thank you very much for the opportunity to assist in this patient's care.  If you have any questions or concerns, please do not hesitate to contact me at any time.     Sincerely,  Justus Babb, DO

## 2019-10-08 ENCOUNTER — OFFICE VISIT (OUTPATIENT)
Dept: FAMILY MEDICINE | Facility: CLINIC | Age: 74
End: 2019-10-08
Payer: MEDICARE

## 2019-10-08 VITALS
SYSTOLIC BLOOD PRESSURE: 158 MMHG | OXYGEN SATURATION: 97 % | HEART RATE: 72 BPM | WEIGHT: 213.63 LBS | DIASTOLIC BLOOD PRESSURE: 76 MMHG | BODY MASS INDEX: 37.84 KG/M2

## 2019-10-08 DIAGNOSIS — S46.812A STRAIN OF LEFT TRAPEZIUS MUSCLE, INITIAL ENCOUNTER: ICD-10-CM

## 2019-10-08 DIAGNOSIS — I10 ESSENTIAL HYPERTENSION: Primary | ICD-10-CM

## 2019-10-08 PROCEDURE — 3077F PR MOST RECENT SYSTOLIC BLOOD PRESSURE >= 140 MM HG: ICD-10-PCS | Mod: HCNC,CPTII,S$GLB, | Performed by: PHYSICIAN ASSISTANT

## 2019-10-08 PROCEDURE — 99214 OFFICE O/P EST MOD 30 MIN: CPT | Mod: HCNC,S$GLB,, | Performed by: PHYSICIAN ASSISTANT

## 2019-10-08 PROCEDURE — 1101F PR PT FALLS ASSESS DOC 0-1 FALLS W/OUT INJ PAST YR: ICD-10-PCS | Mod: HCNC,CPTII,S$GLB, | Performed by: PHYSICIAN ASSISTANT

## 2019-10-08 PROCEDURE — 99214 PR OFFICE/OUTPT VISIT, EST, LEVL IV, 30-39 MIN: ICD-10-PCS | Mod: HCNC,S$GLB,, | Performed by: PHYSICIAN ASSISTANT

## 2019-10-08 PROCEDURE — 3078F DIAST BP <80 MM HG: CPT | Mod: HCNC,CPTII,S$GLB, | Performed by: PHYSICIAN ASSISTANT

## 2019-10-08 PROCEDURE — 1101F PT FALLS ASSESS-DOCD LE1/YR: CPT | Mod: HCNC,CPTII,S$GLB, | Performed by: PHYSICIAN ASSISTANT

## 2019-10-08 PROCEDURE — 99999 PR PBB SHADOW E&M-EST. PATIENT-LVL III: ICD-10-PCS | Mod: PBBFAC,HCNC,, | Performed by: PHYSICIAN ASSISTANT

## 2019-10-08 PROCEDURE — 99999 PR PBB SHADOW E&M-EST. PATIENT-LVL III: CPT | Mod: PBBFAC,HCNC,, | Performed by: PHYSICIAN ASSISTANT

## 2019-10-08 PROCEDURE — 3078F PR MOST RECENT DIASTOLIC BLOOD PRESSURE < 80 MM HG: ICD-10-PCS | Mod: HCNC,CPTII,S$GLB, | Performed by: PHYSICIAN ASSISTANT

## 2019-10-08 PROCEDURE — 3077F SYST BP >= 140 MM HG: CPT | Mod: HCNC,CPTII,S$GLB, | Performed by: PHYSICIAN ASSISTANT

## 2019-10-08 RX ORDER — METHYLPREDNISOLONE 4 MG/1
TABLET ORAL
Qty: 1 PACKAGE | Refills: 0 | Status: SHIPPED | OUTPATIENT
Start: 2019-10-08 | End: 2019-10-29

## 2019-10-08 RX ORDER — LOSARTAN POTASSIUM AND HYDROCHLOROTHIAZIDE 12.5; 5 MG/1; MG/1
1 TABLET ORAL DAILY
Qty: 30 TABLET | Refills: 1 | Status: SHIPPED | OUTPATIENT
Start: 2019-10-08 | End: 2019-10-10 | Stop reason: SDUPTHER

## 2019-10-08 RX ORDER — METHOCARBAMOL 500 MG/1
500 TABLET, FILM COATED ORAL NIGHTLY PRN
Qty: 10 TABLET | Refills: 0 | Status: SHIPPED | OUTPATIENT
Start: 2019-10-08 | End: 2019-10-18

## 2019-10-08 NOTE — PROGRESS NOTES
Subjective:       Patient ID: Nicole Medina is a 74 y.o. female    Chief Complaint: Medication Problem (pt states she is here for a medication change ( losartan )) and Back Pain    HPI  The patient is new to me, PCP is Dr. Jain.  She presents today for follow-up for her hypertension.  She saw the neurologist a month ago and was started on losartan 25 mg daily.  She has been taking her blood pressure at home and is still elevated at home.  She denies any recent headaches or blurred vision.  She has a history of a stroke last year.    Also, she is complaining of left upper back pain that began couple weeks ago.  She had a similar problem on her right upper back a few months ago and was treated with a Medrol Dosepak and Robaxin.  She is requesting new medications today.    Review of Systems   Constitutional: Negative for activity change, chills and fever.   HENT: Negative for congestion and sore throat.    Eyes: Negative for visual disturbance.   Respiratory: Negative for cough and shortness of breath.    Cardiovascular: Negative for chest pain and palpitations.   Gastrointestinal: Negative for abdominal pain, diarrhea, nausea and vomiting.   Endocrine: Negative for polydipsia and polyuria.   Musculoskeletal: Negative for myalgias.   Skin: Negative for rash.   Neurological: Negative for headaches.   Psychiatric/Behavioral: The patient is not nervous/anxious.         Objective:   Physical Exam   Constitutional: She is oriented to person, place, and time. She appears well-developed and well-nourished. No distress.   HENT:   Head: Normocephalic and atraumatic.   Eyes: Pupils are equal, round, and reactive to light. EOM are normal.   Neck: Normal range of motion. Neck supple.   Cardiovascular: Normal rate, regular rhythm, normal heart sounds and intact distal pulses. Exam reveals no gallop and no friction rub.   No murmur heard.  Pulmonary/Chest: Effort normal and breath sounds normal. No respiratory distress. She has  no wheezes. She has no rales. She exhibits no tenderness.   Musculoskeletal: Normal range of motion.   Neurological: She is alert and oriented to person, place, and time.   Skin: Skin is warm and dry. No rash noted. She is not diaphoretic.   Psychiatric: She has a normal mood and affect. Her behavior is normal. Judgment and thought content normal.        Assessment:       1. Essential hypertension  losartan-hydrochlorothiazide 50-12.5 mg (HYZAAR) 50-12.5 mg per tablet        Plan:       Essential hypertension  -   START losartan-hydrochlorothiazide 50-12.5 mg (HYZAAR) 50-12.5 mg per tablet; Take 1 tablet by mouth once daily.  Dispense: 30 tablet; Refill: 1  - STOP  LOSARTAN 25 MG  - RETURN 4 WEEKS

## 2019-10-09 ENCOUNTER — HOSPITAL ENCOUNTER (OUTPATIENT)
Dept: RADIOLOGY | Facility: HOSPITAL | Age: 74
Discharge: HOME | End: 2019-10-09
Attending: INTERNAL MEDICINE
Payer: MEDICARE

## 2019-10-09 DIAGNOSIS — R91.8 LUNG MASS: ICD-10-CM

## 2019-10-09 DIAGNOSIS — I10 ESSENTIAL HYPERTENSION: Primary | ICD-10-CM

## 2019-10-09 DIAGNOSIS — D86.9 SARCOIDOSIS: ICD-10-CM

## 2019-10-09 DIAGNOSIS — J84.112 ACUTE IDIOPATHIC PULMONARY FIBROSIS: ICD-10-CM

## 2019-10-09 PROCEDURE — 71250 CT THORAX DX C-: CPT | Mod: TC,HCNC,PO

## 2019-10-09 PROCEDURE — 71250 CT CHEST WITHOUT CONTRAST: ICD-10-PCS | Mod: 26,HCNC,, | Performed by: RADIOLOGY

## 2019-10-09 PROCEDURE — 71250 CT THORAX DX C-: CPT | Mod: 26,HCNC,, | Performed by: RADIOLOGY

## 2019-10-09 NOTE — TELEPHONE ENCOUNTER
----- Message from Sivakumar Chavez sent at 10/9/2019  3:01 PM CDT -----  Contact: [facility]  Type:  Pharmacy Calling to Clarify an RX    Name of Caller:  Ynaeth   Pharmacy Name:  [facility]   Prescription Name:  losartan-hydrochlorothiazide 50-12.5 mg (HYZAAR) 50-12.5 mg per tablet  What do they need to clarify?:  Medication is on back order  Best Call Back Number:  [phone]  Additional Information:

## 2019-10-10 DIAGNOSIS — I10 ESSENTIAL HYPERTENSION: ICD-10-CM

## 2019-10-10 RX ORDER — HYDROCHLOROTHIAZIDE 12.5 MG/1
12.5 TABLET ORAL DAILY
Qty: 90 TABLET | Refills: 3 | Status: SHIPPED | OUTPATIENT
Start: 2019-10-10 | End: 2019-10-10 | Stop reason: SDUPTHER

## 2019-10-10 RX ORDER — LOSARTAN POTASSIUM AND HYDROCHLOROTHIAZIDE 12.5; 5 MG/1; MG/1
TABLET ORAL
Qty: 90 TABLET | Refills: 1 | Status: SHIPPED | OUTPATIENT
Start: 2019-10-10 | End: 2019-11-12 | Stop reason: SDUPTHER

## 2019-10-10 RX ORDER — LOSARTAN POTASSIUM 50 MG/1
50 TABLET ORAL DAILY
Qty: 90 TABLET | Refills: 3 | Status: SHIPPED | OUTPATIENT
Start: 2019-10-10 | End: 2019-10-10 | Stop reason: SDUPTHER

## 2019-10-15 ENCOUNTER — PATIENT MESSAGE (OUTPATIENT)
Dept: PULMONOLOGY | Facility: CLINIC | Age: 74
End: 2019-10-15

## 2019-10-17 ENCOUNTER — PATIENT MESSAGE (OUTPATIENT)
Dept: PULMONOLOGY | Facility: CLINIC | Age: 74
End: 2019-10-17

## 2019-11-02 ENCOUNTER — PATIENT OUTREACH (OUTPATIENT)
Dept: ADMINISTRATIVE | Facility: OTHER | Age: 74
End: 2019-11-02

## 2019-11-06 ENCOUNTER — OFFICE VISIT (OUTPATIENT)
Dept: PULMONOLOGY | Facility: CLINIC | Age: 74
End: 2019-11-06
Payer: MEDICARE

## 2019-11-06 VITALS
BODY MASS INDEX: 37.65 KG/M2 | OXYGEN SATURATION: 99 % | HEART RATE: 70 BPM | WEIGHT: 212.5 LBS | SYSTOLIC BLOOD PRESSURE: 120 MMHG | HEIGHT: 63 IN | DIASTOLIC BLOOD PRESSURE: 70 MMHG

## 2019-11-06 DIAGNOSIS — D86.0 SARCOIDOSIS OF LUNG: ICD-10-CM

## 2019-11-06 DIAGNOSIS — R91.1 LUNG NODULE: Primary | ICD-10-CM

## 2019-11-06 DIAGNOSIS — E66.2 MORBID (SEVERE) OBESITY WITH ALVEOLAR HYPOVENTILATION: ICD-10-CM

## 2019-11-06 PROCEDURE — 3074F SYST BP LT 130 MM HG: CPT | Mod: CPTII,S$GLB,, | Performed by: INTERNAL MEDICINE

## 2019-11-06 PROCEDURE — 1101F PR PT FALLS ASSESS DOC 0-1 FALLS W/OUT INJ PAST YR: ICD-10-PCS | Mod: CPTII,S$GLB,, | Performed by: INTERNAL MEDICINE

## 2019-11-06 PROCEDURE — 99213 OFFICE O/P EST LOW 20 MIN: CPT | Mod: S$GLB,,, | Performed by: INTERNAL MEDICINE

## 2019-11-06 PROCEDURE — 3074F PR MOST RECENT SYSTOLIC BLOOD PRESSURE < 130 MM HG: ICD-10-PCS | Mod: CPTII,S$GLB,, | Performed by: INTERNAL MEDICINE

## 2019-11-06 PROCEDURE — 1101F PT FALLS ASSESS-DOCD LE1/YR: CPT | Mod: CPTII,S$GLB,, | Performed by: INTERNAL MEDICINE

## 2019-11-06 PROCEDURE — 99213 PR OFFICE/OUTPT VISIT, EST, LEVL III, 20-29 MIN: ICD-10-PCS | Mod: S$GLB,,, | Performed by: INTERNAL MEDICINE

## 2019-11-06 PROCEDURE — 3078F DIAST BP <80 MM HG: CPT | Mod: CPTII,S$GLB,, | Performed by: INTERNAL MEDICINE

## 2019-11-06 PROCEDURE — 99999 PR PBB SHADOW E&M-EST. PATIENT-LVL IV: ICD-10-PCS | Mod: PBBFAC,,, | Performed by: INTERNAL MEDICINE

## 2019-11-06 PROCEDURE — 99999 PR PBB SHADOW E&M-EST. PATIENT-LVL IV: CPT | Mod: PBBFAC,,, | Performed by: INTERNAL MEDICINE

## 2019-11-06 PROCEDURE — 3078F PR MOST RECENT DIASTOLIC BLOOD PRESSURE < 80 MM HG: ICD-10-PCS | Mod: CPTII,S$GLB,, | Performed by: INTERNAL MEDICINE

## 2019-11-06 RX ORDER — LOSARTAN POTASSIUM 50 MG/1
TABLET ORAL
Refills: 3 | COMMUNITY
Start: 2019-10-10 | End: 2019-11-12 | Stop reason: SDUPTHER

## 2019-11-06 RX ORDER — HYDROCHLOROTHIAZIDE 12.5 MG/1
TABLET ORAL
Refills: 3 | COMMUNITY
Start: 2019-10-10 | End: 2019-11-12 | Stop reason: SDUPTHER

## 2019-11-06 NOTE — PROGRESS NOTES
Subjective:       Patient ID: Nicole Medina is a 74 y.o. female.    Chief Complaint: Abnormal Ct Scan    HPI   Nicole Medina 74 y.o. female    has a past medical history of Acute ischemic right MCA stroke (6/2/2017), Cataract, Essential hypertension (8/21/2017), GERD (gastroesophageal reflux disease), History of seasonal allergies, Hyperlipidemia, On home oxygen therapy, Presbyopia, PSVT (paroxysmal supraventricular tachycardia), Sarcoidosis, Sciatica, TIA (transient ischemic attack) (06/02/2017), and Vertigo.    has a past surgical history that includes Total abdominal hysterectomy; Cholecystectomy; Varicose vein surgery (Bilateral); Hand surgery (Right); Tonsillectomy; Cataract extraction w/  intraocular lens implant (Left, 03/27/2018); Cataract extraction w/  intraocular lens implant (Right, 05/08/2018); Esophagogastroduodenoscopy (N/A, 11/22/2018); and Esophageal dilation (N/A, 11/22/2018).   reports that she has quit smoking. She has never used smokeless tobacco. She reports that she does not drink alcohol or use drugs.  Referred by: Aaareferral Self  Who had concerns including Abnormal Ct Scan.  The patient's last visit with me was on Visit date not found.    Here for evaluation of pulmonary nodule  Seen previously   Reviewed with Dr He, BRITNEY biopsy possible  Patient and  have questions about biopsy etc  No other sx    No fever chills, ns, wt changes, nausea, vomiting, diarrhea, constipation, chest pain, tightness, pressure. Remainder of review of systems is negative.  Otherwise asymptomatic from pulmonary standpoint.    Review of Systems   All other systems reviewed and are negative.      Objective:      Physical Exam   Constitutional: She is oriented to person, place, and time. She appears well-developed and well-nourished. She appears not cachectic. No distress.   HENT:   Head: Normocephalic.   Right Ear: External ear normal.   Left Ear: External ear normal.   Neck: Normal range of motion. No  thyromegaly present.   Cardiovascular: Normal rate, regular rhythm, normal heart sounds and intact distal pulses. Exam reveals no gallop and no friction rub.   No murmur heard.  Pulmonary/Chest: Normal expansion, symmetric chest wall expansion, effort normal and breath sounds normal. No stridor. No respiratory distress. She has no decreased breath sounds. She has no wheezes. She has no rhonchi. She has no rales. Chest wall is not dull to percussion. She exhibits no tenderness. Negative for egophony. Negative for tactile fremitus.   Abdominal: She exhibits no distension.   Musculoskeletal: She exhibits no edema, tenderness or deformity.   Lymphadenopathy:     She has no cervical adenopathy.   Neurological: She is alert and oriented to person, place, and time. No cranial nerve deficit. Gait normal.   Skin: Skin is warm and dry. No rash noted. She is not diaphoretic. No cyanosis or erythema. No pallor. Nails show no clubbing.   Psychiatric: She has a normal mood and affect. Her behavior is normal. Judgment and thought content normal.   Vitals reviewed.    Personal Diagnostic Review    No flowsheet data found.      Assessment:       1. Lung nodule    2. Sarcoidosis of lung    3. Morbid (severe) obesity with alveolar hypoventilation         Outpatient Encounter Medications as of 11/6/2019   Medication Sig Dispense Refill    albuterol (PROVENTIL HFA) 90 mcg/actuation inhaler Inhale 2 puffs into the lungs every 6 (six) hours as needed for Wheezing.      albuterol (PROVENTIL) 2.5 mg /3 mL (0.083 %) nebulizer solution Take 2.5 mg by nebulization every 6 (six) hours as needed for Wheezing.      aspirin 81 MG Chew Take 1 tablet (81 mg total) by mouth once daily. 30 tablet 11    atorvastatin (LIPITOR) 20 MG tablet Take 1 tablet (20 mg total) by mouth once daily. 90 tablet 3    calcium-vitamin D 600 mg(1,500mg) -400 unit Tab Take by mouth.      cholecalciferol, vitamin D3, (VITAMIN D3) 5,000 unit Tab Take 5,000 Units by  mouth once daily.      fluticasone (FLONASE) 50 mcg/actuation nasal spray       FLUZONE HIGH-DOSE 2018-19, PF, 180 mcg/0.5 mL vaccine ADM 0.5ML IM UTD  0    hydroCHLOROthiazide (HYDRODIURIL) 12.5 MG Tab   3    ibandronate (BONIVA) 150 mg tablet Take 1 tablet (150 mg total) by mouth every 30 days. 3 tablet 3    losartan (COZAAR) 50 MG tablet   3    losartan-hydrochlorothiazide 50-12.5 mg (HYZAAR) 50-12.5 mg per tablet TAKE 1 TABLET BY MOUTH EVERY DAY 90 tablet 1    metoprolol succinate (TOPROL XL) 50 MG 24 hr tablet Take 1 tablet (50 mg total) by mouth once daily. 90 tablet 3    mv,Ca,min-folic acid-vit K1 (ONE-A-DAY WOMEN'S 50 PLUS) 400-20 mcg Tab Take 1 tablet by mouth once daily at 6am.      omeprazole (PRILOSEC) 40 MG capsule Take 1 capsule (40 mg total) by mouth every morning. 90 capsule 3    SYMBICORT 160-4.5 mcg/actuation HFAA Inhale 2 puffs into the lungs every 12 (twelve) hours.        No facility-administered encounter medications on file as of 11/6/2019.      Orders Placed This Encounter   Procedures    Amb Consult to [facility] Interventional RAD     Referral Priority:   Routine     Referral Type:   Consultation     Number of Visits Requested:   1    Ambulatory referral to Sleep Disorders     Referral Priority:   Routine     Referral Type:   Consultation     Requested Specialty:   Sleep Medicine     Number of Visits Requested:   1    Home Sleep Studies     Standing Status:   Future     Standing Expiration Date:   11/6/2020       Plan:           Assessment:  Nicole was seen today for abnormal ct scan.    Diagnoses and all orders for this visit:    Lung nodule  -     Amb Consult to [facility] Interventional RAD  -     Ambulatory referral to Sleep Disorders  -     Home Sleep Studies; Future    Sarcoidosis of lung  -     Amb Consult to [facility] Interventional RAD  -     Ambulatory referral to Sleep Disorders  -     Home Sleep Studies; Future    Morbid (severe) obesity with alveolar hypoventilation   -     Home  Sleep Studies; Future        Plan:  Problem List Items Addressed This Visit     Lung nodule - Primary    Overview     Plan for IR biopsy         Relevant Orders    Amb Consult to [facility] Interventional RAD    Ambulatory referral to Sleep Disorders    Home Sleep Studies    Sarcoidosis of lung    Overview     Stable sarcoidosis         Relevant Orders    Amb Consult to [facility] Interventional RAD    Ambulatory referral to Sleep Disorders    Home Sleep Studies      Other Visit Diagnoses     Morbid (severe) obesity with alveolar hypoventilation         Relevant Orders    Home Sleep Studies          Plan for lung biopsy by IR  followup afterwards  No follow-ups on file.    There are no Patient Instructions on file for this visit.    Immunization History   Administered Date(s) Administered    Influenza - High Dose - PF (65 years and older) 09/29/2014, 09/25/2015, 09/21/2016, 09/18/2017, 10/05/2018, 09/10/2019    Influenza - Quadrivalent - PF (6 months and older) 11/21/2013    Influenza Split 11/21/2013    Pneumococcal Conjugate - 13 Valent 06/30/2015, 11/09/2015    Pneumococcal Polysaccharide - 23 Valent 11/21/2013, 10/05/2018

## 2019-11-08 ENCOUNTER — TELEPHONE (OUTPATIENT)
Dept: SLEEP MEDICINE | Facility: OTHER | Age: 74
End: 2019-11-08

## 2019-11-08 ENCOUNTER — TELEPHONE (OUTPATIENT)
Dept: PULMONOLOGY | Facility: CLINIC | Age: 74
End: 2019-11-08

## 2019-11-08 ENCOUNTER — TELEPHONE (OUTPATIENT)
Dept: ADMINISTRATIVE | Facility: OTHER | Age: 74
End: 2019-11-08

## 2019-11-08 NOTE — TELEPHONE ENCOUNTER
Left voice message for patient to return call to schedule appointment from referral to Sleep medicine department.  Gabrielle QUINTERO [phone]

## 2019-11-12 ENCOUNTER — OFFICE VISIT (OUTPATIENT)
Dept: FAMILY MEDICINE | Facility: CLINIC | Age: 74
End: 2019-11-12
Payer: MEDICARE

## 2019-11-12 VITALS
HEART RATE: 81 BPM | SYSTOLIC BLOOD PRESSURE: 136 MMHG | WEIGHT: 216.94 LBS | BODY MASS INDEX: 38.43 KG/M2 | DIASTOLIC BLOOD PRESSURE: 72 MMHG | OXYGEN SATURATION: 96 %

## 2019-11-12 DIAGNOSIS — I10 ESSENTIAL HYPERTENSION: ICD-10-CM

## 2019-11-12 PROCEDURE — 1101F PR PT FALLS ASSESS DOC 0-1 FALLS W/OUT INJ PAST YR: ICD-10-PCS | Mod: CPTII,S$GLB,, | Performed by: PHYSICIAN ASSISTANT

## 2019-11-12 PROCEDURE — 3078F DIAST BP <80 MM HG: CPT | Mod: CPTII,S$GLB,, | Performed by: PHYSICIAN ASSISTANT

## 2019-11-12 PROCEDURE — 99999 PR PBB SHADOW E&M-EST. PATIENT-LVL III: ICD-10-PCS | Mod: PBBFAC,,, | Performed by: PHYSICIAN ASSISTANT

## 2019-11-12 PROCEDURE — 99999 PR PBB SHADOW E&M-EST. PATIENT-LVL III: CPT | Mod: PBBFAC,,, | Performed by: PHYSICIAN ASSISTANT

## 2019-11-12 PROCEDURE — 3075F SYST BP GE 130 - 139MM HG: CPT | Mod: CPTII,S$GLB,, | Performed by: PHYSICIAN ASSISTANT

## 2019-11-12 PROCEDURE — 99214 OFFICE O/P EST MOD 30 MIN: CPT | Mod: S$GLB,,, | Performed by: PHYSICIAN ASSISTANT

## 2019-11-12 PROCEDURE — 3075F PR MOST RECENT SYSTOLIC BLOOD PRESS GE 130-139MM HG: ICD-10-PCS | Mod: CPTII,S$GLB,, | Performed by: PHYSICIAN ASSISTANT

## 2019-11-12 PROCEDURE — 3078F PR MOST RECENT DIASTOLIC BLOOD PRESSURE < 80 MM HG: ICD-10-PCS | Mod: CPTII,S$GLB,, | Performed by: PHYSICIAN ASSISTANT

## 2019-11-12 PROCEDURE — 1101F PT FALLS ASSESS-DOCD LE1/YR: CPT | Mod: CPTII,S$GLB,, | Performed by: PHYSICIAN ASSISTANT

## 2019-11-12 PROCEDURE — 99214 PR OFFICE/OUTPT VISIT, EST, LEVL IV, 30-39 MIN: ICD-10-PCS | Mod: S$GLB,,, | Performed by: PHYSICIAN ASSISTANT

## 2019-11-12 RX ORDER — LOSARTAN POTASSIUM AND HYDROCHLOROTHIAZIDE 12.5; 5 MG/1; MG/1
1 TABLET ORAL DAILY
Qty: 90 TABLET | Refills: 3 | Status: SHIPPED | OUTPATIENT
Start: 2019-11-12 | End: 2020-04-27 | Stop reason: SDUPTHER

## 2019-11-12 NOTE — PROGRESS NOTES
Subjective:       Patient ID: Nicole Meidna is a 74 y.o. female    Chief Complaint: Follow-up    HPI  The patient presents today for follow-up for hypertension.  She has been taking her losartan hydrochlorothiazide 50-12.5 for the past month.  She denies any side effects from the medication.  Her blood pressure has been well controlled under 140/90.  She denies any recent headaches or blurred vision.      Review of Systems   Constitutional: Negative for activity change, chills and fever.   HENT: Negative for congestion and sore throat.    Eyes: Negative for visual disturbance.   Respiratory: Negative for cough and shortness of breath.    Cardiovascular: Negative for chest pain and palpitations.   Gastrointestinal: Negative for abdominal pain, diarrhea, nausea and vomiting.   Endocrine: Negative for polydipsia and polyuria.   Musculoskeletal: Negative for myalgias.   Skin: Negative for rash.   Neurological: Negative for headaches.   Psychiatric/Behavioral: The patient is not nervous/anxious.         Objective:   Physical Exam   Constitutional: She is oriented to person, place, and time. She appears well-developed and well-nourished. No distress.   HENT:   Head: Normocephalic and atraumatic.   Eyes: Pupils are equal, round, and reactive to light. EOM are normal.   Neck: Normal range of motion. Neck supple. No thyromegaly present.   Cardiovascular: Normal rate, regular rhythm and normal heart sounds.   Pulmonary/Chest: Effort normal and breath sounds normal.   Musculoskeletal: Normal range of motion.   Neurological: She is alert and oriented to person, place, and time.   Skin: Skin is warm and dry.   Psychiatric: She has a normal mood and affect. Her behavior is normal. Judgment and thought content normal.        Assessment:       1. Essential hypertension  losartan-hydrochlorothiazide 50-12.5 mg (HYZAAR) 50-12.5 mg per tablet        Plan:       Essential hypertension  -     CONTINUE losartan-hydrochlorothiazide  50-12.5 mg (HYZAAR) 50-12.5 mg per tablet; Take 1 tablet by mouth once daily.  Dispense: 90 tablet; Refill: 3

## 2019-11-13 ENCOUNTER — TELEPHONE (OUTPATIENT)
Dept: PULMONOLOGY | Facility: CLINIC | Age: 74
End: 2019-11-13

## 2019-11-13 NOTE — TELEPHONE ENCOUNTER
----- Message from Kristie Holly sent at 11/13/2019  9:13 AM CST -----  Contact: Lety tel:   [phone]    Pt.says she was supposed to have a Biopsy.   Spoke to Regina last Friday.  Has not been set up for the biopsy.     Pt.lives on the [facility] , can it be done there?   Has some questions.

## 2019-11-13 NOTE — TELEPHONE ENCOUNTER
I spoke to Ms Medina. Patient will be scheduled through Interventional radiology for bx. Message sent to IR. Patient verbalized understanding.

## 2019-11-14 ENCOUNTER — TELEPHONE (OUTPATIENT)
Dept: INTERVENTIONAL RADIOLOGY/VASCULAR | Facility: HOSPITAL | Age: 74
End: 2019-11-14

## 2019-11-21 ENCOUNTER — HOSPITAL ENCOUNTER (OUTPATIENT)
Dept: SLEEP MEDICINE | Facility: OTHER | Age: 74
Discharge: HOME | End: 2019-11-21
Attending: INTERNAL MEDICINE
Payer: MEDICARE

## 2019-11-21 DIAGNOSIS — G47.33 OSA (OBSTRUCTIVE SLEEP APNEA): ICD-10-CM

## 2019-11-21 DIAGNOSIS — R91.1 LUNG NODULE: ICD-10-CM

## 2019-11-21 DIAGNOSIS — D86.0 SARCOIDOSIS OF LUNG: ICD-10-CM

## 2019-11-21 DIAGNOSIS — E66.2 MORBID (SEVERE) OBESITY WITH ALVEOLAR HYPOVENTILATION: ICD-10-CM

## 2019-11-21 PROCEDURE — 95800 SLP STDY UNATTENDED: CPT | Mod: HCNC

## 2019-11-27 ENCOUNTER — OFFICE VISIT (OUTPATIENT)
Dept: INTERVENTIONAL RADIOLOGY/VASCULAR | Facility: CLINIC | Age: 74
End: 2019-11-27
Payer: MEDICARE

## 2019-11-27 ENCOUNTER — LAB VISIT (OUTPATIENT)
Dept: LAB | Facility: HOSPITAL | Age: 74
End: 2019-11-27
Payer: MEDICARE

## 2019-11-27 VITALS
BODY MASS INDEX: 38.39 KG/M2 | DIASTOLIC BLOOD PRESSURE: 70 MMHG | WEIGHT: 216.69 LBS | SYSTOLIC BLOOD PRESSURE: 152 MMHG | HEIGHT: 63 IN | HEART RATE: 79 BPM

## 2019-11-27 DIAGNOSIS — R91.1 LUNG NODULE: ICD-10-CM

## 2019-11-27 DIAGNOSIS — D49.9 NEOPLASM: ICD-10-CM

## 2019-11-27 DIAGNOSIS — R91.1 LUNG NODULE: Primary | ICD-10-CM

## 2019-11-27 LAB
BASOPHILS # BLD AUTO: 0.05 K/UL (ref 0–0.2)
BASOPHILS NFR BLD: 0.6 % (ref 0–1.9)
DIFFERENTIAL METHOD: ABNORMAL
EOSINOPHIL # BLD AUTO: 0.3 K/UL (ref 0–0.5)
EOSINOPHIL NFR BLD: 3.3 % (ref 0–8)
ERYTHROCYTE [DISTWIDTH] IN BLOOD BY AUTOMATED COUNT: 14 % (ref 11.5–14.5)
HCT VFR BLD AUTO: 41.2 % (ref 37–48.5)
HGB BLD-MCNC: 12.2 G/DL (ref 12–16)
IMM GRANULOCYTES # BLD AUTO: 0.02 K/UL (ref 0–0.04)
IMM GRANULOCYTES NFR BLD AUTO: 0.2 % (ref 0–0.5)
INR PPP: 1 (ref 0.8–1.2)
LYMPHOCYTES # BLD AUTO: 2.3 K/UL (ref 1–4.8)
LYMPHOCYTES NFR BLD: 25.8 % (ref 18–48)
MCH RBC QN AUTO: 26.6 PG (ref 27–31)
MCHC RBC AUTO-ENTMCNC: 29.6 G/DL (ref 32–36)
MCV RBC AUTO: 90 FL (ref 82–98)
MONOCYTES # BLD AUTO: 0.9 K/UL (ref 0.3–1)
MONOCYTES NFR BLD: 9.9 % (ref 4–15)
NEUTROPHILS # BLD AUTO: 5.4 K/UL (ref 1.8–7.7)
NEUTROPHILS NFR BLD: 60.2 % (ref 38–73)
NRBC BLD-RTO: 0 /100 WBC
PLATELET # BLD AUTO: 244 K/UL (ref 150–350)
PMV BLD AUTO: 11 FL (ref 9.2–12.9)
PROTHROMBIN TIME: 10.4 SEC (ref 9–12.5)
RBC # BLD AUTO: 4.58 M/UL (ref 4–5.4)
WBC # BLD AUTO: 8.96 K/UL (ref 3.9–12.7)

## 2019-11-27 PROCEDURE — 1101F PR PT FALLS ASSESS DOC 0-1 FALLS W/OUT INJ PAST YR: ICD-10-PCS | Mod: HCNC,CPTII,S$GLB, | Performed by: FAMILY MEDICINE

## 2019-11-27 PROCEDURE — 1159F PR MEDICATION LIST DOCUMENTED IN MEDICAL RECORD: ICD-10-PCS | Mod: HCNC,S$GLB,, | Performed by: FAMILY MEDICINE

## 2019-11-27 PROCEDURE — 99204 OFFICE O/P NEW MOD 45 MIN: CPT | Mod: HCNC,S$GLB,, | Performed by: FAMILY MEDICINE

## 2019-11-27 PROCEDURE — 1101F PT FALLS ASSESS-DOCD LE1/YR: CPT | Mod: HCNC,CPTII,S$GLB, | Performed by: FAMILY MEDICINE

## 2019-11-27 PROCEDURE — 36415 COLL VENOUS BLD VENIPUNCTURE: CPT | Mod: HCNC

## 2019-11-27 PROCEDURE — 99204 PR OFFICE/OUTPT VISIT, NEW, LEVL IV, 45-59 MIN: ICD-10-PCS | Mod: HCNC,S$GLB,, | Performed by: FAMILY MEDICINE

## 2019-11-27 PROCEDURE — 3077F SYST BP >= 140 MM HG: CPT | Mod: HCNC,CPTII,S$GLB, | Performed by: FAMILY MEDICINE

## 2019-11-27 PROCEDURE — 85025 COMPLETE CBC W/AUTO DIFF WBC: CPT | Mod: HCNC

## 2019-11-27 PROCEDURE — 3078F DIAST BP <80 MM HG: CPT | Mod: HCNC,CPTII,S$GLB, | Performed by: FAMILY MEDICINE

## 2019-11-27 PROCEDURE — 3077F PR MOST RECENT SYSTOLIC BLOOD PRESSURE >= 140 MM HG: ICD-10-PCS | Mod: HCNC,CPTII,S$GLB, | Performed by: FAMILY MEDICINE

## 2019-11-27 PROCEDURE — 85610 PROTHROMBIN TIME: CPT | Mod: HCNC

## 2019-11-27 PROCEDURE — 3078F PR MOST RECENT DIASTOLIC BLOOD PRESSURE < 80 MM HG: ICD-10-PCS | Mod: HCNC,CPTII,S$GLB, | Performed by: FAMILY MEDICINE

## 2019-11-27 PROCEDURE — 99999 PR PBB SHADOW E&M-EST. PATIENT-LVL III: ICD-10-PCS | Mod: PBBFAC,HCNC,, | Performed by: FAMILY MEDICINE

## 2019-11-27 PROCEDURE — 99999 PR PBB SHADOW E&M-EST. PATIENT-LVL III: CPT | Mod: PBBFAC,HCNC,, | Performed by: FAMILY MEDICINE

## 2019-11-27 PROCEDURE — 1159F MED LIST DOCD IN RCRD: CPT | Mod: HCNC,S$GLB,, | Performed by: FAMILY MEDICINE

## 2019-11-27 NOTE — LETTER
November 27, 2019    Nicole Medina  [address] [address]     [facility] Interventional [facility]  [address]  [place] [address]  Phone: [phone] PRE-PROCEDURE INSTRUCTIONS    Your procedure with Interventional Radiology is scheduled for December 12, 2019. Please arrive by 8:30am.    You must check-in and receive a wristband before going to your procedure. Your [facility]-in [facility].    **Do not eat or drink anything between midnight and the time of your procedure. This includes gum, mints, and candy lemon drops.    **Do not smoke or drink alcoholic beverages 24 hours prior to your procedure.    **If you wear contact lenses, dentures, hearing aids, or glasses, bring a container to put them in during the procedure and give them to a family member for safekeeping.    **If you have been diagnosed with sleep apnea please bring your CPAP machine.    **If your doctor has scheduled you for an overnight stay, bring a small overnight bag with any personal items that you may need.    **Make arrangements in advance for transportation home by a responsible adult. It is not safe to drive a vehicle during the 24 hours following the procedure.    **All [facility] facilities and properties are tobacco free. Smoking is NOT allowed.    PLEASE NOTE: The procedure schedule has many variables which affect the time of your procedure. Family members should be available if your surgery time changes.    If you have any questions about these instructions call Interventional Radiology at [phone] Monday - Friday between 8:00am and 4:00pm or [phone] for after hours.

## 2019-11-27 NOTE — LETTER
November 27, 2019      Chevy Soria MD  [address]  [facility] [address]           [facility] Interventional Rad  [address]  [facility] [address]  Phone: [phone]          Patient: Nicole Medina   MR Number: [id number]   YOB: 1945   Date of Visit: 11/27/2019       Dear Dr. Chevy Soria:    Thank you for referring Nicole Median to me for evaluation. Attached you will find relevant portions of my assessment and plan of care.    If you have questions, please do not hesitate to call me. I look forward to following Nicole Medina along with you.    Sincerely,    Ledy Del Real, NP    Enclosure  CC:  No Recipients    If you would like to receive this communication electronically, please contact [email or web address] or [phone] to request more information on [employer] access.    For providers and/or their staff who would like to refer a patient to [facility], please contact us through our one-stop-shop provider referral line, [facility], at [phone].    If you feel you have received this communication in error or would no longer like to receive these types of communications, please e-mail [email or web address]

## 2019-11-27 NOTE — Clinical Note
"Thank you for referring Mr. Medina to Interventional Radiology at the [facility]. Please don't hesitate to contact us if there are any questions regarding this evaluation at [phone]. If you have any other patients for whom you would like a consultation, please place an order for "Amb consult to [facility] Interventional Rad", and we will be happy to review their case.Sincerely,ELIECER Angel, FNPInterventional Radiology"

## 2019-11-27 NOTE — PROGRESS NOTES
"Subjective:       Patient ID: Nicole Medina is a 74 y.o. female.    Chief Complaint: Lesion    Patient referred to Interventional Radiology by Dr. Soria for evaluation of a DOC pulmonary nodule. Patient has a history of sarcoidosis and a remote history of smoking as a teenager. This lesion was identified in 2015. Routine surveillance with CT on 10/9/2019 noted "Left apical pulmonary nodule persists, with interval increase in density.  This remains concerning for neoplasm such as adenocarcinoma." Patient reports feeling in her usual state of health. She denies any dyspnea, chronic cough, or hemoptysis. She is accompanied by her .     Review of Systems   Constitutional: Negative for activity change, appetite change, chills, fatigue and fever.   HENT: Negative for congestion, drooling, ear discharge, postnasal drip, sneezing and trouble swallowing.    Eyes: Negative for pain, discharge, redness and itching.   Respiratory: Negative for cough, shortness of breath, wheezing and stridor.    Cardiovascular: Negative for chest pain, palpitations and leg swelling.   Gastrointestinal: Negative for abdominal distention, abdominal pain, constipation, diarrhea, nausea and vomiting.   Genitourinary: Negative for difficulty urinating, dysuria, frequency and urgency.   Musculoskeletal: Negative for arthralgias, back pain, gait problem, joint swelling, myalgias and neck pain.   Skin: Negative for color change, pallor and rash.   Neurological: Negative for dizziness, weakness and headaches.       Objective:      Physical Exam   Constitutional: She is oriented to person, place, and time. She appears well-developed and well-nourished. No distress.   HENT:   Head: Normocephalic and atraumatic.   Right Ear: External ear normal.   Left Ear: External ear normal.   Nose: Nose normal.   Mouth/Throat: Oropharynx is clear and moist. No oropharyngeal exudate.   Eyes: Pupils are equal, round, and reactive to light. Conjunctivae are normal. " Right eye exhibits no discharge. Left eye exhibits no discharge. No scleral icterus.   Neck: Neck supple. No JVD present. No tracheal deviation present. No thyromegaly present.   Cardiovascular: Normal rate, regular rhythm, normal heart sounds and intact distal pulses. Exam reveals no gallop and no friction rub.   No murmur heard.  Pulmonary/Chest: Effort normal and breath sounds normal. No stridor. No respiratory distress. She has no wheezes. She has no rales.   Abdominal: Soft. Bowel sounds are normal. She exhibits no distension and no mass. There is no hepatosplenomegaly. There is no tenderness. There is no rebound and no guarding.   Musculoskeletal: She exhibits edema (trace bilateral LE).   Lymphadenopathy:     She has no cervical adenopathy.   Neurological: She is alert and oriented to person, place, and time. Gait normal.   Skin: Skin is warm and dry. She is not diaphoretic. No cyanosis. Nails show no clubbing.   Psychiatric: She has a normal mood and affect.   Vitals reviewed.      Imaging:    Assessment:       1. Lung nodule    2. Neoplasm        Plan:         Discussed case with Dr. Aceves and Dr. Landry. Explained to patient can offer biopsy of DOC pulmonary nodule. Discussed how the procedure will be performed, risks (including, but not limited to, pain, bleeding, infection, damage to nearby structures, and the need for additional procedures), benefits, possible complications, pre-post procedure expectations, and alternatives. The patient voices understanding and all questions have been answered.  The patient agrees to proceed as planned. Dr. Landry in room. Written informed consent obtained. Patient scheduled for 12/12/2019. Pre-procedure handout with clinic phone number provided. Patient will have pre-procedure labs drawn today.

## 2019-11-28 PROCEDURE — 95800 PR SLEEP STUDY, UNATTENDED, RECORD HEART RATE/O2 SAT/RESP ANAL/SLEEP TIME: ICD-10-PCS | Mod: 26,HCNC,, | Performed by: PSYCHIATRY & NEUROLOGY

## 2019-11-28 PROCEDURE — 95800 SLP STDY UNATTENDED: CPT | Mod: 26,HCNC,, | Performed by: PSYCHIATRY & NEUROLOGY

## 2019-11-28 NOTE — PROCEDURES
"Dear Referring Provider,    The sleep study that you ordered is complete. You have ordered sleep LAB services to perform the sleep study for Nicole Medina.     Please find Sleep Study result in "Chart Review" under the "Media tab."     As the ordering provider, you are responsible for reviewing the results and implementing a treatment plan with your patient. If you need a Sleep Medicine provider to explain the sleep study findings and arrange treatment for the patient, please refer patient for consultation to our Sleep Clinic via [facility] with Ambulatory Consult Sleep.    To do that please place an order for an "Ambulatory Consult Sleep" - it will go to our clinic work queue for our Medical Assistant to contact the patient for an appointment.     For any questions, please contact our clinic staff at [phone] to talk to clinical staff.      "

## 2019-12-04 ENCOUNTER — PATIENT MESSAGE (OUTPATIENT)
Dept: CRITICAL CARE MEDICINE | Facility: HOSPITAL | Age: 74
End: 2019-12-04

## 2019-12-04 DIAGNOSIS — G47.33 OSA (OBSTRUCTIVE SLEEP APNEA): Primary | ICD-10-CM

## 2019-12-11 RX ORDER — FENTANYL CITRATE 50 UG/ML
50 INJECTION, SOLUTION INTRAMUSCULAR; INTRAVENOUS
Status: CANCELLED | OUTPATIENT
Start: 2019-12-11

## 2019-12-11 RX ORDER — MIDAZOLAM HYDROCHLORIDE 1 MG/ML
1 INJECTION INTRAMUSCULAR; INTRAVENOUS
Status: CANCELLED | OUTPATIENT
Start: 2019-12-11

## 2019-12-12 ENCOUNTER — HOSPITAL ENCOUNTER (OUTPATIENT)
Facility: HOSPITAL | Age: 74
Discharge: HOME | End: 2019-12-12
Attending: RADIOLOGY | Admitting: RADIOLOGY
Payer: MEDICARE

## 2019-12-12 VITALS
SYSTOLIC BLOOD PRESSURE: 185 MMHG | DIASTOLIC BLOOD PRESSURE: 92 MMHG | BODY MASS INDEX: 37.21 KG/M2 | TEMPERATURE: 98 F | WEIGHT: 210 LBS | RESPIRATION RATE: 17 BRPM | OXYGEN SATURATION: 100 % | HEIGHT: 63 IN | HEART RATE: 83 BPM

## 2019-12-12 DIAGNOSIS — R91.1 LUNG NODULE: ICD-10-CM

## 2019-12-12 DIAGNOSIS — D49.9 NEOPLASM: ICD-10-CM

## 2019-12-12 PROCEDURE — 63600175 PHARM REV CODE 636 W HCPCS: Mod: HCNC | Performed by: FAMILY MEDICINE

## 2019-12-12 RX ORDER — SODIUM CHLORIDE 9 MG/ML
500 INJECTION, SOLUTION INTRAVENOUS ONCE
Status: COMPLETED | OUTPATIENT
Start: 2019-12-12 | End: 2019-12-12

## 2019-12-12 RX ADMIN — SODIUM CHLORIDE 500 ML: 0.9 INJECTION, SOLUTION INTRAVENOUS at 08:12

## 2019-12-12 NOTE — DISCHARGE INSTRUCTIONS
[facility] [phone] (MON-FRI 8 AM- 5PM). Radiology Resident on call [phone].

## 2019-12-13 NOTE — PROGRESS NOTES
Biopsy of DOC lung nodule had to be postponed due to patient being on aspirin. Will have to reschedule lung biopsy to another date with instructions to hold aspirin and any other anticoagulants.    Akil Valerio MD  Radiology PGY-2

## 2019-12-16 DIAGNOSIS — R91.1 LUNG NODULE: Primary | ICD-10-CM

## 2019-12-19 ENCOUNTER — TELEPHONE (OUTPATIENT)
Dept: FAMILY MEDICINE | Facility: CLINIC | Age: 74
End: 2019-12-19

## 2019-12-19 DIAGNOSIS — Z13.6 ENCOUNTER FOR SCREENING FOR CARDIOVASCULAR DISORDERS: Primary | ICD-10-CM

## 2019-12-19 RX ORDER — MIDAZOLAM HYDROCHLORIDE 1 MG/ML
1 INJECTION INTRAMUSCULAR; INTRAVENOUS
Status: CANCELLED | OUTPATIENT
Start: 2019-12-19

## 2019-12-19 RX ORDER — FENTANYL CITRATE 50 UG/ML
50 INJECTION, SOLUTION INTRAMUSCULAR; INTRAVENOUS
Status: CANCELLED | OUTPATIENT
Start: 2019-12-19

## 2019-12-19 NOTE — TELEPHONE ENCOUNTER
Orders entered----- Message from Rima Mcgarry sent at 12/19/2019 10:21 AM CST -----  Contact: pt  Pt has made her annual appointment 4/1/20 and would like to have order and schedule for her labs 1 week before please...[phone]  Orders entered

## 2019-12-20 ENCOUNTER — HOSPITAL ENCOUNTER (OUTPATIENT)
Facility: HOSPITAL | Age: 74
Discharge: HOME | End: 2019-12-20
Attending: RADIOLOGY | Admitting: RADIOLOGY
Payer: MEDICARE

## 2019-12-20 VITALS
DIASTOLIC BLOOD PRESSURE: 58 MMHG | OXYGEN SATURATION: 98 % | BODY MASS INDEX: 37.21 KG/M2 | HEART RATE: 80 BPM | TEMPERATURE: 98 F | SYSTOLIC BLOOD PRESSURE: 143 MMHG | HEIGHT: 63 IN | WEIGHT: 210 LBS | RESPIRATION RATE: 16 BRPM

## 2019-12-20 DIAGNOSIS — R91.1 LUNG NODULE: ICD-10-CM

## 2019-12-20 PROCEDURE — 88333 PATH CONSLTJ SURG CYTO XM 1: CPT | Mod: HCNC | Performed by: PATHOLOGY

## 2019-12-20 PROCEDURE — 88305 TISSUE EXAM BY PATHOLOGIST: CPT | Mod: 26,HCNC,, | Performed by: PATHOLOGY

## 2019-12-20 PROCEDURE — 88333 PATH CONSLTJ SURG CYTO XM 1: CPT | Mod: 26,HCNC,, | Performed by: PATHOLOGY

## 2019-12-20 PROCEDURE — 88271 CYTOGENETICS DNA PROBE: CPT | Mod: 59,HCNC | Performed by: PATHOLOGY

## 2019-12-20 PROCEDURE — 88333 PR  INTRAOPERATIVE CYTO PATH CONSULT, INITIAL SITE: ICD-10-PCS | Mod: 26,HCNC,, | Performed by: PATHOLOGY

## 2019-12-20 PROCEDURE — 88305 TISSUE EXAM BY PATHOLOGIST: ICD-10-PCS | Mod: 26,HCNC,, | Performed by: PATHOLOGY

## 2019-12-20 PROCEDURE — 88274 CYTOGENETICS 25-99: CPT | Mod: HCNC | Performed by: PATHOLOGY

## 2019-12-20 PROCEDURE — 88360 TUMOR IMMUNOHISTOCHEM/MANUAL: CPT | Mod: HCNC,59 | Performed by: PATHOLOGY

## 2019-12-20 PROCEDURE — 63600175 PHARM REV CODE 636 W HCPCS: Mod: HCNC | Performed by: RADIOLOGY

## 2019-12-20 PROCEDURE — 88305 TISSUE EXAM BY PATHOLOGIST: CPT | Mod: HCNC | Performed by: PATHOLOGY

## 2019-12-20 PROCEDURE — 88381 MICRODISSECTION MANUAL: CPT | Mod: HCNC | Performed by: PATHOLOGY

## 2019-12-20 RX ORDER — FENTANYL CITRATE 50 UG/ML
INJECTION, SOLUTION INTRAMUSCULAR; INTRAVENOUS CODE/TRAUMA/SEDATION MEDICATION
Status: COMPLETED | OUTPATIENT
Start: 2019-12-20 | End: 2019-12-20

## 2019-12-20 RX ORDER — MIDAZOLAM HYDROCHLORIDE 1 MG/ML
INJECTION INTRAMUSCULAR; INTRAVENOUS CODE/TRAUMA/SEDATION MEDICATION
Status: COMPLETED | OUTPATIENT
Start: 2019-12-20 | End: 2019-12-20

## 2019-12-20 RX ORDER — SODIUM CHLORIDE 9 MG/ML
500 INJECTION, SOLUTION INTRAVENOUS ONCE
Status: DISCONTINUED | OUTPATIENT
Start: 2019-12-20 | End: 2019-12-20 | Stop reason: HOSPADM

## 2019-12-20 RX ADMIN — MIDAZOLAM HYDROCHLORIDE 1 MG: 1 INJECTION, SOLUTION INTRAMUSCULAR; INTRAVENOUS at 08:12

## 2019-12-20 RX ADMIN — FENTANYL CITRATE 50 MCG: 50 INJECTION, SOLUTION INTRAMUSCULAR; INTRAVENOUS at 08:12

## 2019-12-20 NOTE — H&P
Radiology History & Physical      SUBJECTIVE:     Chief Complaint: Lung nodule    History of Present Illness:  Nicole Medina is a 74 y.o. female who presents for CT guided lung biopsy.  Pt with history of sarcoidosis and remote h/o smoking (as a teenager).  DOC pulmonary nodule identified in 2015, increasing in density on CT 10/9/19.    Past Medical History:   Diagnosis Date    Acute ischemic right MCA stroke 6/2/2017    Cataract     done ou    Essential hypertension 8/21/2017    GERD (gastroesophageal reflux disease)     Hiatal hernia     History of seasonal allergies     On home oxygen therapy     while sleeping    Polio     as a child    Presbyopia     PSVT (paroxysmal supraventricular tachycardia)     Sarcoidosis     Sciatica     [facility]    Vertigo      Past Surgical History:   Procedure Laterality Date    CATARACT EXTRACTION W/  INTRAOCULAR LENS IMPLANT Left 03/27/2018    Dr Higginbotham    CATARACT EXTRACTION W/  INTRAOCULAR LENS IMPLANT Right 05/08/2018    Dr Higginbotham    CHOLECYSTECTOMY      ESOPHAGEAL DILATION N/A 11/22/2018    Procedure: DILATION, ESOPHAGUS;  Surgeon: Robb Fitzgerald Jr., MD;  Location: [facility];  Service: Endoscopy;  Laterality: N/A;    ESOPHAGOGASTRODUODENOSCOPY N/A 11/22/2018    Procedure: ESOPHAGOGASTRODUODENOSCOPY (EGD);  Surgeon: Robb Fitzgerald Jr., MD;  Location: [facility];  Service: Endoscopy;  Laterality: N/A;    HAND SURGERY Right     trauma repair    TONSILLECTOMY      TOTAL ABDOMINAL HYSTERECTOMY      VARICOSE VEIN SURGERY Bilateral     bilateral legs       Home Meds:   Prior to Admission medications    Medication Sig Start Date End Date Taking? Authorizing Provider   albuterol (PROVENTIL HFA) 90 mcg/actuation inhaler Inhale 2 puffs into the lungs every 6 (six) hours as needed for Wheezing.    Historical Provider, MD   albuterol (PROVENTIL) 2.5 mg /3 mL (0.083 %) nebulizer solution Take 2.5 mg by  nebulization every 6 (six) hours as needed for Wheezing.    Historical Provider, MD   aspirin 81 MG Chew Take 1 tablet (81 mg total) by mouth once daily. 9/13/18   Justus Babb DO   atorvastatin (LIPITOR) 20 MG tablet Take 1 tablet (20 mg total) by mouth once daily. 3/25/19 3/24/20  Milan Jain MD   calcium-vitamin D 600 mg(1,500mg) -400 unit Tab Take by mouth.    Historical Provider, MD   cholecalciferol, vitamin D3, (VITAMIN D3) 5,000 unit Tab Take 5,000 Units by mouth once daily.    Historical Provider, MD   fluticasone (FLONASE) 50 mcg/actuation nasal spray  2/22/16   Historical Provider, MD   FLUZONE HIGH-DOSE 2018-19, PF, 180 mcg/0.5 mL vaccine ADM 0.5ML IM UTD 10/5/18   Historical Provider, MD   ibandronate (BONIVA) 150 mg tablet Take 1 tablet (150 mg total) by mouth every 30 days. 3/25/19   Milan Jain MD   losartan-hydrochlorothiazide 50-12.5 mg (HYZAAR) 50-12.5 mg per tablet Take 1 tablet by mouth once daily. 11/12/19   Merced Perez PA-C   metoprolol succinate (TOPROL XL) 50 MG 24 hr tablet Take 1 tablet (50 mg total) by mouth once daily. 3/25/19   Milan Jain MD   mv,Ca,min-folic acid-vit K1 (ONE-A-DAY WOMEN'S 50 PLUS) 400-20 mcg Tab Take 1 tablet by mouth once daily at 6am.    Historical Provider, MD   omeprazole (PRILOSEC) 40 MG capsule Take 1 capsule (40 mg total) by mouth every morning. 3/25/19   Milan Jain MD   SYMBICORT 160-4.5 mcg/actuation HFAA Inhale 2 puffs into the lungs every 12 (twelve) hours.  6/21/17   Historical Provider, MD     Anticoagulants/Antiplatelets: aspirin 81 mg, last dose 12/11/19    Allergies:   Review of patient's allergies indicates:   Allergen Reactions    Latex Itching and Swelling     Itching and swelling to site (local reaction)     Sedation History:  no adverse reactions    Review of Systems:   Hematological: no known coagulopathies  Respiratory: no shortness of breath  Cardiovascular: no chest pain  Gastrointestinal: no  abdominal pain  Genito-Urinary: no dysuria  Musculoskeletal: negative  Neurological: no TIA or stroke symptoms         OBJECTIVE:     Vital Signs (Most Recent)       Physical Exam:  ASA: 2  Mallampati: 3    General: no acute distress  Mental Status: alert and oriented to person, place and time  HEENT: normocephalic, atraumatic  Chest: unlabored breathing  Abdomen: nondistended  Extremity: moves all extremities    Laboratory  Lab Results   Component Value Date    INR 1.0 11/27/2019       Lab Results   Component Value Date    WBC 8.96 11/27/2019    HGB 12.2 11/27/2019    HCT 41.2 11/27/2019    MCV 90 11/27/2019     11/27/2019      Lab Results   Component Value Date    GLU 71 08/07/2019     08/07/2019    K 4.3 08/07/2019     08/07/2019    CO2 29 08/07/2019    BUN 15 08/07/2019    CREATININE 0.7 08/07/2019    CALCIUM 9.8 08/07/2019    ALT 17 08/07/2019    AST 22 08/07/2019    ALBUMIN 3.4 (L) 08/07/2019    BILITOT 0.3 08/07/2019       ASSESSMENT/PLAN:     Sedation Plan: Moderate  Patient will undergo CT guided lung biopsy.    Pattie Hoskins MD  Resident  Department of Radiology  Pager: [phone]

## 2019-12-20 NOTE — DISCHARGE SUMMARY
Radiology Discharge Summary      Hospital Course: No complications    Procedure Performed: Left upper lobe lung biopsy    Admit Date: 12/20/2019  Discharge Date: 12/20/2019     Instructions Given to Patient: Yes  Diet: Resume prior diet  Activity: activity as tolerated and no driving for today    Description of Condition on Discharge: Stable  Vital Signs (Most Recent): Temp: 97.7 °F (36.5 °C) (12/20/19 0709)  Pulse: 92 (12/20/19 0835)  Resp: 13 (12/20/19 0835)  BP: (!) 156/67 (12/20/19 0835)  SpO2: 95 % (12/20/19 0830)    Discharge Disposition: Home    Discharge Diagnosis: Left upper lobe lung nodule     Follow-up:  Follow-up pathology results.  Further follow-up per ordering physician.      Pattie Hoskins MD  Resident  Department of Radiology  Pager: [phone]

## 2019-12-20 NOTE — PROCEDURES
Radiology Post-Procedure Note    Pre Op Diagnosis: Left upper lung nodule    Post Op Diagnosis: Left upper lung nodule    Procedure: left lung biopsy    Procedure performed by: Amador Gonzales MD, Pattie Hoskins MD    Written Informed Consent Obtained: Yes    Specimen Removed: YES     Estimated Blood Loss: Minimal    Findings:   Using CT guidance a 19g sheath needle was placed into a Left lung nodule. 20g monopty biopsy gun used to take 3 core biopsy samples. Specimen sent to pathology.     Additional specimen sent to lab: No    Patient tolerated procedure well.    Pattie Hoskins MD  Resident  Department of Radiology  Pager: [phone]

## 2019-12-20 NOTE — PROGRESS NOTES
Pt discharged per unit and hospital protocol via wheel chair with family member and transport staff.

## 2019-12-20 NOTE — PROGRESS NOTES
Pt VSS. Procedure incision site CDI. Pt and family members acknowledged understanding of discharge instructions.  Pt may be discharged per . Transport requested.will continue to monitor.

## 2019-12-20 NOTE — DISCHARGE INSTRUCTIONS
For scheduling: Call Risa at [phone]    For questions or concerns call: OSM[facility] MON[facility] 8 AM- 5PM [phone]. Radiology resident on call [phone].    For immediate concerns that are not emergent, you may call our radiology clinic at: [phone]...

## 2019-12-20 NOTE — PLAN OF CARE
Lung bx completed. Pt tolerated well. NAD noted or reported. Site bandaged, CDI. Pt to recover in [facility], report to be given at bedside. Xray to be completed STAT, then follow up Xray in 2 hrs. Specimen given to pathology.

## 2019-12-20 NOTE — NURSING
Pt arrives to IR via stretcher. Pt is AAOX4, able to state reason she is here. Orders, hx, labs, consent reviewed.

## 2019-12-20 NOTE — PROGRESS NOTES
Pt arrived [facility] BAY 1 S/P Lung biopsy. VSS. Incision site CDI. Report received from DEVI Dowd. Family notified.

## 2019-12-21 ENCOUNTER — NURSE TRIAGE (OUTPATIENT)
Dept: ADMINISTRATIVE | Facility: CLINIC | Age: 74
End: 2019-12-21

## 2019-12-30 ENCOUNTER — TELEPHONE (OUTPATIENT)
Dept: PULMONOLOGY | Facility: CLINIC | Age: 74
End: 2019-12-30

## 2019-12-30 NOTE — TELEPHONE ENCOUNTER
----- Message from Shellie Tapia sent at 12/30/2019 11:00 AM CST -----  Contact: Jim [phone]  Patient called to get results for her lung biopsy done on 12/20.    Call back#[phone]

## 2019-12-30 NOTE — TELEPHONE ENCOUNTER
Spoke with patient, informed her that I have received her message and will forward to Dr Soria to review/advise. Patient verbalizes that she understand.

## 2020-01-03 ENCOUNTER — TELEPHONE (OUTPATIENT)
Dept: PULMONOLOGY | Facility: CLINIC | Age: 75
End: 2020-01-03

## 2020-01-03 DIAGNOSIS — C34.92 ADENOCARCINOMA OF LEFT LUNG: Primary | ICD-10-CM

## 2020-01-03 NOTE — TELEPHONE ENCOUNTER
----- Message from Tanja Velazco sent at 1/3/2020 11:03 AM CST -----  Contact: Self  Pt needs a call back from provider or nurse in office on today  in regards to her results from biopsy     Please advise pt can be reached at [phone]

## 2020-01-03 NOTE — TELEPHONE ENCOUNTER
Patient was waiting for results. Called and reviewed on behalf of Dr. Soria.  History of sarcoid and enlarging DOC nodule is positive for adenocarcinoma.  Unable to review PFTs but will send to thoracic surgery to consider resection.  Patient and  request to be seen by [facility] but request that I keep Dr. Gillespie informed of her progress as her primary pulmonologist.  Happy to do so.

## 2020-01-03 NOTE — TELEPHONE ENCOUNTER
Mr and Mrs Medina,    It was nice talking with you today.  I am sorry that I have to deliver bad news but hope you are encouraged that I believe may be removed.  The two thoracic surgeons on the [facility] that I recommend are Dr. Moshe Verdugo and or Dr. Ritesh Mccall.  The type of cancer that you have is called adenocarcinoma.     BIOPSY OF LEFT UPPER LOBE:   POSITIVE FOR ADENOCARCINOMA   This specimen is being sent for molecular studies.     Dr. Verna Hassan      Per patient request will send to Dr. Soria and Dr. Gillespie.

## 2020-01-03 NOTE — TELEPHONE ENCOUNTER
I spoke to Mrs Medina to let her know a message was sent to Dr Soria. However, Dr Soria is not in and will be back next week. Patient calling for final results of bx on 12/20/19. Patient verbalized understanding.

## 2020-01-06 ENCOUNTER — TELEPHONE (OUTPATIENT)
Dept: HEMATOLOGY/ONCOLOGY | Facility: CLINIC | Age: 75
End: 2020-01-06

## 2020-01-06 NOTE — NURSING
Oncology Navigation   Intake  Date of Diagnosis: 19  Cancer Type: Thoracic  MD Assigned: Stefany  Internal / External Referral: Internal  Initial Nurse Navigator Contact: 20  Diagnosis to Initial Contact Timeline (days): 13 days  Contact Method: Queue  Date Worked: 20  First Appointment Available: 20  Appointment Date: 20  Schedule to Appointment Timeline (days): 2  First Available Date vs. Scheduled Date (days): 0  Multiple appointments: Yes (Scheduled for PFT's)     Treatment      Acuity  Surgical Procedure Complexity: 2  Treatment Tolerability: Has not started treatment yet/treatment fully completed and side effects resolved  ECO  Comorbidities in Medical History: 0  Hospitalization Within the Past Month: 0   Needed: 0  Support: 0  Verbalizes Financial Concerns: 0  Transportation: 0  Mental Health: PHQ Score: 0  History of noncompliance/frequent no shows and cancellations: 0  Verbalizes the need for more education: 0  Other Factors (+1 for Each): 0  Navigation Acuity: 2     Follow Up  Follow up in about 8 days (around 2020) for To follow up on initial appointment.   Received referral for pt with new diagnosis of lung adenocarcinoma per pathology report (BIOPSY OF LEFT UPPER LOBE:   POSITIVE FOR ADENOCARCINOMA).  Specimen is being sent for molecular studies. Spoke with pt and spouse who is agreeable with seeing Dr. Mccall on .  Instructions given on where to go for PFT's and thoracic appointment.  Pt active with [facility] and appointments are visible.

## 2020-01-07 NOTE — PROGRESS NOTES
History & Physical    SUBJECTIVE:     History of Present Illness:   74 y.o. female with  PMH of TIA in 2017, HTN, HLD, SVT, COPD, GERD, obesity and sarcoidosis presents for evaluation of DOC biopsy proven adenocarcinoma. Patient has followed with pulmonology since 2014 diagnosis of sarcoidosis. Serial CTs had shown increasing solid component of nodule. Nodule measured 1.8cm in Oct 2019. CT guided biopsy positive for adenocarcinoma. Today she reports feeling well and is asymptomatic. Endorses baseline MACK since initial sarcoid diagnosis. She was previously prescribed nocturnal NC O2 but more recent sleep studies proved she did not need it.  SVT controlled chemically. No prior cardiac or thoracic surgeries. Takes 81mg ASA but has held it since IR biopsy.     Remote smoking history. Denies EtOH use. 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   PSH of bilateral cataract extraction, cholecystectomy and hysterectomy.    Chief Complaint   Patient presents with    Consult       Review of patient's allergies indicates:   Allergen Reactions    Latex Itching and Swelling     Itching and swelling to site (local reaction)       Current Outpatient Medications   Medication Sig Dispense Refill    albuterol (PROVENTIL HFA) 90 mcg/actuation inhaler Inhale 2 puffs into the lungs every 6 (six) hours as needed for Wheezing.      albuterol (PROVENTIL) 2.5 mg /3 mL (0.083 %) nebulizer solution Take 2.5 mg by nebulization every 6 (six) hours as needed for Wheezing.      aspirin 81 MG Chew Take 1 tablet (81 mg total) by mouth once daily. 30 tablet 11    atorvastatin (LIPITOR) 20 MG tablet Take 1 tablet (20 mg total) by mouth once daily. 90 tablet 3    calcium-vitamin D 600 mg(1,500mg) -400 unit Tab Take by mouth.      cholecalciferol, vitamin D3, (VITAMIN D3) 5,000 unit Tab Take 5,000 Units by mouth once daily.      fluticasone (FLONASE) 50 mcg/actuation nasal spray       FLUZONE HIGH-DOSE 2018-19, PF, 180 mcg/0.5 mL vaccine  ADM 0.5ML IM UTD  0    ibandronate (BONIVA) 150 mg tablet Take 1 tablet (150 mg total) by mouth every 30 days. 3 tablet 3    losartan-hydrochlorothiazide 50-12.5 mg (HYZAAR) 50-12.5 mg per tablet Take 1 tablet by mouth once daily. 90 tablet 3    metoprolol succinate (TOPROL XL) 50 MG 24 hr tablet Take 1 tablet (50 mg total) by mouth once daily. 90 tablet 3    mv,Ca,min-folic acid-vit K1 (ONE-A-DAY WOMEN'S 50 PLUS) 400-20 mcg Tab Take 1 tablet by mouth once daily at 6am.      omeprazole (PRILOSEC) 40 MG capsule Take 1 capsule (40 mg total) by mouth every morning. 90 capsule 3    predniSONE (DELTASONE) 5 MG tablet       SYMBICORT 160-4.5 mcg/actuation HFAA Inhale 2 puffs into the lungs every 12 (twelve) hours.        No current facility-administered medications for this visit.        Past Medical History:   Diagnosis Date    Acute ischemic right MCA stroke 6/2/2017    Cataract     done ou    Essential hypertension 8/21/2017    GERD (gastroesophageal reflux disease)     Hiatal hernia     History of seasonal allergies     On home oxygen therapy     while sleeping    Polio     as a child    Presbyopia     PSVT (paroxysmal supraventricular tachycardia)     Sarcoidosis     Sciatica     [facility]    Vertigo      Past Surgical History:   Procedure Laterality Date    CATARACT EXTRACTION W/  INTRAOCULAR LENS IMPLANT Left 03/27/2018    Dr Higginbotham    CATARACT EXTRACTION W/  INTRAOCULAR LENS IMPLANT Right 05/08/2018    Dr Higginbotham    CHOLECYSTECTOMY      ESOPHAGEAL DILATION N/A 11/22/2018    Procedure: DILATION, ESOPHAGUS;  Surgeon: Robb Fitzgerald Jr., MD;  Location: [facility];  Service: Endoscopy;  Laterality: N/A;    ESOPHAGOGASTRODUODENOSCOPY N/A 11/22/2018    Procedure: ESOPHAGOGASTRODUODENOSCOPY (EGD);  Surgeon: Robb Fitzgerald Jr., MD;  Location: [facility];  Service: Endoscopy;  Laterality: N/A;    HAND SURGERY Right     trauma repair  "   TONSILLECTOMY      TOTAL ABDOMINAL HYSTERECTOMY      VARICOSE VEIN SURGERY Bilateral     bilateral legs     Family History   Problem Relation Age of Onset    Cataracts Mother     Macular degeneration Mother     Cancer Mother         lymphoma    Macular degeneration Sister     Cancer Sister         breast    Breast cancer Sister 68    Diabetes Father     Hypertension Father     Thyroid disease Daughter     Cancer Daughter         thyroid    Breast cancer Maternal Grandmother     Amblyopia Neg Hx     Blindness Neg Hx     Glaucoma Neg Hx     Retinal detachment Neg Hx     Strabismus Neg Hx     Stroke Neg Hx      Social History     Tobacco Use    Smoking status: Former Smoker    Smokeless tobacco: Never Used    Tobacco comment: as teenager   Substance Use Topics    Alcohol use: No    Drug use: No        Review of Systems:  Review of Systems   Constitutional: Negative for activity change, fatigue and fever.   HENT: Positive for trouble swallowing. Negative for congestion and voice change.    Eyes: Negative for visual disturbance.   Respiratory: Positive for chest tightness and shortness of breath. Negative for cough and wheezing.    Cardiovascular: Positive for palpitations. Negative for chest pain and leg swelling.   Gastrointestinal: Negative for abdominal distention, diarrhea, nausea and vomiting.   Genitourinary: Negative for difficulty urinating.   Musculoskeletal: Negative for arthralgias and myalgias.   Neurological: Negative for seizures, syncope and headaches.   Psychiatric/Behavioral: Negative for confusion.       OBJECTIVE:     Vital Signs (Most Recent)  Vitals:    01/08/20 0855   BP: (!) 175/84   Pulse: 94   SpO2: 97%   Weight: 96.6 kg (213 lb)   Height: 5' 3" (1.6 m)   PainSc: 0-No pain       Physical Exam:  Physical Exam   Constitutional: She is oriented to person, place, and time. She appears well-developed and well-nourished.   Obese   HENT:   Mouth/Throat: Oropharynx is clear " and moist.   Eyes: Conjunctivae are normal.   Neck: Normal range of motion.   Cardiovascular: Normal rate, regular rhythm, normal heart sounds and intact distal pulses.   Pulmonary/Chest: Effort normal and breath sounds normal. She has no wheezes.   Abdominal: Soft. Bowel sounds are normal. She exhibits no distension. There is no tenderness.   Musculoskeletal: She exhibits no edema.   Lymphadenopathy:     She has no cervical adenopathy.   Neurological: She is alert and oriented to person, place, and time.   Psychiatric: She has a normal mood and affect.       Chest CT 3/2019:  1. A ground-glass consolidation is noted that appears more solid than on the prior examination in the left upper lobe being both denser and larger.  This is worrisome for an adenomatous neoplastic process such as a bronchiolar cell carcinoma or adenocarcinoma.  Clinical correlation and/or further evaluation is necessary.  PET-CT fusion may add additional information but is often false negative with bronchial or cell neoplastic processes, biopsy could be considered but this is very close to the great vessels and aortic arch. This is on image 40 sequence 4 in the left upper lobe.  2. Multiple small stable pulmonary nodules are noted  3. Increased patchy band like symmetric upper lobe mosaic attenuation changes appear to be present within the left and right upper lobe bilaterally as can be seen with small airway, pulmonary congestion or small vessel disease; inflammatory processes, atypical infection, vascular abnormality, or scarring is likely minimally progressed since the prior exams.  4. Liver hypodensity stable since the prior most likely a cyst  5. Left adrenal adenoma stable since the prior.  6. Small hiatal hernia  7. Possible thyroid nodule, ultrasound may be of use    Pathology 12/20/19:   DOC nodule   Adenocarcinoma     Chest CT 10/9/19:   1. Left apical pulmonary nodule persists, with interval increase in density.  This remains  concerning for neoplasm such as adenocarcinoma.  2. Scattered pulmonary nodules elsewhere are unchanged.  3.  Mosaic attenuation in the lungs, with differential favoring air trapping.  Other considerations such as mild pulmonary edema, pneumonitis, bronchiolitis are felt less likely.    PFTs 1/8/20:  FEV 1 - 1.98 60%  DLCO - 19.6 83%    ASSESSMENT/PLAN:      74 y.o. female with  PMH of TIA in 2017, HTN, HLD, SVT, COPD, GERD, obesity and sarcoidosis presents for evaluation of DOC biopsy proven adenocarcinoma.     PLAN:Plan   PET CT staging will not be useful at this juncture given history of sarcoidosis in mediastinum. Recommend repeat chest CT with contrast for preoperative planning. Recommend dobutamine stress ECHO for preoperative cardiac work up. Labs morning of surgery.  Pending the above, proceed with robotic assisted left upper division with MLND, possible thoracotomy.   The patient's body habitus may compromise accurate port placement at the time of robotic surgery.  If such occurs I will perform a left mini thoracotomy.  Given her history of SVT and TIAs,  I will also consider a nonanatomic upper division resection with mediastinal lymphadenectomy to perform an expedited surgery.    Appropriate patient education regarding the vicki-operative period as well as intraoperative details were discussed. Risks, including but not limited to, bleeding, infection, pain and anesthetic complication were discussed. Patient was given the opportunity to ask questions and to have those questions answered to their satisfaction. Patient verbalized understanding to both procedure and associated risks. Consent was obtained.

## 2020-01-07 NOTE — H&P (VIEW-ONLY)
History & Physical    SUBJECTIVE:     History of Present Illness:   74 y.o. female with  PMH of TIA in 2017, HTN, HLD, SVT, COPD, GERD, obesity and sarcoidosis presents for evaluation of DOC biopsy proven adenocarcinoma. Patient has followed with pulmonology since 2014 diagnosis of sarcoidosis. Serial CTs had shown increasing solid component of nodule. Nodule measured 1.8cm in Oct 2019. CT guided biopsy positive for adenocarcinoma. Today she reports feeling well and is asymptomatic. Endorses baseline MACK since initial sarcoid diagnosis. She was previously prescribed nocturnal NC O2 but more recent sleep studies proved she did not need it.  SVT controlled chemically. No prior cardiac or thoracic surgeries. Takes 81mg ASA but has held it since IR biopsy.     Remote smoking history. Denies EtOH use. 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   PSH of bilateral cataract extraction, cholecystectomy and hysterectomy.    Chief Complaint   Patient presents with    Consult       Review of patient's allergies indicates:   Allergen Reactions    Latex Itching and Swelling     Itching and swelling to site (local reaction)       Current Outpatient Medications   Medication Sig Dispense Refill    albuterol (PROVENTIL HFA) 90 mcg/actuation inhaler Inhale 2 puffs into the lungs every 6 (six) hours as needed for Wheezing.      albuterol (PROVENTIL) 2.5 mg /3 mL (0.083 %) nebulizer solution Take 2.5 mg by nebulization every 6 (six) hours as needed for Wheezing.      aspirin 81 MG Chew Take 1 tablet (81 mg total) by mouth once daily. 30 tablet 11    atorvastatin (LIPITOR) 20 MG tablet Take 1 tablet (20 mg total) by mouth once daily. 90 tablet 3    calcium-vitamin D 600 mg(1,500mg) -400 unit Tab Take by mouth.      cholecalciferol, vitamin D3, (VITAMIN D3) 5,000 unit Tab Take 5,000 Units by mouth once daily.      fluticasone (FLONASE) 50 mcg/actuation nasal spray       FLUZONE HIGH-DOSE 2018-19, PF, 180 mcg/0.5 mL vaccine  ADM 0.5ML IM UTD  0    ibandronate (BONIVA) 150 mg tablet Take 1 tablet (150 mg total) by mouth every 30 days. 3 tablet 3    losartan-hydrochlorothiazide 50-12.5 mg (HYZAAR) 50-12.5 mg per tablet Take 1 tablet by mouth once daily. 90 tablet 3    metoprolol succinate (TOPROL XL) 50 MG 24 hr tablet Take 1 tablet (50 mg total) by mouth once daily. 90 tablet 3    mv,Ca,min-folic acid-vit K1 (ONE-A-DAY WOMEN'S 50 PLUS) 400-20 mcg Tab Take 1 tablet by mouth once daily at 6am.      omeprazole (PRILOSEC) 40 MG capsule Take 1 capsule (40 mg total) by mouth every morning. 90 capsule 3    predniSONE (DELTASONE) 5 MG tablet       SYMBICORT 160-4.5 mcg/actuation HFAA Inhale 2 puffs into the lungs every 12 (twelve) hours.        No current facility-administered medications for this visit.        Past Medical History:   Diagnosis Date    Acute ischemic right MCA stroke 6/2/2017    Cataract     done ou    Essential hypertension 8/21/2017    GERD (gastroesophageal reflux disease)     Hiatal hernia     History of seasonal allergies     On home oxygen therapy     while sleeping    Polio     as a child    Presbyopia     PSVT (paroxysmal supraventricular tachycardia)     Sarcoidosis     Sciatica     TIA (transient ischemic attack) 06/02/2017    [facility]//    Vertigo      Past Surgical History:   Procedure Laterality Date    CATARACT EXTRACTION W/  INTRAOCULAR LENS IMPLANT Left 03/27/2018    Dr Higginbotham    CATARACT EXTRACTION W/  INTRAOCULAR LENS IMPLANT Right 05/08/2018    Dr Higginbotham    CHOLECYSTECTOMY      ESOPHAGEAL DILATION N/A 11/22/2018    Procedure: DILATION, ESOPHAGUS;  Surgeon: Robb Fitzgerald Jr., MD;  Location: [facility];  Service: Endoscopy;  Laterality: N/A;    ESOPHAGOGASTRODUODENOSCOPY N/A 11/22/2018    Procedure: ESOPHAGOGASTRODUODENOSCOPY (EGD);  Surgeon: Robb Fitzgerald Jr., MD;  Location: [facility];  Service: Endoscopy;  Laterality: N/A;    HAND SURGERY Right     trauma repair  "   TONSILLECTOMY      TOTAL ABDOMINAL HYSTERECTOMY      VARICOSE VEIN SURGERY Bilateral     bilateral legs     Family History   Problem Relation Age of Onset    Cataracts Mother     Macular degeneration Mother     Cancer Mother         lymphoma    Macular degeneration Sister     Cancer Sister         breast    Breast cancer Sister 68    Diabetes Father     Hypertension Father     Thyroid disease Daughter     Cancer Daughter         thyroid    Breast cancer Maternal Grandmother     Amblyopia Neg Hx     Blindness Neg Hx     Glaucoma Neg Hx     Retinal detachment Neg Hx     Strabismus Neg Hx     Stroke Neg Hx      Social History     Tobacco Use    Smoking status: Former Smoker    Smokeless tobacco: Never Used    Tobacco comment: as teenager   Substance Use Topics    Alcohol use: No    Drug use: No        Review of Systems:  Review of Systems   Constitutional: Negative for activity change, fatigue and fever.   HENT: Positive for trouble swallowing. Negative for congestion and voice change.    Eyes: Negative for visual disturbance.   Respiratory: Positive for chest tightness and shortness of breath. Negative for cough and wheezing.    Cardiovascular: Positive for palpitations. Negative for chest pain and leg swelling.   Gastrointestinal: Negative for abdominal distention, diarrhea, nausea and vomiting.   Genitourinary: Negative for difficulty urinating.   Musculoskeletal: Negative for arthralgias and myalgias.   Neurological: Negative for seizures, syncope and headaches.   Psychiatric/Behavioral: Negative for confusion.       OBJECTIVE:     Vital Signs (Most Recent)  Vitals:    01/08/20 0855   BP: (!) 175/84   Pulse: 94   SpO2: 97%   Weight: 96.6 kg (213 lb)   Height: 5' 3" (1.6 m)   PainSc: 0-No pain       Physical Exam:  Physical Exam   Constitutional: She is oriented to person, place, and time. She appears well-developed and well-nourished.   Obese   HENT:   Mouth/Throat: Oropharynx is clear " and moist.   Eyes: Conjunctivae are normal.   Neck: Normal range of motion.   Cardiovascular: Normal rate, regular rhythm, normal heart sounds and intact distal pulses.   Pulmonary/Chest: Effort normal and breath sounds normal. She has no wheezes.   Abdominal: Soft. Bowel sounds are normal. She exhibits no distension. There is no tenderness.   Musculoskeletal: She exhibits no edema.   Lymphadenopathy:     She has no cervical adenopathy.   Neurological: She is alert and oriented to person, place, and time.   Psychiatric: She has a normal mood and affect.       Chest CT 3/2019:  1. A ground-glass consolidation is noted that appears more solid than on the prior examination in the left upper lobe being both denser and larger.  This is worrisome for an adenomatous neoplastic process such as a bronchiolar cell carcinoma or adenocarcinoma.  Clinical correlation and/or further evaluation is necessary.  PET-CT fusion may add additional information but is often false negative with bronchial or cell neoplastic processes, biopsy could be considered but this is very close to the great vessels and aortic arch. This is on image 40 sequence 4 in the left upper lobe.  2. Multiple small stable pulmonary nodules are noted  3. Increased patchy band like symmetric upper lobe mosaic attenuation changes appear to be present within the left and right upper lobe bilaterally as can be seen with small airway, pulmonary congestion or small vessel disease; inflammatory processes, atypical infection, vascular abnormality, or scarring is likely minimally progressed since the prior exams.  4. Liver hypodensity stable since the prior most likely a cyst  5. Left adrenal adenoma stable since the prior.  6. Small hiatal hernia  7. Possible thyroid nodule, ultrasound may be of use    Pathology 12/20/19:   DOC nodule   Adenocarcinoma     Chest CT 10/9/19:   1. Left apical pulmonary nodule persists, with interval increase in density.  This remains  concerning for neoplasm such as adenocarcinoma.  2. Scattered pulmonary nodules elsewhere are unchanged.  3.  Mosaic attenuation in the lungs, with differential favoring air trapping.  Other considerations such as mild pulmonary edema, pneumonitis, bronchiolitis are felt less likely.    PFTs 1/8/20:  FEV 1 - 1.98 60%  DLCO - 19.6 83%    ASSESSMENT/PLAN:      74 y.o. female with  PMH of TIA in 2017, HTN, HLD, SVT, COPD, GERD, obesity and sarcoidosis presents for evaluation of DOC biopsy proven adenocarcinoma.     PLAN:Plan   PET CT staging will not be useful at this juncture given history of sarcoidosis in mediastinum. Recommend repeat chest CT with contrast for preoperative planning. Recommend dobutamine stress ECHO for preoperative cardiac work up. Labs morning of surgery.  Pending the above, proceed with robotic assisted left upper division with MLND, possible thoracotomy.   The patient's body habitus may compromise accurate port placement at the time of robotic surgery.  If such occurs I will perform a left mini thoracotomy.  Given her history of SVT and TIAs,  I will also consider a nonanatomic upper division resection with mediastinal lymphadenectomy to perform an expedited surgery.    Appropriate patient education regarding the vicki-operative period as well as intraoperative details were discussed. Risks, including but not limited to, bleeding, infection, pain and anesthetic complication were discussed. Patient was given the opportunity to ask questions and to have those questions answered to their satisfaction. Patient verbalized understanding to both procedure and associated risks. Consent was obtained.

## 2020-01-08 ENCOUNTER — HOSPITAL ENCOUNTER (OUTPATIENT)
Dept: PULMONOLOGY | Facility: CLINIC | Age: 75
Discharge: HOME | End: 2020-01-08
Payer: MEDICARE

## 2020-01-08 ENCOUNTER — OFFICE VISIT (OUTPATIENT)
Dept: CARDIOTHORACIC SURGERY | Facility: CLINIC | Age: 75
End: 2020-01-08
Payer: MEDICARE

## 2020-01-08 VITALS
DIASTOLIC BLOOD PRESSURE: 84 MMHG | HEART RATE: 94 BPM | HEIGHT: 63 IN | OXYGEN SATURATION: 97 % | SYSTOLIC BLOOD PRESSURE: 175 MMHG | BODY MASS INDEX: 37.74 KG/M2 | WEIGHT: 213 LBS

## 2020-01-08 DIAGNOSIS — C34.92 NON-SMALL CELL CANCER OF LEFT LUNG: ICD-10-CM

## 2020-01-08 DIAGNOSIS — C34.92 ADENOCARCINOMA OF LEFT LUNG: ICD-10-CM

## 2020-01-08 DIAGNOSIS — R06.02 SHORTNESS OF BREATH: ICD-10-CM

## 2020-01-08 LAB
DLCO ADJ PRE: 16.91 ML/(MIN*MMHG) (ref 13.9–25.36)
DLCO SINGLE BREATH LLN: 13.9
DLCO SINGLE BREATH PRE REF: 82.8 %
DLCO SINGLE BREATH REF: 19.63
DLCOC SBVA LLN: 2.73
DLCOC SBVA PRE REF: 131.8 %
DLCOC SBVA REF: 4.29
DLCOC SINGLE BREATH LLN: 13.9
DLCOC SINGLE BREATH PRE REF: 86.2 %
DLCOC SINGLE BREATH REF: 19.63
DLCOCSBVAULN: 5.86
DLCOCSINGLEBREATHULN: 25.36
DLCOSINGLEBREATHULN: 25.36
DLCOVA LLN: 2.73
DLCOVA PRE REF: 126.6 %
DLCOVA PRE: 5.44 ML/(MIN*MMHG*L) (ref 2.73–5.86)
DLCOVA REF: 4.29
DLCOVAULN: 5.86
DLVAADJ PRE: 5.66 ML/(MIN*MMHG*L) (ref 2.73–5.86)
FEF 25 75 LLN: 0.77
FEF 25 75 PRE REF: 85 %
FEF 25 75 REF: 1.7
FET100 CHG: -0.7 %
FEV05 LLN: 0.71
FEV05 REF: 1.57
FEV1 CHG: 6.3 %
FEV1 FVC LLN: 64
FEV1 FVC PRE REF: 104.3 %
FEV1 FVC REF: 78
FEV1 LLN: 1.43
FEV1 PRE REF: 59.6 %
FEV1 REF: 1.98
FEV1 VOL CHG: 0.07
FVC CHG: 0.5 %
FVC LLN: 1.85
FVC PRE REF: 56.7 %
FVC REF: 2.56
FVC VOL CHG: 0.01
IVC PRE: 1.6 L (ref 1.85–3.27)
IVC SINGLE BREATH LLN: 1.85
IVC SINGLE BREATH PRE REF: 62.6 %
IVC SINGLE BREATH REF: 2.56
IVCSINGLEBREATHULN: 3.27
PEF LLN: 3.57
PEF PRE REF: 102.7 %
PEF REF: 5.16
PHYSICIAN COMMENT: ABNORMAL
POST FEF 25 75: 2.47 L/S (ref 0.77–2.63)
POST FET 100: 5.5 SEC
POST FEV1 FVC: 86.15 % (ref 64.45–91.79)
POST FEV1: 1.26 L (ref 1.43–2.54)
POST FEV5: 1.15 L (ref 0.71–2.42)
POST FVC: 1.46 L (ref 1.85–3.27)
POST PEF: 5.62 L/S (ref 3.57–6.76)
PRE DLCO: 16.26 ML/(MIN*MMHG) (ref 13.9–25.36)
PRE FEF 25 75: 1.44 L/S (ref 0.77–2.63)
PRE FET 100: 5.54 SEC
PRE FEV05 REF: 67 %
PRE FEV1 FVC: 81.47 % (ref 64.45–91.79)
PRE FEV1: 1.18 L (ref 1.43–2.54)
PRE FEV5: 1.05 L (ref 0.71–2.42)
PRE FVC: 1.45 L (ref 1.85–3.27)
PRE PEF: 5.3 L/S (ref 3.57–6.76)
VA PRE: 3 L (ref 4.42–4.42)
VA SINGLE BREATH LLN: 4.42
VA SINGLE BREATH PRE REF: 67.8 %
VA SINGLE BREATH REF: 4.42
VASINGLEBREATHULN: 4.42

## 2020-01-08 PROCEDURE — 3077F SYST BP >= 140 MM HG: CPT | Mod: HCNC,CPTII,S$GLB, | Performed by: THORACIC SURGERY (CARDIOTHORACIC VASCULAR SURGERY)

## 2020-01-08 PROCEDURE — 3079F PR MOST RECENT DIASTOLIC BLOOD PRESSURE 80-89 MM HG: ICD-10-PCS | Mod: HCNC,CPTII,S$GLB, | Performed by: THORACIC SURGERY (CARDIOTHORACIC VASCULAR SURGERY)

## 2020-01-08 PROCEDURE — 99499 RISK ADDL DX/OHS AUDIT: ICD-10-PCS | Mod: HCNC,S$GLB,, | Performed by: THORACIC SURGERY (CARDIOTHORACIC VASCULAR SURGERY)

## 2020-01-08 PROCEDURE — 1126F AMNT PAIN NOTED NONE PRSNT: CPT | Mod: HCNC,S$GLB,, | Performed by: THORACIC SURGERY (CARDIOTHORACIC VASCULAR SURGERY)

## 2020-01-08 PROCEDURE — 99999 PR PBB SHADOW E&M-EST. PATIENT-LVL IV: ICD-10-PCS | Mod: PBBFAC,HCNC,, | Performed by: THORACIC SURGERY (CARDIOTHORACIC VASCULAR SURGERY)

## 2020-01-08 PROCEDURE — 1126F PR PAIN SEVERITY QUANTIFIED, NO PAIN PRESENT: ICD-10-PCS | Mod: HCNC,S$GLB,, | Performed by: THORACIC SURGERY (CARDIOTHORACIC VASCULAR SURGERY)

## 2020-01-08 PROCEDURE — 3077F PR MOST RECENT SYSTOLIC BLOOD PRESSURE >= 140 MM HG: ICD-10-PCS | Mod: HCNC,CPTII,S$GLB, | Performed by: THORACIC SURGERY (CARDIOTHORACIC VASCULAR SURGERY)

## 2020-01-08 PROCEDURE — 94729 DIFFUSING CAPACITY: CPT | Mod: HCNC,S$GLB,, | Performed by: INTERNAL MEDICINE

## 2020-01-08 PROCEDURE — 99499 UNLISTED E&M SERVICE: CPT | Mod: HCNC,S$GLB,, | Performed by: THORACIC SURGERY (CARDIOTHORACIC VASCULAR SURGERY)

## 2020-01-08 PROCEDURE — 99204 PR OFFICE/OUTPT VISIT, NEW, LEVL IV, 45-59 MIN: ICD-10-PCS | Mod: HCNC,S$GLB,, | Performed by: THORACIC SURGERY (CARDIOTHORACIC VASCULAR SURGERY)

## 2020-01-08 PROCEDURE — 1159F MED LIST DOCD IN RCRD: CPT | Mod: HCNC,S$GLB,, | Performed by: THORACIC SURGERY (CARDIOTHORACIC VASCULAR SURGERY)

## 2020-01-08 PROCEDURE — 1101F PT FALLS ASSESS-DOCD LE1/YR: CPT | Mod: HCNC,CPTII,S$GLB, | Performed by: THORACIC SURGERY (CARDIOTHORACIC VASCULAR SURGERY)

## 2020-01-08 PROCEDURE — 99204 OFFICE O/P NEW MOD 45 MIN: CPT | Mod: HCNC,S$GLB,, | Performed by: THORACIC SURGERY (CARDIOTHORACIC VASCULAR SURGERY)

## 2020-01-08 PROCEDURE — 1101F PR PT FALLS ASSESS DOC 0-1 FALLS W/OUT INJ PAST YR: ICD-10-PCS | Mod: HCNC,CPTII,S$GLB, | Performed by: THORACIC SURGERY (CARDIOTHORACIC VASCULAR SURGERY)

## 2020-01-08 PROCEDURE — 1159F PR MEDICATION LIST DOCUMENTED IN MEDICAL RECORD: ICD-10-PCS | Mod: HCNC,S$GLB,, | Performed by: THORACIC SURGERY (CARDIOTHORACIC VASCULAR SURGERY)

## 2020-01-08 PROCEDURE — 94729 PR C02/MEMBANE DIFFUSE CAPACITY: ICD-10-PCS | Mod: HCNC,S$GLB,, | Performed by: INTERNAL MEDICINE

## 2020-01-08 PROCEDURE — 94060 EVALUATION OF WHEEZING: CPT | Mod: HCNC,S$GLB,, | Performed by: INTERNAL MEDICINE

## 2020-01-08 PROCEDURE — 94060 PR EVAL OF BRONCHOSPASM: ICD-10-PCS | Mod: HCNC,S$GLB,, | Performed by: INTERNAL MEDICINE

## 2020-01-08 PROCEDURE — 99999 PR PBB SHADOW E&M-EST. PATIENT-LVL IV: CPT | Mod: PBBFAC,HCNC,, | Performed by: THORACIC SURGERY (CARDIOTHORACIC VASCULAR SURGERY)

## 2020-01-08 PROCEDURE — 3079F DIAST BP 80-89 MM HG: CPT | Mod: HCNC,CPTII,S$GLB, | Performed by: THORACIC SURGERY (CARDIOTHORACIC VASCULAR SURGERY)

## 2020-01-08 RX ORDER — PREDNISONE 5 MG/1
TABLET ORAL
COMMUNITY
Start: 2019-12-04 | End: 2020-04-27

## 2020-01-08 NOTE — LETTER
[place] - Thoracic Surgery  [address]  [facility] [address]  Phone: [phone]  Fax: [phone] January 8, 2020      Verna Hassan MD  [address]  [facility] [address]    Patient: Nicole Medina   MR Number: [id number]   YOB: 1945   Date of Visit: 1/8/2020       Dear Dr. Hassan:    Thank you for referring Nicole Medina to me for evaluation.   She presents for evaluation of a left upper lobe adenocarcinoma.  The lesion has evolved from a ground-glass opacity to a semi solid nodule.  Biopsy shows an adenocarcinoma.    I recommend a robotic assisted anatomic left upper division resection. Mrs. Medina's body habitus may compromise accurate port placement at the time of robotic surgery.  If such occurs,  I will perform a left mini thoracotomy.  Given her history of SVT and TIAs,  I will also consider a nonanatomic upper division resection with mediastinal lymphadenectomy to perform an expedited surgery.   I will obtain a preoperative dobutamine stress ECHO.     If you have questions, please do not hesitate to call me. I look forward to following Nicole along with you.    Sincerely,      Ritesh Mccall MD    BP/etb    Enclosed    CC  Milan Jain MD

## 2020-01-14 ENCOUNTER — CLINICAL SUPPORT (OUTPATIENT)
Dept: CARDIOLOGY | Facility: CLINIC | Age: 75
DRG: 167 | End: 2020-01-14
Attending: PHYSICIAN ASSISTANT
Payer: MEDICARE

## 2020-01-14 ENCOUNTER — HOSPITAL ENCOUNTER (OUTPATIENT)
Dept: RADIOLOGY | Facility: HOSPITAL | Age: 75
Discharge: HOME | DRG: 167 | End: 2020-01-14
Attending: PHYSICIAN ASSISTANT
Payer: MEDICARE

## 2020-01-14 ENCOUNTER — ANESTHESIA EVENT (OUTPATIENT)
Dept: SURGERY | Facility: HOSPITAL | Age: 75
DRG: 167 | End: 2020-01-14
Payer: MEDICARE

## 2020-01-14 ENCOUNTER — TELEPHONE (OUTPATIENT)
Dept: CARDIOTHORACIC SURGERY | Facility: CLINIC | Age: 75
End: 2020-01-14

## 2020-01-14 VITALS — HEIGHT: 63 IN | WEIGHT: 213 LBS | BODY MASS INDEX: 37.74 KG/M2

## 2020-01-14 DIAGNOSIS — C34.92 NON-SMALL CELL CANCER OF LEFT LUNG: ICD-10-CM

## 2020-01-14 DIAGNOSIS — R06.02 SHORTNESS OF BREATH: ICD-10-CM

## 2020-01-14 PROCEDURE — 71260 CT THORAX DX C+: CPT | Mod: TC,HCNC,PO

## 2020-01-14 PROCEDURE — 99999 PR PBB SHADOW E&M-EST. PATIENT-LVL II: ICD-10-PCS | Mod: PBBFAC,HCNC,,

## 2020-01-14 PROCEDURE — 25500020 PHARM REV CODE 255: Mod: HCNC,PO | Performed by: PHYSICIAN ASSISTANT

## 2020-01-14 PROCEDURE — 93351 STRESS ECHO (CUPID ONLY): ICD-10-PCS | Mod: HCNC,S$GLB,, | Performed by: INTERNAL MEDICINE

## 2020-01-14 PROCEDURE — 71260 CT CHEST WITH CONTRAST: ICD-10-PCS | Mod: 26,HCNC,, | Performed by: RADIOLOGY

## 2020-01-14 PROCEDURE — 93351 STRESS TTE COMPLETE: CPT | Mod: HCNC,S$GLB,, | Performed by: INTERNAL MEDICINE

## 2020-01-14 PROCEDURE — 99999 PR PBB SHADOW E&M-EST. PATIENT-LVL II: CPT | Mod: PBBFAC,HCNC,,

## 2020-01-14 PROCEDURE — 71260 CT THORAX DX C+: CPT | Mod: 26,HCNC,, | Performed by: RADIOLOGY

## 2020-01-14 RX ADMIN — IOHEXOL 75 ML: 350 INJECTION, SOLUTION INTRAVENOUS at 08:01

## 2020-01-15 ENCOUNTER — TELEPHONE (OUTPATIENT)
Dept: CARDIOTHORACIC SURGERY | Facility: CLINIC | Age: 75
End: 2020-01-15

## 2020-01-15 ENCOUNTER — TELEPHONE (OUTPATIENT)
Dept: CARDIOLOGY | Facility: CLINIC | Age: 75
End: 2020-01-15

## 2020-01-15 LAB
ASCENDING AORTA: 2.46 CM
BSA FOR ECHO PROCEDURE: 2.07 M2
CV ECHO LV RWT: 0.33 CM
CV STRESS BASE HR: 77 BPM
DIASTOLIC BLOOD PRESSURE: 78 MMHG
DOP CALC LVOT AREA: 3.5 CM2
DOP CALC LVOT DIAMETER: 2.11 CM
DOP CALC LVOT PEAK VEL: 0.8 M/S
DOP CALC LVOT STROKE VOLUME: 73.22 CM3
DOP CALCLVOT PEAK VEL VTI: 20.95 CM
E WAVE DECELERATION TIME: 228.37 MSEC
E/A RATIO: 1.83
E/E' RATIO: 10 M/S
ECHO LV POSTERIOR WALL: 0.83 CM (ref 0.6–1.1)
FRACTIONAL SHORTENING: 38 % (ref 28–44)
INTERVENTRICULAR SEPTUM: 0.93 CM (ref 0.6–1.1)
IVRT: 0.07 MSEC
LA MAJOR: 5 CM
LA MINOR: 5.33 CM
LA WIDTH: 3.8 CM
LEFT ATRIUM SIZE: 4.31 CM
LEFT ATRIUM VOLUME INDEX: 36.2 ML/M2
LEFT ATRIUM VOLUME: 71.83 CM3
LEFT INTERNAL DIMENSION IN SYSTOLE: 3.18 CM (ref 2.1–4)
LEFT VENTRICLE DIASTOLIC VOLUME INDEX: 62.03 ML/M2
LEFT VENTRICLE DIASTOLIC VOLUME: 123.21 ML
LEFT VENTRICLE MASS INDEX: 80 G/M2
LEFT VENTRICLE SYSTOLIC VOLUME INDEX: 20.3 ML/M2
LEFT VENTRICLE SYSTOLIC VOLUME: 40.3 ML
LEFT VENTRICULAR INTERNAL DIMENSION IN DIASTOLE: 5.09 CM (ref 3.5–6)
LEFT VENTRICULAR MASS: 158.3 G
LV LATERAL E/E' RATIO: 8.64 M/S
LV SEPTAL E/E' RATIO: 11.88 M/S
MV PEAK A VEL: 0.52 M/S
MV PEAK E VEL: 0.95 M/S
OHS CV CPX 1 MINUTE RECOVERY HEART RATE: 93 BPM
OHS CV CPX 85 PERCENT MAX PREDICTED HEART RATE MALE: 121
OHS CV CPX MAX PREDICTED HEART RATE: 143
OHS CV CPX PATIENT IS FEMALE: 1
OHS CV CPX PATIENT IS MALE: 0
OHS CV CPX PEAK DIASTOLIC BLOOD PRESSURE: 73 MMHG
OHS CV CPX PEAK HEAR RATE: 129 BPM
OHS CV CPX PEAK RATE PRESSURE PRODUCT: NORMAL
OHS CV CPX PEAK SYSTOLIC BLOOD PRESSURE: 210 MMHG
OHS CV CPX PERCENT MAX PREDICTED HEART RATE ACHIEVED: 90
OHS CV CPX RATE PRESSURE PRODUCT PRESENTING: NORMAL
PISA TR MAX VEL: 2.93 M/S
PULM VEIN S/D RATIO: 0.62
PV PEAK D VEL: 0.78 M/S
PV PEAK S VEL: 0.48 M/S
RA MAJOR: 4.1 CM
RA WIDTH: 3.64 CM
SINUS: 2.44 CM
STJ: 2.17 CM
SYSTOLIC BLOOD PRESSURE: 192 MMHG
TDI LATERAL: 0.11 M/S
TDI SEPTAL: 0.08 M/S
TDI: 0.1 M/S
TR MAX PG: 34 MMHG

## 2020-01-15 NOTE — PRE-PROCEDURE INSTRUCTIONS
PREOP INSTRUCTIONS:Instructed patient to take nothing by mouth past midnight.It is ok to take AM medications with a few sips of water.Shower instructions as well as directions to the Surgery Center were given.Patient encouraged to wear loose fitting,comfortable clothing.Medication instructions for pm prior to and am of procedure reviewed.Instructed patient to avoid taking vitamins,supplements,aspirin and ibuprofen the morning of surgery.Patient stated an understanding.Patient also stated that her  would accompany her to the hospital tomorrow.    Patient denies any side effects or issues with anesthesia or sedation.    Patient does not know arrival time.Explained that this information comes from the surgeon's office and if they haven't heard from them by 2 or 3 pm to call the office.Patient stated an understanding.

## 2020-01-15 NOTE — ANESTHESIA PREPROCEDURE EVALUATION
[facility]  Anesthesia Pre-Operative Evaluation         Patient Name: Nicole Medina  YOB: 1945  MRN: [id number]    SUBJECTIVE:     01/15/2020    Pre-operative evaluation for Procedure(s) (LRB):  XI ROBOTIC RESECTION, LUNG (RATS) (Left)    Nicole Medina is a 74 y.o. female with HTN, GERD, hiatal hernia, paroxysmal SVT, Hx of stroke (in 6/2017), COPD, sarcoidosis, [place], and DOC lung adenocarcinoma    Patient now presents for the above procedure(s).      Previous airway:   Placement Date: 11/22/18; Placement Time: 2129; Method of Intubation: Glidescope, Rapid Sequence Induction; Inserted by: CRNA; Airway Device: Endotracheal Tube; Mask Ventilation: Not Attempted; Intubated: Postinduction; Airway Device Size: 7.5; Style: Cuffed; Cuff Inflation: Minimal occlusive pressure; Placement Verified By: Auscultation, Capnometry, ETT Condensation (glide); Grade: Grade I; Complicating Factors: Retrognathia, Small mouth, Obesity; Intubation Findings: Positive EtCO2, Bilateral breath sounds, Atraumatic/Condition of teeth unchanged;  Depth of Insertion: 21; Securment: Lips; Complications: None; Breath Sounds: Equal Bilateral; Insertion Attempts: 1; Removal Date: 11/22/18;  Removal Time: 2153       Patient Active Problem List   Diagnosis    PSVT (paroxysmal supraventricular tachycardia)    GERD (gastroesophageal reflux disease)    Nuclear sclerosis - Both Eyes    Dry eyes - Both Eyes    Sarcoidosis of lung    Atherosclerosis of aorta    Essential hypertension    Hyperlipidemia LDL goal <70    History of ischemic right MCA stroke    Senile nuclear sclerosis    Esophageal foreign body, initial encounter    Vertigo    Lung nodule    Acute non intractable tension-type headache    Morbid (severe) obesity with alveolar hypoventilation    [place] (obstructive sleep apnea)    Adenocarcinoma of left lung       Review of patient's allergies indicates:   Allergen Reactions    Latex Itching and  Swelling     Itching and swelling to site (local reaction)       No current facility-administered medications on file prior to encounter.      Current Outpatient Medications on File Prior to Encounter   Medication Sig Dispense Refill    aspirin 81 MG Chew Take 1 tablet (81 mg total) by mouth once daily. 30 tablet 11    atorvastatin (LIPITOR) 20 MG tablet Take 1 tablet (20 mg total) by mouth once daily. 90 tablet 3    calcium-vitamin D 600 mg(1,500mg) -400 unit Tab Take by mouth.      cholecalciferol, vitamin D3, (VITAMIN D3) 5,000 unit Tab Take 5,000 Units by mouth once daily.      ibandronate (BONIVA) 150 mg tablet Take 1 tablet (150 mg total) by mouth every 30 days. 3 tablet 3    losartan-hydrochlorothiazide 50-12.5 mg (HYZAAR) 50-12.5 mg per tablet Take 1 tablet by mouth once daily. 90 tablet 3    metoprolol succinate (TOPROL XL) 50 MG 24 hr tablet Take 1 tablet (50 mg total) by mouth once daily. 90 tablet 3    mv,Ca,min-folic acid-vit K1 (ONE-A-DAY WOMEN'S 50 PLUS) 400-20 mcg Tab Take 1 tablet by mouth once daily at 6am.      omeprazole (PRILOSEC) 40 MG capsule Take 1 capsule (40 mg total) by mouth every morning. 90 capsule 3    SYMBICORT 160-4.5 mcg/actuation HFAA Inhale 2 puffs into the lungs every 12 (twelve) hours.       fluticasone (FLONASE) 50 mcg/actuation nasal spray       FLUZONE HIGH-DOSE 2018-19, PF, 180 mcg/0.5 mL vaccine ADM 0.5ML IM UTD  0    predniSONE (DELTASONE) 5 MG tablet          Past Surgical History:   Procedure Laterality Date    CATARACT EXTRACTION W/  INTRAOCULAR LENS IMPLANT Left 03/27/2018    Dr Higginbotham    CATARACT EXTRACTION W/  INTRAOCULAR LENS IMPLANT Right 05/08/2018    Dr Higginbotham    CHOLECYSTECTOMY      ESOPHAGEAL DILATION N/A 11/22/2018    Procedure: DILATION, ESOPHAGUS;  Surgeon: Robb Fitzgerald Jr., MD;  [facility];  Service: Endoscopy;  Laterality: N/A;    ESOPHAGOGASTRODUODENOSCOPY N/A 11/22/2018    Procedure: ESOPHAGOGASTRODUODENOSCOPY (EGD);   Surgeon: Robb Fitzgerald Jr., MD;  Location[facility];  Service: Endoscopy;  Laterality: N/A;    HAND SURGERY Right     trauma repair    TONSILLECTOMY      TOTAL ABDOMINAL HYSTERECTOMY      VARICOSE VEIN SURGERY Bilateral     bilateral legs       Social History     Socioeconomic History    Marital status:      Spouse name: Not on file    Number of children: Not on file    Years of education: Not on file    Highest education level: Not on file   Occupational History    Not on file   Social Needs    Financial resource strain: Not on file    Food insecurity:     Worry: Not on file     Inability: Not on file    Transportation needs:     Medical: Not on file     Non-medical: Not on file   Tobacco Use    Smoking status: Former Smoker    Smokeless tobacco: Never Used    Tobacco comment: as teenager   Substance and Sexual Activity    Alcohol use: No    Drug use: No    Sexual activity: Not on file   Lifestyle    Physical activity:     Days per week: Not on file     Minutes per session: Not on file    Stress: Not on file   Relationships    Social connections:     Talks on phone: Not on file     Gets together: Not on file     Attends [employer] service: Not on file     Active member of club or organization: Not on file     Attends meetings of clubs or organizations: Not on file     Relationship status: Not on file   Other Topics Concern    Not on file   Social History Narrative    Not on file       OBJECTIVE:     Significant Labs:  Lab Results   Component Value Date    WBC 8.96 11/27/2019    HGB 12.2 11/27/2019    HCT 41.2 11/27/2019     11/27/2019    CHOL 157 03/11/2019    TRIG 55 03/11/2019    HDL 77 (H) 03/11/2019    ALT 17 08/07/2019    AST 22 08/07/2019     08/07/2019    K 4.3 08/07/2019     08/07/2019    CREATININE 0.7 01/14/2020    BUN 15 08/07/2019    CO2 29 08/07/2019    TSH 3.440 06/03/2017    INR 1.0 11/27/2019    HGBA1C 6.0 (H) 06/03/2017         Diagnostic  Studies:  No relevant studies.      Cardiac Studies:  EKG (6/2/2017):   Rate:    67                       P:       38  PA:      152                      QRS: 14  QRSD: 74                       T:        17  QT:       388                                      QTc:     409                                                                 Interpretive Statements  Normal sinus rhythm  Normal ECG  No previous ECG available for comparison    Electronically Signed On 6-5-2017 8:45:15 CDT by Donald Dawn MD    2D Echo (6/3/2017):  CONCLUSIONS     1 - Normal left ventricular systolic function (EF 60-65%).     2 - Mild left atrial enlargement.     3 - Normal right ventricular systolic function .    Echocardiogram dobutamine stress test (1/14/2020):  Pending      ASSESSMENT/PLAN:     Anesthesia Evaluation    I have reviewed the Patient Summary Reports.    I have reviewed the Nursing Notes.   I have reviewed the Medications.     Review of Systems  Anesthesia Hx:  History of prior surgery of interest to airway management or planning: Denies Family Hx of Anesthesia complications.   Denies Personal Hx of Anesthesia complications.          Anesthesia Plan  Type of Anesthesia, risks & benefits discussed:  Anesthesia Type:  general  Patient's Preference: General  Intra-op Monitoring Plan: standard ASA monitors  Intra-op Monitoring Plan Comments:   Post Op Pain Control Plan: multimodal analgesia, IV/PO Opioids PRN and per primary service following discharge from PACU  Post Op Pain Control Plan Comments:   Induction:   IV  Beta Blocker:  Patient is not currently on a Beta-Blocker (No further documentation required).       Informed Consent: Patient understands risks and agrees with Anesthesia plan.  Questions answered. Anesthesia consent signed with patient.  ASA Score: 4     Day of Surgery Review of History & Physical: I have interviewed and examined the patient. I have reviewed the patient's H&P dated:  There are no significant  changes.  H&P update referred to the surgeon.         Ready For Surgery From Anesthesia Perspective.

## 2020-01-15 NOTE — TELEPHONE ENCOUNTER
----- Message from Bimal Noe sent at 1/15/2020  4:07 PM CST -----  Contact: BRITTANIE Barnes   Type: Needs Medical Advice    Who Called:  Mary Carmen Finnegan Call Back Number: [phone]  Additional Information: requesting a call back to discuss the mutual pt with the office. Please call to advise

## 2020-01-16 ENCOUNTER — ANESTHESIA (OUTPATIENT)
Dept: SURGERY | Facility: HOSPITAL | Age: 75
DRG: 167 | End: 2020-01-16
Payer: MEDICARE

## 2020-01-16 ENCOUNTER — HOSPITAL ENCOUNTER (INPATIENT)
Facility: HOSPITAL | Age: 75
LOS: 1 days | Discharge: HOME | DRG: 167 | End: 2020-01-17
Attending: THORACIC SURGERY (CARDIOTHORACIC VASCULAR SURGERY) | Admitting: THORACIC SURGERY (CARDIOTHORACIC VASCULAR SURGERY)
Payer: MEDICARE

## 2020-01-16 DIAGNOSIS — C34.92 ADENOCARCINOMA OF LEFT LUNG: Primary | ICD-10-CM

## 2020-01-16 LAB
ABO + RH BLD: NORMAL
BASOPHILS # BLD AUTO: 0.05 K/UL (ref 0–0.2)
BASOPHILS NFR BLD: 0.5 % (ref 0–1.9)
BLD GP AB SCN CELLS X3 SERPL QL: NORMAL
BLD PROD TYP BPU: NORMAL
BLD PROD TYP BPU: NORMAL
BLOOD UNIT EXPIRATION DATE: NORMAL
BLOOD UNIT EXPIRATION DATE: NORMAL
BLOOD UNIT TYPE CODE: 7300
BLOOD UNIT TYPE CODE: 7300
BLOOD UNIT TYPE: NORMAL
BLOOD UNIT TYPE: NORMAL
CODING SYSTEM: NORMAL
CODING SYSTEM: NORMAL
DIFFERENTIAL METHOD: ABNORMAL
DISPENSE STATUS: NORMAL
DISPENSE STATUS: NORMAL
EOSINOPHIL # BLD AUTO: 0.4 K/UL (ref 0–0.5)
EOSINOPHIL NFR BLD: 3.9 % (ref 0–8)
ERYTHROCYTE [DISTWIDTH] IN BLOOD BY AUTOMATED COUNT: 14.1 % (ref 11.5–14.5)
HCT VFR BLD AUTO: 41.3 % (ref 37–48.5)
HGB BLD-MCNC: 12.8 G/DL (ref 12–16)
IMM GRANULOCYTES # BLD AUTO: 0.05 K/UL (ref 0–0.04)
IMM GRANULOCYTES NFR BLD AUTO: 0.5 % (ref 0–0.5)
LYMPHOCYTES # BLD AUTO: 2 K/UL (ref 1–4.8)
LYMPHOCYTES NFR BLD: 21.1 % (ref 18–48)
MCH RBC QN AUTO: 27.1 PG (ref 27–31)
MCHC RBC AUTO-ENTMCNC: 31 G/DL (ref 32–36)
MCV RBC AUTO: 87 FL (ref 82–98)
MONOCYTES # BLD AUTO: 0.7 K/UL (ref 0.3–1)
MONOCYTES NFR BLD: 7.1 % (ref 4–15)
NEUTROPHILS # BLD AUTO: 6.2 K/UL (ref 1.8–7.7)
NEUTROPHILS NFR BLD: 66.9 % (ref 38–73)
NRBC BLD-RTO: 0 /100 WBC
NUM UNITS TRANS PACKED RBC: NORMAL
NUM UNITS TRANS PACKED RBC: NORMAL
PLATELET # BLD AUTO: 228 K/UL (ref 150–350)
PMV BLD AUTO: 11.9 FL (ref 9.2–12.9)
RBC # BLD AUTO: 4.73 M/UL (ref 4–5.4)
WBC # BLD AUTO: 9.26 K/UL (ref 3.9–12.7)

## 2020-01-16 PROCEDURE — D9220A PRA ANESTHESIA: Mod: HCNC,,, | Performed by: ANESTHESIOLOGY

## 2020-01-16 PROCEDURE — D9220A PRA ANESTHESIA: ICD-10-PCS | Mod: HCNC,,, | Performed by: ANESTHESIOLOGY

## 2020-01-16 PROCEDURE — 25000003 PHARM REV CODE 250: Mod: HCNC | Performed by: STUDENT IN AN ORGANIZED HEALTH CARE EDUCATION/TRAINING PROGRAM

## 2020-01-16 PROCEDURE — 71000033 HC RECOVERY, INTIAL HOUR: Mod: HCNC | Performed by: THORACIC SURGERY (CARDIOTHORACIC VASCULAR SURGERY)

## 2020-01-16 PROCEDURE — 63600175 PHARM REV CODE 636 W HCPCS: Mod: HCNC | Performed by: STUDENT IN AN ORGANIZED HEALTH CARE EDUCATION/TRAINING PROGRAM

## 2020-01-16 PROCEDURE — 36000710: Mod: HCNC | Performed by: THORACIC SURGERY (CARDIOTHORACIC VASCULAR SURGERY)

## 2020-01-16 PROCEDURE — 27201037 HC PRESSURE MONITORING SET UP: Mod: HCNC

## 2020-01-16 PROCEDURE — 36620 ARTERIAL: ICD-10-PCS | Mod: 59,HCNC,, | Performed by: ANESTHESIOLOGY

## 2020-01-16 PROCEDURE — 36000711: Mod: HCNC | Performed by: THORACIC SURGERY (CARDIOTHORACIC VASCULAR SURGERY)

## 2020-01-16 PROCEDURE — 32601 THORACOSCOPY DIAGNOSTIC: CPT | Mod: HCNC,,, | Performed by: THORACIC SURGERY (CARDIOTHORACIC VASCULAR SURGERY)

## 2020-01-16 PROCEDURE — 25000003 PHARM REV CODE 250: Mod: HCNC | Performed by: PHYSICIAN ASSISTANT

## 2020-01-16 PROCEDURE — 99900035 HC TECH TIME PER 15 MIN (STAT): Mod: HCNC

## 2020-01-16 PROCEDURE — 32601 PR THORACOSCOPY,DX NO BX: ICD-10-PCS | Mod: HCNC,,, | Performed by: THORACIC SURGERY (CARDIOTHORACIC VASCULAR SURGERY)

## 2020-01-16 PROCEDURE — 25000003 PHARM REV CODE 250: Mod: HCNC | Performed by: THORACIC SURGERY (CARDIOTHORACIC VASCULAR SURGERY)

## 2020-01-16 PROCEDURE — 20600001 HC STEP DOWN PRIVATE ROOM: Mod: HCNC

## 2020-01-16 PROCEDURE — 27201423 OPTIME MED/SURG SUP & DEVICES STERILE SUPPLY: Mod: HCNC | Performed by: THORACIC SURGERY (CARDIOTHORACIC VASCULAR SURGERY)

## 2020-01-16 PROCEDURE — 63600175 PHARM REV CODE 636 W HCPCS: Mod: HCNC | Performed by: ANESTHESIOLOGY

## 2020-01-16 PROCEDURE — 94799 UNLISTED PULMONARY SVC/PX: CPT | Mod: HCNC

## 2020-01-16 PROCEDURE — 36620 INSERTION CATHETER ARTERY: CPT | Mod: 59,HCNC,, | Performed by: ANESTHESIOLOGY

## 2020-01-16 PROCEDURE — 25000242 PHARM REV CODE 250 ALT 637 W/ HCPCS: Mod: HCNC | Performed by: STUDENT IN AN ORGANIZED HEALTH CARE EDUCATION/TRAINING PROGRAM

## 2020-01-16 PROCEDURE — 86920 COMPATIBILITY TEST SPIN: CPT | Mod: HCNC

## 2020-01-16 PROCEDURE — 37000008 HC ANESTHESIA 1ST 15 MINUTES: Mod: HCNC | Performed by: THORACIC SURGERY (CARDIOTHORACIC VASCULAR SURGERY)

## 2020-01-16 PROCEDURE — 86901 BLOOD TYPING SEROLOGIC RH(D): CPT | Mod: HCNC

## 2020-01-16 PROCEDURE — 37000009 HC ANESTHESIA EA ADD 15 MINS: Mod: HCNC | Performed by: THORACIC SURGERY (CARDIOTHORACIC VASCULAR SURGERY)

## 2020-01-16 PROCEDURE — 85025 COMPLETE CBC W/AUTO DIFF WBC: CPT | Mod: HCNC

## 2020-01-16 RX ORDER — ONDANSETRON 8 MG/1
8 TABLET, ORALLY DISINTEGRATING ORAL EVERY 8 HOURS PRN
Status: DISCONTINUED | OUTPATIENT
Start: 2020-01-16 | End: 2020-01-17 | Stop reason: HOSPADM

## 2020-01-16 RX ORDER — ATORVASTATIN CALCIUM 20 MG/1
20 TABLET, FILM COATED ORAL DAILY
Status: DISCONTINUED | OUTPATIENT
Start: 2020-01-17 | End: 2020-01-17 | Stop reason: HOSPADM

## 2020-01-16 RX ORDER — ONDANSETRON 2 MG/ML
8 INJECTION INTRAMUSCULAR; INTRAVENOUS ONCE
Status: COMPLETED | OUTPATIENT
Start: 2020-01-16 | End: 2020-01-16

## 2020-01-16 RX ORDER — HEPARIN SODIUM 5000 [USP'U]/ML
5000 INJECTION, SOLUTION INTRAVENOUS; SUBCUTANEOUS EVERY 8 HOURS
Status: DISCONTINUED | OUTPATIENT
Start: 2020-01-17 | End: 2020-01-17 | Stop reason: HOSPADM

## 2020-01-16 RX ORDER — CEFAZOLIN SODIUM 1 G/3ML
2 INJECTION, POWDER, FOR SOLUTION INTRAMUSCULAR; INTRAVENOUS
Status: COMPLETED | OUTPATIENT
Start: 2020-01-16 | End: 2020-01-16

## 2020-01-16 RX ORDER — FAMOTIDINE 20 MG/1
20 TABLET, FILM COATED ORAL 2 TIMES DAILY
Status: DISCONTINUED | OUTPATIENT
Start: 2020-01-16 | End: 2020-01-17 | Stop reason: HOSPADM

## 2020-01-16 RX ORDER — AMOXICILLIN 250 MG
1 CAPSULE ORAL 2 TIMES DAILY
Status: DISCONTINUED | OUTPATIENT
Start: 2020-01-16 | End: 2020-01-17 | Stop reason: HOSPADM

## 2020-01-16 RX ORDER — SODIUM CHLORIDE 0.9 % (FLUSH) 0.9 %
10 SYRINGE (ML) INJECTION
Status: DISCONTINUED | OUTPATIENT
Start: 2020-01-16 | End: 2020-01-16 | Stop reason: HOSPADM

## 2020-01-16 RX ORDER — METOCLOPRAMIDE HYDROCHLORIDE 5 MG/ML
5 INJECTION INTRAMUSCULAR; INTRAVENOUS EVERY 6 HOURS PRN
Status: DISCONTINUED | OUTPATIENT
Start: 2020-01-16 | End: 2020-01-17 | Stop reason: HOSPADM

## 2020-01-16 RX ORDER — FLUTICASONE FUROATE AND VILANTEROL 100; 25 UG/1; UG/1
1 POWDER RESPIRATORY (INHALATION) DAILY
Status: DISCONTINUED | OUTPATIENT
Start: 2020-01-16 | End: 2020-01-17 | Stop reason: HOSPADM

## 2020-01-16 RX ORDER — POLYETHYLENE GLYCOL 3350 17 G/17G
17 POWDER, FOR SOLUTION ORAL DAILY
Status: DISCONTINUED | OUTPATIENT
Start: 2020-01-16 | End: 2020-01-16

## 2020-01-16 RX ORDER — NAPROXEN SODIUM 220 MG/1
81 TABLET, FILM COATED ORAL DAILY
Status: DISCONTINUED | OUTPATIENT
Start: 2020-01-17 | End: 2020-01-17 | Stop reason: HOSPADM

## 2020-01-16 RX ORDER — POLYETHYLENE GLYCOL 3350 17 G/17G
17 POWDER, FOR SOLUTION ORAL DAILY
Status: DISCONTINUED | OUTPATIENT
Start: 2020-01-16 | End: 2020-01-17 | Stop reason: HOSPADM

## 2020-01-16 RX ORDER — METOPROLOL SUCCINATE 50 MG/1
50 TABLET, EXTENDED RELEASE ORAL DAILY
Status: DISCONTINUED | OUTPATIENT
Start: 2020-01-17 | End: 2020-01-17 | Stop reason: HOSPADM

## 2020-01-16 RX ORDER — LIDOCAINE HCL/PF 100 MG/5ML
SYRINGE (ML) INTRAVENOUS
Status: DISCONTINUED | OUTPATIENT
Start: 2020-01-16 | End: 2020-01-16

## 2020-01-16 RX ORDER — CEFAZOLIN SODIUM 1 G/3ML
2 INJECTION, POWDER, FOR SOLUTION INTRAMUSCULAR; INTRAVENOUS
Status: DISCONTINUED | OUTPATIENT
Start: 2020-01-16 | End: 2020-01-16

## 2020-01-16 RX ORDER — PROPOFOL 10 MG/ML
VIAL (ML) INTRAVENOUS
Status: DISCONTINUED | OUTPATIENT
Start: 2020-01-16 | End: 2020-01-16

## 2020-01-16 RX ORDER — BUPIVACAINE HYDROCHLORIDE AND EPINEPHRINE 2.5; 5 MG/ML; UG/ML
INJECTION, SOLUTION EPIDURAL; INFILTRATION; INTRACAUDAL; PERINEURAL
Status: DISCONTINUED | OUTPATIENT
Start: 2020-01-16 | End: 2020-01-16 | Stop reason: HOSPADM

## 2020-01-16 RX ORDER — ONDANSETRON 2 MG/ML
4 INJECTION INTRAMUSCULAR; INTRAVENOUS DAILY PRN
Status: DISCONTINUED | OUTPATIENT
Start: 2020-01-16 | End: 2020-01-16 | Stop reason: HOSPADM

## 2020-01-16 RX ORDER — OXYCODONE HYDROCHLORIDE 10 MG/1
10 TABLET ORAL EVERY 4 HOURS PRN
Status: DISCONTINUED | OUTPATIENT
Start: 2020-01-16 | End: 2020-01-17 | Stop reason: HOSPADM

## 2020-01-16 RX ORDER — MIDAZOLAM HYDROCHLORIDE 1 MG/ML
INJECTION, SOLUTION INTRAMUSCULAR; INTRAVENOUS
Status: DISCONTINUED | OUTPATIENT
Start: 2020-01-16 | End: 2020-01-16

## 2020-01-16 RX ORDER — FENTANYL CITRATE 50 UG/ML
INJECTION, SOLUTION INTRAMUSCULAR; INTRAVENOUS
Status: DISCONTINUED | OUTPATIENT
Start: 2020-01-16 | End: 2020-01-16

## 2020-01-16 RX ORDER — ACETAMINOPHEN 325 MG/1
650 TABLET ORAL EVERY 4 HOURS PRN
Status: DISCONTINUED | OUTPATIENT
Start: 2020-01-16 | End: 2020-01-17 | Stop reason: HOSPADM

## 2020-01-16 RX ORDER — OXYCODONE HYDROCHLORIDE 5 MG/1
5 TABLET ORAL EVERY 4 HOURS PRN
Status: DISCONTINUED | OUTPATIENT
Start: 2020-01-16 | End: 2020-01-17 | Stop reason: HOSPADM

## 2020-01-16 RX ORDER — BISACODYL 10 MG
10 SUPPOSITORY, RECTAL RECTAL DAILY PRN
Status: DISCONTINUED | OUTPATIENT
Start: 2020-01-16 | End: 2020-01-17 | Stop reason: HOSPADM

## 2020-01-16 RX ORDER — LIDOCAINE HYDROCHLORIDE 10 MG/ML
1 INJECTION, SOLUTION EPIDURAL; INFILTRATION; INTRACAUDAL; PERINEURAL ONCE
Status: COMPLETED | OUTPATIENT
Start: 2020-01-16 | End: 2020-01-16

## 2020-01-16 RX ORDER — HYDROMORPHONE HYDROCHLORIDE 1 MG/ML
0.2 INJECTION, SOLUTION INTRAMUSCULAR; INTRAVENOUS; SUBCUTANEOUS EVERY 5 MIN PRN
Status: DISCONTINUED | OUTPATIENT
Start: 2020-01-16 | End: 2020-01-16

## 2020-01-16 RX ORDER — FENTANYL CITRATE 50 UG/ML
25 INJECTION, SOLUTION INTRAMUSCULAR; INTRAVENOUS EVERY 5 MIN PRN
Status: DISCONTINUED | OUTPATIENT
Start: 2020-01-16 | End: 2020-01-16 | Stop reason: HOSPADM

## 2020-01-16 RX ORDER — SODIUM CHLORIDE 0.9 % (FLUSH) 0.9 %
3 SYRINGE (ML) INJECTION
Status: DISCONTINUED | OUTPATIENT
Start: 2020-01-16 | End: 2020-01-16

## 2020-01-16 RX ORDER — LOSARTAN POTASSIUM AND HYDROCHLOROTHIAZIDE 12.5; 5 MG/1; MG/1
1 TABLET ORAL DAILY
Status: DISCONTINUED | OUTPATIENT
Start: 2020-01-16 | End: 2020-01-17 | Stop reason: HOSPADM

## 2020-01-16 RX ORDER — ROCURONIUM BROMIDE 10 MG/ML
INJECTION, SOLUTION INTRAVENOUS
Status: DISCONTINUED | OUTPATIENT
Start: 2020-01-16 | End: 2020-01-16

## 2020-01-16 RX ORDER — MUPIROCIN 20 MG/G
1 OINTMENT TOPICAL 2 TIMES DAILY
Status: DISCONTINUED | OUTPATIENT
Start: 2020-01-16 | End: 2020-01-16

## 2020-01-16 RX ADMIN — POLYETHYLENE GLYCOL 3350 17 G: 17 POWDER, FOR SOLUTION ORAL at 10:01

## 2020-01-16 RX ADMIN — FENTANYL CITRATE 50 MCG: 50 INJECTION, SOLUTION INTRAMUSCULAR; INTRAVENOUS at 07:01

## 2020-01-16 RX ADMIN — ONDANSETRON 8 MG: 2 INJECTION INTRAMUSCULAR; INTRAVENOUS at 09:01

## 2020-01-16 RX ADMIN — ACETAMINOPHEN 650 MG: 325 TABLET ORAL at 06:01

## 2020-01-16 RX ADMIN — SENNOSIDES AND DOCUSATE SODIUM 1 TABLET: 8.6; 5 TABLET ORAL at 10:01

## 2020-01-16 RX ADMIN — MIDAZOLAM HYDROCHLORIDE 2 MG: 1 INJECTION, SOLUTION INTRAMUSCULAR; INTRAVENOUS at 07:01

## 2020-01-16 RX ADMIN — SUGAMMADEX 200 MG: 100 INJECTION, SOLUTION INTRAVENOUS at 09:01

## 2020-01-16 RX ADMIN — FAMOTIDINE 20 MG: 20 TABLET ORAL at 10:01

## 2020-01-16 RX ADMIN — LIDOCAINE HYDROCHLORIDE 75 MG: 20 INJECTION, SOLUTION INTRAVENOUS at 07:01

## 2020-01-16 RX ADMIN — SENNOSIDES AND DOCUSATE SODIUM 1 TABLET: 8.6; 5 TABLET ORAL at 08:01

## 2020-01-16 RX ADMIN — CEFAZOLIN 2 G: 1 INJECTION, POWDER, FOR SOLUTION INTRAMUSCULAR; INTRAVENOUS at 06:01

## 2020-01-16 RX ADMIN — FAMOTIDINE 20 MG: 20 TABLET ORAL at 08:01

## 2020-01-16 RX ADMIN — CEFAZOLIN 2 G: 1 INJECTION, POWDER, FOR SOLUTION INTRAMUSCULAR; INTRAVENOUS at 08:01

## 2020-01-16 RX ADMIN — LOSARTAN POTASSIUM AND HYDROCHLOROTHIAZIDE 1 TABLET: 50; 12.5 TABLET, FILM COATED ORAL at 11:01

## 2020-01-16 RX ADMIN — PROPOFOL 200 MG: 10 INJECTION, EMULSION INTRAVENOUS at 07:01

## 2020-01-16 RX ADMIN — HYDROMORPHONE HYDROCHLORIDE 0.2 MG: 1 INJECTION, SOLUTION INTRAMUSCULAR; INTRAVENOUS; SUBCUTANEOUS at 09:01

## 2020-01-16 RX ADMIN — FLUTICASONE FUROATE AND VILANTEROL TRIFENATATE 1 PUFF: 100; 25 POWDER RESPIRATORY (INHALATION) at 11:01

## 2020-01-16 RX ADMIN — ROCURONIUM BROMIDE 50 MG: 10 INJECTION, SOLUTION INTRAVENOUS at 07:01

## 2020-01-16 RX ADMIN — LIDOCAINE HYDROCHLORIDE 0.1 MG: 10 INJECTION, SOLUTION EPIDURAL; INFILTRATION; INTRACAUDAL; PERINEURAL at 05:01

## 2020-01-16 RX ADMIN — MUPIROCIN 1 G: 20 OINTMENT TOPICAL at 10:01

## 2020-01-16 NOTE — NURSING TRANSFER
Nursing Transfer Note      1/16/2020     Transfer to room 1042    Transfer via bed    Transported by pct    Medicines sent: inhaler    Chart send with patient: yes    Notified:     Patient reassessed at: 1600

## 2020-01-16 NOTE — OP NOTE
[facility]  Cardiothoracic Surgery  Operative Note    SUMMARY     Date of Procedure: 1/16/2020     Procedure: Procedure(s):  VATS (VIDEO-ASSISTED THORACOSCOPIC SURGERY) - exploratory    Surgeon(s) and Role:     * Ritesh Mccall MD - Primary     * Enedelia Skaggs MD - Fellow    Pre-Operative Diagnosis: Non-small cell cancer of left lung [C34.92]    Post-Operative Diagnosis: Post-Op Diagnosis Codes:     * Non-small cell cancer of left lung [C34.92]    Anesthesia: General endotracheal    Medications: 0.25% Marcaine with epinephrine    Indications:   Mrs. Medina is a pleasant 74 y.o. female with PMHx of TIA 2017, HTN, HLD, SVT, COPD, GERD, obesity, sarcoidosis, DOC biopsy proven adenocarcinoma. She underwent a thoughtful and thorough workup that included CT, and brain MRI.  Brain MRI was negative for metastatic disease. There were no concerning lymph nodes. PET not performed secondary to sarcoidosis in mediastinum. The location of the tumor in the left upper lobe made a left upper division vs non-anatomic upper division the most likely surgical option due to body habitus.    Operative Findings:   Markedly difficult airway  Inability to achieve double lumen intubation  Accurate placement of left main stem bronchial blocker with failed isolation of left lung secondary to sarcoid disease  Fair to poor toleration of single lung ventilation on 100% FiO2  Case aborted    Description of the Procedure:   The patient was placed supine and general anesthesia administered with single lumen intubation with left bronchial blocker secondary to difficult airway.  The patient was positioned in a right lateral decubitus position.  All pressure points were protected.  A time out was performed.  The left chest was prepped and draped. Appropriate tube and blocker position confirmed by bronchoscopy.    Given body habitus, difficulty of intubation, and fair to poor toleration of single lung ventilation on 100%  FiO2, decision was made to abort robotic access attempt and proceed as a VATS case. An 10mm port was inserted into the eighth interspace posterior axillary line.  Thoracic cavity entered uneventfully with no injury to underlying lung. Inspection of thoracic cavity thoracoscopically revealed failure of left lung/atelectasis secondary to sarcoid disease. Inability to achieve this precluded all further thoracic cavity entry or exploration. Patient at this time maintaining oxygen saturations of 91-93% on single right lung ventilation 100% FiO2. Decision was made to abort the operation. The port site was anesthetized using 0.25% Marcaine with epinephrine 20cc and closed in two layers. A sterile dressing was applied, the patient was extubated without incident, and was stable to PACU.    Significant Surgical Tasks Conducted by the Assistant(s), if Applicable: none    Wound Classification: 1, Clean    Complications: None apparent    Estimated Blood Loss (EBL): * No values recorded between 1/16/2020  8:54 AM and 1/16/2020  9:25 AM *           Drains:  none    Implants: * No implants in log *    Specimens:   Specimen (12h ago, onward)    None                  Condition: Stable    Disposition: PACU - hemodynamically stable.    Postoperative Plan:   Immediate post-operative chest XR  Admit to [facility]  Monitor airway for edema    Attestation: Dr. Mccall was present for the entirety of the case.    Enedelia Skaggs MD  Thoracic Surgery Resident, PGY7  General Thoracic Surgery  [facility]

## 2020-01-16 NOTE — PROGRESS NOTES
Patient assigned to this writer by charge nurse. The patient is in the waiting room but, will be escorted to [facility] CORE D room 40. This writer is completing a chart review and reviewing MD orders.

## 2020-01-16 NOTE — INTERVAL H&P NOTE
The patient has been examined and the H&P has been reviewed:    I concur with the findings and no changes have occurred since H&P was written.    Anesthesia/Surgery risks, benefits and alternative options discussed and understood by patient/family.          Active Hospital Problems    Diagnosis  POA    Adenocarcinoma of left lung [C34.92]  Yes      Resolved Hospital Problems   No resolved problems to display.     Nilda Donaldson MD, PGY-4  General Surgery  [phone]

## 2020-01-16 NOTE — TRANSFER OF CARE
"Anesthesia Transfer of Care Note    Patient: Nicole Medina    Procedure(s) Performed: Procedure(s):  VATS (VIDEO-ASSISTED THORACOSCOPIC SURGERY) - exploratory    Patient location: PACU    Anesthesia Type: general    Transport from OR: Transported from OR on 6-10 L/min O2 by face mask with adequate spontaneous ventilation    Post pain: adequate analgesia    Post assessment: no apparent anesthetic complications    Post vital signs: stable    Level of consciousness: awake    Nausea/Vomiting: no nausea/vomiting    Complications: none    Transfer of care protocol was followed      Last vitals:   Visit Vitals  /60   Pulse 83   Temp 36 °C (96.8 °F) (Temporal)   Resp 18   Ht 5' 3" (1.6 m)   Wt 96.6 kg (212 lb 15.4 oz)   SpO2 100%   Breastfeeding? No   BMI 37.72 kg/m²     "

## 2020-01-17 VITALS
BODY MASS INDEX: 37.93 KG/M2 | HEIGHT: 63 IN | SYSTOLIC BLOOD PRESSURE: 133 MMHG | WEIGHT: 214.06 LBS | DIASTOLIC BLOOD PRESSURE: 61 MMHG | TEMPERATURE: 96 F | HEART RATE: 80 BPM | OXYGEN SATURATION: 94 % | RESPIRATION RATE: 18 BRPM

## 2020-01-17 PROCEDURE — 63600175 PHARM REV CODE 636 W HCPCS: Mod: HCNC | Performed by: STUDENT IN AN ORGANIZED HEALTH CARE EDUCATION/TRAINING PROGRAM

## 2020-01-17 PROCEDURE — 25000003 PHARM REV CODE 250: Mod: HCNC | Performed by: STUDENT IN AN ORGANIZED HEALTH CARE EDUCATION/TRAINING PROGRAM

## 2020-01-17 RX ADMIN — FLUTICASONE FUROATE AND VILANTEROL TRIFENATATE 1 PUFF: 100; 25 POWDER RESPIRATORY (INHALATION) at 09:01

## 2020-01-17 RX ADMIN — HEPARIN SODIUM 5000 UNITS: 5000 INJECTION, SOLUTION INTRAVENOUS; SUBCUTANEOUS at 06:01

## 2020-01-17 RX ADMIN — METOPROLOL SUCCINATE 50 MG: 50 TABLET, EXTENDED RELEASE ORAL at 09:01

## 2020-01-17 RX ADMIN — FAMOTIDINE 20 MG: 20 TABLET ORAL at 09:01

## 2020-01-17 RX ADMIN — LOSARTAN POTASSIUM AND HYDROCHLOROTHIAZIDE 1 TABLET: 50; 12.5 TABLET, FILM COATED ORAL at 09:01

## 2020-01-17 RX ADMIN — ATORVASTATIN CALCIUM 20 MG: 20 TABLET, FILM COATED ORAL at 09:01

## 2020-01-17 RX ADMIN — ASPIRIN 81 MG CHEWABLE TABLET 81 MG: 81 TABLET CHEWABLE at 09:01

## 2020-01-17 NOTE — PLAN OF CARE
Patient discharged home with no needs today.  Message sent to Dr Mccall's clinic to schedule postop and call patient with appointment.    Future Appointments   Date Time Provider Department [place]   4/20/2020  8:00 AM [facility]   4/27/2020  9:15 AM Milan Jain MD [facility]          01/17/20 5685   Final Note   Assessment Type Final Discharge Note   Anticipated Discharge Disposition Home   Hospital Follow Up  Appt(s) scheduled? Yes   Right Care Referral Info   Post Acute Recommendation No Care

## 2020-01-17 NOTE — ASSESSMENT & PLAN NOTE
POD 1 aborted left VATS    Stable for discharge home.   Will refer to radiation oncology.   Regular diet  Home meds  oob   Ambulation

## 2020-01-17 NOTE — PLAN OF CARE
Plan of care reviewed with patient who demonstrated understanding. Pt. AAOx4, VSS on 2 liters NC. Pt. Tolerating clear liquids with no complaints of nausea vomiting. Pt. Does not complain of pain. Pt. Ambulates independently within the room and turcios. Bed in low position, bed rails x2, call light within reach. No falls or adverse events reported, frequent monitoring continued.

## 2020-01-17 NOTE — NURSING
AAOx4, vitals stable with the exception of HR, MD aware. Tolerating clear liquids, pt reports swelling sensation in neck, bedside suction set up in needed. Patient ambulates independently, tylenol request for minor discomfort. Bedside report done with DEVI Goldstein

## 2020-01-17 NOTE — HPI
74 y.o. female with  PMH of TIA in 2017, HTN, HLD, SVT, COPD, GERD, obesity and sarcoidosis presents for evaluation of DOC biopsy proven adenocarcinoma. Patient has followed with pulmonology since 2014 diagnosis of sarcoidosis. Serial CTs had shown increasing solid component of nodule. Nodule measured 1.8cm in Oct 2019. CT guided biopsy positive for adenocarcinoma. Today she reports feeling well and is asymptomatic. Endorses baseline MACK since initial sarcoid diagnosis. She was previously prescribed nocturnal NC O2 but more recent sleep studies proved she did not need it.  SVT controlled chemically. No prior cardiac or thoracic surgeries. Takes 81mg ASA but has held it since IR biopsy.      Remote smoking history. Denies EtOH use. 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   PSH of bilateral cataract extraction, cholecystectomy and hysterectomy.

## 2020-01-17 NOTE — PLAN OF CARE
01/17/20 1027   Post-Acute Status   Post-Acute Authorization Other   Other Status No Post-Acute Service Needs   This SW in communication with  and medical team. SW will continue to follow for discharge needs and offer support as needed.    No SW needs identified at this time.    Vee Linn LMSW  [facility]  [id number]

## 2020-01-17 NOTE — DISCHARGE SUMMARY
[facility]  Thoracic Surgery  Discharge Summary    Patient Name: Nicole Medina  MRN: [id number]  Admission Date: 1/16/2020  Hospital Length of Stay: 1 days  Discharge Date and Time:  01/17/2020 9:51 AM  Attending Physician: Ritesh Mccall MD   Discharging Provider: Jade Landers PA-C  Primary Care Provider: Milan Jain MD    HPI:   74 y.o. female with  PMH of TIA in 2017, HTN, HLD, SVT, COPD, GERD, obesity and sarcoidosis presents for evaluation of DOC biopsy proven adenocarcinoma. Patient has followed with pulmonology since 2014 diagnosis of sarcoidosis. Serial CTs had shown increasing solid component of nodule. Nodule measured 1.8cm in Oct 2019. CT guided biopsy positive for adenocarcinoma. Today she reports feeling well and is asymptomatic. Endorses baseline MACK since initial sarcoid diagnosis. She was previously prescribed nocturnal NC O2 but more recent sleep studies proved she did not need it.  SVT controlled chemically. No prior cardiac or thoracic surgeries. Takes 81mg ASA but has held it since IR biopsy.      Remote smoking history. Denies EtOH use. 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   PSH of bilateral cataract extraction, cholecystectomy and hysterectomy.    Procedure(s):  VATS (VIDEO-ASSISTED THORACOSCOPIC SURGERY) - exploratory      Hospital Course: No notes on file        Significant Diagnostic Studies: Radiology: X-Ray: CXR: X-Ray Chest 1 View (CXR): No results found for this visit on 01/16/20.    Pending Diagnostic Studies:     None        Final Active Diagnoses:    Diagnosis Date Noted POA    PRINCIPAL PROBLEM:  Adenocarcinoma of left lung [C34.92]  Yes      Problems Resolved During this Admission:      Discharged Condition: good    Disposition:     Follow Up:    Patient Instructions:      Diet Adult Regular     Call MD for:  temperature >100.4     Call MD for:  persistent nausea and vomiting or diarrhea     Call MD for:  severe uncontrolled pain      Call MD for:  redness, tenderness, or signs of infection (pain, swelling, redness, odor or green/yellow discharge around incision site)     Call MD for:  difficulty breathing or increased cough     Call MD for:  severe persistent headache     Call MD for:  worsening rash     Call MD for:  persistent dizziness, light-headedness, or visual disturbances     Call MD for:  increased confusion or weakness     Remove dressing in 48 hours     Activity as tolerated     Medications:  Reconciled Home Medications:      Medication List      CONTINUE taking these medications    aspirin 81 MG Chew  Take 1 tablet (81 mg total) by mouth once daily.     atorvastatin 20 MG tablet  Commonly known as:  LIPITOR  Take 1 tablet (20 mg total) by mouth once daily.     calcium-vitamin D 600 mg(1,500mg) -400 unit Tab  Take by mouth.     fluticasone propionate 50 mcg/actuation nasal spray  Commonly known as:  FLONASE     Fluzone High-Dose 2018-19 (PF) 180 mcg/0.5 mL vaccine  Generic drug:  influenza  ADM 0.5ML IM UTD     ibandronate 150 mg tablet  Commonly known as:  BONIVA  Take 1 tablet (150 mg total) by mouth every 30 days.     losartan-hydrochlorothiazide 50-12.5 mg 50-12.5 mg per tablet  Commonly known as:  HYZAAR  Take 1 tablet by mouth once daily.     metoprolol succinate 50 MG 24 hr tablet  Commonly known as:  Toprol XL  Take 1 tablet (50 mg total) by mouth once daily.     omeprazole 40 MG capsule  Commonly known as:  PRILOSEC  Take 1 capsule (40 mg total) by mouth every morning.     One-A-Day Women's 50 Plus 400-20 mcg Tab  Generic drug:  mv,Ca,min-folic acid-vit K1  Take 1 tablet by mouth once daily at 6am.     predniSONE 5 MG tablet  Commonly known as:  DELTASONE     Symbicort 160-4.5 mcg/actuation Hfaa  Generic drug:  budesonide-formoterol 160-4.5 mcg  Inhale 2 puffs into the lungs every 12 (twelve) hours.     Vitamin D3 125 mcg (5,000 unit) Tab  Generic drug:  cholecalciferol (vitamin D3)  Take 5,000 Units by mouth once  daily.            Jade Landers PA-C  Thoracic Surgery  [facility]

## 2020-01-17 NOTE — NURSING
Discharge instructions reviewed with patient and spouse, PIVs discontinued x2, catheter intact. NO new rx given and no changes in medications. Awaiting transport for discharge.

## 2020-01-17 NOTE — PLAN OF CARE
Patient is a 74 year old admitted from home 1/16/2020 and underwent VATS.  Patient discharged home today with no needs.    PCP  Milan Jain MD  [address] / [place] [address]  [phone]      [id number] - [place], [place] - [address] [facility] [address]  [address]  [facility] [address]  Phone: [phone] Fax: [phone]    [facility] - [place], [place] - [address]   [address] [facility] [address]  Phone: [phone] Fax: [phone]      Extended Emergency Contact Information  Primary Emergency Contact: Jenaro Medina  Address: [id number] [place] [address] JAHAIRA ROBERTS [id number] [place] [address] of [facility]  Home Phone: [phone]  Mobile Phone: [phone]  Relation: Spouse       01/17/20 1352   Discharge Assessment   Assessment Type Discharge Planning Assessment   Assessment information obtained from? Medical Record   Expected Length of Stay (days) 2   Prior to hospitilization cognitive status: Alert/Oriented   Prior to hospitalization functional status: Independent   Current cognitive status: Alert/Oriented   Current Functional Status: Independent   Lives With spouse   Able to Return to Prior Arrangements yes   Is patient able to care for self after discharge? Yes   Who are your caregiver(s) and their phone number(s)?    Patient's perception of discharge disposition home or selfcare   Equipment Currently Used at Home oxygen   Does the patient have transportation home? Yes   Transportation Anticipated family or friend will provide   Discharge Plan A Home with family

## 2020-01-17 NOTE — ANESTHESIA POSTPROCEDURE EVALUATION
Anesthesia Post Evaluation    Patient: Nicole Medina    Procedure(s) Performed: Procedure(s):  VATS (VIDEO-ASSISTED THORACOSCOPIC SURGERY) - exploratory    Final Anesthesia Type: general    Patient location during evaluation: PACU  Patient participation: Yes- Able to Participate  Level of consciousness: awake and alert  Post-procedure vital signs: reviewed and stable  Pain management: adequate  Airway patency: patent    PONV status at discharge: No PONV  Anesthetic complications: no      Cardiovascular status: blood pressure returned to baseline and stable  Respiratory status: unassisted  Hydration status: euvolemic  Follow-up not needed.          Vitals Value Taken Time   /58 1/17/2020  5:20 AM   Temp 36.4 °C (97.6 °F) 1/17/2020  5:20 AM   Pulse 73 1/17/2020  5:20 AM   Resp 18 1/17/2020  5:20 AM   SpO2 98 % 1/17/2020  5:20 AM         Event Time     Out of Recovery 10:00:00          Pain/Sasha Score: Pain Rating Prior to Med Admin: 4 (1/16/2020  6:56 PM)  Sasha Score: 9 (1/16/2020  4:00 PM)

## 2020-01-17 NOTE — SUBJECTIVE & OBJECTIVE
Interval History: NAEON. Breathing stable on RA. Pain controlled     Medications:  Continuous Infusions:  Scheduled Meds:   aspirin  81 mg Oral Daily    atorvastatin  20 mg Oral Daily    famotidine  20 mg Oral BID    fluticasone furoate-vilanterol  1 puff Inhalation Daily    heparin (porcine)  5,000 Units Subcutaneous Q8H    losartan-hydrochlorothiazide 50-12.5 mg  1 tablet Oral Daily    metoprolol succinate  50 mg Oral Daily    polyethylene glycol  17 g Oral Daily    senna-docusate 8.6-50 mg  1 tablet Oral BID     PRN Meds:acetaminophen, bisacodyl, metoclopramide HCl, ondansetron, oxyCODONE, oxyCODONE     Review of patient's allergies indicates:   Allergen Reactions    Latex Itching and Swelling     Itching and swelling to site (local reaction)     Objective:     Vital Signs (Most Recent):  Temp: 96.4 °F (35.8 °C) (01/17/20 0734)  Pulse: 63 (01/17/20 0734)  Resp: 20 (01/17/20 0734)  BP: 133/61 (01/17/20 0734)  SpO2: (!) 94 % (01/17/20 0734) Vital Signs (24h Range):  Temp:  [96.1 °F (35.6 °C)-98.2 °F (36.8 °C)] 96.4 °F (35.8 °C)  Pulse:  [63-93] 63  Resp:  [13-28] 20  SpO2:  [89 %-100 %] 94 %  BP: (124-184)/(58-97) 133/61     Intake/Output - Last 3 Shifts       01/15 0700 - 01/16 0659 01/16 0700 - 01/17 0659 01/17 0700 - 01/18 0659    Urine (mL/kg/hr)  1000 (0.4)     Total Output  1000     Net  -1000            Urine Occurrence  2 x     Stool Occurrence  0 x     Emesis Occurrence  0 x           SpO2: (!) 94 %  O2 Device (Oxygen Therapy): room air    Physical Exam   Constitutional: She is oriented to person, place, and time. She appears well-developed and well-nourished.   HENT:   Head: Atraumatic.   Eyes: EOM are normal.   Neck: Neck supple.   Cardiovascular: Normal rate and regular rhythm.   Pulmonary/Chest: Effort normal.   Appropriate soreness   Abdominal: Soft.   Musculoskeletal: Normal range of motion.   Neurological: She is oriented to person, place, and time.   Skin: Skin is warm and dry.    Psychiatric: She has a normal mood and affect. Thought content normal.   Vitals reviewed.      Significant Labs:  BMP: No results for input(s): GLU, NA, K, CL, CO2, BUN, CREATININE, CALCIUM, MG in the last 48 hours.  CBC:   Recent Labs   Lab 01/16/20  0540   WBC 9.26   RBC 4.73   HGB 12.8   HCT 41.3      MCV 87   MCH 27.1   MCHC 31.0*       Significant Diagnostics:  CXR: I have reviewed all pertinent results/findings within the past 24 hours    VTE Risk Mitigation (From admission, onward)         Ordered     heparin (porcine) injection 5,000 Units  Every 8 hours      01/16/20 0946     IP VTE HIGH RISK PATIENT  Once      01/16/20 0946     Place sequential compression device  Until discontinued      01/16/20 0946

## 2020-01-17 NOTE — PROGRESS NOTES
[facility]  Thoracic Surgery  Progress Note    Subjective:     History of Present Illness:  74 y.o. female with  PMH of TIA in 2017, HTN, HLD, SVT, COPD, GERD, obesity and sarcoidosis presents for evaluation of DOC biopsy proven adenocarcinoma. Patient has followed with pulmonology since 2014 diagnosis of sarcoidosis. Serial CTs had shown increasing solid component of nodule. Nodule measured 1.8cm in Oct 2019. CT guided biopsy positive for adenocarcinoma. Today she reports feeling well and is asymptomatic. Endorses baseline MACK since initial sarcoid diagnosis. She was previously prescribed nocturnal NC O2 but more recent sleep studies proved she did not need it.  SVT controlled chemically. No prior cardiac or thoracic surgeries. Takes 81mg ASA but has held it since IR biopsy.      Remote smoking history. Denies EtOH use. 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   PSH of bilateral cataract extraction, cholecystectomy and hysterectomy.    Post-Op Info:  Procedure(s):  VATS (VIDEO-ASSISTED THORACOSCOPIC SURGERY) - exploratory   1 Day Post-Op     Interval History: NAEON. Breathing stable on RA. Pain controlled     Medications:  Continuous Infusions:  Scheduled Meds:   aspirin  81 mg Oral Daily    atorvastatin  20 mg Oral Daily    famotidine  20 mg Oral BID    fluticasone furoate-vilanterol  1 puff Inhalation Daily    heparin (porcine)  5,000 Units Subcutaneous Q8H    losartan-hydrochlorothiazide 50-12.5 mg  1 tablet Oral Daily    metoprolol succinate  50 mg Oral Daily    polyethylene glycol  17 g Oral Daily    senna-docusate 8.6-50 mg  1 tablet Oral BID     PRN Meds:acetaminophen, bisacodyl, metoclopramide HCl, ondansetron, oxyCODONE, oxyCODONE     Review of patient's allergies indicates:   Allergen Reactions    Latex Itching and Swelling     Itching and swelling to site (local reaction)     Objective:     Vital Signs (Most Recent):  Temp: 96.4 °F (35.8 °C) (01/17/20 0734)  Pulse: 63  (01/17/20 0734)  Resp: 20 (01/17/20 0734)  BP: 133/61 (01/17/20 0734)  SpO2: (!) 94 % (01/17/20 0734) Vital Signs (24h Range):  Temp:  [96.1 °F (35.6 °C)-98.2 °F (36.8 °C)] 96.4 °F (35.8 °C)  Pulse:  [63-93] 63  Resp:  [13-28] 20  SpO2:  [89 %-100 %] 94 %  BP: (124-184)/(58-97) 133/61     Intake/Output - Last 3 Shifts       01/15 0700 - 01/16 0659 01/16 0700 - 01/17 0659 01/17 0700 - 01/18 0659    Urine (mL/kg/hr)  1000 (0.4)     Total Output  1000     Net  -1000            Urine Occurrence  2 x     Stool Occurrence  0 x     Emesis Occurrence  0 x           SpO2: (!) 94 %  O2 Device (Oxygen Therapy): room air    Physical Exam   Constitutional: She is oriented to person, place, and time. She appears well-developed and well-nourished.   HENT:   Head: Atraumatic.   Eyes: EOM are normal.   Neck: Neck supple.   Cardiovascular: Normal rate and regular rhythm.   Pulmonary/Chest: Effort normal.   Appropriate soreness   Abdominal: Soft.   Musculoskeletal: Normal range of motion.   Neurological: She is oriented to person, place, and time.   Skin: Skin is warm and dry.   Psychiatric: She has a normal mood and affect. Thought content normal.   Vitals reviewed.      Significant Labs:  BMP: No results for input(s): GLU, NA, K, CL, CO2, BUN, CREATININE, CALCIUM, MG in the last 48 hours.  CBC:   Recent Labs   Lab 01/16/20  0540   WBC 9.26   RBC 4.73   HGB 12.8   HCT 41.3      MCV 87   MCH 27.1   MCHC 31.0*       Significant Diagnostics:  CXR: I have reviewed all pertinent results/findings within the past 24 hours    VTE Risk Mitigation (From admission, onward)         Ordered     heparin (porcine) injection 5,000 Units  Every 8 hours      01/16/20 0946     IP VTE HIGH RISK PATIENT  Once      01/16/20 0946     Place sequential compression device  Until discontinued      01/16/20 0946              Assessment/Plan:     * Adenocarcinoma of left lung  POD 1 aborted left VATS    Stable for discharge home.   Will refer to radiation  oncology.   Regular diet  Home meds  oob   Ambulation         Jade Landers PA-C  Thoracic Surgery  [facility]

## 2020-01-21 ENCOUNTER — TELEPHONE (OUTPATIENT)
Dept: HEMATOLOGY/ONCOLOGY | Facility: CLINIC | Age: 75
End: 2020-01-21

## 2020-01-21 DIAGNOSIS — C34.90 LUNG CANCER: Primary | ICD-10-CM

## 2020-01-27 ENCOUNTER — INITIAL CONSULT (OUTPATIENT)
Dept: RADIATION ONCOLOGY | Facility: CLINIC | Age: 75
End: 2020-01-27
Payer: MEDICARE

## 2020-01-27 VITALS
TEMPERATURE: 98 F | RESPIRATION RATE: 20 BRPM | WEIGHT: 212 LBS | SYSTOLIC BLOOD PRESSURE: 190 MMHG | HEART RATE: 74 BPM | HEIGHT: 63 IN | BODY MASS INDEX: 37.56 KG/M2 | DIASTOLIC BLOOD PRESSURE: 77 MMHG | OXYGEN SATURATION: 96 %

## 2020-01-27 DIAGNOSIS — C34.12 PRIMARY MALIGNANT NEOPLASM OF LEFT UPPER LOBE OF LUNG: Primary | ICD-10-CM

## 2020-01-27 PROCEDURE — 1101F PR PT FALLS ASSESS DOC 0-1 FALLS W/OUT INJ PAST YR: ICD-10-PCS | Mod: HCNC,CPTII,S$GLB, | Performed by: RADIOLOGY

## 2020-01-27 PROCEDURE — 1101F PT FALLS ASSESS-DOCD LE1/YR: CPT | Mod: HCNC,CPTII,S$GLB, | Performed by: RADIOLOGY

## 2020-01-27 PROCEDURE — 1126F AMNT PAIN NOTED NONE PRSNT: CPT | Mod: HCNC,S$GLB,, | Performed by: RADIOLOGY

## 2020-01-27 PROCEDURE — 3077F PR MOST RECENT SYSTOLIC BLOOD PRESSURE >= 140 MM HG: ICD-10-PCS | Mod: HCNC,CPTII,S$GLB, | Performed by: RADIOLOGY

## 2020-01-27 PROCEDURE — 99999 PR PBB SHADOW E&M-EST. PATIENT-LVL IV: CPT | Mod: PBBFAC,HCNC,, | Performed by: RADIOLOGY

## 2020-01-27 PROCEDURE — 3078F PR MOST RECENT DIASTOLIC BLOOD PRESSURE < 80 MM HG: ICD-10-PCS | Mod: HCNC,CPTII,S$GLB, | Performed by: RADIOLOGY

## 2020-01-27 PROCEDURE — 99999 PR PBB SHADOW E&M-EST. PATIENT-LVL IV: ICD-10-PCS | Mod: PBBFAC,HCNC,, | Performed by: RADIOLOGY

## 2020-01-27 PROCEDURE — 3077F SYST BP >= 140 MM HG: CPT | Mod: HCNC,CPTII,S$GLB, | Performed by: RADIOLOGY

## 2020-01-27 PROCEDURE — 1159F PR MEDICATION LIST DOCUMENTED IN MEDICAL RECORD: ICD-10-PCS | Mod: HCNC,S$GLB,, | Performed by: RADIOLOGY

## 2020-01-27 PROCEDURE — 99205 OFFICE O/P NEW HI 60 MIN: CPT | Mod: HCNC,S$GLB,, | Performed by: RADIOLOGY

## 2020-01-27 PROCEDURE — 1159F MED LIST DOCD IN RCRD: CPT | Mod: HCNC,S$GLB,, | Performed by: RADIOLOGY

## 2020-01-27 PROCEDURE — 3078F DIAST BP <80 MM HG: CPT | Mod: HCNC,CPTII,S$GLB, | Performed by: RADIOLOGY

## 2020-01-27 PROCEDURE — 99205 PR OFFICE/OUTPT VISIT, NEW, LEVL V, 60-74 MIN: ICD-10-PCS | Mod: HCNC,S$GLB,, | Performed by: RADIOLOGY

## 2020-01-27 PROCEDURE — 1126F PR PAIN SEVERITY QUANTIFIED, NO PAIN PRESENT: ICD-10-PCS | Mod: HCNC,S$GLB,, | Performed by: RADIOLOGY

## 2020-01-27 NOTE — PROGRESS NOTES
01/27/2020    Radiation Oncology Consultation    Assessment   This is a 74 y.o. y/o female with Stage IA2 (cT1b cN0 M0) DOC NSCLC (adeno) diagnosed on biopsy 12/20/19. History significant for sarcoidosis. CT Chest 1/14/20 demonstrated 1.7 cm DOC primary and multiple other stable sub-centimeter nodules with mild hilar and mediastinal adenopathy. She underwent attempted VATS resection by Dr. Mccall 1/16/20, which was aborted due to poor toleration of single lung ventilation. She is referred for consideration of definitive SBRT.     I discussed treatment options for early stage NSCLC including surgical resection and stereotactic body radiotherapy (SBRT). Since she is not a candidate for resection, I recommend treating the DOC primary to 50-54 Gy in 3-5 fractions, which offers excellent local control in 90% of patients. Potential short- and long-term risks of treatment were reviewed with the patient, particularly pneumonitis and chest wall pain. At the end of our discussion, she was in agreement with proceeding with the recommended treatment.     Sarcoidosis makes mediastinal staging difficult. PET/CT was not obtained due to high likelihood of false positive. Surgery would have performed iba dissection to pathologically stage the mediastinum. Will present case at conference to get input on whether EBUS is likely to benefit in this setting.         Plan   1) Schedule CT Simulation for SBRT treatment planning.   2) Discuss case at Thoracic MDT Conference to get input on whether EBUS would be worthwhile to complete mediastinal staging.        Chief Complaint   Patient presents with    Lung Cancer     radiation cons       HPI: Mrs. Medina is a 73 y/o female with known sarcoidosis who has found to have a 1.9 x 1.2 cm GGO in the DOC on routine CT Chest 3/25/19. Multiple other subcentimeter nodules were noted and stable from 2016. MRI Brain 8/14/19 was performed for HA and negative for intracranial disease. She underwent  repeat CT Chest 10/9/19 that demonstrated stable size but increased solid component of the DOC lesion. IR biopsy 12/20/19 revealed adenocarcinoma. CT Chest 1/14/20 demonstrated stable size of known DOC primary and other nodules with mild hilar and mediastinal adenopathy c/w sarcoidosis. On 1/16/20 she underwent exploratory VATS by Dr. Mccall for attempted resection; however, procedure was aborted due to inability to maintain sats with left lung collapse. She is referred for consideration of definitive SBRT.     In clinic today, the patient is accompanied by her . She has some mild discomfort in the LUQ abdomen since surgery but otherwise denies dyspnea, cough, hemoptysis, fevers, or weight loss.         Prior Radiation History: None    Past Medical History:   Diagnosis Date    Acute ischemic right MCA stroke 6/2/2017    Cataract     done ou    Essential hypertension 8/21/2017    GERD (gastroesophageal reflux disease)     Hiatal hernia     History of seasonal allergies     On home oxygen therapy     while sleeping    Polio     as a child    Presbyopia     PSVT (paroxysmal supraventricular tachycardia)     Sarcoidosis     Sciatica     TIA [facility]transient ischemic attack) 06/02/2017    [facility]//    Vertigo        Past Surgical History:   Procedure Laterality Date    CATARACT EXTRACTION W/  INTRAOCULAR LENS IMPLANT Left 03/27/2018    Dr Higginbotham    CATARACT EXTRACTION W/  INTRAOCULAR LENS IMPLANT Right 05/08/2018    Dr Higginbotham    CHOLECYSTECTOMY      ESOPHAGEAL DILATION N/A 11/22/2018    Procedure: DILATION, ESOPHAGUS;  Surgeon: Robb Fitzgerald Jr., MD;  Location: [facility];  Service: Endoscopy;  Laterality: N/A;    ESOPHAGOGASTRODUODENOSCOPY N/A 11/22/2018    Procedure: ESOPHAGOGASTRODUODENOSCOPY (EGD);  Surgeon: Robb Fitzgerald Jr., MD;  Location: [facility];  Service: Endoscopy;  Laterality: N/A;    HAND SURGERY Right     trauma repair    TONSILLECTOMY      TOTAL ABDOMINAL  HYSTERECTOMY      VARICOSE VEIN SURGERY Bilateral     bilateral legs    VIDEO-ASSISTED THORACOSCOPIC SURGERY (VATS)  1/16/2020    Procedure: VATS (VIDEO-ASSISTED THORACOSCOPIC SURGERY) - exploratory;  Surgeon: Ritesh Mccall MD;  Location: [facility] OR [address] [place] [address];  Service: Thoracic;;       Social History     Tobacco Use    Smoking status: Former Smoker    Smokeless tobacco: Never Used    Tobacco comment: as teenager   Substance Use Topics    Alcohol use: No    Drug use: No       Cancer-related family history includes Breast cancer in her maternal grandmother; Breast cancer (age of onset: 68) in her sister; Cancer in her daughter, mother, and sister.    Current Outpatient Medications on File Prior to Visit   Medication Sig Dispense Refill    aspirin 81 MG Chew Take 1 tablet (81 mg total) by mouth once daily. 30 tablet 11    atorvastatin (LIPITOR) 20 MG tablet Take 1 tablet (20 mg total) by mouth once daily. 90 tablet 3    calcium-vitamin D 600 mg(1,500mg) -400 unit Tab Take by mouth.      cholecalciferol, vitamin D3, (VITAMIN D3) 5,000 unit Tab Take 5,000 Units by mouth once daily.      ibandronate (BONIVA) 150 mg tablet Take 1 tablet (150 mg total) by mouth every 30 days. 3 tablet 3    losartan-hydrochlorothiazide 50-12.5 mg (HYZAAR) 50-12.5 mg per tablet Take 1 tablet by mouth once daily. 90 tablet 3    metoprolol succinate (TOPROL XL) 50 MG 24 hr tablet Take 1 tablet (50 mg total) by mouth once daily. 90 tablet 3    mv,Ca,min-folic acid-vit K1 (ONE-A-DAY WOMEN'S 50 PLUS) 400-20 mcg Tab Take 1 tablet by mouth once daily at 6am.      omeprazole (PRILOSEC) 40 MG capsule Take 1 capsule (40 mg total) by mouth every morning. 90 capsule 3    SYMBICORT 160-4.5 mcg/actuation HFAA Inhale 2 puffs into the lungs every 12 (twelve) hours.       fluticasone (FLONASE) 50 mcg/actuation nasal spray       FLUZONE HIGH-DOSE 2018-19, PF, 180 mcg/0.5 mL vaccine ADM 0.5ML IM UTD  0    predniSONE (DELTASONE) 5 MG tablet  "       No current facility-administered medications on file prior to visit.        Review of patient's allergies indicates:   Allergen Reactions    Latex Itching and Swelling     Itching and swelling to site (local reaction)       Review of Systems   Constitutional: Negative for fever and weight loss.   HENT: Negative for ear pain and sore throat.    Eyes: Negative for blurred vision and double vision.   Respiratory: Negative for cough, hemoptysis and shortness of breath.    Cardiovascular: Negative for chest pain and leg swelling.   Gastrointestinal: Negative for abdominal pain, constipation, diarrhea, heartburn and nausea.   Genitourinary: Negative for dysuria and hematuria.   Musculoskeletal: Negative for falls and joint pain.   Neurological: Negative for tingling, speech change, focal weakness, seizures and headaches.   Psychiatric/Behavioral: Negative for depression. The patient is not nervous/anxious.         Vital Signs: BP (!) 190/77 (BP Location: Right arm)   Pulse 74   Temp 97.5 °F (36.4 °C) (Oral)   Resp 20   Ht 5' 3" (1.6 m)   Wt 96.2 kg (212 lb)   SpO2 96%   BMI 37.55 kg/m²     ECOG Performance Status: 1 - Ambulates, capable of light work    Physical Exam   Constitutional: She is oriented to person, place, and time and well-developed, well-nourished, and in no distress.   HENT:   Head: Normocephalic and atraumatic.   Mouth/Throat: Oropharynx is clear and moist.   Eyes: EOM are normal. No scleral icterus.   Neck: Normal range of motion. Neck supple.   Pulmonary/Chest: No accessory muscle usage. No respiratory distress.   Abdominal: Soft. Normal appearance. She exhibits no distension.   Musculoskeletal: Normal range of motion. She exhibits no edema.   Lymphadenopathy:     She has no cervical adenopathy.        Right: No supraclavicular adenopathy present.        Left: No supraclavicular adenopathy present.   Neurological: She is alert and oriented to person, place, and time. No cranial nerve " deficit. Gait normal.   Skin: Skin is warm and dry.   Psychiatric: Mood, affect and judgment normal.   Vitals reviewed.       Labs:    Imaging: I have personally reviewed the patient's available images and reports and summarized pertinent findings above in HPI.     Pathology: I have personally reviewed the patient's available pathology and summarized pertinent findings above in HPI.       - Thank you for allowing me to participate in the care of your patient.    Kirby Sibley MD, PhD

## 2020-01-27 NOTE — LETTER
January 27, 2020      Ritesh Mccall MD  [address]  [facility] [address]           [facility] - Radiation Oncology  [address]  [facility] [address]  Phone: [phone]          Patient: Nicole Medina   MR Number: [id number]   YOB: 1945   Date of Visit: 1/27/2020       Dear Dr. Ritesh Mccall:    Thank you for referring Nicole Medina to me for evaluation. Attached you will find relevant portions of my assessment and plan of care.    If you have questions, please do not hesitate to call me. I look forward to following Nicole Medina along with you.    Sincerely,    Kirby Sibley MD    Enclosure  CC:  No Recipients    If you would like to receive this communication electronically, please contact [email or web address] or [phone] to request more information on [employer] access.    For providers and/or their staff who would like to refer a patient to [facility], please contact us through our one-stop-shop provider referral line, [facility] , at [phone].    If you feel you have received this communication in error or would no longer like to receive these types of communications, please e-mail [email or web address]

## 2020-01-28 ENCOUNTER — HOSPITAL ENCOUNTER (OUTPATIENT)
Dept: RADIATION THERAPY | Facility: HOSPITAL | Age: 75
Discharge: HOME | End: 2020-01-28
Attending: RADIOLOGY
Payer: MEDICARE

## 2020-01-28 PROCEDURE — 77014 HC CT GUIDANCE RADIATION THERAPY FLDS PLACEMENT: CPT | Mod: TC,HCNC | Performed by: RADIOLOGY

## 2020-01-28 PROCEDURE — 77334 RADIATION TREATMENT AID(S): CPT | Mod: TC,HCNC | Performed by: RADIOLOGY

## 2020-01-28 PROCEDURE — 77263 PR  RADIATION THERAPY PLAN COMPLEX: ICD-10-PCS | Mod: HCNC,,, | Performed by: RADIOLOGY

## 2020-01-28 PROCEDURE — 77290 THER RAD SIMULAJ FIELD CPLX: CPT | Mod: TC,HCNC | Performed by: RADIOLOGY

## 2020-01-28 PROCEDURE — 77290 THER RAD SIMULAJ FIELD CPLX: CPT | Mod: 26,HCNC,, | Performed by: RADIOLOGY

## 2020-01-28 PROCEDURE — 77263 THER RADIOLOGY TX PLNG CPLX: CPT | Mod: HCNC,,, | Performed by: RADIOLOGY

## 2020-01-28 PROCEDURE — 77334 RADIATION TREATMENT AID(S): CPT | Mod: 26,HCNC,, | Performed by: RADIOLOGY

## 2020-01-28 PROCEDURE — 77334 PR  RADN TREATMENT AID(S) COMPLX: ICD-10-PCS | Mod: 26,HCNC,, | Performed by: RADIOLOGY

## 2020-01-28 PROCEDURE — 77290 PR  SET RADN THERAPY FIELD COMPLEX: ICD-10-PCS | Mod: 26,HCNC,, | Performed by: RADIOLOGY

## 2020-01-29 ENCOUNTER — DOCUMENTATION ONLY (OUTPATIENT)
Dept: CARDIOTHORACIC SURGERY | Facility: CLINIC | Age: 75
End: 2020-01-29

## 2020-01-29 LAB
FINAL PATHOLOGIC DIAGNOSIS: NORMAL
GROSS: NORMAL
SUPPLEMENTAL DIAGNOSIS: NORMAL

## 2020-01-29 NOTE — PATIENT CARE CONFERENCE
[facility]      THORACIC MULTIDISCIPLINARY TUMOR BOARD  PATIENT REVIEW FORM  ________________________________________________________________________    CLINIC #: [id number]  DATE: 01/29/2020    DIAGNOSIS:   NSCLC    PRESENTER:   Dr. Sibley     PATIENT SUMMARY:   74 y.o. female with history of TIA in 2017, HTN, HLD, SVT, COPD, GERD, obesity, sarcoidosis, and DOC biopsy proven adenocarcinoma. Patient has followed with pulmonology since 2014 diagnosis of sarcoidosis. Serial CTs had shown increasing solid component of nodule. Nodule measured 1.8cm in Oct 2019. CT guided biopsy positive for adenocarcinoma. In the setting of sarcoidosis, mediastinal staging is difficult. PET not performed to due to high likelihood of false positive. She was taken for resection however the case was aborted secondary to patient not tolerating single lung ventilation thus referred to radiation oncology.     BOARD RECOMMENDATIONS:   Agree with not performing PET due to high likelihood of avidity with history of sarcoidosis. Recommend SBRT of the DOC adenocarcinoma with surveillance. If evidence of enlarging adenopathy develops,  can consider EBUS in the future.     CONSULT NEEDED:     [] Surgery    [] Hem/Onc    [x] Rad/Onc    [] Dietary                 [] Social Service    [] Psychology       [] Pulmonology    Clinical Stage: Tumor 1 Node(s) 0 Metastasis 0  Pathologic Stage: Tumor  Node(s)  Metastasis     GROUP STAGE:     [] O    [x] 1A    [] IB    [] IIA    [] IIB     [] IIIA     [] IIIB     [] IIIC    [] IV                               [] Local recurrence     [] Regional recurrence     [] Distant recurrence                   [] NSCLC     [] SCLC     Tumor type     Unstageable:      [] Yes     [x] No  Metastatic site(s):          [x] Kristy'l Treatment Guidelines reviewed and care planned is consistent with guidelines.         (i.e., NCCN, NCI, PD, ACO, AUA, etc.)    PRESENTATION AT CANCER CONFERENCE:         [] Prospective    []  Retrospective     [] Follow-Up          [] Eligible for clinical trial

## 2020-02-03 ENCOUNTER — HOSPITAL ENCOUNTER (OUTPATIENT)
Dept: RADIATION THERAPY | Facility: HOSPITAL | Age: 75
Discharge: HOME | End: 2020-02-03
Attending: RADIOLOGY
Payer: MEDICARE

## 2020-02-04 PROCEDURE — 77301 RADIOTHERAPY DOSE PLAN IMRT: CPT | Mod: 26,HCNC,, | Performed by: RADIOLOGY

## 2020-02-04 PROCEDURE — 77301 RADIOTHERAPY DOSE PLAN IMRT: CPT | Mod: TC,HCNC | Performed by: RADIOLOGY

## 2020-02-04 PROCEDURE — 77293 PR RESPIRATORY MOTION MGMT SIMULATION: ICD-10-PCS | Mod: 26,HCNC,, | Performed by: RADIOLOGY

## 2020-02-04 PROCEDURE — 77301 PR  INTEN MOD RADIOTHER PLAN W/DOSE VOL HIST: ICD-10-PCS | Mod: 26,HCNC,, | Performed by: RADIOLOGY

## 2020-02-04 PROCEDURE — 77293 RESPIRATOR MOTION MGMT SIMUL: CPT | Mod: 26,HCNC,, | Performed by: RADIOLOGY

## 2020-02-04 PROCEDURE — 77293 RESPIRATOR MOTION MGMT SIMUL: CPT | Mod: TC,HCNC | Performed by: RADIOLOGY

## 2020-02-05 PROCEDURE — 77470 SPECIAL RADIATION TREATMENT: CPT | Mod: 59,TC,HCNC | Performed by: RADIOLOGY

## 2020-02-05 PROCEDURE — 77300 RADIATION THERAPY DOSE PLAN: CPT | Mod: TC,HCNC | Performed by: RADIOLOGY

## 2020-02-05 PROCEDURE — 77300 PR RADIATION THERAPY,DOSIMETRY PLAN: ICD-10-PCS | Mod: 26,HCNC,, | Performed by: RADIOLOGY

## 2020-02-05 PROCEDURE — 77300 RADIATION THERAPY DOSE PLAN: CPT | Mod: 26,HCNC,, | Performed by: RADIOLOGY

## 2020-02-05 PROCEDURE — 77338 PR  MLC IMRT DESIGN & CONSTRUCTION PER IMRT PLAN: ICD-10-PCS | Mod: 26,HCNC,, | Performed by: RADIOLOGY

## 2020-02-05 PROCEDURE — 77370 RADIATION PHYSICS CONSULT: CPT | Mod: HCNC | Performed by: RADIOLOGY

## 2020-02-05 PROCEDURE — 77338 DESIGN MLC DEVICE FOR IMRT: CPT | Mod: TC,HCNC | Performed by: RADIOLOGY

## 2020-02-05 PROCEDURE — 77338 DESIGN MLC DEVICE FOR IMRT: CPT | Mod: 26,HCNC,, | Performed by: RADIOLOGY

## 2020-02-05 PROCEDURE — 77470 PR  SPECIAL RADIATION TREATMENT: ICD-10-PCS | Mod: 26,59,HCNC, | Performed by: RADIOLOGY

## 2020-02-05 PROCEDURE — 77470 SPECIAL RADIATION TREATMENT: CPT | Mod: 26,59,HCNC, | Performed by: RADIOLOGY

## 2020-02-07 PROCEDURE — 77373 STRTCTC BDY RAD THER TX DLVR: CPT | Mod: HCNC | Performed by: RADIOLOGY

## 2020-02-07 PROCEDURE — 77014 HC CT GUIDANCE RADIATION THERAPY FLDS PLACEMENT: CPT | Mod: TC,HCNC | Performed by: RADIOLOGY

## 2020-02-10 PROCEDURE — 77014 HC CT GUIDANCE RADIATION THERAPY FLDS PLACEMENT: CPT | Mod: TC,HCNC | Performed by: RADIOLOGY

## 2020-02-10 PROCEDURE — 77373 STRTCTC BDY RAD THER TX DLVR: CPT | Mod: HCNC | Performed by: RADIOLOGY

## 2020-02-11 PROCEDURE — 77014 HC CT GUIDANCE RADIATION THERAPY FLDS PLACEMENT: CPT | Mod: TC,HCNC,59 | Performed by: RADIOLOGY

## 2020-02-11 PROCEDURE — 77373 STRTCTC BDY RAD THER TX DLVR: CPT | Mod: HCNC | Performed by: RADIOLOGY

## 2020-02-12 DIAGNOSIS — C34.12 PRIMARY MALIGNANT NEOPLASM OF LEFT UPPER LOBE OF LUNG: Primary | ICD-10-CM

## 2020-02-12 PROCEDURE — 77014 HC CT GUIDANCE RADIATION THERAPY FLDS PLACEMENT: CPT | Mod: TC,HCNC | Performed by: RADIOLOGY

## 2020-02-12 PROCEDURE — 77373 STRTCTC BDY RAD THER TX DLVR: CPT | Mod: HCNC | Performed by: RADIOLOGY

## 2020-02-13 PROCEDURE — 77014 HC CT GUIDANCE RADIATION THERAPY FLDS PLACEMENT: CPT | Mod: TC,HCNC,59 | Performed by: RADIOLOGY

## 2020-02-13 PROCEDURE — 77373 STRTCTC BDY RAD THER TX DLVR: CPT | Mod: HCNC | Performed by: RADIOLOGY

## 2020-02-13 PROCEDURE — 77336 RADIATION PHYSICS CONSULT: CPT | Mod: HCNC | Performed by: RADIOLOGY

## 2020-02-21 ENCOUNTER — TELEPHONE (OUTPATIENT)
Dept: RADIATION ONCOLOGY | Facility: CLINIC | Age: 75
End: 2020-02-21

## 2020-04-16 ENCOUNTER — TELEPHONE (OUTPATIENT)
Dept: FAMILY MEDICINE | Facility: CLINIC | Age: 75
End: 2020-04-16

## 2020-04-16 NOTE — TELEPHONE ENCOUNTER
----- Message from Janette Turner sent at 4/16/2020 11:40 AM CDT -----  Contact: self  Someone called the patient to reschedule her labs that she does before her visit with the doctor.  Her doctors appt is video so doesn't she still need to have her labs done prior?    Call back to advise at [phone].  Thanks

## 2020-04-21 ENCOUNTER — LAB VISIT (OUTPATIENT)
Dept: LAB | Facility: HOSPITAL | Age: 75
End: 2020-04-21
Attending: FAMILY MEDICINE
Payer: MEDICARE

## 2020-04-21 DIAGNOSIS — Z13.6 ENCOUNTER FOR SCREENING FOR CARDIOVASCULAR DISORDERS: ICD-10-CM

## 2020-04-21 LAB
ALBUMIN SERPL BCP-MCNC: 3.5 G/DL (ref 3.5–5.2)
ALP SERPL-CCNC: 76 U/L (ref 55–135)
ALT SERPL W/O P-5'-P-CCNC: 14 U/L (ref 10–44)
ANION GAP SERPL CALC-SCNC: 11 MMOL/L (ref 8–16)
AST SERPL-CCNC: 20 U/L (ref 10–40)
BILIRUB SERPL-MCNC: 0.4 MG/DL (ref 0.1–1)
BUN SERPL-MCNC: 19 MG/DL (ref 8–23)
CALCIUM SERPL-MCNC: 9.3 MG/DL (ref 8.7–10.5)
CHLORIDE SERPL-SCNC: 102 MMOL/L (ref 95–110)
CHOLEST SERPL-MCNC: 137 MG/DL (ref 120–199)
CHOLEST/HDLC SERPL: 1.9 {RATIO} (ref 2–5)
CO2 SERPL-SCNC: 29 MMOL/L (ref 23–29)
CREAT SERPL-MCNC: 0.7 MG/DL (ref 0.5–1.4)
EST. GFR  (AFRICAN AMERICAN): >60 ML/MIN/1.73 M^2
EST. GFR  (NON AFRICAN AMERICAN): >60 ML/MIN/1.73 M^2
GLUCOSE SERPL-MCNC: 94 MG/DL (ref 70–110)
HDLC SERPL-MCNC: 71 MG/DL (ref 40–75)
HDLC SERPL: 51.8 % (ref 20–50)
LDLC SERPL CALC-MCNC: 55.2 MG/DL (ref 63–159)
NONHDLC SERPL-MCNC: 66 MG/DL
POTASSIUM SERPL-SCNC: 4.4 MMOL/L (ref 3.5–5.1)
PROT SERPL-MCNC: 7.8 G/DL (ref 6–8.4)
SODIUM SERPL-SCNC: 142 MMOL/L (ref 136–145)
TRIGL SERPL-MCNC: 54 MG/DL (ref 30–150)

## 2020-04-21 PROCEDURE — 80061 LIPID PANEL: CPT | Mod: HCNC

## 2020-04-21 PROCEDURE — 80053 COMPREHEN METABOLIC PANEL: CPT | Mod: HCNC

## 2020-04-21 PROCEDURE — 36415 COLL VENOUS BLD VENIPUNCTURE: CPT | Mod: HCNC,PO

## 2020-04-27 ENCOUNTER — OFFICE VISIT (OUTPATIENT)
Dept: FAMILY MEDICINE | Facility: CLINIC | Age: 75
End: 2020-04-27
Payer: MEDICARE

## 2020-04-27 ENCOUNTER — TELEPHONE (OUTPATIENT)
Dept: RADIATION ONCOLOGY | Facility: CLINIC | Age: 75
End: 2020-04-27

## 2020-04-27 VITALS
DIASTOLIC BLOOD PRESSURE: 78 MMHG | BODY MASS INDEX: 36.67 KG/M2 | WEIGHT: 207 LBS | SYSTOLIC BLOOD PRESSURE: 132 MMHG | HEART RATE: 71 BPM | OXYGEN SATURATION: 98 %

## 2020-04-27 DIAGNOSIS — I70.0 AORTIC ATHEROSCLEROSIS: ICD-10-CM

## 2020-04-27 DIAGNOSIS — C34.12 PRIMARY MALIGNANT NEOPLASM OF LEFT UPPER LOBE OF LUNG: Primary | ICD-10-CM

## 2020-04-27 DIAGNOSIS — Z12.31 ENCOUNTER FOR SCREENING MAMMOGRAM FOR BREAST CANCER: ICD-10-CM

## 2020-04-27 DIAGNOSIS — I10 ESSENTIAL HYPERTENSION: ICD-10-CM

## 2020-04-27 DIAGNOSIS — E66.2 MORBID (SEVERE) OBESITY WITH ALVEOLAR HYPOVENTILATION: ICD-10-CM

## 2020-04-27 DIAGNOSIS — D86.0 SARCOIDOSIS OF LUNG: ICD-10-CM

## 2020-04-27 PROCEDURE — 99214 OFFICE O/P EST MOD 30 MIN: CPT | Mod: HCNC,95,, | Performed by: FAMILY MEDICINE

## 2020-04-27 PROCEDURE — 3075F SYST BP GE 130 - 139MM HG: CPT | Mod: HCNC,CPTII,95, | Performed by: FAMILY MEDICINE

## 2020-04-27 PROCEDURE — 1159F MED LIST DOCD IN RCRD: CPT | Mod: HCNC,95,, | Performed by: FAMILY MEDICINE

## 2020-04-27 PROCEDURE — 99499 UNLISTED E&M SERVICE: CPT | Mod: 95,,, | Performed by: FAMILY MEDICINE

## 2020-04-27 PROCEDURE — 3078F PR MOST RECENT DIASTOLIC BLOOD PRESSURE < 80 MM HG: ICD-10-PCS | Mod: HCNC,CPTII,95, | Performed by: FAMILY MEDICINE

## 2020-04-27 PROCEDURE — 3075F PR MOST RECENT SYSTOLIC BLOOD PRESS GE 130-139MM HG: ICD-10-PCS | Mod: HCNC,CPTII,95, | Performed by: FAMILY MEDICINE

## 2020-04-27 PROCEDURE — 1101F PT FALLS ASSESS-DOCD LE1/YR: CPT | Mod: HCNC,CPTII,95, | Performed by: FAMILY MEDICINE

## 2020-04-27 PROCEDURE — 1159F PR MEDICATION LIST DOCUMENTED IN MEDICAL RECORD: ICD-10-PCS | Mod: HCNC,95,, | Performed by: FAMILY MEDICINE

## 2020-04-27 PROCEDURE — 99499 RISK ADDL DX/OHS AUDIT: ICD-10-PCS | Mod: 95,,, | Performed by: FAMILY MEDICINE

## 2020-04-27 PROCEDURE — 99214 PR OFFICE/OUTPT VISIT, EST, LEVL IV, 30-39 MIN: ICD-10-PCS | Mod: HCNC,95,, | Performed by: FAMILY MEDICINE

## 2020-04-27 PROCEDURE — 3078F DIAST BP <80 MM HG: CPT | Mod: HCNC,CPTII,95, | Performed by: FAMILY MEDICINE

## 2020-04-27 PROCEDURE — 1101F PR PT FALLS ASSESS DOC 0-1 FALLS W/OUT INJ PAST YR: ICD-10-PCS | Mod: HCNC,CPTII,95, | Performed by: FAMILY MEDICINE

## 2020-04-27 RX ORDER — METOPROLOL SUCCINATE 50 MG/1
50 TABLET, EXTENDED RELEASE ORAL DAILY
Qty: 90 TABLET | Refills: 3 | OUTPATIENT
Start: 2020-04-27 | End: 2020-04-28 | Stop reason: SDUPTHER

## 2020-04-27 RX ORDER — OMEPRAZOLE 40 MG/1
40 CAPSULE, DELAYED RELEASE ORAL EVERY MORNING
Qty: 90 CAPSULE | Refills: 3 | OUTPATIENT
Start: 2020-04-27 | End: 2020-04-28 | Stop reason: SDUPTHER

## 2020-04-27 RX ORDER — LOSARTAN POTASSIUM AND HYDROCHLOROTHIAZIDE 12.5; 5 MG/1; MG/1
1 TABLET ORAL DAILY
Qty: 90 TABLET | Refills: 3 | OUTPATIENT
Start: 2020-04-27 | End: 2020-04-28 | Stop reason: SDUPTHER

## 2020-04-27 RX ORDER — IBANDRONATE SODIUM 150 MG/1
150 TABLET, FILM COATED ORAL
Qty: 3 TABLET | Refills: 3 | OUTPATIENT
Start: 2020-04-27 | End: 2020-04-28 | Stop reason: SDUPTHER

## 2020-04-27 RX ORDER — ATORVASTATIN CALCIUM 20 MG/1
20 TABLET, FILM COATED ORAL DAILY
Qty: 90 TABLET | Refills: 3 | OUTPATIENT
Start: 2020-04-27 | End: 2020-04-28 | Stop reason: SDUPTHER

## 2020-04-27 NOTE — TELEPHONE ENCOUNTER
----- Message from Basia Gibbons sent at 4/27/2020 10:03 AM CDT -----  Pt is wants to speak to you about her upcoming CT scan. Please call at [phone].

## 2020-04-27 NOTE — PROGRESS NOTES
HPI  iNcole Medina is a 74 y.o. female with multiple medical diagnoses as listed in the medical history and problem list that presents for Follow-up    The patient location is: home in [place]  The chief complaint leading to consultation is: Review of lung cancer  Visit type: audiovisual  Total time spent with patient: 20m  Each patient to whom he or she provides medical services by telemedicine is:  (1) informed of the relationship between the physician and patient and the respective role of any other health care provider with respect to management of the patient; and (2) notified that he or she may decline to receive medical services by telemedicine and may withdraw from such care at any time.    HPI  Here today for interval evaluation  She is undergoing XRT for adenocarcinoma of the lung.  Continued follow up with Pulmonary and Radiation Oncology.  No new changes in dyspnea.  Could not successfully undergo surgical intervention.    PAST MEDICAL HISTORY:  Past Medical History:   Diagnosis Date    Acute ischemic right MCA stroke 6/2/2017    Cataract     done ou    Essential hypertension 8/21/2017    GERD (gastroesophageal reflux disease)     Hiatal hernia     History of seasonal allergies     On home oxygen therapy     while sleeping    Polio     as a child    Presbyopia     PSVT (paroxysmal supraventricular tachycardia)     Sarcoidosis     Sciatica     TIA (transient ischemic attack) 06/02/2017    [facility]//    Vertigo        PAST SURGICAL HISTORY:  Past Surgical History:   Procedure Laterality Date    CATARACT EXTRACTION W/  INTRAOCULAR LENS IMPLANT Left 03/27/2018    Dr Higginbotham    CATARACT EXTRACTION W/  INTRAOCULAR LENS IMPLANT Right 05/08/2018    Dr Higginbotham    CHOLECYSTECTOMY      ESOPHAGEAL DILATION N/A 11/22/2018    Procedure: DILATION, ESOPHAGUS;  Surgeon: Robb Fitzgerald Jr., MD;  Location: [facility];  Service: Endoscopy;  Laterality: N/A;    ESOPHAGOGASTRODUODENOSCOPY N/A  11/22/2018    Procedure: ESOPHAGOGASTRODUODENOSCOPY (EGD);  Surgeon: Robb Fitzgerald Jr., MD;  Location: [facility];  Service: Endoscopy;  Laterality: N/A;    HAND SURGERY Right     trauma repair    TONSILLECTOMY      TOTAL ABDOMINAL HYSTERECTOMY      VARICOSE VEIN SURGERY Bilateral     bilateral legs    VIDEO-ASSISTED THORACOSCOPIC SURGERY (VATS)  1/16/2020    Procedure: VATS (VIDEO-ASSISTED THORACOSCOPIC SURGERY) - exploratory;  Surgeon: Ritesh Mccall MD;  Location: [address];  Service: Thoracic;;       SOCIAL HISTORY:  Social History     Socioeconomic History    Marital status:      Spouse name: Not on file    Number of children: Not on file    Years of education: Not on file    Highest education level: Not on file   Occupational History    Not on file   Social Needs    Financial resource strain: Not on file    Food insecurity:     Worry: Not on file     Inability: Not on file    Transportation needs:     Medical: Not on file     Non-medical: Not on file   Tobacco Use    Smoking status: Former Smoker    Smokeless tobacco: Never Used    Tobacco comment: as teenager   Substance and Sexual Activity    Alcohol use: No    Drug use: No    Sexual activity: Not on file   Lifestyle    Physical activity:     Days per week: Not on file     Minutes per session: Not on file    Stress: Not on file   Relationships    Social connections:     Talks on phone: Not on file     Gets together: Not on file     Attends Gnosticism service: Not on file     Active member of club or organization: Not on file     Attends meetings of clubs or organizations: Not on file     Relationship status: Not on file   Other Topics Concern    Not on file   Social History Narrative    Not on file       FAMILY HISTORY:  Family History   Problem Relation Age of Onset    Cataracts Mother     Macular degeneration Mother     Cancer Mother         lymphoma    Macular degeneration Sister     Cancer Sister          breast    Breast cancer Sister 68    Diabetes Father     Hypertension Father     Thyroid disease Daughter     Cancer Daughter         thyroid    Breast cancer Maternal Grandmother     Amblyopia Neg Hx     Blindness Neg Hx     Glaucoma Neg Hx     Retinal detachment Neg Hx     Strabismus Neg Hx     Stroke Neg Hx        ALLERGIES AND MEDICATIONS: updated and reviewed.  Review of patient's allergies indicates:   Allergen Reactions    Latex Itching and Swelling     Itching and swelling to site (local reaction)     Current Outpatient Medications   Medication Sig Dispense Refill    aspirin 81 MG Chew Take 1 tablet (81 mg total) by mouth once daily. 30 tablet 11    atorvastatin (LIPITOR) 20 MG tablet Take 1 tablet (20 mg total) by mouth once daily. 90 tablet 3    calcium-vitamin D 600 mg(1,500mg) -400 unit Tab Take by mouth.      cholecalciferol, vitamin D3, (VITAMIN D3) 5,000 unit Tab Take 5,000 Units by mouth once daily.      fluticasone (FLONASE) 50 mcg/actuation nasal spray       FLUZONE HIGH-DOSE 2018-19, PF, 180 mcg/0.5 mL vaccine ADM 0.5ML IM UTD  0    ibandronate (BONIVA) 150 mg tablet Take 1 tablet (150 mg total) by mouth every 30 days. 3 tablet 3    losartan-hydrochlorothiazide 50-12.5 mg (HYZAAR) 50-12.5 mg per tablet Take 1 tablet by mouth once daily. 90 tablet 3    metoprolol succinate (TOPROL XL) 50 MG 24 hr tablet Take 1 tablet (50 mg total) by mouth once daily. 90 tablet 3    mv,Ca,min-folic acid-vit K1 (ONE-A-DAY WOMEN'S 50 PLUS) 400-20 mcg Tab Take 1 tablet by mouth once daily at 6am.      omeprazole (PRILOSEC) 40 MG capsule Take 1 capsule (40 mg total) by mouth every morning. 90 capsule 3    predniSONE (DELTASONE) 5 MG tablet       SYMBICORT 160-4.5 mcg/actuation HFAA Inhale 2 puffs into the lungs every 12 (twelve) hours.        No current facility-administered medications for this visit.        ROS  Review of Systems   Constitutional: Negative for fatigue, fever and unexpected  weight change.   HENT: Negative for congestion, hearing loss, rhinorrhea and sore throat.    Eyes: Negative for visual disturbance.   Respiratory: Negative for cough, chest tightness, shortness of breath and wheezing.    Cardiovascular: Negative for chest pain, palpitations and leg swelling.   Gastrointestinal: Negative for abdominal distention, abdominal pain, blood in stool, constipation, diarrhea, nausea and vomiting.   Genitourinary: Negative for difficulty urinating, dysuria, frequency, hematuria, menstrual problem, pelvic pain, urgency and vaginal bleeding.   Musculoskeletal: Negative for back pain, joint swelling and neck pain.   Skin: Negative for rash.   Neurological: Negative for dizziness, tremors, weakness, light-headedness, numbness and headaches.   Psychiatric/Behavioral: Negative for confusion, dysphoric mood and sleep disturbance. The patient is not nervous/anxious.        Physical Exam  Vitals:    04/27/20 0913   BP: 132/78    There is no height or weight on file to calculate BMI.            Physical Exam   Constitutional: She is oriented to person, place, and time. She appears well-developed and well-nourished.   Neurological: She is alert and oriented to person, place, and time.   Vitals reviewed.    Results for orders placed or performed in visit on 04/21/20   Lipid panel   Result Value Ref Range    Cholesterol 137 120 - 199 mg/dL    Triglycerides 54 30 - 150 mg/dL    HDL 71 40 - 75 mg/dL    LDL Cholesterol 55.2 (L) 63.0 - 159.0 mg/dL    Hdl/Cholesterol Ratio 51.8 (H) 20.0 - 50.0 %    Total Cholesterol/HDL Ratio 1.9 (L) 2.0 - 5.0    Non-HDL Cholesterol 66 mg/dL   Comprehensive metabolic panel   Result Value Ref Range    Sodium 142 136 - 145 mmol/L    Potassium 4.4 3.5 - 5.1 mmol/L    Chloride 102 95 - 110 mmol/L    CO2 29 23 - 29 mmol/L    Glucose 94 70 - 110 mg/dL    BUN, Bld 19 8 - 23 mg/dL    Creatinine 0.7 0.5 - 1.4 mg/dL    Calcium 9.3 8.7 - 10.5 mg/dL    Total Protein 7.8 6.0 - 8.4 g/dL     Albumin 3.5 3.5 - 5.2 g/dL    Total Bilirubin 0.4 0.1 - 1.0 mg/dL    Alkaline Phosphatase 76 55 - 135 U/L    AST 20 10 - 40 U/L    ALT 14 10 - 44 U/L    Anion Gap 11 8 - 16 mmol/L    eGFR if African American >60.0 >60 mL/min/1.73 m^2    eGFR if non African American >60.0 >60 mL/min/1.73 m^2         Health Maintenance       Date Due Completion Date    TETANUS VACCINE 06/27/1965 ---    High Dose Statin 06/27/1968 ---    Shingles Vaccine (1 of 2) 06/27/1997 ---    Mammogram 03/14/2020 3/14/2019    Override on 6/9/2015: (N/S) (per patient report)    Override on 2/7/2013: (N/S)    Override on 7/29/2009: Done (Dr. Luna Pedro - routine follow up in one year was recommended (in [facility] report from [facility]))    Colonoscopy 11/22/2020 11/22/2010    DEXA SCAN 03/14/2022 3/14/2019    Override on 2/7/2013: (N/S)    Override on 8/5/2008: Done (Dr. Luna Pedro - osteopenia by this report from [facility].)    Lipid Panel 04/21/2025 4/21/2020          Assessment & Plan    Primary malignant neoplasm of left upper lobe of lung with Sarcoidosis of lung  - Continue Radiation Oncology  - Continue Pulmonary  - Continue current therapy  - Follow up in about 6 months (around 10/27/2020).    Essential hypertension with Morbid (severe) obesity with alveolar hypoventilation with Aortic atherosclerosis  - Continue current therapy  - Serial blood pressure monitoring  - Diet and exercise education.    Encounter for screening mammogram for breast cancer  -     Mammo Digital Screening Bilat; Future; Expected date: 04/27/2020        Follow up in about 6 months (around 10/27/2020).

## 2020-04-28 ENCOUNTER — PATIENT MESSAGE (OUTPATIENT)
Dept: FAMILY MEDICINE | Facility: CLINIC | Age: 75
End: 2020-04-28

## 2020-04-28 DIAGNOSIS — I10 ESSENTIAL HYPERTENSION: ICD-10-CM

## 2020-04-28 RX ORDER — METOPROLOL SUCCINATE 50 MG/1
50 TABLET, EXTENDED RELEASE ORAL DAILY
Qty: 90 TABLET | Refills: 3 | Status: SHIPPED | OUTPATIENT
Start: 2020-04-28 | End: 2020-10-26 | Stop reason: SDUPTHER

## 2020-04-28 RX ORDER — ATORVASTATIN CALCIUM 20 MG/1
20 TABLET, FILM COATED ORAL DAILY
Qty: 90 TABLET | Refills: 3 | Status: SHIPPED | OUTPATIENT
Start: 2020-04-28 | End: 2020-10-26 | Stop reason: SDUPTHER

## 2020-04-28 RX ORDER — OMEPRAZOLE 40 MG/1
40 CAPSULE, DELAYED RELEASE ORAL EVERY MORNING
Qty: 90 CAPSULE | Refills: 3 | Status: SHIPPED | OUTPATIENT
Start: 2020-04-28 | End: 2020-10-26 | Stop reason: SDUPTHER

## 2020-04-28 RX ORDER — IBANDRONATE SODIUM 150 MG/1
150 TABLET, FILM COATED ORAL
Qty: 3 TABLET | Refills: 3 | Status: SHIPPED | OUTPATIENT
Start: 2020-04-28 | End: 2020-10-26 | Stop reason: SDUPTHER

## 2020-04-28 RX ORDER — LOSARTAN POTASSIUM AND HYDROCHLOROTHIAZIDE 12.5; 5 MG/1; MG/1
1 TABLET ORAL DAILY
Qty: 90 TABLET | Refills: 3 | Status: SHIPPED | OUTPATIENT
Start: 2020-04-28 | End: 2020-10-26 | Stop reason: SDUPTHER

## 2020-05-06 ENCOUNTER — PATIENT MESSAGE (OUTPATIENT)
Dept: ADMINISTRATIVE | Facility: HOSPITAL | Age: 75
End: 2020-05-06

## 2020-05-11 ENCOUNTER — HOSPITAL ENCOUNTER (OUTPATIENT)
Dept: RADIOLOGY | Facility: HOSPITAL | Age: 75
Discharge: HOME | End: 2020-05-11
Attending: RADIOLOGY
Payer: MEDICARE

## 2020-05-11 DIAGNOSIS — C34.12 PRIMARY MALIGNANT NEOPLASM OF LEFT UPPER LOBE OF LUNG: ICD-10-CM

## 2020-05-11 PROCEDURE — 71260 CT THORAX DX C+: CPT | Mod: 26,HCNC,, | Performed by: RADIOLOGY

## 2020-05-11 PROCEDURE — 71260 CT THORAX DX C+: CPT | Mod: TC,HCNC,PO

## 2020-05-11 PROCEDURE — 71260 CT CHEST WITH CONTRAST: ICD-10-PCS | Mod: 26,HCNC,, | Performed by: RADIOLOGY

## 2020-05-11 PROCEDURE — 25500020 PHARM REV CODE 255: Mod: HCNC,PO | Performed by: RADIOLOGY

## 2020-05-11 RX ADMIN — IOHEXOL 75 ML: 350 INJECTION, SOLUTION INTRAVENOUS at 08:05

## 2020-05-12 ENCOUNTER — OFFICE VISIT (OUTPATIENT)
Dept: RADIATION ONCOLOGY | Facility: CLINIC | Age: 75
End: 2020-05-12
Payer: MEDICARE

## 2020-05-12 DIAGNOSIS — C34.12 PRIMARY MALIGNANT NEOPLASM OF LEFT UPPER LOBE OF LUNG: Primary | ICD-10-CM

## 2020-05-12 PROCEDURE — 99024 POSTOP FOLLOW-UP VISIT: CPT | Mod: HCNC,95,, | Performed by: RADIOLOGY

## 2020-05-12 PROCEDURE — 99024 PR POST-OP FOLLOW-UP VISIT: ICD-10-PCS | Mod: HCNC,95,, | Performed by: RADIOLOGY

## 2020-05-12 NOTE — PROGRESS NOTES
05/12/2020    Radiation Oncology Follow-Up Visit    The patient location is: Home, with   The chief complaint leading to consultation is: Follow-up after lung SBRT  Visit type: audiovisual  Total time spent with patient: 10 minutes  Each patient to whom he or she provides medical services by telemedicine is:  (1) informed of the relationship between the physician and patient and the respective role of any other health care provider with respect to management of the patient; and (2) notified that he or she may decline to receive medical services by telemedicine and may withdraw from such care at any time.    Prior Radiation History:    Site  Technique  Energy  Dose/Fx (Gy)  #Fx  Total Dose (Gy)  Start Date  End Date  Elapsed Days    DOC Lung  SBRT  6X  11  5 / 5  55  2/7/2020 2/13/2020  6        Assessment   This is a 74 y.o. y/o female with Stage IA2 (cT1b cN0 M0) DOC NSCLC (adeno) diagnosed on biopsy 12/20/19. History significant for sarcoidosis. CT Chest 1/14/20 demonstrated 1.7 cm DOC primary and multiple other stable sub-centimeter nodules with mild hilar and mediastinal adenopathy. She underwent attempted VATS resection by Dr. Mccall 1/16/20, which was aborted due to poor toleration of single lung ventilation. She completed definitive SBRT 55 Gy in 5 fx on 2/13/20.     Denies cough or shortness of breath following treatment. Has been feeling well overall, staying at home during Covid. CT Chest 5/11/20 demonstrates stability of treated DOC lesion and no evidence of progression in lung or lymph nodes.         Plan   1) I will see her back in 6 months with CT Chest prior.        HPI: HPI    Past Medical History:   Diagnosis Date    Acute ischemic right MCA stroke 6/2/2017    Cataract     done ou    Essential hypertension 8/21/2017    GERD (gastroesophageal reflux disease)     Hiatal hernia     History of seasonal allergies     On home oxygen therapy     while sleeping    Polio     as a child     Presbyopia     PSVT (paroxysmal supraventricular tachycardia)     Sarcoidosis     Sciatica     TIA (transient ischemic attack) 06/02/2017    [facility]//    Vertigo        Past Surgical History:   Procedure Laterality Date    CATARACT EXTRACTION W/  INTRAOCULAR LENS IMPLANT Left 03/27/2018    Dr Higginbotham    CATARACT EXTRACTION W/  INTRAOCULAR LENS IMPLANT Right 05/08/2018    Dr Higginbotham    CHOLECYSTECTOMY      ESOPHAGEAL DILATION N/A 11/22/2018    Procedure: DILATION, ESOPHAGUS;  Surgeon: Robb Fitzgerald Jr., MD;  Location: [facility];  Service: Endoscopy;  Laterality: N/A;    ESOPHAGOGASTRODUODENOSCOPY N/A 11/22/2018    Procedure: ESOPHAGOGASTRODUODENOSCOPY (EGD);  Surgeon: Robb Fitzegrald Jr., MD;  Location: [facility];  Service: Endoscopy;  Laterality: N/A;    HAND SURGERY Right     trauma repair    TONSILLECTOMY      TOTAL ABDOMINAL HYSTERECTOMY      VARICOSE VEIN SURGERY Bilateral     bilateral legs    VIDEO-ASSISTED THORACOSCOPIC SURGERY (VATS)  1/16/2020    Procedure: VATS (VIDEO-ASSISTED THORACOSCOPIC SURGERY) - exploratory;  Surgeon: Ritesh Mccall MD;  Location: [facility] OR [address] [place] [address];  Service: Thoracic;;       Social History     Tobacco Use    Smoking status: Former Smoker    Smokeless tobacco: Never Used    Tobacco comment: as teenager   Substance Use Topics    Alcohol use: No    Drug use: No       Cancer-related family history includes Breast cancer in her maternal grandmother; Breast cancer (age of onset: 68) in her sister; Cancer in her daughter, mother, and sister.    Current Outpatient Medications on File Prior to Visit   Medication Sig Dispense Refill    aspirin 81 MG Chew Take 1 tablet (81 mg total) by mouth once daily. 30 tablet 11    atorvastatin (LIPITOR) 20 MG tablet Take 1 tablet (20 mg total) by mouth once daily. 90 tablet 3    calcium-vitamin D 600 mg(1,500mg) -400 unit Tab Take by mouth.      cholecalciferol, vitamin D3, (VITAMIN D3) 5,000 unit Tab Take 5,000  Units by mouth once daily.      fluticasone (FLONASE) 50 mcg/actuation nasal spray       FLUZONE HIGH-DOSE 2018-19, PF, 180 mcg/0.5 mL vaccine ADM 0.5ML IM UTD  0    ibandronate (BONIVA) 150 mg tablet Take 1 tablet (150 mg total) by mouth every 30 days. 3 tablet 3    losartan-hydrochlorothiazide 50-12.5 mg (HYZAAR) 50-12.5 mg per tablet Take 1 tablet by mouth once daily. 90 tablet 3    metoprolol succinate (TOPROL XL) 50 MG 24 hr tablet Take 1 tablet (50 mg total) by mouth once daily. 90 tablet 3    mv,Ca,min-folic acid-vit K1 (ONE-A-DAY WOMEN'S 50 PLUS) 400-20 mcg Tab Take 1 tablet by mouth once daily at 6am.      omeprazole (PRILOSEC) 40 MG capsule Take 1 capsule (40 mg total) by mouth every morning. 90 capsule 3    SYMBICORT 160-4.5 mcg/actuation HFAA Inhale 2 puffs into the lungs every 12 (twelve) hours.        Current Facility-Administered Medications on File Prior to Visit   Medication Dose Route Frequency Provider Last Rate Last Dose    [COMPLETED] iohexoL (OMNIPAQUE 350) injection 75 mL  75 mL Intravenous ONCE PRN Kirby Sibley MD   75 mL at 05/11/20 0848       Review of patient's allergies indicates:   Allergen Reactions    Latex Itching and Swelling     Itching and swelling to site (local reaction)       ROS     Vital Signs: There were no vitals taken for this visit.    ECOG Performance Status: Unable to assess    Physical Exam     Labs:    Imaging: I have personally reviewed the patient's available images and reports and summarized pertinent findings above in HPI.     Pathology: No new path

## 2020-06-04 ENCOUNTER — HOSPITAL ENCOUNTER (OUTPATIENT)
Dept: RADIOLOGY | Facility: HOSPITAL | Age: 75
Discharge: HOME | End: 2020-06-04
Attending: FAMILY MEDICINE
Payer: MEDICARE

## 2020-06-04 DIAGNOSIS — Z12.31 ENCOUNTER FOR SCREENING MAMMOGRAM FOR BREAST CANCER: ICD-10-CM

## 2020-06-04 PROCEDURE — 77067 SCR MAMMO BI INCL CAD: CPT | Mod: 26,HCNC,, | Performed by: RADIOLOGY

## 2020-06-04 PROCEDURE — 77063 MAMMO DIGITAL SCREENING BILAT WITH TOMOSYNTHESIS_CAD: ICD-10-PCS | Mod: 26,HCNC,, | Performed by: RADIOLOGY

## 2020-06-04 PROCEDURE — 77063 BREAST TOMOSYNTHESIS BI: CPT | Mod: 26,HCNC,, | Performed by: RADIOLOGY

## 2020-06-04 PROCEDURE — 77067 MAMMO DIGITAL SCREENING BILAT WITH TOMOSYNTHESIS_CAD: ICD-10-PCS | Mod: 26,HCNC,, | Performed by: RADIOLOGY

## 2020-06-04 PROCEDURE — 77067 SCR MAMMO BI INCL CAD: CPT | Mod: TC,HCNC,PO

## 2020-07-10 ENCOUNTER — PATIENT OUTREACH (OUTPATIENT)
Dept: ADMINISTRATIVE | Facility: OTHER | Age: 75
End: 2020-07-10

## 2020-07-10 ENCOUNTER — TELEPHONE (OUTPATIENT)
Dept: OPTOMETRY | Facility: CLINIC | Age: 75
End: 2020-07-10

## 2020-07-10 NOTE — TELEPHONE ENCOUNTER
----- Message from Sivakumar Chavez sent at 7/10/2020  7:49 AM CDT -----  Contact: pt  Type:  Same Day Appointment Request    Caller is requesting a same day appointment.  Caller declined first available appointment listed below.      Name of Caller:  pt  When is the first available appointment?  09/03/2020  Symptoms:  feels like something is in right eye  Best Call Back Number: [phone]  Additional Information:  pt usually see Dr. Mason, but states that he is not in the office today.

## 2020-07-11 ENCOUNTER — OFFICE VISIT (OUTPATIENT)
Dept: OPTOMETRY | Facility: CLINIC | Age: 75
End: 2020-07-11
Payer: MEDICARE

## 2020-07-11 DIAGNOSIS — H00.022 HORDEOLUM INTERNUM OF RIGHT LOWER EYELID: Primary | ICD-10-CM

## 2020-07-11 PROCEDURE — 99999 PR PBB SHADOW E&M-EST. PATIENT-LVL III: ICD-10-PCS | Mod: PBBFAC,HCNC,, | Performed by: OPTOMETRIST

## 2020-07-11 PROCEDURE — 92012 INTRM OPH EXAM EST PATIENT: CPT | Mod: HCNC,S$GLB,, | Performed by: OPTOMETRIST

## 2020-07-11 PROCEDURE — 99999 PR PBB SHADOW E&M-EST. PATIENT-LVL III: CPT | Mod: PBBFAC,HCNC,, | Performed by: OPTOMETRIST

## 2020-07-11 PROCEDURE — 92012 PR EYE EXAM, EST PATIENT,INTERMED: ICD-10-PCS | Mod: HCNC,S$GLB,, | Performed by: OPTOMETRIST

## 2020-07-11 RX ORDER — NEOMYCIN SULFATE, POLYMYXIN B SULFATE AND DEXAMETHASONE 3.5; 10000; 1 MG/ML; [USP'U]/ML; MG/ML
1 SUSPENSION/ DROPS OPHTHALMIC 4 TIMES DAILY
Qty: 5 ML | Refills: 0 | Status: SHIPPED | OUTPATIENT
Start: 2020-07-11 | End: 2020-07-18

## 2020-07-11 NOTE — PROGRESS NOTES
HPI     Urgent patient is here for fbs dls 07/18/2020  Patient states she has been around a lot of dust and dirt , feels like her   right tear duct is clogged. Says it started bothering her Thursday   morning, says the eye wash and warm compresses have helped. No watering or   itching just red.    Last edited by Sangita Jenkins MA on 7/11/2020 10:50 AM. (History)            Assessment /Plan     For exam results, see Encounter Report.    Hordeolum internum of right lower eyelid  -     neomycin-polymyxin-dexamethasone (MAXITROL) 3.5mg/mL-10,000 unit/mL-0.1 % DrpS; Place 1 drop into the right eye 4 (four) times daily. for 7 days  Dispense: 5 mL; Refill: 0      1. Start maxitrol drops and warm compress 4x/day x 1 week, RTC or if no better.

## 2020-07-13 ENCOUNTER — HOSPITAL ENCOUNTER (OUTPATIENT)
Dept: RADIOLOGY | Facility: HOSPITAL | Age: 75
Discharge: HOME | End: 2020-07-13
Attending: ORTHOPAEDIC SURGERY
Payer: MEDICARE

## 2020-07-13 ENCOUNTER — OFFICE VISIT (OUTPATIENT)
Dept: ORTHOPEDICS | Facility: CLINIC | Age: 75
End: 2020-07-13
Payer: MEDICARE

## 2020-07-13 VITALS — BODY MASS INDEX: 36.5 KG/M2 | TEMPERATURE: 99 F | WEIGHT: 206 LBS | HEIGHT: 63 IN

## 2020-07-13 DIAGNOSIS — M25.561 PAIN IN BOTH KNEES, UNSPECIFIED CHRONICITY: Primary | ICD-10-CM

## 2020-07-13 DIAGNOSIS — M25.561 PAIN IN BOTH KNEES, UNSPECIFIED CHRONICITY: ICD-10-CM

## 2020-07-13 DIAGNOSIS — M25.562 PAIN IN BOTH KNEES, UNSPECIFIED CHRONICITY: ICD-10-CM

## 2020-07-13 DIAGNOSIS — M25.562 PAIN IN BOTH KNEES, UNSPECIFIED CHRONICITY: Primary | ICD-10-CM

## 2020-07-13 PROCEDURE — 99203 OFFICE O/P NEW LOW 30 MIN: CPT | Mod: 25,HCNC,S$GLB, | Performed by: ORTHOPAEDIC SURGERY

## 2020-07-13 PROCEDURE — 1159F MED LIST DOCD IN RCRD: CPT | Mod: HCNC,S$GLB,, | Performed by: ORTHOPAEDIC SURGERY

## 2020-07-13 PROCEDURE — 73562 X-RAY EXAM OF KNEE 3: CPT | Mod: TC,50,HCNC,PO

## 2020-07-13 PROCEDURE — 99999 PR PBB SHADOW E&M-EST. PATIENT-LVL IV: CPT | Mod: PBBFAC,HCNC,, | Performed by: ORTHOPAEDIC SURGERY

## 2020-07-13 PROCEDURE — 73562 X-RAY EXAM OF KNEE 3: CPT | Mod: 26,50,HCNC, | Performed by: RADIOLOGY

## 2020-07-13 PROCEDURE — 3008F BODY MASS INDEX DOCD: CPT | Mod: HCNC,CPTII,S$GLB, | Performed by: ORTHOPAEDIC SURGERY

## 2020-07-13 PROCEDURE — 99999 PR PBB SHADOW E&M-EST. PATIENT-LVL IV: ICD-10-PCS | Mod: PBBFAC,HCNC,, | Performed by: ORTHOPAEDIC SURGERY

## 2020-07-13 PROCEDURE — 1125F AMNT PAIN NOTED PAIN PRSNT: CPT | Mod: HCNC,S$GLB,, | Performed by: ORTHOPAEDIC SURGERY

## 2020-07-13 PROCEDURE — 1101F PT FALLS ASSESS-DOCD LE1/YR: CPT | Mod: HCNC,CPTII,S$GLB, | Performed by: ORTHOPAEDIC SURGERY

## 2020-07-13 PROCEDURE — 1101F PR PT FALLS ASSESS DOC 0-1 FALLS W/OUT INJ PAST YR: ICD-10-PCS | Mod: HCNC,CPTII,S$GLB, | Performed by: ORTHOPAEDIC SURGERY

## 2020-07-13 PROCEDURE — 97760 ORTHOTIC MGMT&TRAING 1ST ENC: CPT | Mod: HCNC,GP,S$GLB, | Performed by: ORTHOPAEDIC SURGERY

## 2020-07-13 PROCEDURE — 1125F PR PAIN SEVERITY QUANTIFIED, PAIN PRESENT: ICD-10-PCS | Mod: HCNC,S$GLB,, | Performed by: ORTHOPAEDIC SURGERY

## 2020-07-13 PROCEDURE — 97760 PR ORTHOTIC MGMT&TRAINJ INITIAL ENC EA 15 MINS: ICD-10-PCS | Mod: HCNC,GP,S$GLB, | Performed by: ORTHOPAEDIC SURGERY

## 2020-07-13 PROCEDURE — 73562 XR KNEE ORTHO BILAT: ICD-10-PCS | Mod: 26,50,HCNC, | Performed by: RADIOLOGY

## 2020-07-13 PROCEDURE — 99203 PR OFFICE/OUTPT VISIT, NEW, LEVL III, 30-44 MIN: ICD-10-PCS | Mod: 25,HCNC,S$GLB, | Performed by: ORTHOPAEDIC SURGERY

## 2020-07-13 PROCEDURE — 1159F PR MEDICATION LIST DOCUMENTED IN MEDICAL RECORD: ICD-10-PCS | Mod: HCNC,S$GLB,, | Performed by: ORTHOPAEDIC SURGERY

## 2020-07-13 PROCEDURE — 3008F PR BODY MASS INDEX (BMI) DOCUMENTED: ICD-10-PCS | Mod: HCNC,CPTII,S$GLB, | Performed by: ORTHOPAEDIC SURGERY

## 2020-07-13 RX ORDER — CIPROFLOXACIN 500 MG/1
TABLET ORAL
COMMUNITY
Start: 2020-06-18 | End: 2020-10-26

## 2020-07-13 RX ORDER — MUPIROCIN 20 MG/G
OINTMENT TOPICAL
Status: ON HOLD | COMMUNITY
Start: 2020-07-02 | End: 2020-11-19 | Stop reason: HOSPADM

## 2020-07-13 NOTE — PROGRESS NOTES
75 years old complaining of right knee pain for about 5 days time.  States that she twisted her knee.  Got worse yesterday with another episode.  Pain is been 6 out 10 on the pain scale.  Ice seems to help, walking makes it worse.  She has been using a cane which has helped as well    Exam shows no signs infection or instability, does have tenderness the joint line, well-perfused distally, compartments are soft, negative Homans test    X-rays show mild degenerative changes    Assessment:  Knee pain x5 days and a 75-year-old    Plan:  Brace, symptomatic care, relative rest, follow-up in a few weeks time to see how things are coming along    We performed a custom orthotic/brace fitting, adjusting and training with the patient. The patient demonstrated understanding and proper care. This was performed for 15 minutes.      Further History  Aching pain  Worse with activity  Relieved with rest  No other associated symptoms  No other radiation    Further Exam  Alert and oriented  Pleasant  Contralateral limb has appropriate range of motion for age and condition  Contralateral limb has appropriate strength for age and condition  Contralateral limb has appropriate stability  for age and condition  No adenopathy  Pulses are appropriate for current condition  Skin is intact        Chief Complaint    Chief Complaint   Patient presents with    Right Knee - Pain       HPI  Nicole Medina is a 75 y.o.  female who presents with       Past Medical History  Past Medical History:   Diagnosis Date    Acute ischemic right MCA stroke 6/2/2017    Cataract     done ou    Essential hypertension 8/21/2017    GERD (gastroesophageal reflux disease)     Hiatal hernia     History of seasonal allergies     On home oxygen therapy     while sleeping    Polio     as a child    Presbyopia     PSVT (paroxysmal supraventricular tachycardia)     Sarcoidosis     Sciatica     TIA [facility]transient ischemic attack) 06/02/2017    [facility]//    Vertigo        Past Surgical History  Past Surgical History:   Procedure Laterality Date    CATARACT EXTRACTION W/  INTRAOCULAR LENS IMPLANT Left 03/27/2018    Dr Higginbotham    CATARACT EXTRACTION W/  INTRAOCULAR LENS IMPLANT Right 05/08/2018    Dr Higginbotham    CHOLECYSTECTOMY      ESOPHAGEAL DILATION N/A 11/22/2018    Procedure: DILATION, ESOPHAGUS;  Surgeon: Robb Fitzgerald Jr., MD;  Location: [facility];  Service: Endoscopy;  Laterality: N/A;    ESOPHAGOGASTRODUODENOSCOPY N/A 11/22/2018    Procedure: ESOPHAGOGASTRODUODENOSCOPY (EGD);  Surgeon: Robb Fitzgerald Jr., MD;  Location: [facility];  Service: Endoscopy;  Laterality: N/A;    HAND SURGERY Right     trauma repair    TONSILLECTOMY      TOTAL ABDOMINAL HYSTERECTOMY      VARICOSE VEIN SURGERY Bilateral     bilateral legs    VIDEO-ASSISTED THORACOSCOPIC SURGERY (VATS)  1/16/2020    Procedure: VATS (VIDEO-ASSISTED THORACOSCOPIC SURGERY) - exploratory;  Surgeon: Ritesh Mccall MD;  Location: [address];  Service: Thoracic;;       Medications  Current Outpatient Medications   Medication Sig    aspirin 81 MG Chew Take 1 tablet (81 mg total) by mouth once daily.    atorvastatin (LIPITOR) 20 MG tablet Take 1 tablet (20 mg total) by mouth once daily.    calcium-vitamin D 600 mg(1,500mg) -400 unit Tab Take by mouth.    cholecalciferol, vitamin D3, (VITAMIN D3) 5,000 unit Tab Take 5,000 Units by mouth once daily.    fluticasone (FLONASE) 50 mcg/actuation nasal spray     FLUZONE HIGH-DOSE 2018-19, PF, 180 mcg/0.5 mL vaccine ADM 0.5ML IM UTD    ibandronate (BONIVA) 150 mg tablet Take 1 tablet (150 mg total) by mouth every 30 days.    losartan-hydrochlorothiazide 50-12.5 mg (HYZAAR) 50-12.5 mg per tablet Take 1 tablet by mouth once daily.    metoprolol succinate (TOPROL XL) 50 MG 24 hr tablet Take 1 tablet (50 mg total) by mouth once daily.    mv,Ca,min-folic acid-vit K1 (ONE-A-DAY WOMEN'S 50 PLUS) 400-20 mcg Tab Take 1 tablet by  mouth once daily at 6am.    neomycin-polymyxin-dexamethasone (MAXITROL) 3.5mg/mL-10,000 unit/mL-0.1 % DrpS Place 1 drop into the right eye 4 (four) times daily. for 7 days    omeprazole (PRILOSEC) 40 MG capsule Take 1 capsule (40 mg total) by mouth every morning.    SYMBICORT 160-4.5 mcg/actuation HFAA Inhale 2 puffs into the lungs every 12 (twelve) hours.     ciprofloxacin HCl (CIPRO) 500 MG tablet     mupirocin (BACTROBAN) 2 % ointment STANISLAW EXT TO THE SKIN BID     No current facility-administered medications for this visit.        Allergies  Review of patient's allergies indicates:   Allergen Reactions    Latex Itching and Swelling     Itching and swelling to site (local reaction)       Family History  Family History   Problem Relation Age of Onset    Cataracts Mother     Macular degeneration Mother     Cancer Mother         lymphoma    Macular degeneration Sister     Cancer Sister         breast    Diabetes Father     Hypertension Father     Thyroid disease Daughter     Cancer Daughter         thyroid    Breast cancer Maternal Grandmother     Breast cancer Other     Amblyopia Neg Hx     Blindness Neg Hx     Glaucoma Neg Hx     Retinal detachment Neg Hx     Strabismus Neg Hx     Stroke Neg Hx        Social History  Social History     Socioeconomic History    Marital status:      Spouse name: Not on file    Number of children: Not on file    Years of education: Not on file    Highest education level: Not on file   Occupational History    Not on file   Social Needs    Financial resource strain: Not on file    Food insecurity     Worry: Not on file     Inability: Not on file    Transportation needs     Medical: Not on file     Non-medical: Not on file   Tobacco Use    Smoking status: Former Smoker    Smokeless tobacco: Never Used    Tobacco comment: as teenager   Substance and Sexual Activity    Alcohol use: No    Drug use: No    Sexual activity: Not on file   Lifestyle     Physical activity     Days per week: Not on file     Minutes per session: Not on file    Stress: Not on file   Relationships    Social connections     Talks on phone: Not on file     Gets together: Not on file     Attends [employer] service: Not on file     Active member of club or organization: Not on file     Attends meetings of clubs or organizations: Not on file     Relationship status: Not on file   Other Topics Concern    Not on file   Social History Narrative    Not on file               Review of Systems     Constitutional: Negative    HENT: Negative  Eyes: Negative  Respiratory: Negative  Cardiovascular: Negative  Musculoskeletal: HPI  Skin: Negative  Neurological: Negative  Hematological: Negative  Endocrine: Negative                 Physical Exam    Vitals:    07/13/20 0834   Temp: 99 °F (37.2 °C)     Body mass index is 36.49 kg/m².  Physical Examination:     General appearance -  well appearing, and in no distress  Mental status - awake  Neck - supple  Chest -  symmetric air entry  Heart - normal rate   Abdomen - soft      Assessment     1. Pain in both knees, unspecified chronicity          Plan

## 2020-07-28 ENCOUNTER — PATIENT OUTREACH (OUTPATIENT)
Dept: ADMINISTRATIVE | Facility: OTHER | Age: 75
End: 2020-07-28

## 2020-07-30 ENCOUNTER — OFFICE VISIT (OUTPATIENT)
Dept: OPTOMETRY | Facility: CLINIC | Age: 75
End: 2020-07-30
Payer: MEDICARE

## 2020-07-30 DIAGNOSIS — Z96.1 PSEUDOPHAKIA OF BOTH EYES: ICD-10-CM

## 2020-07-30 DIAGNOSIS — H00.022 HORDEOLUM INTERNUM OF RIGHT LOWER EYELID: Primary | ICD-10-CM

## 2020-07-30 PROCEDURE — 99999 PR PBB SHADOW E&M-EST. PATIENT-LVL III: CPT | Mod: PBBFAC,HCNC,, | Performed by: OPTOMETRIST

## 2020-07-30 PROCEDURE — 92014 COMPRE OPH EXAM EST PT 1/>: CPT | Mod: HCNC,S$GLB,, | Performed by: OPTOMETRIST

## 2020-07-30 PROCEDURE — 92014 PR EYE EXAM, EST PATIENT,COMPREHESV: ICD-10-PCS | Mod: HCNC,S$GLB,, | Performed by: OPTOMETRIST

## 2020-07-30 PROCEDURE — 99999 PR PBB SHADOW E&M-EST. PATIENT-LVL III: ICD-10-PCS | Mod: PBBFAC,HCNC,, | Performed by: OPTOMETRIST

## 2020-07-30 RX ORDER — NEOMYCIN SULFATE, POLYMYXIN B SULFATE AND DEXAMETHASONE 3.5; 10000; 1 MG/ML; [USP'U]/ML; MG/ML
1 SUSPENSION/ DROPS OPHTHALMIC 4 TIMES DAILY
Qty: 5 ML | Refills: 0 | Status: SHIPPED | OUTPATIENT
Start: 2020-07-30 | End: 2020-08-06

## 2020-07-30 NOTE — PROGRESS NOTES
HPI     Routine eye exam-dle-7/11/20 stye    Pt denies any blurred vision. No eye pain. Eyes feeling better since last   visit. Wears otc reading glasses.     Last edited by Nazia Aggarwal on 7/30/2020  8:32 AM. (History)            Assessment /Plan     For exam results, see Encounter Report.    Hordeolum internum of right lower eyelid  -     neomycin-polymyxin-dexamethasone (MAXITROL) 3.5mg/mL-10,000 unit/mL-0.1 % DrpS; Place 1 drop into the right eye 4 (four) times daily. for 7 days  Dispense: 5 mL; Refill: 0    Pseudophakia of both eyes      1. Resolving slowly, restart Maxitrol drops, warm compress, RTC or call if not resolved in 2 weeks.  2. Monitor condition. Patient to report any changes. RTC 1 year recheck.

## 2020-08-03 ENCOUNTER — OFFICE VISIT (OUTPATIENT)
Dept: ORTHOPEDICS | Facility: CLINIC | Age: 75
End: 2020-08-03
Payer: MEDICARE

## 2020-08-03 VITALS — BODY MASS INDEX: 36.49 KG/M2 | WEIGHT: 205.94 LBS | HEIGHT: 63 IN

## 2020-08-03 DIAGNOSIS — M25.561 PAIN IN BOTH KNEES, UNSPECIFIED CHRONICITY: Primary | ICD-10-CM

## 2020-08-03 DIAGNOSIS — M25.562 PAIN IN BOTH KNEES, UNSPECIFIED CHRONICITY: Primary | ICD-10-CM

## 2020-08-03 PROCEDURE — 3008F BODY MASS INDEX DOCD: CPT | Mod: HCNC,CPTII,S$GLB, | Performed by: ORTHOPAEDIC SURGERY

## 2020-08-03 PROCEDURE — 99999 PR PBB SHADOW E&M-EST. PATIENT-LVL III: CPT | Mod: PBBFAC,HCNC,, | Performed by: ORTHOPAEDIC SURGERY

## 2020-08-03 PROCEDURE — 99213 PR OFFICE/OUTPT VISIT, EST, LEVL III, 20-29 MIN: ICD-10-PCS | Mod: HCNC,S$GLB,, | Performed by: ORTHOPAEDIC SURGERY

## 2020-08-03 PROCEDURE — 99213 OFFICE O/P EST LOW 20 MIN: CPT | Mod: HCNC,S$GLB,, | Performed by: ORTHOPAEDIC SURGERY

## 2020-08-03 PROCEDURE — 1159F MED LIST DOCD IN RCRD: CPT | Mod: HCNC,S$GLB,, | Performed by: ORTHOPAEDIC SURGERY

## 2020-08-03 PROCEDURE — 1126F AMNT PAIN NOTED NONE PRSNT: CPT | Mod: HCNC,S$GLB,, | Performed by: ORTHOPAEDIC SURGERY

## 2020-08-03 PROCEDURE — 1101F PR PT FALLS ASSESS DOC 0-1 FALLS W/OUT INJ PAST YR: ICD-10-PCS | Mod: HCNC,CPTII,S$GLB, | Performed by: ORTHOPAEDIC SURGERY

## 2020-08-03 PROCEDURE — 1159F PR MEDICATION LIST DOCUMENTED IN MEDICAL RECORD: ICD-10-PCS | Mod: HCNC,S$GLB,, | Performed by: ORTHOPAEDIC SURGERY

## 2020-08-03 PROCEDURE — 1101F PT FALLS ASSESS-DOCD LE1/YR: CPT | Mod: HCNC,CPTII,S$GLB, | Performed by: ORTHOPAEDIC SURGERY

## 2020-08-03 PROCEDURE — 1126F PR PAIN SEVERITY QUANTIFIED, NO PAIN PRESENT: ICD-10-PCS | Mod: HCNC,S$GLB,, | Performed by: ORTHOPAEDIC SURGERY

## 2020-08-03 PROCEDURE — 3008F PR BODY MASS INDEX (BMI) DOCUMENTED: ICD-10-PCS | Mod: HCNC,CPTII,S$GLB, | Performed by: ORTHOPAEDIC SURGERY

## 2020-08-03 PROCEDURE — 99999 PR PBB SHADOW E&M-EST. PATIENT-LVL III: ICD-10-PCS | Mod: PBBFAC,HCNC,, | Performed by: ORTHOPAEDIC SURGERY

## 2020-08-03 NOTE — PROGRESS NOTES
75 years old and is feeling better since she injured her right knee.  She has been resting and is feeling much better    Exam shows some tenderness the joint line without signs infection or instability    Plan:  Continue symptomatic care, gradual strengthening of lower extremity over time, follow-up as needed

## 2020-08-18 ENCOUNTER — PATIENT MESSAGE (OUTPATIENT)
Dept: FAMILY MEDICINE | Facility: CLINIC | Age: 75
End: 2020-08-18

## 2020-08-18 NOTE — TELEPHONE ENCOUNTER
Would you like for pt to have any labs prior to her appt? Last labs were April 2020. Please advise.

## 2020-09-29 ENCOUNTER — PATIENT MESSAGE (OUTPATIENT)
Dept: OTHER | Facility: OTHER | Age: 75
End: 2020-09-29

## 2020-09-29 ENCOUNTER — IMMUNIZATION (OUTPATIENT)
Dept: PHARMACY | Facility: CLINIC | Age: 75
End: 2020-09-29
Payer: MEDICARE

## 2020-10-12 ENCOUNTER — PATIENT OUTREACH (OUTPATIENT)
Dept: ADMINISTRATIVE | Facility: HOSPITAL | Age: 75
End: 2020-10-12

## 2020-10-12 NOTE — PROGRESS NOTES
Chart review completed 10/12/2020.  Care Everywhere updates requested and reviewed.  Immunizations reconciled. Media reports reviewed.  Duplicate HM overrides and  orders removed.  Overdue HM topic chart audit and/or requested.  Overdue lab testing linked to upcoming lab appointments if applies.        Health Maintenance Due   Topic Date Due    TETANUS VACCINE  1963    Shingles Vaccine (1 of 2) 1997    Colorectal Cancer Screening  2020

## 2020-10-26 ENCOUNTER — OFFICE VISIT (OUTPATIENT)
Dept: FAMILY MEDICINE | Facility: CLINIC | Age: 75
End: 2020-10-26
Payer: MEDICARE

## 2020-10-26 VITALS
SYSTOLIC BLOOD PRESSURE: 128 MMHG | WEIGHT: 210.56 LBS | HEART RATE: 75 BPM | DIASTOLIC BLOOD PRESSURE: 68 MMHG | RESPIRATION RATE: 18 BRPM | HEIGHT: 63 IN | TEMPERATURE: 98 F | OXYGEN SATURATION: 95 % | BODY MASS INDEX: 37.31 KG/M2

## 2020-10-26 DIAGNOSIS — C34.12 PRIMARY MALIGNANT NEOPLASM OF LEFT UPPER LOBE OF LUNG: Primary | ICD-10-CM

## 2020-10-26 DIAGNOSIS — J44.9 CHRONIC OBSTRUCTIVE PULMONARY DISEASE, UNSPECIFIED COPD TYPE: ICD-10-CM

## 2020-10-26 DIAGNOSIS — I47.10 PSVT (PAROXYSMAL SUPRAVENTRICULAR TACHYCARDIA): ICD-10-CM

## 2020-10-26 PROCEDURE — 3008F PR BODY MASS INDEX (BMI) DOCUMENTED: ICD-10-PCS | Mod: HCNC,CPTII,S$GLB, | Performed by: FAMILY MEDICINE

## 2020-10-26 PROCEDURE — 1126F PR PAIN SEVERITY QUANTIFIED, NO PAIN PRESENT: ICD-10-PCS | Mod: HCNC,S$GLB,, | Performed by: FAMILY MEDICINE

## 2020-10-26 PROCEDURE — 3078F PR MOST RECENT DIASTOLIC BLOOD PRESSURE < 80 MM HG: ICD-10-PCS | Mod: HCNC,CPTII,S$GLB, | Performed by: FAMILY MEDICINE

## 2020-10-26 PROCEDURE — 99999 PR PBB SHADOW E&M-EST. PATIENT-LVL IV: CPT | Mod: PBBFAC,HCNC,, | Performed by: FAMILY MEDICINE

## 2020-10-26 PROCEDURE — 99213 PR OFFICE/OUTPT VISIT, EST, LEVL III, 20-29 MIN: ICD-10-PCS | Mod: HCNC,S$GLB,, | Performed by: FAMILY MEDICINE

## 2020-10-26 PROCEDURE — 3078F DIAST BP <80 MM HG: CPT | Mod: HCNC,CPTII,S$GLB, | Performed by: FAMILY MEDICINE

## 2020-10-26 PROCEDURE — 1159F PR MEDICATION LIST DOCUMENTED IN MEDICAL RECORD: ICD-10-PCS | Mod: HCNC,S$GLB,, | Performed by: FAMILY MEDICINE

## 2020-10-26 PROCEDURE — 1159F MED LIST DOCD IN RCRD: CPT | Mod: HCNC,S$GLB,, | Performed by: FAMILY MEDICINE

## 2020-10-26 PROCEDURE — 3074F SYST BP LT 130 MM HG: CPT | Mod: HCNC,CPTII,S$GLB, | Performed by: FAMILY MEDICINE

## 2020-10-26 PROCEDURE — 1126F AMNT PAIN NOTED NONE PRSNT: CPT | Mod: HCNC,S$GLB,, | Performed by: FAMILY MEDICINE

## 2020-10-26 PROCEDURE — 3074F PR MOST RECENT SYSTOLIC BLOOD PRESSURE < 130 MM HG: ICD-10-PCS | Mod: HCNC,CPTII,S$GLB, | Performed by: FAMILY MEDICINE

## 2020-10-26 PROCEDURE — 99499 RISK ADDL DX/OHS AUDIT: ICD-10-PCS | Mod: S$GLB,,, | Performed by: FAMILY MEDICINE

## 2020-10-26 PROCEDURE — 99213 OFFICE O/P EST LOW 20 MIN: CPT | Mod: HCNC,S$GLB,, | Performed by: FAMILY MEDICINE

## 2020-10-26 PROCEDURE — 3008F BODY MASS INDEX DOCD: CPT | Mod: HCNC,CPTII,S$GLB, | Performed by: FAMILY MEDICINE

## 2020-10-26 PROCEDURE — 1101F PR PT FALLS ASSESS DOC 0-1 FALLS W/OUT INJ PAST YR: ICD-10-PCS | Mod: HCNC,CPTII,S$GLB, | Performed by: FAMILY MEDICINE

## 2020-10-26 PROCEDURE — 99999 PR PBB SHADOW E&M-EST. PATIENT-LVL IV: ICD-10-PCS | Mod: PBBFAC,HCNC,, | Performed by: FAMILY MEDICINE

## 2020-10-26 PROCEDURE — 1101F PT FALLS ASSESS-DOCD LE1/YR: CPT | Mod: HCNC,CPTII,S$GLB, | Performed by: FAMILY MEDICINE

## 2020-10-26 PROCEDURE — 99499 UNLISTED E&M SERVICE: CPT | Mod: S$GLB,,, | Performed by: FAMILY MEDICINE

## 2020-10-26 RX ORDER — HYDROCHLOROTHIAZIDE 12.5 MG/1
12.5 CAPSULE ORAL DAILY
Qty: 90 CAPSULE | Refills: 3 | Status: SHIPPED | OUTPATIENT
Start: 2020-10-26 | End: 2020-10-26 | Stop reason: SDUPTHER

## 2020-10-26 RX ORDER — HYDROCHLOROTHIAZIDE 12.5 MG/1
12.5 CAPSULE ORAL DAILY
Qty: 90 CAPSULE | Refills: 3 | Status: SHIPPED | OUTPATIENT
Start: 2020-10-26 | End: 2021-10-07

## 2020-10-26 RX ORDER — ATORVASTATIN CALCIUM 20 MG/1
20 TABLET, FILM COATED ORAL DAILY
Qty: 90 TABLET | Refills: 3 | Status: SHIPPED | OUTPATIENT
Start: 2020-10-26 | End: 2020-10-26 | Stop reason: SDUPTHER

## 2020-10-26 RX ORDER — OMEPRAZOLE 40 MG/1
40 CAPSULE, DELAYED RELEASE ORAL EVERY MORNING
Qty: 90 CAPSULE | Refills: 3 | Status: SHIPPED | OUTPATIENT
Start: 2020-10-26 | End: 2020-10-26 | Stop reason: SDUPTHER

## 2020-10-26 RX ORDER — LOSARTAN POTASSIUM 50 MG/1
50 TABLET ORAL DAILY
Qty: 90 TABLET | Refills: 3 | Status: SHIPPED | OUTPATIENT
Start: 2020-10-26 | End: 2020-10-26 | Stop reason: SDUPTHER

## 2020-10-26 RX ORDER — LOSARTAN POTASSIUM 50 MG/1
50 TABLET ORAL DAILY
Qty: 90 TABLET | Refills: 3 | Status: SHIPPED | OUTPATIENT
Start: 2020-10-26 | End: 2021-11-04 | Stop reason: SDUPTHER

## 2020-10-26 RX ORDER — METOPROLOL SUCCINATE 50 MG/1
50 TABLET, EXTENDED RELEASE ORAL DAILY
Qty: 90 TABLET | Refills: 3 | Status: SHIPPED | OUTPATIENT
Start: 2020-10-26 | End: 2021-11-04 | Stop reason: SDUPTHER

## 2020-10-26 RX ORDER — METOPROLOL SUCCINATE 50 MG/1
50 TABLET, EXTENDED RELEASE ORAL DAILY
Qty: 90 TABLET | Refills: 3 | Status: SHIPPED | OUTPATIENT
Start: 2020-10-26 | End: 2020-10-26 | Stop reason: SDUPTHER

## 2020-10-26 RX ORDER — ATORVASTATIN CALCIUM 20 MG/1
20 TABLET, FILM COATED ORAL DAILY
Qty: 90 TABLET | Refills: 3 | Status: SHIPPED | OUTPATIENT
Start: 2020-10-26 | End: 2021-11-04 | Stop reason: SDUPTHER

## 2020-10-26 RX ORDER — IBANDRONATE SODIUM 150 MG/1
150 TABLET, FILM COATED ORAL
Qty: 3 TABLET | Refills: 3 | Status: SHIPPED | OUTPATIENT
Start: 2020-10-26 | End: 2020-10-26 | Stop reason: SDUPTHER

## 2020-10-26 RX ORDER — IBANDRONATE SODIUM 150 MG/1
150 TABLET, FILM COATED ORAL
Qty: 3 TABLET | Refills: 3 | Status: SHIPPED | OUTPATIENT
Start: 2020-10-26 | End: 2021-11-04 | Stop reason: SDUPTHER

## 2020-10-26 RX ORDER — OMEPRAZOLE 40 MG/1
40 CAPSULE, DELAYED RELEASE ORAL EVERY MORNING
Qty: 90 CAPSULE | Refills: 3 | Status: SHIPPED | OUTPATIENT
Start: 2020-10-26 | End: 2021-01-05 | Stop reason: SDUPTHER

## 2020-10-26 NOTE — PROGRESS NOTES
Subjective:       Patient ID: Nicole Medina is a 75 y.o. female    Chief Complaint: Primary malignant neoplasm of left upper lobe of lung    HPI  Here today for interval evaluation.  She failed surgical resection and underwent XRT.  She has had decrease in size of paratracheal lymph nodes.    Review of Systems     Objective:   Physical Exam  Vitals signs reviewed.   Constitutional:       Appearance: She is well-developed.   Neurological:      Mental Status: She is alert and oriented to person, place, and time.          Assessment:       1. Primary malignant neoplasm of left upper lobe of lung     2. Chronic obstructive pulmonary disease, unspecified COPD type     3. PSVT (paroxysmal supraventricular tachycardia)          Plan:       Primary malignant neoplasm of left upper lobe of lung with Chronic obstructive pulmonary disease, unspecified COPD type  - Continue current therapy  - Continue Pulmonary and Radiation Oncology    PSVT (paroxysmal supraventricular tachycardia)  - Continue current therapy  - Serial blood pressure monitoring  - Diet and exercise education.    Other orders  -     omeprazole (PRILOSEC) 40 MG capsule; Take 1 capsule (40 mg total) by mouth every morning.  Dispense: 90 capsule; Refill: 3  -     metoprolol succinate (TOPROL XL) 50 MG 24 hr tablet; Take 1 tablet (50 mg total) by mouth once daily.  Dispense: 90 tablet; Refill: 3  -     ibandronate (BONIVA) 150 mg tablet; Take 1 tablet (150 mg total) by mouth every 30 days.  Dispense: 3 tablet; Refill: 3  -     atorvastatin (LIPITOR) 20 MG tablet; Take 1 tablet (20 mg total) by mouth once daily.  Dispense: 90 tablet; Refill: 3  -     hydroCHLOROthiazide (MICROZIDE) 12.5 mg capsule; Take 1 capsule (12.5 mg total) by mouth once daily.  Dispense: 90 capsule; Refill: 3  -     losartan (COZAAR) 50 MG tablet; Take 1 tablet (50 mg total) by mouth once daily.  Dispense: 90 tablet; Refill: 3

## 2020-11-13 ENCOUNTER — PES CALL (OUTPATIENT)
Dept: ADMINISTRATIVE | Facility: CLINIC | Age: 75
End: 2020-11-13

## 2020-11-16 ENCOUNTER — OFFICE VISIT (OUTPATIENT)
Dept: RADIATION ONCOLOGY | Facility: CLINIC | Age: 75
End: 2020-11-16
Payer: MEDICARE

## 2020-11-16 ENCOUNTER — HOSPITAL ENCOUNTER (OUTPATIENT)
Dept: RADIOLOGY | Facility: HOSPITAL | Age: 75
Discharge: HOME | End: 2020-11-16
Attending: RADIOLOGY
Payer: MEDICARE

## 2020-11-16 VITALS
OXYGEN SATURATION: 98 % | RESPIRATION RATE: 19 BRPM | WEIGHT: 214.81 LBS | TEMPERATURE: 99 F | HEART RATE: 121 BPM | DIASTOLIC BLOOD PRESSURE: 79 MMHG | HEIGHT: 63 IN | SYSTOLIC BLOOD PRESSURE: 139 MMHG | BODY MASS INDEX: 38.06 KG/M2

## 2020-11-16 DIAGNOSIS — Z85.118 PERSONAL HISTORY OF LUNG CANCER: Primary | ICD-10-CM

## 2020-11-16 DIAGNOSIS — C34.12 PRIMARY MALIGNANT NEOPLASM OF LEFT UPPER LOBE OF LUNG: ICD-10-CM

## 2020-11-16 LAB
CREAT SERPL-MCNC: 0.7 MG/DL (ref 0.5–1.4)
SAMPLE: NORMAL

## 2020-11-16 PROCEDURE — 1126F AMNT PAIN NOTED NONE PRSNT: CPT | Mod: HCNC,S$GLB,, | Performed by: RADIOLOGY

## 2020-11-16 PROCEDURE — 1101F PR PT FALLS ASSESS DOC 0-1 FALLS W/OUT INJ PAST YR: ICD-10-PCS | Mod: HCNC,CPTII,S$GLB, | Performed by: RADIOLOGY

## 2020-11-16 PROCEDURE — 71260 CT THORAX DX C+: CPT | Mod: 26,HCNC,, | Performed by: RADIOLOGY

## 2020-11-16 PROCEDURE — 3075F PR MOST RECENT SYSTOLIC BLOOD PRESS GE 130-139MM HG: ICD-10-PCS | Mod: HCNC,CPTII,S$GLB, | Performed by: RADIOLOGY

## 2020-11-16 PROCEDURE — 1159F PR MEDICATION LIST DOCUMENTED IN MEDICAL RECORD: ICD-10-PCS | Mod: HCNC,S$GLB,, | Performed by: RADIOLOGY

## 2020-11-16 PROCEDURE — 99999 PR PBB SHADOW E&M-EST. PATIENT-LVL III: ICD-10-PCS | Mod: PBBFAC,HCNC,, | Performed by: RADIOLOGY

## 2020-11-16 PROCEDURE — 25500020 PHARM REV CODE 255: Mod: HCNC | Performed by: RADIOLOGY

## 2020-11-16 PROCEDURE — 71260 CT THORAX DX C+: CPT | Mod: TC,HCNC

## 2020-11-16 PROCEDURE — 3008F BODY MASS INDEX DOCD: CPT | Mod: HCNC,CPTII,S$GLB, | Performed by: RADIOLOGY

## 2020-11-16 PROCEDURE — 1159F MED LIST DOCD IN RCRD: CPT | Mod: HCNC,S$GLB,, | Performed by: RADIOLOGY

## 2020-11-16 PROCEDURE — 99999 PR PBB SHADOW E&M-EST. PATIENT-LVL III: CPT | Mod: PBBFAC,HCNC,, | Performed by: RADIOLOGY

## 2020-11-16 PROCEDURE — 3008F PR BODY MASS INDEX (BMI) DOCUMENTED: ICD-10-PCS | Mod: HCNC,CPTII,S$GLB, | Performed by: RADIOLOGY

## 2020-11-16 PROCEDURE — 99213 PR OFFICE/OUTPT VISIT, EST, LEVL III, 20-29 MIN: ICD-10-PCS | Mod: HCNC,S$GLB,, | Performed by: RADIOLOGY

## 2020-11-16 PROCEDURE — 3288F FALL RISK ASSESSMENT DOCD: CPT | Mod: HCNC,CPTII,S$GLB, | Performed by: RADIOLOGY

## 2020-11-16 PROCEDURE — 1101F PT FALLS ASSESS-DOCD LE1/YR: CPT | Mod: HCNC,CPTII,S$GLB, | Performed by: RADIOLOGY

## 2020-11-16 PROCEDURE — 1126F PR PAIN SEVERITY QUANTIFIED, NO PAIN PRESENT: ICD-10-PCS | Mod: HCNC,S$GLB,, | Performed by: RADIOLOGY

## 2020-11-16 PROCEDURE — 3078F PR MOST RECENT DIASTOLIC BLOOD PRESSURE < 80 MM HG: ICD-10-PCS | Mod: HCNC,CPTII,S$GLB, | Performed by: RADIOLOGY

## 2020-11-16 PROCEDURE — 99213 OFFICE O/P EST LOW 20 MIN: CPT | Mod: HCNC,S$GLB,, | Performed by: RADIOLOGY

## 2020-11-16 PROCEDURE — 3075F SYST BP GE 130 - 139MM HG: CPT | Mod: HCNC,CPTII,S$GLB, | Performed by: RADIOLOGY

## 2020-11-16 PROCEDURE — 71260 CT CHEST WITH CONTRAST: ICD-10-PCS | Mod: 26,HCNC,, | Performed by: RADIOLOGY

## 2020-11-16 PROCEDURE — 3078F DIAST BP <80 MM HG: CPT | Mod: HCNC,CPTII,S$GLB, | Performed by: RADIOLOGY

## 2020-11-16 PROCEDURE — 3288F PR FALLS RISK ASSESSMENT DOCUMENTED: ICD-10-PCS | Mod: HCNC,CPTII,S$GLB, | Performed by: RADIOLOGY

## 2020-11-16 RX ADMIN — IOHEXOL 75 ML: 350 INJECTION, SOLUTION INTRAVENOUS at 10:11

## 2020-11-16 NOTE — PROGRESS NOTES
11/16/2020    Radiation Oncology Follow-Up Visit      Prior Radiation History:    Site  Technique  Energy  Dose/Fx (Gy)  #Fx  Total Dose (Gy)  Start Date  End Date  Elapsed Days    DOC Lung  SBRT  6X  11  5 / 5  55  2/7/2020 2/13/2020  6        Assessment   This is a 75 y.o. y/o female with Stage IA2 (cT1b cN0 M0) DOC NSCLC (adeno) diagnosed on biopsy 12/20/19. History significant for sarcoidosis. CT Chest 1/14/20 demonstrated 1.7 cm DOC primary and multiple other stable sub-centimeter nodules with mild hilar and mediastinal adenopathy. She underwent attempted VATS resection by Dr. Mccall 1/16/20, which was aborted due to poor toleration of single lung ventilation. She completed definitive SBRT 55 Gy in 5 fx on 2/13/20.     CT Chest today without obvious evidence of disease. There are new sub-solid opacities in the DOC near the treated primary that may be post-radiation fibrosis vs progressive disease (felt to be less likely).  I recommend continued observation with short-interval follow-up.        Plan   1) F/U in 3 months with CT Chest prior for re-staging.        HPI: Mrs. Medina returns today accompanied by her . She has felt well since her last visit and denies fevers, weight loss, dyspnea, cough, or chest pain. CT Chest today demonstrates stable adenopathy from sarcoid and slight increased size of the treated DOC lesion. Additional there are new sub-solid linear opacities near this.    Lung Cancer  Pertinent negatives include no abdominal pain, chest pain, coughing, fever, headaches, nausea or sore throat.       Past Medical History:   Diagnosis Date    Acute ischemic right MCA stroke 6/2/2017    Cataract     done ou    Essential hypertension 8/21/2017    GERD (gastroesophageal reflux disease)     Hiatal hernia     History of seasonal allergies     On home oxygen therapy     while sleeping    Polio     as a child    Presbyopia     PSVT (paroxysmal supraventricular tachycardia)      Sarcoidosis     Sciatica     TIA (transient ischemic attack) 06/02/2017    [facility]//    Vertigo        Past Surgical History:   Procedure Laterality Date    CATARACT EXTRACTION W/  INTRAOCULAR LENS IMPLANT Left 03/27/2018    Dr Higginbotham    CATARACT EXTRACTION W/  INTRAOCULAR LENS IMPLANT Right 05/08/2018    Dr Higginbotham    CHOLECYSTECTOMY      ESOPHAGEAL DILATION N/A 11/22/2018    Procedure: DILATION, ESOPHAGUS;  Surgeon: Robb Fitzgerald Jr., MD;  Location[facility];  Service: Endoscopy;  Laterality: N/A;    ESOPHAGOGASTRODUODENOSCOPY N/A 11/22/2018    Procedure: ESOPHAGOGASTRODUODENOSCOPY (EGD);  Surgeon: Robb Fitzgerald Jr., MD;  Location: [facility] ENDO;  Service: Endoscopy;  Laterality: N/A;    HAND SURGERY Right     trauma repair    TONSILLECTOMY      TOTAL ABDOMINAL HYSTERECTOMY      VARICOSE VEIN SURGERY Bilateral     bilateral legs    VIDEO-ASSISTED THORACOSCOPIC SURGERY (VATS)  1/16/2020    Procedure: VATS (VIDEO-ASSISTED THORACOSCOPIC SURGERY) - exploratory;  Surgeon: Ritesh Mccall MD;  Location: [address];  Service: Thoracic;;       Social History     Tobacco Use    Smoking status: Former Smoker    Smokeless tobacco: Never Used    Tobacco comment: as teenager   Substance Use Topics    Alcohol use: No    Drug use: No       Cancer-related family history includes Breast cancer in her maternal grandmother and other; Cancer in her daughter, mother, and sister.    Current Outpatient Medications on File Prior to Visit   Medication Sig Dispense Refill    aspirin 81 MG Chew Take 1 tablet (81 mg total) by mouth once daily. 30 tablet 11    atorvastatin (LIPITOR) 20 MG tablet Take 1 tablet (20 mg total) by mouth once daily. 90 tablet 3    calcium-vitamin D 600 mg(1,500mg) -400 unit Tab Take by mouth.      cholecalciferol, vitamin D3, (VITAMIN D3) 5,000 unit Tab Take 5,000 Units by mouth once daily.      fluticasone (FLONASE) 50 mcg/actuation nasal spray       FLUZONE  HIGH-DOSE 2018-19, PF, 180 mcg/0.5 mL vaccine ADM 0.5ML IM UTD  0    hydroCHLOROthiazide (MICROZIDE) 12.5 mg capsule Take 1 capsule (12.5 mg total) by mouth once daily. 90 capsule 3    ibandronate (BONIVA) 150 mg tablet Take 1 tablet (150 mg total) by mouth every 30 days. 3 tablet 3    losartan (COZAAR) 50 MG tablet Take 1 tablet (50 mg total) by mouth once daily. 90 tablet 3    metoprolol succinate (TOPROL XL) 50 MG 24 hr tablet Take 1 tablet (50 mg total) by mouth once daily. 90 tablet 3    mupirocin (BACTROBAN) 2 % ointment STANISLAW EXT TO THE SKIN BID      mv,Ca,min-folic acid-vit K1 (ONE-A-DAY WOMEN'S 50 PLUS) 400-20 mcg Tab Take 1 tablet by mouth once daily at 6am.      omeprazole (PRILOSEC) 40 MG capsule Take 1 capsule (40 mg total) by mouth every morning. 90 capsule 3    SYMBICORT 160-4.5 mcg/actuation HFAA Inhale 2 puffs into the lungs every 12 (twelve) hours.        Current Facility-Administered Medications on File Prior to Visit   Medication Dose Route Frequency Provider Last Rate Last Dose    [COMPLETED] iohexoL (OMNIPAQUE 350) injection 75 mL  75 mL Intravenous ONCE PRN Kirby Sibley MD   75 mL at 11/16/20 1040       Review of patient's allergies indicates:   Allergen Reactions    Latex Itching and Swelling     Itching and swelling to site (local reaction)       Review of Systems   Constitutional: Negative for fever and weight loss.   HENT: Negative for ear pain and sore throat.    Eyes: Negative for blurred vision and double vision.   Respiratory: Negative for cough, hemoptysis and shortness of breath.    Cardiovascular: Negative for chest pain and leg swelling.   Gastrointestinal: Negative for abdominal pain, constipation, diarrhea, heartburn and nausea.   Genitourinary: Negative for dysuria and hematuria.   Musculoskeletal: Negative for falls and joint pain.   Neurological: Negative for tingling, speech change, focal weakness, seizures and headaches.   Psychiatric/Behavioral: Negative for  "depression. The patient is not nervous/anxious.         Vital Signs: /79 (BP Location: Left arm, Patient Position: Sitting, BP Method: Large (Automatic))   Pulse (!) 121   Temp 98.5 °F (36.9 °C)   Resp 19   Ht 5' 3" (1.6 m)   Wt 97.4 kg (214 lb 12.8 oz)   SpO2 98%   BMI 38.05 kg/m²     ECOG Performance Status: 1    Physical Exam   Constitutional: She is oriented to person, place, and time and well-developed, well-nourished, and in no distress.   HENT:   Head: Normocephalic and atraumatic.   Mouth/Throat: Oropharynx is clear and moist.   Eyes: EOM are normal. No scleral icterus.   Neck: Normal range of motion. Neck supple.   Pulmonary/Chest: No accessory muscle usage. No respiratory distress.   Abdominal: Soft. Normal appearance. She exhibits no distension.   Musculoskeletal: Normal range of motion.         General: No edema.   Lymphadenopathy:     She has no cervical adenopathy.        Right: No supraclavicular adenopathy present.        Left: No supraclavicular adenopathy present.   Neurological: She is alert and oriented to person, place, and time. No cranial nerve deficit. Gait normal.   Skin: Skin is warm and dry.   Psychiatric: Mood, affect and judgment normal.   Vitals reviewed.       Labs:    Imaging: I have personally reviewed the patient's available images and reports and summarized pertinent findings above in HPI.     Pathology: No new path      "

## 2020-11-18 ENCOUNTER — OFFICE VISIT (OUTPATIENT)
Dept: FAMILY MEDICINE | Facility: CLINIC | Age: 75
End: 2020-11-18
Payer: MEDICARE

## 2020-11-18 ENCOUNTER — TELEPHONE (OUTPATIENT)
Dept: FAMILY MEDICINE | Facility: CLINIC | Age: 75
End: 2020-11-18

## 2020-11-18 VITALS
OXYGEN SATURATION: 97 % | BODY MASS INDEX: 37.7 KG/M2 | DIASTOLIC BLOOD PRESSURE: 76 MMHG | HEART RATE: 111 BPM | SYSTOLIC BLOOD PRESSURE: 138 MMHG | HEIGHT: 63 IN | TEMPERATURE: 98 F | WEIGHT: 212.75 LBS

## 2020-11-18 DIAGNOSIS — R00.2 PALPITATIONS: Primary | ICD-10-CM

## 2020-11-18 PROBLEM — I48.91 NEW ONSET ATRIAL FIBRILLATION: Status: ACTIVE | Noted: 2020-11-18

## 2020-11-18 PROCEDURE — 3008F PR BODY MASS INDEX (BMI) DOCUMENTED: ICD-10-PCS | Mod: HCNC,CPTII,S$GLB, | Performed by: NURSE PRACTITIONER

## 2020-11-18 PROCEDURE — 3075F SYST BP GE 130 - 139MM HG: CPT | Mod: HCNC,CPTII,S$GLB, | Performed by: NURSE PRACTITIONER

## 2020-11-18 PROCEDURE — 99999 PR PBB SHADOW E&M-EST. PATIENT-LVL IV: CPT | Mod: PBBFAC,HCNC,, | Performed by: NURSE PRACTITIONER

## 2020-11-18 PROCEDURE — 99213 PR OFFICE/OUTPT VISIT, EST, LEVL III, 20-29 MIN: ICD-10-PCS | Mod: HCNC,S$GLB,, | Performed by: NURSE PRACTITIONER

## 2020-11-18 PROCEDURE — 3078F PR MOST RECENT DIASTOLIC BLOOD PRESSURE < 80 MM HG: ICD-10-PCS | Mod: HCNC,CPTII,S$GLB, | Performed by: NURSE PRACTITIONER

## 2020-11-18 PROCEDURE — 3075F PR MOST RECENT SYSTOLIC BLOOD PRESS GE 130-139MM HG: ICD-10-PCS | Mod: HCNC,CPTII,S$GLB, | Performed by: NURSE PRACTITIONER

## 2020-11-18 PROCEDURE — 99213 OFFICE O/P EST LOW 20 MIN: CPT | Mod: HCNC,S$GLB,, | Performed by: NURSE PRACTITIONER

## 2020-11-18 PROCEDURE — 93005 EKG 12-LEAD: ICD-10-PCS | Mod: HCNC,S$GLB,, | Performed by: NURSE PRACTITIONER

## 2020-11-18 PROCEDURE — 3008F BODY MASS INDEX DOCD: CPT | Mod: HCNC,CPTII,S$GLB, | Performed by: NURSE PRACTITIONER

## 2020-11-18 PROCEDURE — 99999 PR PBB SHADOW E&M-EST. PATIENT-LVL IV: ICD-10-PCS | Mod: PBBFAC,HCNC,, | Performed by: NURSE PRACTITIONER

## 2020-11-18 PROCEDURE — 93005 ELECTROCARDIOGRAM TRACING: CPT | Mod: HCNC,S$GLB,, | Performed by: NURSE PRACTITIONER

## 2020-11-18 PROCEDURE — 1126F AMNT PAIN NOTED NONE PRSNT: CPT | Mod: HCNC,S$GLB,, | Performed by: NURSE PRACTITIONER

## 2020-11-18 PROCEDURE — 1101F PR PT FALLS ASSESS DOC 0-1 FALLS W/OUT INJ PAST YR: ICD-10-PCS | Mod: HCNC,CPTII,S$GLB, | Performed by: NURSE PRACTITIONER

## 2020-11-18 PROCEDURE — 3288F PR FALLS RISK ASSESSMENT DOCUMENTED: ICD-10-PCS | Mod: HCNC,CPTII,S$GLB, | Performed by: NURSE PRACTITIONER

## 2020-11-18 PROCEDURE — 93010 EKG 12-LEAD: ICD-10-PCS | Mod: HCNC,S$GLB,, | Performed by: INTERNAL MEDICINE

## 2020-11-18 PROCEDURE — 1101F PT FALLS ASSESS-DOCD LE1/YR: CPT | Mod: HCNC,CPTII,S$GLB, | Performed by: NURSE PRACTITIONER

## 2020-11-18 PROCEDURE — 1126F PR PAIN SEVERITY QUANTIFIED, NO PAIN PRESENT: ICD-10-PCS | Mod: HCNC,S$GLB,, | Performed by: NURSE PRACTITIONER

## 2020-11-18 PROCEDURE — 93010 ELECTROCARDIOGRAM REPORT: CPT | Mod: HCNC,S$GLB,, | Performed by: INTERNAL MEDICINE

## 2020-11-18 PROCEDURE — 1159F MED LIST DOCD IN RCRD: CPT | Mod: HCNC,S$GLB,, | Performed by: NURSE PRACTITIONER

## 2020-11-18 PROCEDURE — 1159F PR MEDICATION LIST DOCUMENTED IN MEDICAL RECORD: ICD-10-PCS | Mod: HCNC,S$GLB,, | Performed by: NURSE PRACTITIONER

## 2020-11-18 PROCEDURE — 3288F FALL RISK ASSESSMENT DOCD: CPT | Mod: HCNC,CPTII,S$GLB, | Performed by: NURSE PRACTITIONER

## 2020-11-18 PROCEDURE — 3078F DIAST BP <80 MM HG: CPT | Mod: HCNC,CPTII,S$GLB, | Performed by: NURSE PRACTITIONER

## 2020-11-18 NOTE — TELEPHONE ENCOUNTER
----- Message from Greta Hein sent at 11/18/2020  3:40 PM CST -----  Contact: patient  Type: Needs Medical Advice  Who Called:  patient  Symptoms (please be specific):  na  How long has patient had these symptoms:  bandar  Pharmacy name and phone #:  bandar  Best Call Back Number: [phone]  Additional Information: Patient needs to speak with nurse about medication.  Patient states she needs to know if she should increase meds.  Thanks!

## 2020-11-18 NOTE — PROGRESS NOTES
Subjective:       Patient ID: Nicole Medina is a 75 y.o. female.    Chief Complaint: Hypertension    Patient who is new to me presents for palpations. She has been taking her pulse ox and noticed that her heart rate has increased and running between higher than normal in the 100's. She has a history of supraventricular tachycardia, which she takes metoprolol 50 mg. She states she noticed today that her heart felt like it had an irregular heart beat. She denies any chest pain, SOB, and fever.     Palpitations   This is a new problem. The current episode started yesterday (noticed the irregular heart rate today but noticed higher HR on pulse ox yesterday). The problem occurs daily. The problem has been unchanged. Nothing aggravates the symptoms. Associated symptoms include an irregular heartbeat. Pertinent negatives include no chest pain, coughing, dizziness, fever, nausea, numbness, shortness of breath, vomiting or weakness. She has tried beta blockers for the symptoms. Risk factors include post menopause and obesity. Her past medical history is significant for anemia. There is no history of a valve disorder.     Review of Systems   Constitutional: Negative for chills, fatigue and fever.   HENT: Negative for congestion, sinus pressure, sinus pain, sneezing and sore throat.    Respiratory: Negative for cough, chest tightness, shortness of breath and wheezing.    Cardiovascular: Positive for palpitations. Negative for chest pain and leg swelling.   Gastrointestinal: Negative for abdominal distention, abdominal pain, constipation, diarrhea, nausea and vomiting.   Genitourinary: Negative for decreased urine volume, difficulty urinating, dysuria, frequency and urgency.   Musculoskeletal: Negative for arthralgias, gait problem, joint swelling and myalgias.   Skin: Negative for rash and wound.   Neurological: Negative for dizziness, weakness, light-headedness, numbness and headaches.       Objective:      Physical  Exam  Vitals signs and nursing note reviewed.   Constitutional:       Appearance: She is well-developed.   HENT:      Head: Normocephalic and atraumatic.      Right Ear: External ear normal.      Left Ear: External ear normal.      Nose: Nose normal.   Eyes:      Pupils: Pupils are equal, round, and reactive to light.   Neck:      Musculoskeletal: Normal range of motion.   Cardiovascular:      Rate and Rhythm: Normal rate. Rhythm irregularly irregular.      Heart sounds: Normal heart sounds.   Pulmonary:      Effort: Pulmonary effort is normal.      Breath sounds: Normal breath sounds.   Abdominal:      General: Bowel sounds are normal.      Palpations: Abdomen is soft.   Musculoskeletal: Normal range of motion.   Skin:     General: Skin is warm and dry.   Neurological:      Mental Status: She is alert and oriented to person, place, and time.         Assessment:       1. Palpitations        Plan:       Nicole was seen today for hypertension.    Diagnoses and all orders for this visit:    Palpitations  -     IN OFFICE EKG 12-LEAD (to Muse)  -     Comprehensive Metabolic Panel; Future  -     CBC Auto Differential; Future      Discussed EKG and patient case with Dr Sibley. Will send to ER for further evaluation for new onset afib. Patient and  in agreement. Will travel via personal vehicle.

## 2020-11-19 PROBLEM — C34.92 ADENOCARCINOMA, LUNG, LEFT: Status: ACTIVE | Noted: 2020-11-19

## 2020-12-07 ENCOUNTER — TELEPHONE (OUTPATIENT)
Dept: CARDIOLOGY | Facility: CLINIC | Age: 75
End: 2020-12-07

## 2020-12-07 NOTE — TELEPHONE ENCOUNTER
----- Message from Elizabeth Cavanaugh MA sent at 12/7/2020  8:27 AM CST -----  #Pt is requesting a call back for same day appt, reason not stated to please call  call back # [phone]

## 2020-12-07 NOTE — TELEPHONE ENCOUNTER
Pt calling to confirm what medications she is suppose to be taking. Advised pt of med list. Pt verbalized understanding.

## 2020-12-10 ENCOUNTER — PATIENT OUTREACH (OUTPATIENT)
Dept: ADMINISTRATIVE | Facility: OTHER | Age: 75
End: 2020-12-10

## 2020-12-10 NOTE — PROGRESS NOTES
LINKS immunization registry not responding  Care Everywhere updated  Health Maintenance updated  Chart reviewed for overdue Proactive [facility] Encounters (SKIP) health maintenance testing (CRS, Breast Ca, Diabetic Eye Exam)   Orders entered:N/A

## 2020-12-11 ENCOUNTER — PATIENT MESSAGE (OUTPATIENT)
Dept: OTHER | Facility: OTHER | Age: 75
End: 2020-12-11

## 2020-12-14 ENCOUNTER — OFFICE VISIT (OUTPATIENT)
Dept: CARDIOLOGY | Facility: CLINIC | Age: 75
End: 2020-12-14
Payer: MEDICARE

## 2020-12-14 VITALS
HEIGHT: 63 IN | DIASTOLIC BLOOD PRESSURE: 85 MMHG | BODY MASS INDEX: 37.15 KG/M2 | WEIGHT: 209.69 LBS | SYSTOLIC BLOOD PRESSURE: 138 MMHG | HEART RATE: 110 BPM

## 2020-12-14 DIAGNOSIS — Z03.818 ENCOUNTER FOR OBSERVATION FOR SUSPECTED EXPOSURE TO OTHER BIOLOGICAL AGENTS RULED OUT: ICD-10-CM

## 2020-12-14 DIAGNOSIS — I70.0 ATHEROSCLEROSIS OF AORTA: Primary | ICD-10-CM

## 2020-12-14 DIAGNOSIS — E78.5 HYPERLIPIDEMIA LDL GOAL <70: ICD-10-CM

## 2020-12-14 DIAGNOSIS — I47.10 PSVT (PAROXYSMAL SUPRAVENTRICULAR TACHYCARDIA): ICD-10-CM

## 2020-12-14 DIAGNOSIS — I48.0 PAROXYSMAL ATRIAL FIBRILLATION: ICD-10-CM

## 2020-12-14 DIAGNOSIS — I48.19 OTHER PERSISTENT ATRIAL FIBRILLATION: ICD-10-CM

## 2020-12-14 DIAGNOSIS — I10 ESSENTIAL HYPERTENSION: ICD-10-CM

## 2020-12-14 PROCEDURE — 1159F MED LIST DOCD IN RCRD: CPT | Mod: HCNC,S$GLB,, | Performed by: INTERNAL MEDICINE

## 2020-12-14 PROCEDURE — 3075F SYST BP GE 130 - 139MM HG: CPT | Mod: HCNC,CPTII,S$GLB, | Performed by: INTERNAL MEDICINE

## 2020-12-14 PROCEDURE — 1101F PR PT FALLS ASSESS DOC 0-1 FALLS W/OUT INJ PAST YR: ICD-10-PCS | Mod: HCNC,CPTII,S$GLB, | Performed by: INTERNAL MEDICINE

## 2020-12-14 PROCEDURE — 99215 OFFICE O/P EST HI 40 MIN: CPT | Mod: HCNC,S$GLB,, | Performed by: INTERNAL MEDICINE

## 2020-12-14 PROCEDURE — 3075F PR MOST RECENT SYSTOLIC BLOOD PRESS GE 130-139MM HG: ICD-10-PCS | Mod: HCNC,CPTII,S$GLB, | Performed by: INTERNAL MEDICINE

## 2020-12-14 PROCEDURE — 99999 PR PBB SHADOW E&M-EST. PATIENT-LVL III: CPT | Mod: PBBFAC,HCNC,, | Performed by: INTERNAL MEDICINE

## 2020-12-14 PROCEDURE — 99215 PR OFFICE/OUTPT VISIT, EST, LEVL V, 40-54 MIN: ICD-10-PCS | Mod: HCNC,S$GLB,, | Performed by: INTERNAL MEDICINE

## 2020-12-14 PROCEDURE — 3288F FALL RISK ASSESSMENT DOCD: CPT | Mod: HCNC,CPTII,S$GLB, | Performed by: INTERNAL MEDICINE

## 2020-12-14 PROCEDURE — 3079F DIAST BP 80-89 MM HG: CPT | Mod: HCNC,CPTII,S$GLB, | Performed by: INTERNAL MEDICINE

## 2020-12-14 PROCEDURE — 99999 PR PBB SHADOW E&M-EST. PATIENT-LVL III: ICD-10-PCS | Mod: PBBFAC,HCNC,, | Performed by: INTERNAL MEDICINE

## 2020-12-14 PROCEDURE — 3008F BODY MASS INDEX DOCD: CPT | Mod: HCNC,CPTII,S$GLB, | Performed by: INTERNAL MEDICINE

## 2020-12-14 PROCEDURE — 1126F PR PAIN SEVERITY QUANTIFIED, NO PAIN PRESENT: ICD-10-PCS | Mod: HCNC,S$GLB,, | Performed by: INTERNAL MEDICINE

## 2020-12-14 PROCEDURE — 1126F AMNT PAIN NOTED NONE PRSNT: CPT | Mod: HCNC,S$GLB,, | Performed by: INTERNAL MEDICINE

## 2020-12-14 PROCEDURE — 3008F PR BODY MASS INDEX (BMI) DOCUMENTED: ICD-10-PCS | Mod: HCNC,CPTII,S$GLB, | Performed by: INTERNAL MEDICINE

## 2020-12-14 PROCEDURE — 1101F PT FALLS ASSESS-DOCD LE1/YR: CPT | Mod: HCNC,CPTII,S$GLB, | Performed by: INTERNAL MEDICINE

## 2020-12-14 PROCEDURE — 3288F PR FALLS RISK ASSESSMENT DOCUMENTED: ICD-10-PCS | Mod: HCNC,CPTII,S$GLB, | Performed by: INTERNAL MEDICINE

## 2020-12-14 PROCEDURE — 3079F PR MOST RECENT DIASTOLIC BLOOD PRESSURE 80-89 MM HG: ICD-10-PCS | Mod: HCNC,CPTII,S$GLB, | Performed by: INTERNAL MEDICINE

## 2020-12-14 PROCEDURE — 1159F PR MEDICATION LIST DOCUMENTED IN MEDICAL RECORD: ICD-10-PCS | Mod: HCNC,S$GLB,, | Performed by: INTERNAL MEDICINE

## 2020-12-14 PROCEDURE — 93000 EKG 12-LEAD: ICD-10-PCS | Mod: HCNC,S$GLB,, | Performed by: INTERNAL MEDICINE

## 2020-12-14 PROCEDURE — 93000 ELECTROCARDIOGRAM COMPLETE: CPT | Mod: HCNC,S$GLB,, | Performed by: INTERNAL MEDICINE

## 2020-12-14 RX ORDER — FLECAINIDE ACETATE 100 MG/1
100 TABLET ORAL EVERY 12 HOURS
Qty: 180 TABLET | Refills: 3 | Status: SHIPPED | OUTPATIENT
Start: 2020-12-14 | End: 2020-12-14

## 2020-12-14 RX ORDER — FLECAINIDE ACETATE 100 MG/1
100 TABLET ORAL EVERY 12 HOURS
Qty: 60 TABLET | Refills: 11 | Status: ON HOLD | OUTPATIENT
Start: 2020-12-14 | End: 2021-01-04 | Stop reason: SDUPTHER

## 2020-12-14 NOTE — PATIENT INSTRUCTIONS
JEB/Cardioversion    Arrive for your procedure at:  [facility] on 12/28/20. Your procedure is at noon. The pre op department will call with you arrival time.      ? FASTING:  You MAY NOT have anything to eat or drink AFTER MIDNIGHT.  If your procedure is scheduled in the afternoon, you may have a LIGHT BREAKFAST BEFORE 6:00 A.M.  For example: Two slices of toast; black coffee or black tea.    ? MEDICATIONS:  You may take your regular morning medications with a small sip of water.     Hold or adjust the following:   Fluid pills.   Diabetes medications.    Continue: Coumadin, Plavix, Effient, Aspirin, Anti-coagulants, Blood thinners    Please refer to pre-op instructions received from [facility].

## 2020-12-14 NOTE — PROGRESS NOTES
"Subjective:    Patient ID:  Nicole Medina is a 75 y.o. female who presents for follow-up of No chief complaint on file.      Problem List Items Addressed This Visit        Cardiac/Vascular    Atherosclerosis of aorta - Primary    Overview     CT chest 12/29/15: "Atherosclerotic vascular calcifications including coronary and aortic calcifications are seen."         Essential hypertension    Hyperlipidemia LDL goal <70    Paroxysmal atrial fibrillation    PSVT (paroxysmal supraventricular tachycardia)          HPI    Patient was last seen at [facility] in November 2020 at which time she was evaluated for atrial fibrillation.  Underwent JEB/cardioversion that was successful in restoring sinus rhythm.    On assessment today, the patient states that she feels OK today.  Intermittent palpitations    No chest pain.  No shortness of breath.    Bleeding on Eliquis - No issues    Review of Systems   Constitution: Negative for decreased appetite, fever and malaise/fatigue.   Eyes: Negative for blurred vision.   Cardiovascular: Negative for chest pain, dyspnea on exertion, irregular heartbeat and leg swelling.   Respiratory: Negative for cough, hemoptysis, shortness of breath and wheezing.    Endocrine: Negative for cold intolerance and heat intolerance.   Hematologic/Lymphatic: Negative for bleeding problem.   Musculoskeletal: Negative for muscle weakness and myalgias.   Gastrointestinal: Negative for abdominal pain, constipation and diarrhea.   Genitourinary: Negative for bladder incontinence.   Neurological: Negative for dizziness and weakness.   Psychiatric/Behavioral: Negative for depression.        Objective:     Vitals:    12/14/20 1120   BP: 138/85   BP Location: Right arm   Patient Position: Sitting   BP Method: Medium (Automatic)   Pulse: 110   Weight: 95.1 kg (209 lb 10.5 oz)   Height: 5' 3" (1.6 m)        Physical Exam   Constitutional: She is oriented to person, place, and time. She appears " well-developed and well-nourished. No distress.   HENT:   Head: Normocephalic and atraumatic.   Neck: Neck supple. No JVD present.   Cardiovascular: Exam reveals no gallop and no friction rub.   No murmur heard.  irreg irreg   Pulmonary/Chest: Effort normal and breath sounds normal. No respiratory distress. She has no wheezes. She has no rales.   Abdominal: Soft. Bowel sounds are normal. There is no abdominal tenderness. There is no rebound and no guarding.   Musculoskeletal:         General: No tenderness or edema.   Neurological: She is alert and oriented to person, place, and time.   Skin: Skin is warm and dry.   Psychiatric: Her behavior is normal.   Nursing note and vitals reviewed.          Current Outpatient Medications on File Prior to Visit   Medication Sig    apixaban (ELIQUIS) 5 mg Tab Take 1 tablet (5 mg total) by mouth 2 (two) times daily.    aspirin 81 MG Chew Take 1 tablet (81 mg total) by mouth once daily.    atorvastatin (LIPITOR) 20 MG tablet Take 1 tablet (20 mg total) by mouth once daily.    cholecalciferol, vitamin D3, (VITAMIN D3) 5,000 unit Tab Take 5,000 Units by mouth once daily.    fluticasone (FLONASE) 50 mcg/actuation nasal spray 1 spray by Each Nostril route daily as needed for Allergies.     hydroCHLOROthiazide (MICROZIDE) 12.5 mg capsule Take 1 capsule (12.5 mg total) by mouth once daily. (Patient taking differently: Take 12.5 mg by mouth every other day. )    ibandronate (BONIVA) 150 mg tablet Take 1 tablet (150 mg total) by mouth every 30 days.    losartan (COZAAR) 50 MG tablet Take 1 tablet (50 mg total) by mouth once daily.    metoprolol succinate (TOPROL XL) 50 MG 24 hr tablet Take 1 tablet (50 mg total) by mouth once daily.    mv,Ca,min-folic acid-vit K1 (ONE-A-DAY WOMEN'S 50 PLUS) 400-20 mcg Tab Take 1 tablet by mouth once daily at 6am.    omeprazole (PRILOSEC) 40 MG capsule Take 1 capsule (40 mg total) by mouth every morning.    SYMBICORT 160-4.5 mcg/actuation HFAA  Inhale 2 puffs into the lungs 2 (two) times daily as needed (shortness of breath).      No current facility-administered medications on file prior to visit.        Lipid Panel:   Lab Results   Component Value Date    CHOL 137 04/21/2020    HDL 71 04/21/2020    LDLCALC 55.2 (L) 04/21/2020    TRIG 54 04/21/2020    CHOLHDL 51.8 (H) 04/21/2020       The ASCVD Risk score (Rajwinder RODRIGUEZ Jr., et al., 2013) failed to calculate for the following reasons:    The patient has a prior MI or stroke diagnosis    All pertinent labs, imaging, and EKGs reviewed.  Patient's most recent EKG tracing was personally interpreted by this provider.    Assessment:       1. Atherosclerosis of aorta    2. Essential hypertension    3. Hyperlipidemia LDL goal <70    4. PSVT (paroxysmal supraventricular tachycardia)    5. Paroxysmal atrial fibrillation         Plan:     Symptoms of intermittent aFib  BP OK today, pulse elevated  Rhythm irregular today    Continue aspirin 81 mg PO Daily  Continue Eliquis 5 mg PO BID  Continue metoprolol  Start flecainide 100mg PO BID - Educated on risks/benefits of medication   EKG in 1 week   Continue metoprolol   Schedule JEB/DCCV in 3 weeks     Continue other cardiac medications  Mediterranean Diet/Cardiovascular Exercise Program    Patient queried and all questions were answered.    F/u in 3 months to reassess      Signed:    Thanh Fuentes MD  12/14/2020 8:23 AM

## 2020-12-21 ENCOUNTER — CLINICAL SUPPORT (OUTPATIENT)
Dept: CARDIOLOGY | Facility: CLINIC | Age: 75
End: 2020-12-21
Payer: MEDICARE

## 2020-12-21 DIAGNOSIS — I48.0 PAROXYSMAL ATRIAL FIBRILLATION: ICD-10-CM

## 2020-12-21 PROCEDURE — 93000 EKG 12-LEAD: ICD-10-PCS | Mod: HCNC,S$GLB,, | Performed by: INTERNAL MEDICINE

## 2020-12-21 PROCEDURE — 93000 ELECTROCARDIOGRAM COMPLETE: CPT | Mod: HCNC,S$GLB,, | Performed by: INTERNAL MEDICINE

## 2020-12-26 ENCOUNTER — LAB VISIT (OUTPATIENT)
Dept: FAMILY MEDICINE | Facility: CLINIC | Age: 75
End: 2020-12-26
Payer: MEDICARE

## 2020-12-26 DIAGNOSIS — Z03.818 ENCOUNTER FOR OBSERVATION FOR SUSPECTED EXPOSURE TO OTHER BIOLOGICAL AGENTS RULED OUT: ICD-10-CM

## 2020-12-26 PROCEDURE — U0003 INFECTIOUS AGENT DETECTION BY NUCLEIC ACID (DNA OR RNA); SEVERE ACUTE RESPIRATORY SYNDROME CORONAVIRUS 2 (SARS-COV-2) (CORONAVIRUS DISEASE [COVID-19]), AMPLIFIED PROBE TECHNIQUE, MAKING USE OF HIGH THROUGHPUT TECHNOLOGIES AS DESCRIBED BY CMS-2020-01-R: HCPCS | Mod: HCNC

## 2020-12-27 LAB — SARS-COV-2 RNA RESP QL NAA+PROBE: NOT DETECTED

## 2020-12-29 ENCOUNTER — TELEPHONE (OUTPATIENT)
Dept: CARDIOLOGY | Facility: CLINIC | Age: 75
End: 2020-12-29

## 2020-12-29 DIAGNOSIS — I48.19 OTHER PERSISTENT ATRIAL FIBRILLATION: Primary | ICD-10-CM

## 2020-12-29 DIAGNOSIS — I48.0 PAROXYSMAL ATRIAL FIBRILLATION: ICD-10-CM

## 2020-12-29 NOTE — TELEPHONE ENCOUNTER
----- Message from Costa Garza sent at 12/29/2020  3:20 PM CST -----  Contact: pt at [phone]  Type: Needs Medical Advice  Who Called:  pt  Best Call Back Number: [phone]  Additional Information: pt is calling the office requesting a call back. Please call back and advise

## 2020-12-29 NOTE — TELEPHONE ENCOUNTER
SPOKE W/ PT / PHONE, STATES SINCE SHE STARTED THE FLECAINIDE 100 MG, SHE HAS C/O SPASM LIKE FEELING IN UPPER ABD. BUT ONLY WHEN WALKING,   /67  P 61 -- NSR    PLEASE ADVISE...  THANKS

## 2020-12-29 NOTE — TELEPHONE ENCOUNTER
Spoke to pt over the phone, appt for nuclear stress test was schedule for 1/8/21 at 7:45 am, per Dr Fuentes order . Instructions were given to pt . Pt verbalized understanding

## 2020-12-31 ENCOUNTER — NURSE TRIAGE (OUTPATIENT)
Dept: ADMINISTRATIVE | Facility: CLINIC | Age: 75
End: 2020-12-31

## 2020-12-31 DIAGNOSIS — I48.0 PAROXYSMAL ATRIAL FIBRILLATION: Primary | ICD-10-CM

## 2020-12-31 NOTE — TELEPHONE ENCOUNTER
Advised that providers are out of office today.   Patient is unsure of at what point they need to go back to the ER.    States yesterday she dropped of paperwork to be off work, asking if this has been completed, informed Dr. Dc Marino and Jun Johnson RN return to office Monday and they can address at this time.

## 2020-12-31 NOTE — TELEPHONE ENCOUNTER
Patient reporting that she is breathing fine and has concerns about [place]=059 - 104 when checking blood pressure.  Patient has atrial fibrillation. Patient was cardioverted 12/28/2020. Most recent blood pressures of 143/84 HR= 102 and 139/89 HR= 104. Dr. Fuentes, is cardiologist. Disposition see today in office.  Message routed high priority to Dr. Fuentes staff per patient/spouse request. Message also routed to PCP staff. Patient advised to call back to OOC# if MD staff does not call back or worsening symptoms.    Reason for Disposition   Age > 60 years    Additional Information   Negative: Passed out (i.e., fainted, collapsed and was not responding)   Negative: Shock suspected (e.g., cold/pale/clammy skin, too weak to stand, low BP, rapid pulse)   Negative: Difficult to awaken or acting confused (e.g., disoriented, slurred speech)   Negative: Unable to walk, or can only walk with assistance (e.g., requires support)   Negative: Visible sweat on face or sweat dripping down face   Negative: Received SHOCK from implantable cardiac defibrillator and has persisting symptoms (i.e., palpitations, lightheadedness)   Negative: Dizziness, lightheadedness, or weakness and heart beating very rapidly (e.g., > 140 / minute)   Negative: Dizziness, lightheadedness, or weakness and heart beating very slowly (e.g., < 50 / minute)   Negative: Sounds like a life-threatening emergency to the triager   Negative: Chest pain   Negative: Difficulty breathing   Negative: Dizziness, lightheadedness, or weakness   Negative: Heart beating very rapidly (e.g., > 140 / minute) and present now (Exception: during exercise)   Negative: Heart beating very slowly (e.g., < 50 / minute) (Exception: athlete)   Negative: New or worsened shortness of breath with activity (dyspnea on exertion)   Negative: Patient sounds very sick or weak to the triager   Negative: Wearing a 'Holter monitor' or 'cardiac event monitor'   Negative:  Received SHOCK from implantable cardiac defibrillator (and now feels well)   Negative: Heart beating very rapidly (e.g., > 140 / minute) and not present now (Exception: during exercise)   Negative: Skipped or extra beat(s) and increases with exercise or exertion   Negative: Skipped or extra beat(s) and occurs 4 or more times per minute   Negative: History of heart disease (i.e., heart attack, bypass surgery, angina, angioplasty)    Protocols used: HEART RATE AND HEARTBEAT [id number]

## 2021-01-01 PROBLEM — R07.9 CHEST PAIN: Status: ACTIVE | Noted: 2021-01-01

## 2021-01-05 ENCOUNTER — OFFICE VISIT (OUTPATIENT)
Dept: FAMILY MEDICINE | Facility: CLINIC | Age: 76
End: 2021-01-05
Payer: MEDICARE

## 2021-01-05 ENCOUNTER — LAB VISIT (OUTPATIENT)
Dept: LAB | Facility: HOSPITAL | Age: 76
End: 2021-01-05
Attending: INTERNAL MEDICINE
Payer: MEDICARE

## 2021-01-05 VITALS
OXYGEN SATURATION: 97 % | HEART RATE: 77 BPM | DIASTOLIC BLOOD PRESSURE: 84 MMHG | SYSTOLIC BLOOD PRESSURE: 136 MMHG | WEIGHT: 218.69 LBS | BODY MASS INDEX: 38.74 KG/M2

## 2021-01-05 DIAGNOSIS — K44.9 HIATAL HERNIA: ICD-10-CM

## 2021-01-05 DIAGNOSIS — K21.9 GASTROESOPHAGEAL REFLUX DISEASE, UNSPECIFIED WHETHER ESOPHAGITIS PRESENT: ICD-10-CM

## 2021-01-05 DIAGNOSIS — J84.9 ILD (INTERSTITIAL LUNG DISEASE): ICD-10-CM

## 2021-01-05 DIAGNOSIS — J44.9 CHRONIC OBSTRUCTIVE PULMONARY DISEASE, UNSPECIFIED COPD TYPE: ICD-10-CM

## 2021-01-05 DIAGNOSIS — R10.13 EPIGASTRIC PAIN: Primary | ICD-10-CM

## 2021-01-05 DIAGNOSIS — I48.0 PAROXYSMAL ATRIAL FIBRILLATION: ICD-10-CM

## 2021-01-05 DIAGNOSIS — C34.12 PRIMARY MALIGNANT NEOPLASM OF LEFT UPPER LOBE OF LUNG: ICD-10-CM

## 2021-01-05 DIAGNOSIS — D86.0 SARCOIDOSIS OF LUNG: ICD-10-CM

## 2021-01-05 LAB
CK SERPL-CCNC: 52 U/L (ref 20–180)
RHEUMATOID FACT SERPL-ACNC: <10 IU/ML (ref 0–15)

## 2021-01-05 PROCEDURE — 1159F PR MEDICATION LIST DOCUMENTED IN MEDICAL RECORD: ICD-10-PCS | Mod: S$GLB,,, | Performed by: PHYSICIAN ASSISTANT

## 2021-01-05 PROCEDURE — 99214 OFFICE O/P EST MOD 30 MIN: CPT | Mod: S$GLB,,, | Performed by: PHYSICIAN ASSISTANT

## 2021-01-05 PROCEDURE — 1159F MED LIST DOCD IN RCRD: CPT | Mod: S$GLB,,, | Performed by: PHYSICIAN ASSISTANT

## 2021-01-05 PROCEDURE — 82550 ASSAY OF CK (CPK): CPT

## 2021-01-05 PROCEDURE — 99214 PR OFFICE/OUTPT VISIT, EST, LEVL IV, 30-39 MIN: ICD-10-PCS | Mod: S$GLB,,, | Performed by: PHYSICIAN ASSISTANT

## 2021-01-05 PROCEDURE — 1126F AMNT PAIN NOTED NONE PRSNT: CPT | Mod: S$GLB,,, | Performed by: PHYSICIAN ASSISTANT

## 2021-01-05 PROCEDURE — 83615 LACTATE (LD) (LDH) ENZYME: CPT

## 2021-01-05 PROCEDURE — 86038 ANTINUCLEAR ANTIBODIES: CPT

## 2021-01-05 PROCEDURE — 36415 COLL VENOUS BLD VENIPUNCTURE: CPT | Mod: PO

## 2021-01-05 PROCEDURE — 3079F DIAST BP 80-89 MM HG: CPT | Mod: CPTII,S$GLB,, | Performed by: PHYSICIAN ASSISTANT

## 2021-01-05 PROCEDURE — 3079F PR MOST RECENT DIASTOLIC BLOOD PRESSURE 80-89 MM HG: ICD-10-PCS | Mod: CPTII,S$GLB,, | Performed by: PHYSICIAN ASSISTANT

## 2021-01-05 PROCEDURE — 3075F PR MOST RECENT SYSTOLIC BLOOD PRESS GE 130-139MM HG: ICD-10-PCS | Mod: CPTII,S$GLB,, | Performed by: PHYSICIAN ASSISTANT

## 2021-01-05 PROCEDURE — 3075F SYST BP GE 130 - 139MM HG: CPT | Mod: CPTII,S$GLB,, | Performed by: PHYSICIAN ASSISTANT

## 2021-01-05 PROCEDURE — 99999 PR PBB SHADOW E&M-EST. PATIENT-LVL V: ICD-10-PCS | Mod: PBBFAC,,, | Performed by: PHYSICIAN ASSISTANT

## 2021-01-05 PROCEDURE — 86606 ASPERGILLUS ANTIBODY: CPT

## 2021-01-05 PROCEDURE — 99499 RISK ADDL DX/OHS AUDIT: ICD-10-PCS | Mod: HCNC,S$GLB,, | Performed by: PHYSICIAN ASSISTANT

## 2021-01-05 PROCEDURE — 86235 NUCLEAR ANTIGEN ANTIBODY: CPT | Mod: 59

## 2021-01-05 PROCEDURE — 86255 FLUORESCENT ANTIBODY SCREEN: CPT

## 2021-01-05 PROCEDURE — 1126F PR PAIN SEVERITY QUANTIFIED, NO PAIN PRESENT: ICD-10-PCS | Mod: S$GLB,,, | Performed by: PHYSICIAN ASSISTANT

## 2021-01-05 PROCEDURE — 82164 ANGIOTENSIN I ENZYME TEST: CPT

## 2021-01-05 PROCEDURE — 3008F BODY MASS INDEX DOCD: CPT | Mod: CPTII,S$GLB,, | Performed by: PHYSICIAN ASSISTANT

## 2021-01-05 PROCEDURE — 86431 RHEUMATOID FACTOR QUANT: CPT

## 2021-01-05 PROCEDURE — 86235 NUCLEAR ANTIGEN ANTIBODY: CPT

## 2021-01-05 PROCEDURE — 99999 PR PBB SHADOW E&M-EST. PATIENT-LVL V: CPT | Mod: PBBFAC,,, | Performed by: PHYSICIAN ASSISTANT

## 2021-01-05 PROCEDURE — 3008F PR BODY MASS INDEX (BMI) DOCUMENTED: ICD-10-PCS | Mod: CPTII,S$GLB,, | Performed by: PHYSICIAN ASSISTANT

## 2021-01-05 PROCEDURE — 99499 UNLISTED E&M SERVICE: CPT | Mod: HCNC,S$GLB,, | Performed by: PHYSICIAN ASSISTANT

## 2021-01-05 RX ORDER — OMEPRAZOLE 40 MG/1
40 CAPSULE, DELAYED RELEASE ORAL
Qty: 90 CAPSULE | Refills: 3 | Status: SHIPPED | OUTPATIENT
Start: 2021-01-05 | End: 2021-11-04 | Stop reason: SDUPTHER

## 2021-01-06 LAB
ANA SER QL IF: NORMAL
LDH SERPL L TO P-CCNC: 255 U/L (ref 110–260)

## 2021-01-07 ENCOUNTER — PATIENT MESSAGE (OUTPATIENT)
Dept: FAMILY MEDICINE | Facility: CLINIC | Age: 76
End: 2021-01-07

## 2021-01-07 ENCOUNTER — IMMUNIZATION (OUTPATIENT)
Dept: PHARMACY | Facility: CLINIC | Age: 76
End: 2021-01-07
Payer: MEDICARE

## 2021-01-07 ENCOUNTER — HOSPITAL ENCOUNTER (OUTPATIENT)
Dept: RADIOLOGY | Facility: HOSPITAL | Age: 76
Discharge: HOME | End: 2021-01-07
Attending: PHYSICIAN ASSISTANT
Payer: MEDICARE

## 2021-01-07 DIAGNOSIS — Z23 NEED FOR VACCINATION: ICD-10-CM

## 2021-01-07 DIAGNOSIS — R10.13 EPIGASTRIC PAIN: ICD-10-CM

## 2021-01-07 LAB
ACE SERPL-CCNC: 64 U/L (ref 16–85)
ANCA AB TITR SER IF: NORMAL TITER
ENA SCL70 AB SER-ACNC: 3 UNITS
P-ANCA TITR SER IF: NORMAL TITER

## 2021-01-07 PROCEDURE — 74176 CT ABD & PELVIS W/O CONTRAST: CPT | Mod: TC,PO

## 2021-01-07 PROCEDURE — A9698 NON-RAD CONTRAST MATERIALNOC: HCPCS | Mod: PO | Performed by: PHYSICIAN ASSISTANT

## 2021-01-07 PROCEDURE — 25500020 PHARM REV CODE 255: Mod: PO | Performed by: PHYSICIAN ASSISTANT

## 2021-01-07 PROCEDURE — 74176 CT ABDOMEN PELVIS WITHOUT CONTRAST: ICD-10-PCS | Mod: 26,,, | Performed by: RADIOLOGY

## 2021-01-07 PROCEDURE — 74176 CT ABD & PELVIS W/O CONTRAST: CPT | Mod: 26,,, | Performed by: RADIOLOGY

## 2021-01-07 RX ADMIN — IOHEXOL 1000 ML: 12 SOLUTION ORAL at 09:01

## 2021-01-08 ENCOUNTER — PATIENT OUTREACH (OUTPATIENT)
Dept: ADMINISTRATIVE | Facility: HOSPITAL | Age: 76
End: 2021-01-08

## 2021-01-09 LAB — ENA JO1 AB SER IA-ACNC: NORMAL AI

## 2021-01-11 ENCOUNTER — PATIENT OUTREACH (OUTPATIENT)
Dept: ADMINISTRATIVE | Facility: OTHER | Age: 76
End: 2021-01-11

## 2021-01-11 LAB
A FUMIGATUS1 AB SER QL ID: DETECTED
A FUMIGATUS6 AB SER QL ID: ABNORMAL
A PULLULANS AB SER QL ID: ABNORMAL
PIGEON SERUM AB QL ID: ABNORMAL
S RECTIVIRGULA AB SER QL ID: DETECTED
T VULGARIS1 AB SER QL ID: ABNORMAL

## 2021-01-12 ENCOUNTER — TELEPHONE (OUTPATIENT)
Dept: GASTROENTEROLOGY | Facility: CLINIC | Age: 76
End: 2021-01-12

## 2021-01-12 ENCOUNTER — OFFICE VISIT (OUTPATIENT)
Dept: GASTROENTEROLOGY | Facility: CLINIC | Age: 76
End: 2021-01-12
Payer: MEDICARE

## 2021-01-12 VITALS — BODY MASS INDEX: 37.42 KG/M2 | HEIGHT: 63 IN | WEIGHT: 211.19 LBS | RESPIRATION RATE: 18 BRPM

## 2021-01-12 DIAGNOSIS — K21.9 GASTROESOPHAGEAL REFLUX DISEASE, UNSPECIFIED WHETHER ESOPHAGITIS PRESENT: ICD-10-CM

## 2021-01-12 DIAGNOSIS — R07.89 ATYPICAL CHEST PAIN: Primary | ICD-10-CM

## 2021-01-12 DIAGNOSIS — Z92.3 HISTORY OF RADIATION THERAPY: ICD-10-CM

## 2021-01-12 DIAGNOSIS — C34.12 MALIGNANT NEOPLASM OF UPPER LOBE OF LEFT LUNG: ICD-10-CM

## 2021-01-12 DIAGNOSIS — Z12.11 SPECIAL SCREENING FOR MALIGNANT NEOPLASM OF COLON: Primary | ICD-10-CM

## 2021-01-12 DIAGNOSIS — Z01.812 PRE-PROCEDURE LAB EXAM: ICD-10-CM

## 2021-01-12 PROCEDURE — 1126F PR PAIN SEVERITY QUANTIFIED, NO PAIN PRESENT: ICD-10-PCS | Mod: HCNC,S$GLB,, | Performed by: INTERNAL MEDICINE

## 2021-01-12 PROCEDURE — 1159F MED LIST DOCD IN RCRD: CPT | Mod: HCNC,S$GLB,, | Performed by: INTERNAL MEDICINE

## 2021-01-12 PROCEDURE — 3288F FALL RISK ASSESSMENT DOCD: CPT | Mod: HCNC,CPTII,S$GLB, | Performed by: INTERNAL MEDICINE

## 2021-01-12 PROCEDURE — 3008F PR BODY MASS INDEX (BMI) DOCUMENTED: ICD-10-PCS | Mod: HCNC,CPTII,S$GLB, | Performed by: INTERNAL MEDICINE

## 2021-01-12 PROCEDURE — 3008F BODY MASS INDEX DOCD: CPT | Mod: HCNC,CPTII,S$GLB, | Performed by: INTERNAL MEDICINE

## 2021-01-12 PROCEDURE — 1101F PT FALLS ASSESS-DOCD LE1/YR: CPT | Mod: HCNC,CPTII,S$GLB, | Performed by: INTERNAL MEDICINE

## 2021-01-12 PROCEDURE — 99999 PR PBB SHADOW E&M-EST. PATIENT-LVL IV: CPT | Mod: PBBFAC,HCNC,, | Performed by: INTERNAL MEDICINE

## 2021-01-12 PROCEDURE — 1111F PR DISCHARGE MEDS RECONCILED W/ CURRENT OUTPATIENT MED LIST: ICD-10-PCS | Mod: HCNC,CPTII,S$GLB, | Performed by: INTERNAL MEDICINE

## 2021-01-12 PROCEDURE — 3288F PR FALLS RISK ASSESSMENT DOCUMENTED: ICD-10-PCS | Mod: HCNC,CPTII,S$GLB, | Performed by: INTERNAL MEDICINE

## 2021-01-12 PROCEDURE — 1126F AMNT PAIN NOTED NONE PRSNT: CPT | Mod: HCNC,S$GLB,, | Performed by: INTERNAL MEDICINE

## 2021-01-12 PROCEDURE — 1111F DSCHRG MED/CURRENT MED MERGE: CPT | Mod: HCNC,CPTII,S$GLB, | Performed by: INTERNAL MEDICINE

## 2021-01-12 PROCEDURE — 99999 PR PBB SHADOW E&M-EST. PATIENT-LVL IV: ICD-10-PCS | Mod: PBBFAC,HCNC,, | Performed by: INTERNAL MEDICINE

## 2021-01-12 PROCEDURE — 1101F PR PT FALLS ASSESS DOC 0-1 FALLS W/OUT INJ PAST YR: ICD-10-PCS | Mod: HCNC,CPTII,S$GLB, | Performed by: INTERNAL MEDICINE

## 2021-01-12 PROCEDURE — 1159F PR MEDICATION LIST DOCUMENTED IN MEDICAL RECORD: ICD-10-PCS | Mod: HCNC,S$GLB,, | Performed by: INTERNAL MEDICINE

## 2021-01-12 PROCEDURE — 99213 OFFICE O/P EST LOW 20 MIN: CPT | Mod: HCNC,S$GLB,, | Performed by: INTERNAL MEDICINE

## 2021-01-12 PROCEDURE — 99213 PR OFFICE/OUTPT VISIT, EST, LEVL III, 20-29 MIN: ICD-10-PCS | Mod: HCNC,S$GLB,, | Performed by: INTERNAL MEDICINE

## 2021-01-12 RX ORDER — SUCRALFATE 1 G/1
1 TABLET ORAL
Qty: 120 TABLET | Refills: 11 | Status: ON HOLD | OUTPATIENT
Start: 2021-01-12 | End: 2021-02-19 | Stop reason: SDUPTHER

## 2021-01-13 ENCOUNTER — TELEPHONE (OUTPATIENT)
Dept: GASTROENTEROLOGY | Facility: CLINIC | Age: 76
End: 2021-01-13

## 2021-01-13 ENCOUNTER — PATIENT MESSAGE (OUTPATIENT)
Dept: ENDOSCOPY | Facility: HOSPITAL | Age: 76
End: 2021-01-13

## 2021-01-14 ENCOUNTER — PATIENT MESSAGE (OUTPATIENT)
Dept: GASTROENTEROLOGY | Facility: CLINIC | Age: 76
End: 2021-01-14

## 2021-01-14 ENCOUNTER — TELEPHONE (OUTPATIENT)
Dept: GASTROENTEROLOGY | Facility: CLINIC | Age: 76
End: 2021-01-14

## 2021-01-28 ENCOUNTER — OFFICE VISIT (OUTPATIENT)
Dept: CARDIOLOGY | Facility: CLINIC | Age: 76
End: 2021-01-28
Payer: MEDICARE

## 2021-01-28 VITALS
WEIGHT: 213.63 LBS | HEART RATE: 65 BPM | SYSTOLIC BLOOD PRESSURE: 170 MMHG | HEIGHT: 63 IN | BODY MASS INDEX: 37.85 KG/M2 | DIASTOLIC BLOOD PRESSURE: 79 MMHG

## 2021-01-28 DIAGNOSIS — I47.10 PSVT (PAROXYSMAL SUPRAVENTRICULAR TACHYCARDIA): Primary | ICD-10-CM

## 2021-01-28 DIAGNOSIS — I70.0 ATHEROSCLEROSIS OF AORTA: Primary | ICD-10-CM

## 2021-01-28 DIAGNOSIS — I48.0 PAROXYSMAL ATRIAL FIBRILLATION: ICD-10-CM

## 2021-01-28 DIAGNOSIS — E78.5 HYPERLIPIDEMIA LDL GOAL <70: ICD-10-CM

## 2021-01-28 DIAGNOSIS — I10 ESSENTIAL HYPERTENSION: ICD-10-CM

## 2021-01-28 DIAGNOSIS — I47.10 PSVT (PAROXYSMAL SUPRAVENTRICULAR TACHYCARDIA): ICD-10-CM

## 2021-01-28 PROCEDURE — 99999 PR PBB SHADOW E&M-EST. PATIENT-LVL III: CPT | Mod: PBBFAC,,, | Performed by: INTERNAL MEDICINE

## 2021-01-28 PROCEDURE — 3078F PR MOST RECENT DIASTOLIC BLOOD PRESSURE < 80 MM HG: ICD-10-PCS | Mod: CPTII,S$GLB,, | Performed by: INTERNAL MEDICINE

## 2021-01-28 PROCEDURE — 1159F MED LIST DOCD IN RCRD: CPT | Mod: S$GLB,,, | Performed by: INTERNAL MEDICINE

## 2021-01-28 PROCEDURE — 3008F PR BODY MASS INDEX (BMI) DOCUMENTED: ICD-10-PCS | Mod: CPTII,S$GLB,, | Performed by: INTERNAL MEDICINE

## 2021-01-28 PROCEDURE — 3008F BODY MASS INDEX DOCD: CPT | Mod: CPTII,S$GLB,, | Performed by: INTERNAL MEDICINE

## 2021-01-28 PROCEDURE — 1126F AMNT PAIN NOTED NONE PRSNT: CPT | Mod: S$GLB,,, | Performed by: INTERNAL MEDICINE

## 2021-01-28 PROCEDURE — 99214 PR OFFICE/OUTPT VISIT, EST, LEVL IV, 30-39 MIN: ICD-10-PCS | Mod: S$GLB,,, | Performed by: INTERNAL MEDICINE

## 2021-01-28 PROCEDURE — 3077F SYST BP >= 140 MM HG: CPT | Mod: CPTII,S$GLB,, | Performed by: INTERNAL MEDICINE

## 2021-01-28 PROCEDURE — 99214 OFFICE O/P EST MOD 30 MIN: CPT | Mod: S$GLB,,, | Performed by: INTERNAL MEDICINE

## 2021-01-28 PROCEDURE — 93000 ELECTROCARDIOGRAM COMPLETE: CPT | Mod: S$GLB,,, | Performed by: INTERNAL MEDICINE

## 2021-01-28 PROCEDURE — 1126F PR PAIN SEVERITY QUANTIFIED, NO PAIN PRESENT: ICD-10-PCS | Mod: S$GLB,,, | Performed by: INTERNAL MEDICINE

## 2021-01-28 PROCEDURE — 3077F PR MOST RECENT SYSTOLIC BLOOD PRESSURE >= 140 MM HG: ICD-10-PCS | Mod: CPTII,S$GLB,, | Performed by: INTERNAL MEDICINE

## 2021-01-28 PROCEDURE — 93000 EKG 12-LEAD: ICD-10-PCS | Mod: S$GLB,,, | Performed by: INTERNAL MEDICINE

## 2021-01-28 PROCEDURE — 1159F PR MEDICATION LIST DOCUMENTED IN MEDICAL RECORD: ICD-10-PCS | Mod: S$GLB,,, | Performed by: INTERNAL MEDICINE

## 2021-01-28 PROCEDURE — 99999 PR PBB SHADOW E&M-EST. PATIENT-LVL III: ICD-10-PCS | Mod: PBBFAC,,, | Performed by: INTERNAL MEDICINE

## 2021-01-28 PROCEDURE — 3078F DIAST BP <80 MM HG: CPT | Mod: CPTII,S$GLB,, | Performed by: INTERNAL MEDICINE

## 2021-01-28 RX ORDER — FLECAINIDE ACETATE 150 MG/1
150 TABLET ORAL EVERY 12 HOURS
Qty: 180 TABLET | Refills: 3 | Status: SHIPPED | OUTPATIENT
Start: 2021-01-28 | End: 2021-08-03 | Stop reason: SDUPTHER

## 2021-02-04 ENCOUNTER — IMMUNIZATION (OUTPATIENT)
Dept: PHARMACY | Facility: CLINIC | Age: 76
End: 2021-02-04
Payer: MEDICARE

## 2021-02-04 DIAGNOSIS — Z23 NEED FOR VACCINATION: Primary | ICD-10-CM

## 2021-02-12 ENCOUNTER — HOSPITAL ENCOUNTER (OUTPATIENT)
Dept: RADIOLOGY | Facility: HOSPITAL | Age: 76
Discharge: HOME | End: 2021-02-12
Attending: RADIOLOGY
Payer: MEDICARE

## 2021-02-12 ENCOUNTER — OFFICE VISIT (OUTPATIENT)
Dept: RADIATION ONCOLOGY | Facility: CLINIC | Age: 76
End: 2021-02-12
Payer: MEDICARE

## 2021-02-12 VITALS
TEMPERATURE: 98 F | HEIGHT: 63 IN | RESPIRATION RATE: 18 BRPM | HEART RATE: 75 BPM | SYSTOLIC BLOOD PRESSURE: 184 MMHG | WEIGHT: 209 LBS | OXYGEN SATURATION: 95 % | DIASTOLIC BLOOD PRESSURE: 87 MMHG | BODY MASS INDEX: 37.03 KG/M2

## 2021-02-12 DIAGNOSIS — Z85.118 PERSONAL HISTORY OF LUNG CANCER: ICD-10-CM

## 2021-02-12 DIAGNOSIS — C34.90 MALIGNANT NEOPLASM OF UNSPECIFIED PART OF UNSPECIFIED BRONCHUS OR LUNG: ICD-10-CM

## 2021-02-12 DIAGNOSIS — C34.12 MALIGNANT NEOPLASM OF UPPER LOBE, LEFT BRONCHUS OR LUNG: ICD-10-CM

## 2021-02-12 DIAGNOSIS — Z85.118 PERSONAL HISTORY OF LUNG CANCER: Primary | ICD-10-CM

## 2021-02-12 DIAGNOSIS — C34.12 PRIMARY MALIGNANT NEOPLASM OF LEFT UPPER LOBE OF LUNG: ICD-10-CM

## 2021-02-12 PROCEDURE — 99213 PR OFFICE/OUTPT VISIT, EST, LEVL III, 20-29 MIN: ICD-10-PCS | Mod: S$GLB,,, | Performed by: RADIOLOGY

## 2021-02-12 PROCEDURE — 3077F PR MOST RECENT SYSTOLIC BLOOD PRESSURE >= 140 MM HG: ICD-10-PCS | Mod: CPTII,S$GLB,, | Performed by: RADIOLOGY

## 2021-02-12 PROCEDURE — 99999 PR PBB SHADOW E&M-EST. PATIENT-LVL IV: ICD-10-PCS | Mod: PBBFAC,,, | Performed by: RADIOLOGY

## 2021-02-12 PROCEDURE — 99213 OFFICE O/P EST LOW 20 MIN: CPT | Mod: S$GLB,,, | Performed by: RADIOLOGY

## 2021-02-12 PROCEDURE — 1159F MED LIST DOCD IN RCRD: CPT | Mod: S$GLB,,, | Performed by: RADIOLOGY

## 2021-02-12 PROCEDURE — 3288F PR FALLS RISK ASSESSMENT DOCUMENTED: ICD-10-PCS | Mod: CPTII,S$GLB,, | Performed by: RADIOLOGY

## 2021-02-12 PROCEDURE — 1126F AMNT PAIN NOTED NONE PRSNT: CPT | Mod: S$GLB,,, | Performed by: RADIOLOGY

## 2021-02-12 PROCEDURE — 1101F PR PT FALLS ASSESS DOC 0-1 FALLS W/OUT INJ PAST YR: ICD-10-PCS | Mod: CPTII,S$GLB,, | Performed by: RADIOLOGY

## 2021-02-12 PROCEDURE — 71260 CT THORAX DX C+: CPT | Mod: TC

## 2021-02-12 PROCEDURE — 3288F FALL RISK ASSESSMENT DOCD: CPT | Mod: CPTII,S$GLB,, | Performed by: RADIOLOGY

## 2021-02-12 PROCEDURE — 99999 PR PBB SHADOW E&M-EST. PATIENT-LVL IV: CPT | Mod: PBBFAC,,, | Performed by: RADIOLOGY

## 2021-02-12 PROCEDURE — 3079F PR MOST RECENT DIASTOLIC BLOOD PRESSURE 80-89 MM HG: ICD-10-PCS | Mod: CPTII,S$GLB,, | Performed by: RADIOLOGY

## 2021-02-12 PROCEDURE — 3079F DIAST BP 80-89 MM HG: CPT | Mod: CPTII,S$GLB,, | Performed by: RADIOLOGY

## 2021-02-12 PROCEDURE — 25500020 PHARM REV CODE 255: Performed by: RADIOLOGY

## 2021-02-12 PROCEDURE — 71260 CT THORAX DX C+: CPT | Mod: 26,,, | Performed by: RADIOLOGY

## 2021-02-12 PROCEDURE — 1159F PR MEDICATION LIST DOCUMENTED IN MEDICAL RECORD: ICD-10-PCS | Mod: S$GLB,,, | Performed by: RADIOLOGY

## 2021-02-12 PROCEDURE — 1126F PR PAIN SEVERITY QUANTIFIED, NO PAIN PRESENT: ICD-10-PCS | Mod: S$GLB,,, | Performed by: RADIOLOGY

## 2021-02-12 PROCEDURE — 3008F BODY MASS INDEX DOCD: CPT | Mod: CPTII,S$GLB,, | Performed by: RADIOLOGY

## 2021-02-12 PROCEDURE — 3077F SYST BP >= 140 MM HG: CPT | Mod: CPTII,S$GLB,, | Performed by: RADIOLOGY

## 2021-02-12 PROCEDURE — 1101F PT FALLS ASSESS-DOCD LE1/YR: CPT | Mod: CPTII,S$GLB,, | Performed by: RADIOLOGY

## 2021-02-12 PROCEDURE — 3008F PR BODY MASS INDEX (BMI) DOCUMENTED: ICD-10-PCS | Mod: CPTII,S$GLB,, | Performed by: RADIOLOGY

## 2021-02-12 PROCEDURE — 71260 CT CHEST WITH CONTRAST: ICD-10-PCS | Mod: 26,,, | Performed by: RADIOLOGY

## 2021-02-12 PROCEDURE — 99499 UNLISTED E&M SERVICE: CPT | Mod: HCNC,S$GLB,, | Performed by: RADIOLOGY

## 2021-02-12 PROCEDURE — 99499 RISK ADDL DX/OHS AUDIT: ICD-10-PCS | Mod: HCNC,S$GLB,, | Performed by: RADIOLOGY

## 2021-02-12 RX ADMIN — IOHEXOL 75 ML: 350 INJECTION, SOLUTION INTRAVENOUS at 11:02

## 2021-02-17 ENCOUNTER — LAB VISIT (OUTPATIENT)
Dept: FAMILY MEDICINE | Facility: CLINIC | Age: 76
End: 2021-02-17
Payer: MEDICARE

## 2021-02-17 DIAGNOSIS — Z01.812 PRE-PROCEDURE LAB EXAM: ICD-10-CM

## 2021-02-17 PROCEDURE — U0003 INFECTIOUS AGENT DETECTION BY NUCLEIC ACID (DNA OR RNA); SEVERE ACUTE RESPIRATORY SYNDROME CORONAVIRUS 2 (SARS-COV-2) (CORONAVIRUS DISEASE [COVID-19]), AMPLIFIED PROBE TECHNIQUE, MAKING USE OF HIGH THROUGHPUT TECHNOLOGIES AS DESCRIBED BY CMS-2020-01-R: HCPCS

## 2021-02-17 PROCEDURE — U0005 INFEC AGEN DETEC AMPLI PROBE: HCPCS

## 2021-02-18 ENCOUNTER — ANESTHESIA EVENT (OUTPATIENT)
Dept: ENDOSCOPY | Facility: HOSPITAL | Age: 76
End: 2021-02-18
Payer: MEDICARE

## 2021-02-18 LAB — SARS-COV-2 RNA RESP QL NAA+PROBE: NOT DETECTED

## 2021-02-19 ENCOUNTER — NURSE TRIAGE (OUTPATIENT)
Dept: ADMINISTRATIVE | Facility: CLINIC | Age: 76
End: 2021-02-19

## 2021-02-19 ENCOUNTER — ANESTHESIA (OUTPATIENT)
Dept: ENDOSCOPY | Facility: HOSPITAL | Age: 76
End: 2021-02-19
Payer: MEDICARE

## 2021-02-19 ENCOUNTER — HOSPITAL ENCOUNTER (OUTPATIENT)
Facility: HOSPITAL | Age: 76
Discharge: HOME | End: 2021-02-19
Attending: INTERNAL MEDICINE | Admitting: INTERNAL MEDICINE
Payer: MEDICARE

## 2021-02-19 VITALS
HEART RATE: 59 BPM | SYSTOLIC BLOOD PRESSURE: 139 MMHG | BODY MASS INDEX: 37.03 KG/M2 | RESPIRATION RATE: 12 BRPM | DIASTOLIC BLOOD PRESSURE: 73 MMHG | WEIGHT: 209 LBS | HEIGHT: 63 IN | TEMPERATURE: 97 F | OXYGEN SATURATION: 100 %

## 2021-02-19 DIAGNOSIS — R10.13 EPIGASTRIC PAIN: ICD-10-CM

## 2021-02-19 PROCEDURE — D9220A PRA ANESTHESIA: Mod: CRNA,,, | Performed by: NURSE ANESTHETIST, CERTIFIED REGISTERED

## 2021-02-19 PROCEDURE — 88305 TISSUE EXAM BY PATHOLOGIST: ICD-10-PCS | Mod: 26,,, | Performed by: PATHOLOGY

## 2021-02-19 PROCEDURE — 43239 EGD BIOPSY SINGLE/MULTIPLE: CPT | Mod: PO | Performed by: INTERNAL MEDICINE

## 2021-02-19 PROCEDURE — 43239 PR EGD, FLEX, W/BIOPSY, SGL/MULTI: ICD-10-PCS | Mod: 59,,, | Performed by: INTERNAL MEDICINE

## 2021-02-19 PROCEDURE — 43248 PR EGD, FLEX, W/DILATION OVER GUIDEWIRE: ICD-10-PCS | Mod: ,,, | Performed by: INTERNAL MEDICINE

## 2021-02-19 PROCEDURE — D9220A PRA ANESTHESIA: ICD-10-PCS | Mod: CRNA,,, | Performed by: NURSE ANESTHETIST, CERTIFIED REGISTERED

## 2021-02-19 PROCEDURE — 88342 IMHCHEM/IMCYTCHM 1ST ANTB: CPT | Performed by: PATHOLOGY

## 2021-02-19 PROCEDURE — 37000008 HC ANESTHESIA 1ST 15 MINUTES: Mod: PO | Performed by: INTERNAL MEDICINE

## 2021-02-19 PROCEDURE — D9220A PRA ANESTHESIA: ICD-10-PCS | Mod: ANES,,, | Performed by: ANESTHESIOLOGY

## 2021-02-19 PROCEDURE — 37000009 HC ANESTHESIA EA ADD 15 MINS: Mod: PO | Performed by: INTERNAL MEDICINE

## 2021-02-19 PROCEDURE — 88342 CHG IMMUNOCYTOCHEMISTRY: ICD-10-PCS | Mod: 26,,, | Performed by: PATHOLOGY

## 2021-02-19 PROCEDURE — 63600175 PHARM REV CODE 636 W HCPCS: Mod: PO | Performed by: NURSE ANESTHETIST, CERTIFIED REGISTERED

## 2021-02-19 PROCEDURE — 43248 EGD GUIDE WIRE INSERTION: CPT | Mod: PO | Performed by: INTERNAL MEDICINE

## 2021-02-19 PROCEDURE — 43248 EGD GUIDE WIRE INSERTION: CPT | Mod: ,,, | Performed by: INTERNAL MEDICINE

## 2021-02-19 PROCEDURE — D9220A PRA ANESTHESIA: Mod: ANES,,, | Performed by: ANESTHESIOLOGY

## 2021-02-19 PROCEDURE — 27201012 HC FORCEPS, HOT/COLD, DISP: Mod: PO | Performed by: INTERNAL MEDICINE

## 2021-02-19 PROCEDURE — 63600175 PHARM REV CODE 636 W HCPCS: Mod: PO | Performed by: INTERNAL MEDICINE

## 2021-02-19 PROCEDURE — 88342 IMHCHEM/IMCYTCHM 1ST ANTB: CPT | Mod: 26,,, | Performed by: PATHOLOGY

## 2021-02-19 PROCEDURE — 88305 TISSUE EXAM BY PATHOLOGIST: CPT | Performed by: PATHOLOGY

## 2021-02-19 PROCEDURE — 43239 EGD BIOPSY SINGLE/MULTIPLE: CPT | Mod: 59,,, | Performed by: INTERNAL MEDICINE

## 2021-02-19 PROCEDURE — 88305 TISSUE EXAM BY PATHOLOGIST: CPT | Mod: 26,,, | Performed by: PATHOLOGY

## 2021-02-19 PROCEDURE — 25000003 PHARM REV CODE 250: Mod: PO | Performed by: NURSE ANESTHETIST, CERTIFIED REGISTERED

## 2021-02-19 RX ORDER — LIDOCAINE HCL/PF 100 MG/5ML
SYRINGE (ML) INTRAVENOUS
Status: DISCONTINUED | OUTPATIENT
Start: 2021-02-19 | End: 2021-02-19

## 2021-02-19 RX ORDER — FENTANYL CITRATE 50 UG/ML
INJECTION, SOLUTION INTRAMUSCULAR; INTRAVENOUS
Status: DISCONTINUED | OUTPATIENT
Start: 2021-02-19 | End: 2021-02-19

## 2021-02-19 RX ORDER — PROPOFOL 10 MG/ML
VIAL (ML) INTRAVENOUS
Status: DISCONTINUED | OUTPATIENT
Start: 2021-02-19 | End: 2021-02-19

## 2021-02-19 RX ORDER — SUCRALFATE 1 G/1
1 TABLET ORAL
Qty: 360 TABLET | Refills: 3 | Status: SHIPPED | OUTPATIENT
Start: 2021-02-19 | End: 2021-08-03

## 2021-02-19 RX ORDER — SODIUM CHLORIDE, SODIUM LACTATE, POTASSIUM CHLORIDE, CALCIUM CHLORIDE 600; 310; 30; 20 MG/100ML; MG/100ML; MG/100ML; MG/100ML
INJECTION, SOLUTION INTRAVENOUS CONTINUOUS
Status: DISCONTINUED | OUTPATIENT
Start: 2021-02-19 | End: 2021-02-19 | Stop reason: HOSPADM

## 2021-02-19 RX ADMIN — PROPOFOL 30 MG: 10 INJECTION, EMULSION INTRAVENOUS at 08:02

## 2021-02-19 RX ADMIN — LIDOCAINE HYDROCHLORIDE 100 MG: 20 INJECTION, SOLUTION INTRAVENOUS at 08:02

## 2021-02-19 RX ADMIN — PROPOFOL 50 MG: 10 INJECTION, EMULSION INTRAVENOUS at 08:02

## 2021-02-19 RX ADMIN — SODIUM CHLORIDE, SODIUM LACTATE, POTASSIUM CHLORIDE, AND CALCIUM CHLORIDE: .6; .31; .03; .02 INJECTION, SOLUTION INTRAVENOUS at 07:02

## 2021-02-22 ENCOUNTER — HOSPITAL ENCOUNTER (OUTPATIENT)
Dept: RADIOLOGY | Facility: HOSPITAL | Age: 76
Discharge: HOME | End: 2021-02-22
Attending: RADIOLOGY
Payer: MEDICARE

## 2021-02-22 ENCOUNTER — OFFICE VISIT (OUTPATIENT)
Dept: RADIATION ONCOLOGY | Facility: CLINIC | Age: 76
End: 2021-02-22
Payer: MEDICARE

## 2021-02-22 ENCOUNTER — TELEPHONE (OUTPATIENT)
Dept: GASTROENTEROLOGY | Facility: CLINIC | Age: 76
End: 2021-02-22

## 2021-02-22 VITALS
OXYGEN SATURATION: 97 % | HEIGHT: 63 IN | HEART RATE: 70 BPM | RESPIRATION RATE: 17 BRPM | BODY MASS INDEX: 36.5 KG/M2 | WEIGHT: 206 LBS | SYSTOLIC BLOOD PRESSURE: 170 MMHG | TEMPERATURE: 98 F | DIASTOLIC BLOOD PRESSURE: 94 MMHG

## 2021-02-22 DIAGNOSIS — C34.12 MALIGNANT NEOPLASM OF UPPER LOBE, LEFT BRONCHUS OR LUNG: ICD-10-CM

## 2021-02-22 DIAGNOSIS — Z85.118 PERSONAL HISTORY OF LUNG CANCER: ICD-10-CM

## 2021-02-22 DIAGNOSIS — Z85.118 PERSONAL HISTORY OF LUNG CANCER: Primary | ICD-10-CM

## 2021-02-22 DIAGNOSIS — C34.90 MALIGNANT NEOPLASM OF UNSPECIFIED PART OF UNSPECIFIED BRONCHUS OR LUNG: ICD-10-CM

## 2021-02-22 LAB — POCT GLUCOSE: 91 MG/DL (ref 70–110)

## 2021-02-22 PROCEDURE — 78815 PET IMAGE W/CT SKULL-THIGH: CPT | Mod: 26,PS,, | Performed by: RADIOLOGY

## 2021-02-22 PROCEDURE — 3080F DIAST BP >= 90 MM HG: CPT | Mod: CPTII,S$GLB,, | Performed by: RADIOLOGY

## 2021-02-22 PROCEDURE — 99999 PR PBB SHADOW E&M-EST. PATIENT-LVL IV: ICD-10-PCS | Mod: PBBFAC,,, | Performed by: RADIOLOGY

## 2021-02-22 PROCEDURE — 78815 PET IMAGE W/CT SKULL-THIGH: CPT | Mod: TC

## 2021-02-22 PROCEDURE — 3008F BODY MASS INDEX DOCD: CPT | Mod: CPTII,S$GLB,, | Performed by: RADIOLOGY

## 2021-02-22 PROCEDURE — 1126F AMNT PAIN NOTED NONE PRSNT: CPT | Mod: S$GLB,,, | Performed by: RADIOLOGY

## 2021-02-22 PROCEDURE — 3288F FALL RISK ASSESSMENT DOCD: CPT | Mod: CPTII,S$GLB,, | Performed by: RADIOLOGY

## 2021-02-22 PROCEDURE — 99999 PR PBB SHADOW E&M-EST. PATIENT-LVL IV: CPT | Mod: PBBFAC,,, | Performed by: RADIOLOGY

## 2021-02-22 PROCEDURE — 99212 OFFICE O/P EST SF 10 MIN: CPT | Mod: S$GLB,,, | Performed by: RADIOLOGY

## 2021-02-22 PROCEDURE — 1126F PR PAIN SEVERITY QUANTIFIED, NO PAIN PRESENT: ICD-10-PCS | Mod: S$GLB,,, | Performed by: RADIOLOGY

## 2021-02-22 PROCEDURE — 1101F PT FALLS ASSESS-DOCD LE1/YR: CPT | Mod: CPTII,S$GLB,, | Performed by: RADIOLOGY

## 2021-02-22 PROCEDURE — 1159F MED LIST DOCD IN RCRD: CPT | Mod: S$GLB,,, | Performed by: RADIOLOGY

## 2021-02-22 PROCEDURE — 3008F PR BODY MASS INDEX (BMI) DOCUMENTED: ICD-10-PCS | Mod: CPTII,S$GLB,, | Performed by: RADIOLOGY

## 2021-02-22 PROCEDURE — 1159F PR MEDICATION LIST DOCUMENTED IN MEDICAL RECORD: ICD-10-PCS | Mod: S$GLB,,, | Performed by: RADIOLOGY

## 2021-02-22 PROCEDURE — 3288F PR FALLS RISK ASSESSMENT DOCUMENTED: ICD-10-PCS | Mod: CPTII,S$GLB,, | Performed by: RADIOLOGY

## 2021-02-22 PROCEDURE — 3080F PR MOST RECENT DIASTOLIC BLOOD PRESSURE >= 90 MM HG: ICD-10-PCS | Mod: CPTII,S$GLB,, | Performed by: RADIOLOGY

## 2021-02-22 PROCEDURE — 99212 PR OFFICE/OUTPT VISIT, EST, LEVL II, 10-19 MIN: ICD-10-PCS | Mod: S$GLB,,, | Performed by: RADIOLOGY

## 2021-02-22 PROCEDURE — 78815 NM PET CT ROUTINE: ICD-10-PCS | Mod: 26,PS,, | Performed by: RADIOLOGY

## 2021-02-22 PROCEDURE — 3077F PR MOST RECENT SYSTOLIC BLOOD PRESSURE >= 140 MM HG: ICD-10-PCS | Mod: CPTII,S$GLB,, | Performed by: RADIOLOGY

## 2021-02-22 PROCEDURE — 1101F PR PT FALLS ASSESS DOC 0-1 FALLS W/OUT INJ PAST YR: ICD-10-PCS | Mod: CPTII,S$GLB,, | Performed by: RADIOLOGY

## 2021-02-22 PROCEDURE — 3077F SYST BP >= 140 MM HG: CPT | Mod: CPTII,S$GLB,, | Performed by: RADIOLOGY

## 2021-02-25 LAB
FINAL PATHOLOGIC DIAGNOSIS: NORMAL
GROSS: NORMAL
Lab: NORMAL
MICROSCOPIC EXAM: NORMAL

## 2021-03-01 ENCOUNTER — PATIENT MESSAGE (OUTPATIENT)
Dept: FAMILY MEDICINE | Facility: CLINIC | Age: 76
End: 2021-03-01

## 2021-04-28 ENCOUNTER — PATIENT OUTREACH (OUTPATIENT)
Dept: ADMINISTRATIVE | Facility: OTHER | Age: 76
End: 2021-04-28

## 2021-04-29 ENCOUNTER — OFFICE VISIT (OUTPATIENT)
Dept: GASTROENTEROLOGY | Facility: CLINIC | Age: 76
End: 2021-04-29
Payer: MEDICARE

## 2021-04-29 VITALS — BODY MASS INDEX: 37.03 KG/M2 | WEIGHT: 209 LBS | HEIGHT: 63 IN

## 2021-04-29 DIAGNOSIS — C34.12 PRIMARY MALIGNANT NEOPLASM OF LEFT UPPER LOBE OF LUNG: ICD-10-CM

## 2021-04-29 DIAGNOSIS — K21.9 GASTROESOPHAGEAL REFLUX DISEASE, UNSPECIFIED WHETHER ESOPHAGITIS PRESENT: Primary | ICD-10-CM

## 2021-04-29 DIAGNOSIS — E66.01 OBESITY, CLASS III, BMI 40-49.9 (MORBID OBESITY): ICD-10-CM

## 2021-04-29 PROCEDURE — 1101F PR PT FALLS ASSESS DOC 0-1 FALLS W/OUT INJ PAST YR: ICD-10-PCS | Mod: HCNC,CPTII,S$GLB, | Performed by: INTERNAL MEDICINE

## 2021-04-29 PROCEDURE — 99499 RISK ADDL DX/OHS AUDIT: ICD-10-PCS | Mod: HCNC,S$GLB,, | Performed by: INTERNAL MEDICINE

## 2021-04-29 PROCEDURE — 99999 PR PBB SHADOW E&M-EST. PATIENT-LVL III: CPT | Mod: PBBFAC,HCNC,, | Performed by: INTERNAL MEDICINE

## 2021-04-29 PROCEDURE — 4010F ACE/ARB THERAPY RXD/TAKEN: CPT | Mod: HCNC,CPTII,S$GLB, | Performed by: INTERNAL MEDICINE

## 2021-04-29 PROCEDURE — 3008F PR BODY MASS INDEX (BMI) DOCUMENTED: ICD-10-PCS | Mod: HCNC,CPTII,S$GLB, | Performed by: INTERNAL MEDICINE

## 2021-04-29 PROCEDURE — 1126F AMNT PAIN NOTED NONE PRSNT: CPT | Mod: HCNC,CPTII,S$GLB, | Performed by: INTERNAL MEDICINE

## 2021-04-29 PROCEDURE — 3288F FALL RISK ASSESSMENT DOCD: CPT | Mod: HCNC,CPTII,S$GLB, | Performed by: INTERNAL MEDICINE

## 2021-04-29 PROCEDURE — 1101F PT FALLS ASSESS-DOCD LE1/YR: CPT | Mod: HCNC,CPTII,S$GLB, | Performed by: INTERNAL MEDICINE

## 2021-04-29 PROCEDURE — 1126F PR PAIN SEVERITY QUANTIFIED, NO PAIN PRESENT: ICD-10-PCS | Mod: HCNC,CPTII,S$GLB, | Performed by: INTERNAL MEDICINE

## 2021-04-29 PROCEDURE — 99213 PR OFFICE/OUTPT VISIT, EST, LEVL III, 20-29 MIN: ICD-10-PCS | Mod: HCNC,S$GLB,, | Performed by: INTERNAL MEDICINE

## 2021-04-29 PROCEDURE — 99213 OFFICE O/P EST LOW 20 MIN: CPT | Mod: HCNC,S$GLB,, | Performed by: INTERNAL MEDICINE

## 2021-04-29 PROCEDURE — 99999 PR PBB SHADOW E&M-EST. PATIENT-LVL III: ICD-10-PCS | Mod: PBBFAC,HCNC,, | Performed by: INTERNAL MEDICINE

## 2021-04-29 PROCEDURE — 4010F PR ACE/ARB THEARPY RXD/TAKEN: ICD-10-PCS | Mod: HCNC,CPTII,S$GLB, | Performed by: INTERNAL MEDICINE

## 2021-04-29 PROCEDURE — 3288F PR FALLS RISK ASSESSMENT DOCUMENTED: ICD-10-PCS | Mod: HCNC,CPTII,S$GLB, | Performed by: INTERNAL MEDICINE

## 2021-04-29 PROCEDURE — 99499 UNLISTED E&M SERVICE: CPT | Mod: HCNC,S$GLB,, | Performed by: INTERNAL MEDICINE

## 2021-04-29 PROCEDURE — 3008F BODY MASS INDEX DOCD: CPT | Mod: HCNC,CPTII,S$GLB, | Performed by: INTERNAL MEDICINE

## 2021-04-30 ENCOUNTER — TELEPHONE (OUTPATIENT)
Dept: GASTROENTEROLOGY | Facility: CLINIC | Age: 76
End: 2021-04-30

## 2021-08-03 ENCOUNTER — OFFICE VISIT (OUTPATIENT)
Dept: CARDIOLOGY | Facility: CLINIC | Age: 76
End: 2021-08-03
Payer: MEDICARE

## 2021-08-03 VITALS
HEIGHT: 63 IN | HEART RATE: 61 BPM | SYSTOLIC BLOOD PRESSURE: 171 MMHG | BODY MASS INDEX: 38.21 KG/M2 | WEIGHT: 215.63 LBS | DIASTOLIC BLOOD PRESSURE: 76 MMHG

## 2021-08-03 DIAGNOSIS — I47.10 PSVT (PAROXYSMAL SUPRAVENTRICULAR TACHYCARDIA): ICD-10-CM

## 2021-08-03 DIAGNOSIS — E78.5 HYPERLIPIDEMIA LDL GOAL <70: ICD-10-CM

## 2021-08-03 DIAGNOSIS — I10 ESSENTIAL HYPERTENSION: ICD-10-CM

## 2021-08-03 DIAGNOSIS — I48.0 PAROXYSMAL ATRIAL FIBRILLATION: ICD-10-CM

## 2021-08-03 DIAGNOSIS — I70.0 ATHEROSCLEROSIS OF AORTA: Primary | ICD-10-CM

## 2021-08-03 PROCEDURE — 1160F RVW MEDS BY RX/DR IN RCRD: CPT | Mod: HCNC,CPTII,S$GLB, | Performed by: INTERNAL MEDICINE

## 2021-08-03 PROCEDURE — 3077F SYST BP >= 140 MM HG: CPT | Mod: HCNC,CPTII,S$GLB, | Performed by: INTERNAL MEDICINE

## 2021-08-03 PROCEDURE — 99214 PR OFFICE/OUTPT VISIT, EST, LEVL IV, 30-39 MIN: ICD-10-PCS | Mod: HCNC,S$GLB,, | Performed by: INTERNAL MEDICINE

## 2021-08-03 PROCEDURE — 3008F PR BODY MASS INDEX (BMI) DOCUMENTED: ICD-10-PCS | Mod: HCNC,CPTII,S$GLB, | Performed by: INTERNAL MEDICINE

## 2021-08-03 PROCEDURE — 99999 PR PBB SHADOW E&M-EST. PATIENT-LVL III: CPT | Mod: PBBFAC,HCNC,, | Performed by: INTERNAL MEDICINE

## 2021-08-03 PROCEDURE — 99999 PR PBB SHADOW E&M-EST. PATIENT-LVL III: ICD-10-PCS | Mod: PBBFAC,HCNC,, | Performed by: INTERNAL MEDICINE

## 2021-08-03 PROCEDURE — 3008F BODY MASS INDEX DOCD: CPT | Mod: HCNC,CPTII,S$GLB, | Performed by: INTERNAL MEDICINE

## 2021-08-03 PROCEDURE — 3078F PR MOST RECENT DIASTOLIC BLOOD PRESSURE < 80 MM HG: ICD-10-PCS | Mod: HCNC,CPTII,S$GLB, | Performed by: INTERNAL MEDICINE

## 2021-08-03 PROCEDURE — 99214 OFFICE O/P EST MOD 30 MIN: CPT | Mod: HCNC,S$GLB,, | Performed by: INTERNAL MEDICINE

## 2021-08-03 PROCEDURE — 3077F PR MOST RECENT SYSTOLIC BLOOD PRESSURE >= 140 MM HG: ICD-10-PCS | Mod: HCNC,CPTII,S$GLB, | Performed by: INTERNAL MEDICINE

## 2021-08-03 PROCEDURE — 1160F PR REVIEW ALL MEDS BY PRESCRIBER/CLIN PHARMACIST DOCUMENTED: ICD-10-PCS | Mod: HCNC,CPTII,S$GLB, | Performed by: INTERNAL MEDICINE

## 2021-08-03 PROCEDURE — 1159F PR MEDICATION LIST DOCUMENTED IN MEDICAL RECORD: ICD-10-PCS | Mod: HCNC,CPTII,S$GLB, | Performed by: INTERNAL MEDICINE

## 2021-08-03 PROCEDURE — 1159F MED LIST DOCD IN RCRD: CPT | Mod: HCNC,CPTII,S$GLB, | Performed by: INTERNAL MEDICINE

## 2021-08-03 PROCEDURE — 3078F DIAST BP <80 MM HG: CPT | Mod: HCNC,CPTII,S$GLB, | Performed by: INTERNAL MEDICINE

## 2021-08-03 RX ORDER — FLECAINIDE ACETATE 150 MG/1
150 TABLET ORAL EVERY 12 HOURS
Qty: 180 TABLET | Refills: 3 | Status: SHIPPED | OUTPATIENT
Start: 2021-08-03 | End: 2022-04-11 | Stop reason: SDUPTHER

## 2021-08-04 DIAGNOSIS — F41.9 ANXIETY: ICD-10-CM

## 2021-08-04 DIAGNOSIS — C34.12 PRIMARY MALIGNANT NEOPLASM OF LEFT UPPER LOBE OF LUNG: Primary | ICD-10-CM

## 2021-08-04 RX ORDER — LORAZEPAM 1 MG/1
1 TABLET ORAL
Qty: 5 TABLET | Refills: 0 | Status: SHIPPED | OUTPATIENT
Start: 2021-08-04 | End: 2022-05-08 | Stop reason: CLARIF

## 2021-08-05 ENCOUNTER — OFFICE VISIT (OUTPATIENT)
Dept: OPTOMETRY | Facility: CLINIC | Age: 76
End: 2021-08-05
Payer: MEDICARE

## 2021-08-05 DIAGNOSIS — D86.0 SARCOIDOSIS OF LUNG: Primary | ICD-10-CM

## 2021-08-05 DIAGNOSIS — Z96.1 PSEUDOPHAKIA OF BOTH EYES: ICD-10-CM

## 2021-08-05 PROCEDURE — 1101F PT FALLS ASSESS-DOCD LE1/YR: CPT | Mod: HCNC,CPTII,S$GLB, | Performed by: OPTOMETRIST

## 2021-08-05 PROCEDURE — 99499 RISK ADDL DX/OHS AUDIT: ICD-10-PCS | Mod: HCNC,S$GLB,, | Performed by: OPTOMETRIST

## 2021-08-05 PROCEDURE — 3288F PR FALLS RISK ASSESSMENT DOCUMENTED: ICD-10-PCS | Mod: HCNC,CPTII,S$GLB, | Performed by: OPTOMETRIST

## 2021-08-05 PROCEDURE — 3288F FALL RISK ASSESSMENT DOCD: CPT | Mod: HCNC,CPTII,S$GLB, | Performed by: OPTOMETRIST

## 2021-08-05 PROCEDURE — 92014 COMPRE OPH EXAM EST PT 1/>: CPT | Mod: HCNC,S$GLB,, | Performed by: OPTOMETRIST

## 2021-08-05 PROCEDURE — 1126F PR PAIN SEVERITY QUANTIFIED, NO PAIN PRESENT: ICD-10-PCS | Mod: HCNC,CPTII,S$GLB, | Performed by: OPTOMETRIST

## 2021-08-05 PROCEDURE — 1160F PR REVIEW ALL MEDS BY PRESCRIBER/CLIN PHARMACIST DOCUMENTED: ICD-10-PCS | Mod: HCNC,CPTII,S$GLB, | Performed by: OPTOMETRIST

## 2021-08-05 PROCEDURE — 1159F PR MEDICATION LIST DOCUMENTED IN MEDICAL RECORD: ICD-10-PCS | Mod: HCNC,CPTII,S$GLB, | Performed by: OPTOMETRIST

## 2021-08-05 PROCEDURE — 99999 PR PBB SHADOW E&M-EST. PATIENT-LVL III: CPT | Mod: PBBFAC,HCNC,, | Performed by: OPTOMETRIST

## 2021-08-05 PROCEDURE — 1160F RVW MEDS BY RX/DR IN RCRD: CPT | Mod: HCNC,CPTII,S$GLB, | Performed by: OPTOMETRIST

## 2021-08-05 PROCEDURE — 92014 PR EYE EXAM, EST PATIENT,COMPREHESV: ICD-10-PCS | Mod: HCNC,S$GLB,, | Performed by: OPTOMETRIST

## 2021-08-05 PROCEDURE — 1159F MED LIST DOCD IN RCRD: CPT | Mod: HCNC,CPTII,S$GLB, | Performed by: OPTOMETRIST

## 2021-08-05 PROCEDURE — 1101F PR PT FALLS ASSESS DOC 0-1 FALLS W/OUT INJ PAST YR: ICD-10-PCS | Mod: HCNC,CPTII,S$GLB, | Performed by: OPTOMETRIST

## 2021-08-05 PROCEDURE — 1126F AMNT PAIN NOTED NONE PRSNT: CPT | Mod: HCNC,CPTII,S$GLB, | Performed by: OPTOMETRIST

## 2021-08-05 PROCEDURE — 99499 UNLISTED E&M SERVICE: CPT | Mod: HCNC,S$GLB,, | Performed by: OPTOMETRIST

## 2021-08-05 PROCEDURE — 99999 PR PBB SHADOW E&M-EST. PATIENT-LVL III: ICD-10-PCS | Mod: PBBFAC,HCNC,, | Performed by: OPTOMETRIST

## 2021-08-05 RX ORDER — PROPOFOL 10 MG/ML
INJECTION, EMULSION INTRAVENOUS
COMMUNITY
Start: 2021-02-19 | End: 2021-09-30

## 2021-08-18 ENCOUNTER — LAB VISIT (OUTPATIENT)
Dept: LAB | Facility: HOSPITAL | Age: 76
End: 2021-08-18
Attending: RADIOLOGY
Payer: MEDICARE

## 2021-08-18 DIAGNOSIS — C34.12 PRIMARY MALIGNANT NEOPLASM OF LEFT UPPER LOBE OF LUNG: ICD-10-CM

## 2021-08-18 LAB
CREAT SERPL-MCNC: 0.8 MG/DL (ref 0.5–1.4)
EST. GFR  (AFRICAN AMERICAN): >60 ML/MIN/1.73 M^2
EST. GFR  (NON AFRICAN AMERICAN): >60 ML/MIN/1.73 M^2

## 2021-08-18 PROCEDURE — 82565 ASSAY OF CREATININE: CPT | Mod: HCNC,PN | Performed by: RADIOLOGY

## 2021-08-18 PROCEDURE — 36415 COLL VENOUS BLD VENIPUNCTURE: CPT | Mod: HCNC,PN | Performed by: RADIOLOGY

## 2021-08-27 ENCOUNTER — OFFICE VISIT (OUTPATIENT)
Dept: RADIATION ONCOLOGY | Facility: CLINIC | Age: 76
End: 2021-08-27
Payer: MEDICARE

## 2021-08-27 ENCOUNTER — HOSPITAL ENCOUNTER (OUTPATIENT)
Dept: RADIOLOGY | Facility: HOSPITAL | Age: 76
Discharge: HOME | End: 2021-08-27
Attending: RADIOLOGY
Payer: MEDICARE

## 2021-08-27 VITALS
DIASTOLIC BLOOD PRESSURE: 67 MMHG | OXYGEN SATURATION: 96 % | WEIGHT: 211 LBS | HEIGHT: 63 IN | BODY MASS INDEX: 37.39 KG/M2 | TEMPERATURE: 98 F | SYSTOLIC BLOOD PRESSURE: 142 MMHG | HEART RATE: 73 BPM | RESPIRATION RATE: 17 BRPM

## 2021-08-27 DIAGNOSIS — Z85.118 PERSONAL HISTORY OF LUNG CANCER: ICD-10-CM

## 2021-08-27 DIAGNOSIS — Z85.118 PERSONAL HISTORY OF LUNG CANCER: Primary | ICD-10-CM

## 2021-08-27 DIAGNOSIS — C34.12 PRIMARY MALIGNANT NEOPLASM OF LEFT UPPER LOBE OF LUNG: ICD-10-CM

## 2021-08-27 PROCEDURE — 25500020 PHARM REV CODE 255: Mod: HCNC | Performed by: RADIOLOGY

## 2021-08-27 PROCEDURE — 3008F BODY MASS INDEX DOCD: CPT | Mod: HCNC,CPTII,S$GLB, | Performed by: RADIOLOGY

## 2021-08-27 PROCEDURE — 99999 PR PBB SHADOW E&M-EST. PATIENT-LVL IV: CPT | Mod: PBBFAC,HCNC,, | Performed by: RADIOLOGY

## 2021-08-27 PROCEDURE — 3078F DIAST BP <80 MM HG: CPT | Mod: HCNC,CPTII,S$GLB, | Performed by: RADIOLOGY

## 2021-08-27 PROCEDURE — 99213 OFFICE O/P EST LOW 20 MIN: CPT | Mod: HCNC,S$GLB,, | Performed by: RADIOLOGY

## 2021-08-27 PROCEDURE — 3077F PR MOST RECENT SYSTOLIC BLOOD PRESSURE >= 140 MM HG: ICD-10-PCS | Mod: HCNC,CPTII,S$GLB, | Performed by: RADIOLOGY

## 2021-08-27 PROCEDURE — 1126F AMNT PAIN NOTED NONE PRSNT: CPT | Mod: HCNC,CPTII,S$GLB, | Performed by: RADIOLOGY

## 2021-08-27 PROCEDURE — 1159F MED LIST DOCD IN RCRD: CPT | Mod: HCNC,CPTII,S$GLB, | Performed by: RADIOLOGY

## 2021-08-27 PROCEDURE — 3288F PR FALLS RISK ASSESSMENT DOCUMENTED: ICD-10-PCS | Mod: HCNC,CPTII,S$GLB, | Performed by: RADIOLOGY

## 2021-08-27 PROCEDURE — 71250 CT THORAX DX C-: CPT | Mod: 26,HCNC,, | Performed by: RADIOLOGY

## 2021-08-27 PROCEDURE — 71250 CT CHEST WITHOUT CONTRAST: ICD-10-PCS | Mod: 26,HCNC,, | Performed by: RADIOLOGY

## 2021-08-27 PROCEDURE — 1101F PR PT FALLS ASSESS DOC 0-1 FALLS W/OUT INJ PAST YR: ICD-10-PCS | Mod: HCNC,CPTII,S$GLB, | Performed by: RADIOLOGY

## 2021-08-27 PROCEDURE — 1101F PT FALLS ASSESS-DOCD LE1/YR: CPT | Mod: HCNC,CPTII,S$GLB, | Performed by: RADIOLOGY

## 2021-08-27 PROCEDURE — 3288F FALL RISK ASSESSMENT DOCD: CPT | Mod: HCNC,CPTII,S$GLB, | Performed by: RADIOLOGY

## 2021-08-27 PROCEDURE — 1159F PR MEDICATION LIST DOCUMENTED IN MEDICAL RECORD: ICD-10-PCS | Mod: HCNC,CPTII,S$GLB, | Performed by: RADIOLOGY

## 2021-08-27 PROCEDURE — 99213 PR OFFICE/OUTPT VISIT, EST, LEVL III, 20-29 MIN: ICD-10-PCS | Mod: HCNC,S$GLB,, | Performed by: RADIOLOGY

## 2021-08-27 PROCEDURE — 72157 MRI THORACIC SPINE W WO CONTRAST: ICD-10-PCS | Mod: 26,HCNC,, | Performed by: INTERNAL MEDICINE

## 2021-08-27 PROCEDURE — 3008F PR BODY MASS INDEX (BMI) DOCUMENTED: ICD-10-PCS | Mod: HCNC,CPTII,S$GLB, | Performed by: RADIOLOGY

## 2021-08-27 PROCEDURE — 3078F PR MOST RECENT DIASTOLIC BLOOD PRESSURE < 80 MM HG: ICD-10-PCS | Mod: HCNC,CPTII,S$GLB, | Performed by: RADIOLOGY

## 2021-08-27 PROCEDURE — 3077F SYST BP >= 140 MM HG: CPT | Mod: HCNC,CPTII,S$GLB, | Performed by: RADIOLOGY

## 2021-08-27 PROCEDURE — 72157 MRI CHEST SPINE W/O & W/DYE: CPT | Mod: 26,HCNC,, | Performed by: INTERNAL MEDICINE

## 2021-08-27 PROCEDURE — A9585 GADOBUTROL INJECTION: HCPCS | Mod: HCNC | Performed by: RADIOLOGY

## 2021-08-27 PROCEDURE — 71250 CT THORAX DX C-: CPT | Mod: TC,HCNC

## 2021-08-27 PROCEDURE — 99999 PR PBB SHADOW E&M-EST. PATIENT-LVL IV: ICD-10-PCS | Mod: PBBFAC,HCNC,, | Performed by: RADIOLOGY

## 2021-08-27 PROCEDURE — 72157 MRI CHEST SPINE W/O & W/DYE: CPT | Mod: TC,HCNC

## 2021-08-27 PROCEDURE — 1126F PR PAIN SEVERITY QUANTIFIED, NO PAIN PRESENT: ICD-10-PCS | Mod: HCNC,CPTII,S$GLB, | Performed by: RADIOLOGY

## 2021-08-27 RX ORDER — GADOBUTROL 604.72 MG/ML
9 INJECTION INTRAVENOUS
Status: COMPLETED | OUTPATIENT
Start: 2021-08-27 | End: 2021-08-27

## 2021-08-27 RX ADMIN — GADOBUTROL 9 ML: 604.72 INJECTION INTRAVENOUS at 07:08

## 2021-09-27 ENCOUNTER — PATIENT MESSAGE (OUTPATIENT)
Dept: PULMONOLOGY | Facility: CLINIC | Age: 76
End: 2021-09-27

## 2021-09-27 ENCOUNTER — TELEPHONE (OUTPATIENT)
Dept: PULMONOLOGY | Facility: CLINIC | Age: 76
End: 2021-09-27

## 2021-09-29 ENCOUNTER — PATIENT OUTREACH (OUTPATIENT)
Dept: ADMINISTRATIVE | Facility: OTHER | Age: 76
End: 2021-09-29

## 2021-09-30 ENCOUNTER — OFFICE VISIT (OUTPATIENT)
Dept: PULMONOLOGY | Facility: CLINIC | Age: 76
End: 2021-09-30
Payer: MEDICARE

## 2021-09-30 VITALS
RESPIRATION RATE: 16 BRPM | HEIGHT: 60 IN | BODY MASS INDEX: 40.8 KG/M2 | WEIGHT: 207.81 LBS | DIASTOLIC BLOOD PRESSURE: 74 MMHG | OXYGEN SATURATION: 96 % | SYSTOLIC BLOOD PRESSURE: 140 MMHG | HEART RATE: 82 BPM

## 2021-09-30 DIAGNOSIS — R06.02 SOB (SHORTNESS OF BREATH): ICD-10-CM

## 2021-09-30 DIAGNOSIS — D86.0 SARCOIDOSIS OF LUNG: Primary | ICD-10-CM

## 2021-09-30 DIAGNOSIS — C34.12 PRIMARY MALIGNANT NEOPLASM OF LEFT UPPER LOBE OF LUNG: ICD-10-CM

## 2021-09-30 DIAGNOSIS — E66.2 MORBID (SEVERE) OBESITY WITH ALVEOLAR HYPOVENTILATION: ICD-10-CM

## 2021-09-30 DIAGNOSIS — G47.33 OSA (OBSTRUCTIVE SLEEP APNEA): ICD-10-CM

## 2021-09-30 PROBLEM — J44.9 CHRONIC OBSTRUCTIVE PULMONARY DISEASE: Status: RESOLVED | Noted: 2020-10-26 | Resolved: 2021-09-30

## 2021-09-30 PROCEDURE — 3008F PR BODY MASS INDEX (BMI) DOCUMENTED: ICD-10-PCS | Mod: HCNC,CPTII,S$GLB, | Performed by: INTERNAL MEDICINE

## 2021-09-30 PROCEDURE — 3078F DIAST BP <80 MM HG: CPT | Mod: HCNC,CPTII,S$GLB, | Performed by: INTERNAL MEDICINE

## 2021-09-30 PROCEDURE — 99214 OFFICE O/P EST MOD 30 MIN: CPT | Mod: HCNC,S$GLB,, | Performed by: INTERNAL MEDICINE

## 2021-09-30 PROCEDURE — 3288F PR FALLS RISK ASSESSMENT DOCUMENTED: ICD-10-PCS | Mod: HCNC,CPTII,S$GLB, | Performed by: INTERNAL MEDICINE

## 2021-09-30 PROCEDURE — 99499 UNLISTED E&M SERVICE: CPT | Mod: HCNC,S$GLB,, | Performed by: INTERNAL MEDICINE

## 2021-09-30 PROCEDURE — 99499 RISK ADDL DX/OHS AUDIT: ICD-10-PCS | Mod: HCNC,S$GLB,, | Performed by: INTERNAL MEDICINE

## 2021-09-30 PROCEDURE — 3288F FALL RISK ASSESSMENT DOCD: CPT | Mod: HCNC,CPTII,S$GLB, | Performed by: INTERNAL MEDICINE

## 2021-09-30 PROCEDURE — 3077F PR MOST RECENT SYSTOLIC BLOOD PRESSURE >= 140 MM HG: ICD-10-PCS | Mod: HCNC,CPTII,S$GLB, | Performed by: INTERNAL MEDICINE

## 2021-09-30 PROCEDURE — 1159F PR MEDICATION LIST DOCUMENTED IN MEDICAL RECORD: ICD-10-PCS | Mod: HCNC,CPTII,S$GLB, | Performed by: INTERNAL MEDICINE

## 2021-09-30 PROCEDURE — 3008F BODY MASS INDEX DOCD: CPT | Mod: HCNC,CPTII,S$GLB, | Performed by: INTERNAL MEDICINE

## 2021-09-30 PROCEDURE — 4010F ACE/ARB THERAPY RXD/TAKEN: CPT | Mod: HCNC,CPTII,S$GLB, | Performed by: INTERNAL MEDICINE

## 2021-09-30 PROCEDURE — 4010F PR ACE/ARB THEARPY RXD/TAKEN: ICD-10-PCS | Mod: HCNC,CPTII,S$GLB, | Performed by: INTERNAL MEDICINE

## 2021-09-30 PROCEDURE — 1159F MED LIST DOCD IN RCRD: CPT | Mod: HCNC,CPTII,S$GLB, | Performed by: INTERNAL MEDICINE

## 2021-09-30 PROCEDURE — 99214 PR OFFICE/OUTPT VISIT, EST, LEVL IV, 30-39 MIN: ICD-10-PCS | Mod: HCNC,S$GLB,, | Performed by: INTERNAL MEDICINE

## 2021-09-30 PROCEDURE — 1101F PT FALLS ASSESS-DOCD LE1/YR: CPT | Mod: HCNC,CPTII,S$GLB, | Performed by: INTERNAL MEDICINE

## 2021-09-30 PROCEDURE — 3077F SYST BP >= 140 MM HG: CPT | Mod: HCNC,CPTII,S$GLB, | Performed by: INTERNAL MEDICINE

## 2021-09-30 PROCEDURE — 99999 PR PBB SHADOW E&M-EST. PATIENT-LVL V: CPT | Mod: PBBFAC,HCNC,, | Performed by: INTERNAL MEDICINE

## 2021-09-30 PROCEDURE — 1126F AMNT PAIN NOTED NONE PRSNT: CPT | Mod: HCNC,CPTII,S$GLB, | Performed by: INTERNAL MEDICINE

## 2021-09-30 PROCEDURE — 99999 PR PBB SHADOW E&M-EST. PATIENT-LVL V: ICD-10-PCS | Mod: PBBFAC,HCNC,, | Performed by: INTERNAL MEDICINE

## 2021-09-30 PROCEDURE — 3078F PR MOST RECENT DIASTOLIC BLOOD PRESSURE < 80 MM HG: ICD-10-PCS | Mod: HCNC,CPTII,S$GLB, | Performed by: INTERNAL MEDICINE

## 2021-09-30 PROCEDURE — 1101F PR PT FALLS ASSESS DOC 0-1 FALLS W/OUT INJ PAST YR: ICD-10-PCS | Mod: HCNC,CPTII,S$GLB, | Performed by: INTERNAL MEDICINE

## 2021-09-30 PROCEDURE — 1126F PR PAIN SEVERITY QUANTIFIED, NO PAIN PRESENT: ICD-10-PCS | Mod: HCNC,CPTII,S$GLB, | Performed by: INTERNAL MEDICINE

## 2021-09-30 RX ORDER — FLUTICASONE PROPIONATE 50 MCG
1 SPRAY, SUSPENSION (ML) NASAL DAILY PRN
Qty: 16 G | Refills: 11 | Status: SHIPPED | OUTPATIENT
Start: 2021-09-30 | End: 2022-02-02 | Stop reason: SDUPTHER

## 2021-09-30 RX ORDER — PREDNISONE 10 MG/1
10 TABLET ORAL DAILY
Qty: 120 TABLET | Refills: 1 | Status: SHIPPED | OUTPATIENT
Start: 2021-09-30 | End: 2022-05-08 | Stop reason: CLARIF

## 2021-09-30 RX ORDER — BUDESONIDE AND FORMOTEROL FUMARATE DIHYDRATE 160; 4.5 UG/1; UG/1
2 AEROSOL RESPIRATORY (INHALATION) EVERY 12 HOURS
Qty: 3 INHALER | Refills: 3 | Status: SHIPPED | OUTPATIENT
Start: 2021-09-30 | End: 2022-02-02 | Stop reason: SDUPTHER

## 2021-09-30 RX ORDER — ALBUTEROL SULFATE 2.5 MG/.5ML
2.5 SOLUTION RESPIRATORY (INHALATION)
COMMUNITY
End: 2022-05-08 | Stop reason: CLARIF

## 2021-09-30 RX ORDER — ALBUTEROL SULFATE 0.83 MG/ML
2.5 SOLUTION RESPIRATORY (INHALATION) EVERY 6 HOURS PRN
Qty: 180 ML | Refills: 11 | Status: SHIPPED | OUTPATIENT
Start: 2021-09-30 | End: 2022-09-09 | Stop reason: SDUPTHER

## 2021-10-05 ENCOUNTER — TELEPHONE (OUTPATIENT)
Dept: PULMONOLOGY | Facility: CLINIC | Age: 76
End: 2021-10-05

## 2021-10-07 ENCOUNTER — TELEPHONE (OUTPATIENT)
Dept: CARDIOLOGY | Facility: CLINIC | Age: 76
End: 2021-10-07

## 2021-10-07 ENCOUNTER — PATIENT MESSAGE (OUTPATIENT)
Dept: PULMONOLOGY | Facility: CLINIC | Age: 76
End: 2021-10-07

## 2021-10-07 DIAGNOSIS — I10 ESSENTIAL HYPERTENSION: ICD-10-CM

## 2021-10-07 DIAGNOSIS — R93.1 ABNORMAL ECHOCARDIOGRAM: ICD-10-CM

## 2021-10-07 DIAGNOSIS — I70.0 ATHEROSCLEROSIS OF AORTA: Primary | ICD-10-CM

## 2021-10-08 ENCOUNTER — HOSPITAL ENCOUNTER (OUTPATIENT)
Dept: RADIOLOGY | Facility: HOSPITAL | Age: 76
Discharge: HOME | End: 2021-10-08
Attending: INTERNAL MEDICINE
Payer: MEDICARE

## 2021-10-08 DIAGNOSIS — I10 ESSENTIAL HYPERTENSION: ICD-10-CM

## 2021-10-08 DIAGNOSIS — R93.1 ABNORMAL ECHOCARDIOGRAM: ICD-10-CM

## 2021-10-08 DIAGNOSIS — I70.0 ATHEROSCLEROSIS OF AORTA: ICD-10-CM

## 2021-10-08 PROCEDURE — 71046 X-RAY EXAM CHEST 2 VIEWS: CPT | Mod: TC,HCNC,FY,PO

## 2021-10-08 PROCEDURE — 71046 XR CHEST PA AND LATERAL: ICD-10-PCS | Mod: 26,HCNC,, | Performed by: RADIOLOGY

## 2021-10-08 PROCEDURE — 71046 X-RAY EXAM CHEST 2 VIEWS: CPT | Mod: 26,HCNC,, | Performed by: RADIOLOGY

## 2021-10-11 DIAGNOSIS — I10 ESSENTIAL HYPERTENSION: Primary | ICD-10-CM

## 2021-10-11 RX ORDER — FUROSEMIDE 20 MG/1
20 TABLET ORAL DAILY
Qty: 30 TABLET | Refills: 11 | Status: SHIPPED | OUTPATIENT
Start: 2021-10-11 | End: 2022-04-11 | Stop reason: SDUPTHER

## 2021-10-21 ENCOUNTER — LAB VISIT (OUTPATIENT)
Dept: LAB | Facility: HOSPITAL | Age: 76
End: 2021-10-21
Attending: NURSE PRACTITIONER
Payer: MEDICARE

## 2021-10-21 DIAGNOSIS — I10 ESSENTIAL HYPERTENSION: ICD-10-CM

## 2021-10-21 DIAGNOSIS — R00.2 PALPITATIONS: ICD-10-CM

## 2021-10-21 DIAGNOSIS — Z13.6 ENCOUNTER FOR SCREENING FOR CARDIOVASCULAR DISORDERS: ICD-10-CM

## 2021-10-21 LAB
ALBUMIN SERPL BCP-MCNC: 3.3 G/DL (ref 3.5–5.2)
ALP SERPL-CCNC: 75 U/L (ref 55–135)
ALT SERPL W/O P-5'-P-CCNC: 14 U/L (ref 10–44)
ANION GAP SERPL CALC-SCNC: 12 MMOL/L (ref 8–16)
ANION GAP SERPL CALC-SCNC: 12 MMOL/L (ref 8–16)
AST SERPL-CCNC: 22 U/L (ref 10–40)
BASOPHILS # BLD AUTO: 0.06 K/UL (ref 0–0.2)
BASOPHILS NFR BLD: 0.9 % (ref 0–1.9)
BILIRUB SERPL-MCNC: 0.4 MG/DL (ref 0.1–1)
BUN SERPL-MCNC: 15 MG/DL (ref 8–23)
BUN SERPL-MCNC: 15 MG/DL (ref 8–23)
CALCIUM SERPL-MCNC: 9.8 MG/DL (ref 8.7–10.5)
CALCIUM SERPL-MCNC: 9.8 MG/DL (ref 8.7–10.5)
CHLORIDE SERPL-SCNC: 100 MMOL/L (ref 95–110)
CHLORIDE SERPL-SCNC: 100 MMOL/L (ref 95–110)
CHOLEST SERPL-MCNC: 144 MG/DL (ref 120–199)
CHOLEST/HDLC SERPL: 1.8 {RATIO} (ref 2–5)
CO2 SERPL-SCNC: 29 MMOL/L (ref 23–29)
CO2 SERPL-SCNC: 29 MMOL/L (ref 23–29)
CREAT SERPL-MCNC: 0.8 MG/DL (ref 0.5–1.4)
CREAT SERPL-MCNC: 0.8 MG/DL (ref 0.5–1.4)
DIFFERENTIAL METHOD: ABNORMAL
EOSINOPHIL # BLD AUTO: 0.6 K/UL (ref 0–0.5)
EOSINOPHIL NFR BLD: 8.9 % (ref 0–8)
ERYTHROCYTE [DISTWIDTH] IN BLOOD BY AUTOMATED COUNT: 14.6 % (ref 11.5–14.5)
EST. GFR  (AFRICAN AMERICAN): >60 ML/MIN/1.73 M^2
EST. GFR  (AFRICAN AMERICAN): >60 ML/MIN/1.73 M^2
EST. GFR  (NON AFRICAN AMERICAN): >60 ML/MIN/1.73 M^2
EST. GFR  (NON AFRICAN AMERICAN): >60 ML/MIN/1.73 M^2
GLUCOSE SERPL-MCNC: 103 MG/DL (ref 70–110)
GLUCOSE SERPL-MCNC: 103 MG/DL (ref 70–110)
HCT VFR BLD AUTO: 39.4 % (ref 37–48.5)
HDLC SERPL-MCNC: 82 MG/DL (ref 40–75)
HDLC SERPL: 56.9 % (ref 20–50)
HGB BLD-MCNC: 12.1 G/DL (ref 12–16)
IMM GRANULOCYTES # BLD AUTO: 0.01 K/UL (ref 0–0.04)
IMM GRANULOCYTES NFR BLD AUTO: 0.1 % (ref 0–0.5)
LDLC SERPL CALC-MCNC: 51.8 MG/DL (ref 63–159)
LYMPHOCYTES # BLD AUTO: 1.7 K/UL (ref 1–4.8)
LYMPHOCYTES NFR BLD: 25.2 % (ref 18–48)
MAGNESIUM SERPL-MCNC: 1.8 MG/DL (ref 1.6–2.6)
MCH RBC QN AUTO: 26.5 PG (ref 27–31)
MCHC RBC AUTO-ENTMCNC: 30.7 G/DL (ref 32–36)
MCV RBC AUTO: 86 FL (ref 82–98)
MONOCYTES # BLD AUTO: 0.5 K/UL (ref 0.3–1)
MONOCYTES NFR BLD: 7.7 % (ref 4–15)
NEUTROPHILS # BLD AUTO: 3.9 K/UL (ref 1.8–7.7)
NEUTROPHILS NFR BLD: 57.2 % (ref 38–73)
NONHDLC SERPL-MCNC: 62 MG/DL
NRBC BLD-RTO: 0 /100 WBC
PLATELET # BLD AUTO: 169 K/UL (ref 150–450)
PMV BLD AUTO: 12.4 FL (ref 9.2–12.9)
POTASSIUM SERPL-SCNC: 4.4 MMOL/L (ref 3.5–5.1)
POTASSIUM SERPL-SCNC: 4.4 MMOL/L (ref 3.5–5.1)
PROT SERPL-MCNC: 7.7 G/DL (ref 6–8.4)
RBC # BLD AUTO: 4.56 M/UL (ref 4–5.4)
SODIUM SERPL-SCNC: 141 MMOL/L (ref 136–145)
SODIUM SERPL-SCNC: 141 MMOL/L (ref 136–145)
TRIGL SERPL-MCNC: 51 MG/DL (ref 30–150)
WBC # BLD AUTO: 6.87 K/UL (ref 3.9–12.7)

## 2021-10-21 PROCEDURE — 80053 COMPREHEN METABOLIC PANEL: CPT | Mod: HCNC | Performed by: NURSE PRACTITIONER

## 2021-10-21 PROCEDURE — 83735 ASSAY OF MAGNESIUM: CPT | Mod: HCNC | Performed by: INTERNAL MEDICINE

## 2021-10-21 PROCEDURE — 85025 COMPLETE CBC W/AUTO DIFF WBC: CPT | Mod: HCNC | Performed by: NURSE PRACTITIONER

## 2021-10-21 PROCEDURE — 80061 LIPID PANEL: CPT | Mod: HCNC | Performed by: FAMILY MEDICINE

## 2021-10-21 PROCEDURE — 36415 COLL VENOUS BLD VENIPUNCTURE: CPT | Mod: HCNC,PO | Performed by: NURSE PRACTITIONER

## 2021-10-22 ENCOUNTER — TELEPHONE (OUTPATIENT)
Dept: CARDIOLOGY | Facility: CLINIC | Age: 76
End: 2021-10-22

## 2021-10-26 ENCOUNTER — IMMUNIZATION (OUTPATIENT)
Dept: PHARMACY | Facility: CLINIC | Age: 76
End: 2021-10-26
Payer: MEDICARE

## 2021-10-26 DIAGNOSIS — Z23 NEED FOR VACCINATION: Primary | ICD-10-CM

## 2021-10-27 ENCOUNTER — TELEPHONE (OUTPATIENT)
Dept: PULMONOLOGY | Facility: CLINIC | Age: 76
End: 2021-10-27
Payer: MEDICARE

## 2021-11-04 ENCOUNTER — OFFICE VISIT (OUTPATIENT)
Dept: FAMILY MEDICINE | Facility: CLINIC | Age: 76
End: 2021-11-04
Payer: MEDICARE

## 2021-11-04 VITALS
TEMPERATURE: 98 F | OXYGEN SATURATION: 95 % | DIASTOLIC BLOOD PRESSURE: 74 MMHG | WEIGHT: 207 LBS | BODY MASS INDEX: 40.64 KG/M2 | HEART RATE: 69 BPM | SYSTOLIC BLOOD PRESSURE: 122 MMHG | HEIGHT: 60 IN

## 2021-11-04 DIAGNOSIS — I73.9 CLAUDICATION OF BOTH LOWER EXTREMITIES: ICD-10-CM

## 2021-11-04 DIAGNOSIS — C34.92 ADENOCARCINOMA, LUNG, LEFT: ICD-10-CM

## 2021-11-04 DIAGNOSIS — D69.2 OTHER NONTHROMBOCYTOPENIC PURPURA: ICD-10-CM

## 2021-11-04 DIAGNOSIS — D86.0 SARCOIDOSIS OF LUNG: ICD-10-CM

## 2021-11-04 DIAGNOSIS — R10.13 EPIGASTRIC PAIN: ICD-10-CM

## 2021-11-04 DIAGNOSIS — I48.0 PAROXYSMAL ATRIAL FIBRILLATION: ICD-10-CM

## 2021-11-04 DIAGNOSIS — I73.9 CLAUDICATION: Primary | ICD-10-CM

## 2021-11-04 PROCEDURE — 3078F DIAST BP <80 MM HG: CPT | Mod: HCNC,CPTII,S$GLB, | Performed by: FAMILY MEDICINE

## 2021-11-04 PROCEDURE — 3074F PR MOST RECENT SYSTOLIC BLOOD PRESSURE < 130 MM HG: ICD-10-PCS | Mod: HCNC,CPTII,S$GLB, | Performed by: FAMILY MEDICINE

## 2021-11-04 PROCEDURE — 3078F PR MOST RECENT DIASTOLIC BLOOD PRESSURE < 80 MM HG: ICD-10-PCS | Mod: HCNC,CPTII,S$GLB, | Performed by: FAMILY MEDICINE

## 2021-11-04 PROCEDURE — 3008F BODY MASS INDEX DOCD: CPT | Mod: HCNC,CPTII,S$GLB, | Performed by: FAMILY MEDICINE

## 2021-11-04 PROCEDURE — 4010F PR ACE/ARB THEARPY RXD/TAKEN: ICD-10-PCS | Mod: HCNC,CPTII,S$GLB, | Performed by: FAMILY MEDICINE

## 2021-11-04 PROCEDURE — 99999 PR PBB SHADOW E&M-EST. PATIENT-LVL V: CPT | Mod: PBBFAC,HCNC,, | Performed by: FAMILY MEDICINE

## 2021-11-04 PROCEDURE — 3074F SYST BP LT 130 MM HG: CPT | Mod: HCNC,CPTII,S$GLB, | Performed by: FAMILY MEDICINE

## 2021-11-04 PROCEDURE — 1160F RVW MEDS BY RX/DR IN RCRD: CPT | Mod: HCNC,CPTII,S$GLB, | Performed by: FAMILY MEDICINE

## 2021-11-04 PROCEDURE — 3288F PR FALLS RISK ASSESSMENT DOCUMENTED: ICD-10-PCS | Mod: HCNC,CPTII,S$GLB, | Performed by: FAMILY MEDICINE

## 2021-11-04 PROCEDURE — 1160F PR REVIEW ALL MEDS BY PRESCRIBER/CLIN PHARMACIST DOCUMENTED: ICD-10-PCS | Mod: HCNC,CPTII,S$GLB, | Performed by: FAMILY MEDICINE

## 2021-11-04 PROCEDURE — 3288F FALL RISK ASSESSMENT DOCD: CPT | Mod: HCNC,CPTII,S$GLB, | Performed by: FAMILY MEDICINE

## 2021-11-04 PROCEDURE — 99214 PR OFFICE/OUTPT VISIT, EST, LEVL IV, 30-39 MIN: ICD-10-PCS | Mod: HCNC,S$GLB,, | Performed by: FAMILY MEDICINE

## 2021-11-04 PROCEDURE — 1101F PT FALLS ASSESS-DOCD LE1/YR: CPT | Mod: HCNC,CPTII,S$GLB, | Performed by: FAMILY MEDICINE

## 2021-11-04 PROCEDURE — 1159F PR MEDICATION LIST DOCUMENTED IN MEDICAL RECORD: ICD-10-PCS | Mod: HCNC,CPTII,S$GLB, | Performed by: FAMILY MEDICINE

## 2021-11-04 PROCEDURE — 99999 PR PBB SHADOW E&M-EST. PATIENT-LVL V: ICD-10-PCS | Mod: PBBFAC,HCNC,, | Performed by: FAMILY MEDICINE

## 2021-11-04 PROCEDURE — 3008F PR BODY MASS INDEX (BMI) DOCUMENTED: ICD-10-PCS | Mod: HCNC,CPTII,S$GLB, | Performed by: FAMILY MEDICINE

## 2021-11-04 PROCEDURE — 99214 OFFICE O/P EST MOD 30 MIN: CPT | Mod: HCNC,S$GLB,, | Performed by: FAMILY MEDICINE

## 2021-11-04 PROCEDURE — 1159F MED LIST DOCD IN RCRD: CPT | Mod: HCNC,CPTII,S$GLB, | Performed by: FAMILY MEDICINE

## 2021-11-04 PROCEDURE — 1101F PR PT FALLS ASSESS DOC 0-1 FALLS W/OUT INJ PAST YR: ICD-10-PCS | Mod: HCNC,CPTII,S$GLB, | Performed by: FAMILY MEDICINE

## 2021-11-04 PROCEDURE — 99499 RISK ADDL DX/OHS AUDIT: ICD-10-PCS | Mod: S$GLB,,, | Performed by: FAMILY MEDICINE

## 2021-11-04 PROCEDURE — 4010F ACE/ARB THERAPY RXD/TAKEN: CPT | Mod: HCNC,CPTII,S$GLB, | Performed by: FAMILY MEDICINE

## 2021-11-04 PROCEDURE — 99499 UNLISTED E&M SERVICE: CPT | Mod: S$GLB,,, | Performed by: FAMILY MEDICINE

## 2021-11-04 RX ORDER — OMEPRAZOLE 40 MG/1
40 CAPSULE, DELAYED RELEASE ORAL
Qty: 90 CAPSULE | Refills: 3 | OUTPATIENT
Start: 2021-11-04 | End: 2021-11-04 | Stop reason: SDUPTHER

## 2021-11-04 RX ORDER — OMEPRAZOLE 40 MG/1
40 CAPSULE, DELAYED RELEASE ORAL
Qty: 90 CAPSULE | Refills: 3 | Status: SHIPPED | OUTPATIENT
Start: 2021-11-04 | End: 2022-05-06 | Stop reason: SDUPTHER

## 2021-11-04 RX ORDER — ATORVASTATIN CALCIUM 20 MG/1
20 TABLET, FILM COATED ORAL DAILY
Qty: 90 TABLET | Refills: 3 | OUTPATIENT
Start: 2021-11-04 | End: 2021-11-04 | Stop reason: SDUPTHER

## 2021-11-04 RX ORDER — IBANDRONATE SODIUM 150 MG/1
150 TABLET, FILM COATED ORAL
Qty: 3 TABLET | Refills: 3 | OUTPATIENT
Start: 2021-11-04 | End: 2021-11-04 | Stop reason: SDUPTHER

## 2021-11-04 RX ORDER — METOPROLOL SUCCINATE 50 MG/1
50 TABLET, EXTENDED RELEASE ORAL DAILY
Qty: 90 TABLET | Refills: 3 | OUTPATIENT
Start: 2021-11-04 | End: 2021-11-04 | Stop reason: SDUPTHER

## 2021-11-04 RX ORDER — METOPROLOL SUCCINATE 50 MG/1
50 TABLET, EXTENDED RELEASE ORAL DAILY
Qty: 90 TABLET | Refills: 3 | Status: SHIPPED | OUTPATIENT
Start: 2021-11-04 | End: 2021-11-04 | Stop reason: SDUPTHER

## 2021-11-04 RX ORDER — ATORVASTATIN CALCIUM 20 MG/1
20 TABLET, FILM COATED ORAL DAILY
Qty: 90 TABLET | Refills: 3 | Status: SHIPPED | OUTPATIENT
Start: 2021-11-04 | End: 2021-11-04 | Stop reason: SDUPTHER

## 2021-11-04 RX ORDER — LOSARTAN POTASSIUM 50 MG/1
50 TABLET ORAL DAILY
Qty: 90 TABLET | Refills: 3 | OUTPATIENT
Start: 2021-11-04 | End: 2021-11-04 | Stop reason: SDUPTHER

## 2021-11-04 RX ORDER — IBANDRONATE SODIUM 150 MG/1
150 TABLET, FILM COATED ORAL
Qty: 3 TABLET | Refills: 3 | Status: SHIPPED | OUTPATIENT
Start: 2021-11-04 | End: 2021-11-04 | Stop reason: SDUPTHER

## 2021-11-04 RX ORDER — OMEPRAZOLE 40 MG/1
40 CAPSULE, DELAYED RELEASE ORAL
Qty: 90 CAPSULE | Refills: 3 | Status: SHIPPED | OUTPATIENT
Start: 2021-11-04 | End: 2021-11-04 | Stop reason: SDUPTHER

## 2021-11-04 RX ORDER — METOPROLOL SUCCINATE 50 MG/1
50 TABLET, EXTENDED RELEASE ORAL DAILY
Qty: 90 TABLET | Refills: 3 | Status: SHIPPED | OUTPATIENT
Start: 2021-11-04 | End: 2022-04-11 | Stop reason: SDUPTHER

## 2021-11-04 RX ORDER — ATORVASTATIN CALCIUM 20 MG/1
20 TABLET, FILM COATED ORAL DAILY
Qty: 90 TABLET | Refills: 3 | Status: SHIPPED | OUTPATIENT
Start: 2021-11-04 | End: 2022-05-06 | Stop reason: SDUPTHER

## 2021-11-04 RX ORDER — LOSARTAN POTASSIUM 50 MG/1
50 TABLET ORAL DAILY
Qty: 90 TABLET | Refills: 3 | Status: SHIPPED | OUTPATIENT
Start: 2021-11-04 | End: 2021-11-04 | Stop reason: SDUPTHER

## 2021-11-04 RX ORDER — LOSARTAN POTASSIUM 50 MG/1
50 TABLET ORAL DAILY
Qty: 90 TABLET | Refills: 3 | Status: SHIPPED | OUTPATIENT
Start: 2021-11-04 | End: 2022-05-06 | Stop reason: SDUPTHER

## 2021-11-04 RX ORDER — IBANDRONATE SODIUM 150 MG/1
150 TABLET, FILM COATED ORAL
Qty: 3 TABLET | Refills: 3 | Status: SHIPPED | OUTPATIENT
Start: 2021-11-04 | End: 2022-05-06 | Stop reason: SDUPTHER

## 2021-11-05 ENCOUNTER — CLINICAL SUPPORT (OUTPATIENT)
Dept: CARDIOLOGY | Facility: HOSPITAL | Age: 76
End: 2021-11-05
Attending: FAMILY MEDICINE
Payer: MEDICARE

## 2021-11-05 DIAGNOSIS — I73.9 CLAUDICATION: ICD-10-CM

## 2021-11-05 DIAGNOSIS — I73.9 CLAUDICATION OF BOTH LOWER EXTREMITIES: ICD-10-CM

## 2021-11-05 PROCEDURE — 93925 LOWER EXTREMITY STUDY: CPT | Mod: 26,HCNC,, | Performed by: INTERNAL MEDICINE

## 2021-11-05 PROCEDURE — 93925 LOWER EXTREMITY STUDY: CPT | Mod: HCNC,PO

## 2021-11-05 PROCEDURE — 93925 CV US DOPPLER ARTERIAL LEGS BILATERAL (CUPID ONLY): ICD-10-PCS | Mod: 26,HCNC,, | Performed by: INTERNAL MEDICINE

## 2021-11-08 ENCOUNTER — TELEPHONE (OUTPATIENT)
Dept: FAMILY MEDICINE | Facility: CLINIC | Age: 76
End: 2021-11-08
Payer: MEDICARE

## 2021-11-08 DIAGNOSIS — I73.9 PERIPHERAL VASCULAR DISEASE, UNSPECIFIED: ICD-10-CM

## 2021-11-08 LAB
LEFT ANT TIBIAL SYS PSV: 70 CM/S
LEFT CFA PSV: 143 CM/S
LEFT PERONEAL SYS PSV: 73 CM/S
LEFT POPLITEAL PSV: 94 CM/S
LEFT POST TIBIAL SYS PSV: 106 CM/S
LEFT PROFUNDA SYS PSV: 78 CM/S
LEFT SUPER FEMORAL DIST SYS PSV: 133 CM/S
LEFT SUPER FEMORAL MID SYS PSV: 133 CM/S
LEFT SUPER FEMORAL OSTIAL SYS PSV: 103 CM/S
LEFT SUPER FEMORAL PROX SYS PSV: 134 CM/S
LEFT TIB/PER TRUNK SYS PSV: 920 CM/S
OHS CV LEFT LOWER EXTREMITY ABI (NO CALC): 1.03
OHS CV RIGHT ABI LOWER EXTREMITY (NO CALC): 1.02
RIGHT ANT TIBIAL SYS PSV: 74 CM/S
RIGHT CFA PSV: 182 CM/S
RIGHT PERONEAL SYS PSV: 90 CM/S
RIGHT POPLITEAL PSV: 76 CM/S
RIGHT POST TIBIAL SYS PSV: 88 CM/S
RIGHT PROFUNDA SYS PSV: 89 CM/S
RIGHT SUPER FEMORAL DIST SYS PSV: 81 CM/S
RIGHT SUPER FEMORAL MID SYS PSV: 103 CM/S
RIGHT SUPER FEMORAL OSTIAL SYS PSV: 97 CM/S
RIGHT SUPER FEMORAL PROX SYS PSV: 137 CM/S
RIGHT TIB/PER TRUNK SYS PSV: 107 CM/S

## 2021-11-09 ENCOUNTER — TELEPHONE (OUTPATIENT)
Dept: FAMILY MEDICINE | Facility: CLINIC | Age: 76
End: 2021-11-09
Payer: MEDICARE

## 2021-11-15 ENCOUNTER — TELEPHONE (OUTPATIENT)
Dept: CARDIOLOGY | Facility: CLINIC | Age: 76
End: 2021-11-15
Payer: MEDICARE

## 2022-01-04 ENCOUNTER — PATIENT MESSAGE (OUTPATIENT)
Dept: FAMILY MEDICINE | Facility: CLINIC | Age: 77
End: 2022-01-04
Payer: MEDICARE

## 2022-01-07 ENCOUNTER — TELEPHONE (OUTPATIENT)
Dept: FAMILY MEDICINE | Facility: CLINIC | Age: 77
End: 2022-01-07
Payer: MEDICARE

## 2022-01-07 NOTE — TELEPHONE ENCOUNTER
Spoke to pt informing her that no labs were needed for her upcoming visit.  Pt verbalized understanding.

## 2022-02-25 ENCOUNTER — HOSPITAL ENCOUNTER (OUTPATIENT)
Dept: RADIOLOGY | Facility: HOSPITAL | Age: 77
Discharge: HOME | End: 2022-02-25
Attending: RADIOLOGY
Payer: MEDICARE

## 2022-02-25 ENCOUNTER — OFFICE VISIT (OUTPATIENT)
Dept: RADIATION ONCOLOGY | Facility: CLINIC | Age: 77
End: 2022-02-25
Payer: MEDICARE

## 2022-02-25 VITALS
OXYGEN SATURATION: 96 % | HEIGHT: 63 IN | WEIGHT: 207.25 LBS | BODY MASS INDEX: 36.72 KG/M2 | HEART RATE: 69 BPM | RESPIRATION RATE: 18 BRPM | SYSTOLIC BLOOD PRESSURE: 197 MMHG | DIASTOLIC BLOOD PRESSURE: 74 MMHG

## 2022-02-25 DIAGNOSIS — Z85.118 PERSONAL HISTORY OF LUNG CANCER: ICD-10-CM

## 2022-02-25 DIAGNOSIS — Z85.118 PERSONAL HISTORY OF LUNG CANCER: Primary | ICD-10-CM

## 2022-02-25 PROCEDURE — 1125F AMNT PAIN NOTED PAIN PRSNT: CPT | Mod: HCNC,CPTII,S$GLB, | Performed by: RADIOLOGY

## 2022-02-25 PROCEDURE — 1125F PR PAIN SEVERITY QUANTIFIED, PAIN PRESENT: ICD-10-PCS | Mod: HCNC,CPTII,S$GLB, | Performed by: RADIOLOGY

## 2022-02-25 PROCEDURE — 99999 PR PBB SHADOW E&M-EST. PATIENT-LVL IV: ICD-10-PCS | Mod: PBBFAC,HCNC,, | Performed by: RADIOLOGY

## 2022-02-25 PROCEDURE — 1159F PR MEDICATION LIST DOCUMENTED IN MEDICAL RECORD: ICD-10-PCS | Mod: HCNC,CPTII,S$GLB, | Performed by: RADIOLOGY

## 2022-02-25 PROCEDURE — 1101F PR PT FALLS ASSESS DOC 0-1 FALLS W/OUT INJ PAST YR: ICD-10-PCS | Mod: HCNC,CPTII,S$GLB, | Performed by: RADIOLOGY

## 2022-02-25 PROCEDURE — 3077F PR MOST RECENT SYSTOLIC BLOOD PRESSURE >= 140 MM HG: ICD-10-PCS | Mod: HCNC,CPTII,S$GLB, | Performed by: RADIOLOGY

## 2022-02-25 PROCEDURE — 3008F BODY MASS INDEX DOCD: CPT | Mod: HCNC,CPTII,S$GLB, | Performed by: RADIOLOGY

## 2022-02-25 PROCEDURE — 71250 CT THORAX DX C-: CPT | Mod: 26,HCNC,, | Performed by: RADIOLOGY

## 2022-02-25 PROCEDURE — 3288F PR FALLS RISK ASSESSMENT DOCUMENTED: ICD-10-PCS | Mod: HCNC,CPTII,S$GLB, | Performed by: RADIOLOGY

## 2022-02-25 PROCEDURE — 3077F SYST BP >= 140 MM HG: CPT | Mod: HCNC,CPTII,S$GLB, | Performed by: RADIOLOGY

## 2022-02-25 PROCEDURE — 71250 CT THORAX DX C-: CPT | Mod: TC,HCNC

## 2022-02-25 PROCEDURE — 3288F FALL RISK ASSESSMENT DOCD: CPT | Mod: HCNC,CPTII,S$GLB, | Performed by: RADIOLOGY

## 2022-02-25 PROCEDURE — 3078F PR MOST RECENT DIASTOLIC BLOOD PRESSURE < 80 MM HG: ICD-10-PCS | Mod: HCNC,CPTII,S$GLB, | Performed by: RADIOLOGY

## 2022-02-25 PROCEDURE — 99999 PR PBB SHADOW E&M-EST. PATIENT-LVL IV: CPT | Mod: PBBFAC,HCNC,, | Performed by: RADIOLOGY

## 2022-02-25 PROCEDURE — 1159F MED LIST DOCD IN RCRD: CPT | Mod: HCNC,CPTII,S$GLB, | Performed by: RADIOLOGY

## 2022-02-25 PROCEDURE — 99213 OFFICE O/P EST LOW 20 MIN: CPT | Mod: HCNC,S$GLB,, | Performed by: RADIOLOGY

## 2022-02-25 PROCEDURE — 99213 PR OFFICE/OUTPT VISIT, EST, LEVL III, 20-29 MIN: ICD-10-PCS | Mod: HCNC,S$GLB,, | Performed by: RADIOLOGY

## 2022-02-25 PROCEDURE — 3078F DIAST BP <80 MM HG: CPT | Mod: HCNC,CPTII,S$GLB, | Performed by: RADIOLOGY

## 2022-02-25 PROCEDURE — 71250 CT CHEST WITHOUT CONTRAST: ICD-10-PCS | Mod: 26,HCNC,, | Performed by: RADIOLOGY

## 2022-02-25 PROCEDURE — 1101F PT FALLS ASSESS-DOCD LE1/YR: CPT | Mod: HCNC,CPTII,S$GLB, | Performed by: RADIOLOGY

## 2022-02-25 PROCEDURE — 3008F PR BODY MASS INDEX (BMI) DOCUMENTED: ICD-10-PCS | Mod: HCNC,CPTII,S$GLB, | Performed by: RADIOLOGY

## 2022-02-25 NOTE — PROGRESS NOTES
02/25/2022    Radiation Oncology Follow-Up Visit      Prior Radiation History:    Site  Technique  Energy  Dose/Fx (Gy)  #Fx  Total Dose (Gy)  Start Date  End Date  Elapsed Days    DOC Lung  SBRT  6X  11  5 / 5  55  2/7/2020 2/13/2020  6        Assessment   This is a 76 y.o. y/o female with Stage IA2 (cT1b cN0 M0) DOC NSCLC (adeno) diagnosed on biopsy 12/20/19. History significant for sarcoidosis. CT Chest 1/14/20 demonstrated 1.7 cm DOC primary and multiple other stable sub-centimeter nodules with mild hilar and mediastinal adenopathy. She underwent attempted VATS resection by Dr. Mccall 1/16/20, which was aborted due to poor toleration of single lung ventilation. She completed definitive SBRT 55 Gy in 5 fx on 2/13/20.     No significant late toxicity from treatment.  CT Chest today demonstrates stable post-radiation changes and no evidence of disease by my review.        Plan   1) F/U with me and CT Chest prior for re-staging in 6 months per NCCN guidelines (v1.2022).        HPI: Dyspnea has improved with changes in heart and lung medications. Denies fevers, cough, or chest pain. Otherwise has been feeling well.       Past Medical History:   Diagnosis Date    Acute ischemic right MCA stroke 6/2/2017    Cancer 02/2020    lung cancer, small cell     Cancer 06/2020    skin    Cataract     done ou    Colon polyp 11/22/2010    Essential hypertension 8/21/2017    GERD (gastroesophageal reflux disease)     Hiatal hernia     History of radiation therapy     History of seasonal allergies     On home oxygen therapy     while sleeping    Polio     as a child    Presbyopia     PSVT (paroxysmal supraventricular tachycardia)     Pulmonary fibrosis     Dr. Miramontes    Sarcoidosis     Sciatica     TIA (transient ischemic attack) 06/02/2017    [facility]//    Vertigo     Wound of left leg        Past Surgical History:   Procedure Laterality Date    CATARACT EXTRACTION W/  INTRAOCULAR LENS IMPLANT  Left 03/27/2018    Dr Higginbotham    CATARACT EXTRACTION W/  INTRAOCULAR LENS IMPLANT Right 05/08/2018    Dr Higginbotham    CHOLECYSTECTOMY      ESOPHAGEAL DILATION N/A 11/22/2018    Procedure: DILATION, ESOPHAGUS;  Surgeon: Robb Fitzgerald Jr., MD;  Location: [facility];  Service: Endoscopy;  Laterality: N/A;    ESOPHAGOGASTRODUODENOSCOPY N/A 11/22/2018    Procedure: ESOPHAGOGASTRODUODENOSCOPY (EGD);  Surgeon: Robb Fitzgerald Jr., MD;  Location: [facility];  Service: Endoscopy;  Laterality: N/A;    ESOPHAGOGASTRODUODENOSCOPY N/A 2/19/2021    Procedure: EGD (ESOPHAGOGASTRODUODENOSCOPY);  Surgeon: Robb Fitzgerald Jr., MD;  Location: [facility];  Service: Endoscopy;  Laterality: N/A;    EYE SURGERY      HAND SURGERY Right     trauma repair    LEFT HEART CATHETERIZATION N/A 1/4/2021    Procedure: Left heart cath;  Surgeon: Laron Falcon MD;  Location: [facility] CATH;  Service: Cardiology;  Laterality: N/A;    TONSILLECTOMY      TOTAL ABDOMINAL HYSTERECTOMY      VARICOSE VEIN SURGERY Bilateral     bilateral legs    VIDEO-ASSISTED THORACOSCOPIC SURGERY (VATS)  1/16/2020    Procedure: VATS (VIDEO-ASSISTED THORACOSCOPIC SURGERY) - exploratory;  Surgeon: Ritesh Mccall MD;  Location: [address];  Service: Thoracic;;       Social History     Tobacco Use    Smoking status: Former Smoker    Smokeless tobacco: Never Used    Tobacco comment: as teenager   Substance Use Topics    Alcohol use: No    Drug use: No       Cancer-related family history includes Breast cancer in her maternal grandmother and other; Cancer in her daughter, mother, and sister.    Current Outpatient Medications on File Prior to Visit   Medication Sig Dispense Refill    albuterol (PROVENTIL) 2.5 mg /3 mL (0.083 %) nebulizer solution Take 3 mLs (2.5 mg total) by nebulization every 6 (six) hours as needed for Wheezing or Shortness of Breath. Rescue 180 mL 11    albuterol sulfate 2.5 mg/0.5 mL Nebu Take 2.5 mg by nebulization. Rescue       apixaban (ELIQUIS) 5 mg Tab Take 1 tablet (5 mg total) by mouth 2 (two) times daily. 180 tablet 3    aspirin 81 MG Chew Take 1 tablet (81 mg total) by mouth once daily. 30 tablet 11    atorvastatin (LIPITOR) 20 MG tablet Take 1 tablet (20 mg total) by mouth once daily. 90 tablet 3    budesonide-formoterol 160-4.5 mcg (SYMBICORT) 160-4.5 mcg/actuation HFAA Inhale 2 puffs into the lungs every 12 (twelve) hours. 3 g 3    cholecalciferol, vitamin D3, 125 mcg (5,000 unit) Tab Take 5,000 Units by mouth once daily.      flecainide (TAMBOCOR) 150 MG Tab Take 1 tablet (150 mg total) by mouth every 12 (twelve) hours. 180 tablet 3    fluticasone propionate (FLONASE) 50 mcg/actuation nasal spray 1 spray (50 mcg total) by Each Nostril route daily as needed for Allergies. 48 g 3    furosemide (LASIX) 20 MG tablet Take 1 tablet (20 mg total) by mouth once daily. 30 tablet 11    ibandronate (BONIVA) 150 mg tablet Take 1 tablet (150 mg total) by mouth every 30 days. 3 tablet 3    LORazepam (ATIVAN) 1 MG tablet Take 1 tablet (1 mg total) by mouth On call Procedure for Anxiety (Take 1 tablet 30 minutes prior to procedure). 5 tablet 0    losartan (COZAAR) 50 MG tablet Take 1 tablet (50 mg total) by mouth once daily. 90 tablet 3    metoprolol succinate (TOPROL XL) 50 MG 24 hr tablet Take 1 tablet (50 mg total) by mouth once daily. 90 tablet 3    mv,Ca,min-folic acid-vit K1 400-20 mcg Tab Take 1 tablet by mouth once daily at 6am.      omeprazole (PRILOSEC) 40 MG capsule Take 1 capsule (40 mg total) by mouth 2 (two) times daily before meals. 90 capsule 3    predniSONE (DELTASONE) 10 MG tablet Take 1 tablet (10 mg total) by mouth once daily. As needed 120 tablet 1     No current facility-administered medications on file prior to visit.       Review of patient's allergies indicates:   Allergen Reactions    Latex Itching and Swelling     Itching and swelling to site (local reaction)       Review of Systems  "  Constitutional: Negative for weight loss.   HENT: Positive for hearing loss. Negative for ear pain.    Eyes: Negative for blurred vision and double vision.   Respiratory: Positive for shortness of breath. Negative for hemoptysis and wheezing.    Cardiovascular: Negative for leg swelling.   Gastrointestinal: Negative for constipation, diarrhea and heartburn.   Genitourinary: Negative for dysuria and hematuria.   Musculoskeletal: Negative for falls and joint pain.   Neurological: Negative for dizziness, tingling, speech change, focal weakness and seizures.   Psychiatric/Behavioral: Negative for depression. The patient is not nervous/anxious.         Vital Signs: BP (!) 197/74 (BP Location: Right arm, Patient Position: Sitting)   Pulse 69   Resp 18   Ht 5' 3" (1.6 m)   Wt 94 kg (207 lb 3.7 oz)   SpO2 96%   BMI 36.71 kg/m²     ECOG Performance Status: 1    Physical Exam  Vitals reviewed.   Constitutional:       Appearance: Normal appearance.   HENT:      Head: Normocephalic and atraumatic.   Eyes:      General: No scleral icterus.     Extraocular Movements: Extraocular movements intact.   Pulmonary:      Effort: No accessory muscle usage or respiratory distress.   Abdominal:      General: There is no distension.   Musculoskeletal:         General: Normal range of motion.      Cervical back: Normal range of motion and neck supple.   Lymphadenopathy:      Cervical: No cervical adenopathy.   Skin:     General: Skin is warm and dry.   Neurological:      Mental Status: She is alert and oriented to person, place, and time.      Cranial Nerves: No cranial nerve deficit.   Psychiatric:         Mood and Affect: Mood and affect normal.         Judgment: Judgment normal.          Labs:    Imaging: I have personally reviewed the patient's available images and reports and summarized pertinent findings above in HPI.     Pathology: No new path              "

## 2022-03-09 NOTE — Clinical Note
JUST CONfirming [facility] are getting charged 1250 - not 1100 correct? [Lower Ext Edema] : lower extremity edema [Skin Rash] : skin rash [Fever] : no fever [Chills] : no chills [Night Sweats] : no night sweats [Dysphagia] : no dysphagia [Odynophagia] : no odynophagia [Chest Pain] : no chest pain [Shortness Of Breath] : no shortness of breath [Abdominal Pain] : no abdominal pain [Vomiting] : no vomiting [Dizziness] : no dizziness [Fainting] : no fainting [Easy Bleeding] : no tendency for easy bleeding [Easy Bruising] : no tendency for easy bruising [Swollen Glands] : no swollen glands [FreeTextEntry8] : no hematuria.  [de-identified] : bilateral lower extremity rash

## 2022-03-10 ENCOUNTER — TELEPHONE (OUTPATIENT)
Dept: CARDIOLOGY | Facility: CLINIC | Age: 77
End: 2022-03-10
Payer: MEDICARE

## 2022-03-10 NOTE — TELEPHONE ENCOUNTER
"Spoke to pt and advised per Dr Fuentes (Okay to hold off on Eliquis with temporary pause Eliquis if needed for skin to heal.  Would minimize time off Eliquis.  Very unlikely that the Eliquis or the flecainide is causing the jerking episodes."  Pt expressed understanding  "

## 2022-03-10 NOTE — TELEPHONE ENCOUNTER
----- Message from Shira Ramos sent at 3/10/2022 10:53 AM CST -----  Contact: pt  Type: Needs Medical Advice    Who Called: pt  Best Call Back Number: [phone]  Inquiry/Question: pt would like to speak to nurse regarding her having 3 bleed outs.  Pt needs to speak a nurse..   Thank you~

## 2022-04-04 ENCOUNTER — IMMUNIZATION (OUTPATIENT)
Dept: PHARMACY | Facility: CLINIC | Age: 77
End: 2022-04-04
Payer: MEDICARE

## 2022-04-04 DIAGNOSIS — Z23 NEED FOR VACCINATION: Primary | ICD-10-CM

## 2022-04-07 ENCOUNTER — PATIENT OUTREACH (OUTPATIENT)
Dept: ADMINISTRATIVE | Facility: OTHER | Age: 77
End: 2022-04-07
Payer: MEDICARE

## 2022-04-07 NOTE — PROGRESS NOTES
LINKS immunization registry updated  Care Everywhere updated  Health Maintenance updated  Chart reviewed for overdue Proactive [facility] Encounters (SKIP) health maintenance testing (CRS, Breast Ca, Diabetic Eye Exam)   Orders entered:N/A

## 2022-04-11 ENCOUNTER — OFFICE VISIT (OUTPATIENT)
Dept: CARDIOLOGY | Facility: CLINIC | Age: 77
End: 2022-04-11
Payer: MEDICARE

## 2022-04-11 VITALS
SYSTOLIC BLOOD PRESSURE: 159 MMHG | BODY MASS INDEX: 36.91 KG/M2 | HEIGHT: 63 IN | HEART RATE: 72 BPM | DIASTOLIC BLOOD PRESSURE: 63 MMHG | WEIGHT: 208.31 LBS

## 2022-04-11 DIAGNOSIS — I70.0 ATHEROSCLEROSIS OF AORTA: ICD-10-CM

## 2022-04-11 DIAGNOSIS — E78.5 HYPERLIPIDEMIA LDL GOAL <70: ICD-10-CM

## 2022-04-11 DIAGNOSIS — I10 ESSENTIAL HYPERTENSION: ICD-10-CM

## 2022-04-11 DIAGNOSIS — I10 ESSENTIAL HYPERTENSION: Primary | ICD-10-CM

## 2022-04-11 DIAGNOSIS — I48.0 PAROXYSMAL ATRIAL FIBRILLATION: Primary | ICD-10-CM

## 2022-04-11 DIAGNOSIS — I48.0 PAF (PAROXYSMAL ATRIAL FIBRILLATION): ICD-10-CM

## 2022-04-11 DIAGNOSIS — I47.10 PSVT (PAROXYSMAL SUPRAVENTRICULAR TACHYCARDIA): ICD-10-CM

## 2022-04-11 PROCEDURE — 3288F FALL RISK ASSESSMENT DOCD: CPT | Mod: CPTII,S$GLB,, | Performed by: INTERNAL MEDICINE

## 2022-04-11 PROCEDURE — 1160F PR REVIEW ALL MEDS BY PRESCRIBER/CLIN PHARMACIST DOCUMENTED: ICD-10-PCS | Mod: CPTII,S$GLB,, | Performed by: INTERNAL MEDICINE

## 2022-04-11 PROCEDURE — 3008F BODY MASS INDEX DOCD: CPT | Mod: CPTII,S$GLB,, | Performed by: INTERNAL MEDICINE

## 2022-04-11 PROCEDURE — 3077F PR MOST RECENT SYSTOLIC BLOOD PRESSURE >= 140 MM HG: ICD-10-PCS | Mod: CPTII,S$GLB,, | Performed by: INTERNAL MEDICINE

## 2022-04-11 PROCEDURE — 1126F AMNT PAIN NOTED NONE PRSNT: CPT | Mod: CPTII,S$GLB,, | Performed by: INTERNAL MEDICINE

## 2022-04-11 PROCEDURE — 99999 PR PBB SHADOW E&M-EST. PATIENT-LVL IV: CPT | Mod: PBBFAC,,, | Performed by: INTERNAL MEDICINE

## 2022-04-11 PROCEDURE — 1101F PR PT FALLS ASSESS DOC 0-1 FALLS W/OUT INJ PAST YR: ICD-10-PCS | Mod: CPTII,S$GLB,, | Performed by: INTERNAL MEDICINE

## 2022-04-11 PROCEDURE — 3077F SYST BP >= 140 MM HG: CPT | Mod: CPTII,S$GLB,, | Performed by: INTERNAL MEDICINE

## 2022-04-11 PROCEDURE — 4010F PR ACE/ARB THEARPY RXD/TAKEN: ICD-10-PCS | Mod: CPTII,S$GLB,, | Performed by: INTERNAL MEDICINE

## 2022-04-11 PROCEDURE — 3288F PR FALLS RISK ASSESSMENT DOCUMENTED: ICD-10-PCS | Mod: CPTII,S$GLB,, | Performed by: INTERNAL MEDICINE

## 2022-04-11 PROCEDURE — 3078F DIAST BP <80 MM HG: CPT | Mod: CPTII,S$GLB,, | Performed by: INTERNAL MEDICINE

## 2022-04-11 PROCEDURE — 4010F ACE/ARB THERAPY RXD/TAKEN: CPT | Mod: CPTII,S$GLB,, | Performed by: INTERNAL MEDICINE

## 2022-04-11 PROCEDURE — 1101F PT FALLS ASSESS-DOCD LE1/YR: CPT | Mod: CPTII,S$GLB,, | Performed by: INTERNAL MEDICINE

## 2022-04-11 PROCEDURE — 99499 UNLISTED E&M SERVICE: CPT | Mod: S$GLB,,, | Performed by: INTERNAL MEDICINE

## 2022-04-11 PROCEDURE — 3008F PR BODY MASS INDEX (BMI) DOCUMENTED: ICD-10-PCS | Mod: CPTII,S$GLB,, | Performed by: INTERNAL MEDICINE

## 2022-04-11 PROCEDURE — 1159F MED LIST DOCD IN RCRD: CPT | Mod: CPTII,S$GLB,, | Performed by: INTERNAL MEDICINE

## 2022-04-11 PROCEDURE — 3078F PR MOST RECENT DIASTOLIC BLOOD PRESSURE < 80 MM HG: ICD-10-PCS | Mod: CPTII,S$GLB,, | Performed by: INTERNAL MEDICINE

## 2022-04-11 PROCEDURE — 99499 RISK ADDL DX/OHS AUDIT: ICD-10-PCS | Mod: S$GLB,,, | Performed by: INTERNAL MEDICINE

## 2022-04-11 PROCEDURE — 1159F PR MEDICATION LIST DOCUMENTED IN MEDICAL RECORD: ICD-10-PCS | Mod: CPTII,S$GLB,, | Performed by: INTERNAL MEDICINE

## 2022-04-11 PROCEDURE — 1126F PR PAIN SEVERITY QUANTIFIED, NO PAIN PRESENT: ICD-10-PCS | Mod: CPTII,S$GLB,, | Performed by: INTERNAL MEDICINE

## 2022-04-11 PROCEDURE — 1160F RVW MEDS BY RX/DR IN RCRD: CPT | Mod: CPTII,S$GLB,, | Performed by: INTERNAL MEDICINE

## 2022-04-11 PROCEDURE — 99215 PR OFFICE/OUTPT VISIT, EST, LEVL V, 40-54 MIN: ICD-10-PCS | Mod: S$GLB,,, | Performed by: INTERNAL MEDICINE

## 2022-04-11 PROCEDURE — 99999 PR PBB SHADOW E&M-EST. PATIENT-LVL IV: ICD-10-PCS | Mod: PBBFAC,,, | Performed by: INTERNAL MEDICINE

## 2022-04-11 PROCEDURE — 99215 OFFICE O/P EST HI 40 MIN: CPT | Mod: S$GLB,,, | Performed by: INTERNAL MEDICINE

## 2022-04-11 RX ORDER — METOPROLOL SUCCINATE 50 MG/1
50 TABLET, EXTENDED RELEASE ORAL DAILY
Qty: 90 TABLET | Refills: 3 | OUTPATIENT
Start: 2022-04-11 | End: 2022-04-11 | Stop reason: SDUPTHER

## 2022-04-11 RX ORDER — FLECAINIDE ACETATE 150 MG/1
150 TABLET ORAL EVERY 12 HOURS
Qty: 180 TABLET | Refills: 3 | OUTPATIENT
Start: 2022-04-11 | End: 2022-04-11 | Stop reason: SDUPTHER

## 2022-04-11 RX ORDER — FUROSEMIDE 20 MG/1
20 TABLET ORAL DAILY
Qty: 90 TABLET | Refills: 3 | OUTPATIENT
Start: 2022-04-11 | End: 2022-04-11 | Stop reason: SDUPTHER

## 2022-04-11 RX ORDER — FUROSEMIDE 20 MG/1
20 TABLET ORAL DAILY
Qty: 90 TABLET | Refills: 3 | Status: SHIPPED | OUTPATIENT
Start: 2022-04-11 | End: 2022-12-28 | Stop reason: SDUPTHER

## 2022-04-11 RX ORDER — FUROSEMIDE 20 MG/1
20 TABLET ORAL DAILY
Qty: 90 TABLET | Refills: 3 | Status: SHIPPED | OUTPATIENT
Start: 2022-04-11 | End: 2022-04-11 | Stop reason: SDUPTHER

## 2022-04-11 RX ORDER — FLECAINIDE ACETATE 150 MG/1
150 TABLET ORAL EVERY 12 HOURS
Qty: 180 TABLET | Refills: 3 | Status: SHIPPED | OUTPATIENT
Start: 2022-04-11 | End: 2022-10-31 | Stop reason: SDUPTHER

## 2022-04-11 RX ORDER — METOPROLOL SUCCINATE 50 MG/1
50 TABLET, EXTENDED RELEASE ORAL DAILY
Qty: 90 TABLET | Refills: 3 | Status: SHIPPED | OUTPATIENT
Start: 2022-04-11 | End: 2022-05-06 | Stop reason: SDUPTHER

## 2022-04-11 NOTE — TELEPHONE ENCOUNTER
"----- Message from Lina Toñito sent at 4/11/2022 12:59 PM CDT -----  "Type:  Patient Call Back    Who Called:CHAPARRO GEORGES    What is the reqeust in detail:Pt requesting call back to get printout for prescription furosemide (LASIX) 20 MG tablet so she can send to insurance. Please advise    Can the clinic reply by [facility]?no    Best Call Back Number:[phone]      Additional Information:Doctor had trouble this morning printing them out she received tow out of the three. Please advise            "

## 2022-04-11 NOTE — PATIENT INSTRUCTIONS
Loop Recorder Insertion    Arrive for your procedure at: [facility] on 4/19/22. Your procedure is at 10 am.    The pre op department will call with your arrival time.    NO PREP IS NECESSARY    The [employer] loop recorder monitors a persons heart rate 24 hours a day and has a battery life of up to 3 years.  The device is approximately one-third of the size of a AAA battery and is also safe for use in an MRI.  It is placed beneath the skin through a small incision in the left upper side of the chest wall.  The procedure is minimally invasive so anesthesia is not needed to implant the device.  The procedure can be done in about 10 minutes.      You may eat or drink the day of the procedure.    Stop Eliquis 48 hours prior to procedure.    This WILL NOT require anesthesia or an IV.    You DO NOT need someone to drive you home.

## 2022-04-11 NOTE — PROGRESS NOTES
"Subjective:    Patient ID:  Nicole Medina is a 76 y.o. female who presents for follow-up of Follow-up      Problem List Items Addressed This Visit        Cardiac/Vascular    Atherosclerosis of aorta    Overview     CT chest 12/29/15: "Atherosclerotic vascular calcifications including coronary and aortic calcifications are seen."           Essential hypertension    Hyperlipidemia LDL goal <70    Paroxysmal atrial fibrillation - Primary    Overview     12/28/20 - JEB/DCCV             PSVT (paroxysmal supraventricular tachycardia)          HPI    Patient was last seen on 08/03/2021 at which time she was doing okay from a cardiac standpoint.  Eliquis and flecainide were continued in addition to metoprolol succinate.    On assessment today, the patient states that she feels OK.    No chest pain.  No shortness of breath.    Palpitations - No  Recurrence of atrial fibrillation - Does not think so    Had recent vein procedures with [facility] for bleeding in the legs.    Review of Systems   Constitutional: Negative for decreased appetite, fever and malaise/fatigue.   Eyes: Negative for blurred vision.   Cardiovascular: Negative for chest pain, dyspnea on exertion, irregular heartbeat and leg swelling.   Respiratory: Negative for cough, hemoptysis, shortness of breath and wheezing.    Endocrine: Negative for cold intolerance and heat intolerance.   Hematologic/Lymphatic: Negative for bleeding problem.   Musculoskeletal: Negative for muscle weakness and myalgias.   Gastrointestinal: Negative for abdominal pain, constipation and diarrhea.   Genitourinary: Negative for bladder incontinence.   Neurological: Negative for dizziness and weakness.   Psychiatric/Behavioral: Negative for depression.        Objective:     Vitals:    04/11/22 1025   BP: (!) 159/63   BP Location: Left arm   Patient Position: Sitting   BP Method: Large (Automatic)   Pulse: 72   Weight: 94.5 kg (208 lb 5.4 oz)   Height: 5' 3" (1.6 m)        Physical " Exam  Vitals and nursing note reviewed.   Constitutional:       General: She is not in acute distress.     Appearance: She is well-developed.   HENT:      Head: Normocephalic and atraumatic.   Neck:      Vascular: No JVD.   Cardiovascular:      Rate and Rhythm: Normal rate and regular rhythm.      Heart sounds: Normal heart sounds. No murmur heard.    No friction rub. No gallop.   Pulmonary:      Effort: Pulmonary effort is normal. No respiratory distress.      Breath sounds: Normal breath sounds. No wheezing or rales.   Abdominal:      General: Bowel sounds are normal.      Palpations: Abdomen is soft.      Tenderness: There is no abdominal tenderness. There is no guarding or rebound.   Musculoskeletal:         General: No tenderness.      Cervical back: Neck supple.   Skin:     General: Skin is warm and dry.   Neurological:      Mental Status: She is alert and oriented to person, place, and time.   Psychiatric:         Behavior: Behavior normal.             Current Outpatient Medications on File Prior to Visit   Medication Sig    albuterol (PROVENTIL) 2.5 mg /3 mL (0.083 %) nebulizer solution Take 3 mLs (2.5 mg total) by nebulization every 6 (six) hours as needed for Wheezing or Shortness of Breath. Rescue    albuterol sulfate 2.5 mg/0.5 mL Nebu Take 2.5 mg by nebulization. Rescue    apixaban (ELIQUIS) 5 mg Tab Take 1 tablet (5 mg total) by mouth 2 (two) times daily.    aspirin 81 MG Chew Take 1 tablet (81 mg total) by mouth once daily.    atorvastatin (LIPITOR) 20 MG tablet Take 1 tablet (20 mg total) by mouth once daily.    budesonide-formoterol 160-4.5 mcg (SYMBICORT) 160-4.5 mcg/actuation HFAA Inhale 2 puffs into the lungs every 12 (twelve) hours.    cholecalciferol, vitamin D3, 125 mcg (5,000 unit) Tab Take 5,000 Units by mouth once daily.    flecainide (TAMBOCOR) 150 MG Tab Take 1 tablet (150 mg total) by mouth every 12 (twelve) hours.    fluticasone propionate (FLONASE) 50 mcg/actuation nasal spray  1 spray (50 mcg total) by Each Nostril route daily as needed for Allergies.    furosemide (LASIX) 20 MG tablet Take 1 tablet (20 mg total) by mouth once daily.    ibandronate (BONIVA) 150 mg tablet Take 1 tablet (150 mg total) by mouth every 30 days.    LORazepam (ATIVAN) 1 MG tablet Take 1 tablet (1 mg total) by mouth On call Procedure for Anxiety (Take 1 tablet 30 minutes prior to procedure).    losartan (COZAAR) 50 MG tablet Take 1 tablet (50 mg total) by mouth once daily.    metoprolol succinate (TOPROL XL) 50 MG 24 hr tablet Take 1 tablet (50 mg total) by mouth once daily.    mv,Ca,min-folic acid-vit K1 400-20 mcg Tab Take 1 tablet by mouth once daily at 6am.    omeprazole (PRILOSEC) 40 MG capsule Take 1 capsule (40 mg total) by mouth 2 (two) times daily before meals.    predniSONE (DELTASONE) 10 MG tablet Take 1 tablet (10 mg total) by mouth once daily. As needed     No current facility-administered medications on file prior to visit.       Lipid Panel:   Lab Results   Component Value Date    CHOL 144 10/21/2021    HDL 82 (H) 10/21/2021    LDLCALC 51.8 (L) 10/21/2021    TRIG 51 10/21/2021    CHOLHDL 56.9 (H) 10/21/2021       The ASCVD Risk score ([place] RODRIGUEZ Jr., et al., 2013) failed to calculate for the following reasons:    The patient has a prior MI or stroke diagnosis    All pertinent labs, imaging, and EKGs reviewed.  Patient's most recent EKG tracing was personally interpreted by this provider.    Assessment:       1. Paroxysmal atrial fibrillation    2. Hyperlipidemia LDL goal <70    3. Essential hypertension    4. Atherosclerosis of aorta    5. PSVT (paroxysmal supraventricular tachycardia)         Plan:     Symptoms OK today  BP borderline  Home BP - 130/60 yesterday  Most recent echocardiogram reviewed personally   Rhythm - Regular today    Continue aspirin 81 mg PO Daily  Continue Eliquis 5 mg PO BID   Continue metoprolol succinate 50 mg PO Daily   Continue flecainide 150 mg PO BID   Continue  atorvastatin  Schedule placement of implantable loop recorder, can stop Eliquis 48 hours prior to procedure, only has to restart if aFib is seen  This procedure has been fully reviewed with the patient.    Continue other cardiac medications  Mediterranean Diet/Cardiovascular Exercise Program    Patient queried and all questions were answered.    F/u in 6 months to reassess      Signed:    Thanh Fuentes MD  4/11/2022 7:45 AM

## 2022-04-19 ENCOUNTER — DOCUMENTATION ONLY (OUTPATIENT)
Dept: CARDIOLOGY | Facility: HOSPITAL | Age: 77
End: 2022-04-19
Payer: MEDICARE

## 2022-04-19 ENCOUNTER — TELEPHONE (OUTPATIENT)
Dept: CARDIOLOGY | Facility: HOSPITAL | Age: 77
End: 2022-04-19
Payer: MEDICARE

## 2022-04-19 DIAGNOSIS — Z95.818 STATUS POST PLACEMENT OF IMPLANTABLE LOOP RECORDER: Primary | ICD-10-CM

## 2022-04-19 DIAGNOSIS — I48.0 PAROXYSMAL ATRIAL FIBRILLATION: ICD-10-CM

## 2022-04-26 ENCOUNTER — CLINICAL SUPPORT (OUTPATIENT)
Dept: CARDIOLOGY | Facility: HOSPITAL | Age: 77
End: 2022-04-26
Attending: INTERNAL MEDICINE
Payer: MEDICARE

## 2022-04-26 DIAGNOSIS — I48.0 PAROXYSMAL ATRIAL FIBRILLATION: ICD-10-CM

## 2022-04-26 DIAGNOSIS — Z95.818 STATUS POST PLACEMENT OF IMPLANTABLE LOOP RECORDER: ICD-10-CM

## 2022-04-26 PROCEDURE — 93285 PRGRMG DEV EVAL SCRMS IP: CPT | Mod: 26,,, | Performed by: INTERNAL MEDICINE

## 2022-04-26 PROCEDURE — 93285 CARDIAC DEVICE CHECK - IN CLINIC & HOSPITAL: ICD-10-PCS | Mod: 26,,, | Performed by: INTERNAL MEDICINE

## 2022-05-06 ENCOUNTER — LAB VISIT (OUTPATIENT)
Dept: LAB | Facility: HOSPITAL | Age: 77
End: 2022-05-06
Attending: INTERNAL MEDICINE
Payer: MEDICARE

## 2022-05-06 ENCOUNTER — OFFICE VISIT (OUTPATIENT)
Dept: FAMILY MEDICINE | Facility: CLINIC | Age: 77
End: 2022-05-06
Payer: MEDICARE

## 2022-05-06 VITALS
HEART RATE: 67 BPM | HEIGHT: 63 IN | WEIGHT: 209.69 LBS | DIASTOLIC BLOOD PRESSURE: 84 MMHG | BODY MASS INDEX: 37.15 KG/M2 | SYSTOLIC BLOOD PRESSURE: 130 MMHG | OXYGEN SATURATION: 95 %

## 2022-05-06 DIAGNOSIS — E66.2 MORBID (SEVERE) OBESITY WITH ALVEOLAR HYPOVENTILATION: ICD-10-CM

## 2022-05-06 DIAGNOSIS — D64.9 ANEMIA, UNSPECIFIED TYPE: ICD-10-CM

## 2022-05-06 DIAGNOSIS — M85.89 OSTEOPENIA OF MULTIPLE SITES: ICD-10-CM

## 2022-05-06 DIAGNOSIS — Z86.73 HISTORY OF CVA (CEREBROVASCULAR ACCIDENT): ICD-10-CM

## 2022-05-06 DIAGNOSIS — K31.83 ACHLORHYDRIA: ICD-10-CM

## 2022-05-06 DIAGNOSIS — R53.83 FATIGUE, UNSPECIFIED TYPE: Primary | ICD-10-CM

## 2022-05-06 DIAGNOSIS — R53.83 FATIGUE, UNSPECIFIED TYPE: ICD-10-CM

## 2022-05-06 DIAGNOSIS — R10.13 EPIGASTRIC PAIN: ICD-10-CM

## 2022-05-06 DIAGNOSIS — I10 ESSENTIAL HYPERTENSION: ICD-10-CM

## 2022-05-06 DIAGNOSIS — D69.2 OTHER NONTHROMBOCYTOPENIC PURPURA: ICD-10-CM

## 2022-05-06 DIAGNOSIS — K21.9 GASTROESOPHAGEAL REFLUX DISEASE WITHOUT ESOPHAGITIS: ICD-10-CM

## 2022-05-06 DIAGNOSIS — I48.0 PAROXYSMAL ATRIAL FIBRILLATION: ICD-10-CM

## 2022-05-06 DIAGNOSIS — Z78.0 MENOPAUSE: ICD-10-CM

## 2022-05-06 LAB
ALBUMIN SERPL BCP-MCNC: 3.4 G/DL (ref 3.5–5.2)
ALP SERPL-CCNC: 70 U/L (ref 55–135)
ALT SERPL W/O P-5'-P-CCNC: 15 U/L (ref 10–44)
ANION GAP SERPL CALC-SCNC: 11 MMOL/L (ref 8–16)
AST SERPL-CCNC: 19 U/L (ref 10–40)
BASOPHILS # BLD AUTO: 0.06 K/UL (ref 0–0.2)
BASOPHILS NFR BLD: 0.5 % (ref 0–1.9)
BILIRUB SERPL-MCNC: 0.4 MG/DL (ref 0.1–1)
BUN SERPL-MCNC: 15 MG/DL (ref 8–23)
CALCIUM SERPL-MCNC: 9.4 MG/DL (ref 8.7–10.5)
CHLORIDE SERPL-SCNC: 102 MMOL/L (ref 95–110)
CO2 SERPL-SCNC: 26 MMOL/L (ref 23–29)
CREAT SERPL-MCNC: 0.8 MG/DL (ref 0.5–1.4)
DIFFERENTIAL METHOD: ABNORMAL
EOSINOPHIL # BLD AUTO: 0.4 K/UL (ref 0–0.5)
EOSINOPHIL NFR BLD: 3.8 % (ref 0–8)
ERYTHROCYTE [DISTWIDTH] IN BLOOD BY AUTOMATED COUNT: 14.3 % (ref 11.5–14.5)
EST. GFR  (AFRICAN AMERICAN): >60 ML/MIN/1.73 M^2
EST. GFR  (NON AFRICAN AMERICAN): >60 ML/MIN/1.73 M^2
FERRITIN SERPL-MCNC: 23 NG/ML (ref 20–300)
GLUCOSE SERPL-MCNC: 113 MG/DL (ref 70–110)
HCT VFR BLD AUTO: 24.3 % (ref 37–48.5)
HGB BLD-MCNC: 7.2 G/DL (ref 12–16)
IMM GRANULOCYTES # BLD AUTO: 0.03 K/UL (ref 0–0.04)
IMM GRANULOCYTES NFR BLD AUTO: 0.3 % (ref 0–0.5)
IRON SERPL-MCNC: 14 UG/DL (ref 30–160)
LYMPHOCYTES # BLD AUTO: 2.4 K/UL (ref 1–4.8)
LYMPHOCYTES NFR BLD: 20.9 % (ref 18–48)
MCH RBC QN AUTO: 25.6 PG (ref 27–31)
MCHC RBC AUTO-ENTMCNC: 29.6 G/DL (ref 32–36)
MCV RBC AUTO: 87 FL (ref 82–98)
MONOCYTES # BLD AUTO: 0.8 K/UL (ref 0.3–1)
MONOCYTES NFR BLD: 7.1 % (ref 4–15)
NEUTROPHILS # BLD AUTO: 7.7 K/UL (ref 1.8–7.7)
NEUTROPHILS NFR BLD: 67.4 % (ref 38–73)
NRBC BLD-RTO: 0 /100 WBC
PLATELET # BLD AUTO: 243 K/UL (ref 150–450)
PMV BLD AUTO: 12.7 FL (ref 9.2–12.9)
POTASSIUM SERPL-SCNC: 4.2 MMOL/L (ref 3.5–5.1)
PROT SERPL-MCNC: 7.5 G/DL (ref 6–8.4)
RBC # BLD AUTO: 2.81 M/UL (ref 4–5.4)
SATURATED IRON: 3 % (ref 20–50)
SODIUM SERPL-SCNC: 139 MMOL/L (ref 136–145)
T4 FREE SERPL-MCNC: 1.07 NG/DL (ref 0.71–1.51)
TOTAL IRON BINDING CAPACITY: 555 UG/DL (ref 250–450)
TRANSFERRIN SERPL-MCNC: 375 MG/DL (ref 200–375)
TSH SERPL DL<=0.005 MIU/L-ACNC: 1.81 UIU/ML (ref 0.4–4)
VIT B12 SERPL-MCNC: 702 PG/ML (ref 210–950)
WBC # BLD AUTO: 11.48 K/UL (ref 3.9–12.7)

## 2022-05-06 PROCEDURE — 99999 PR PBB SHADOW E&M-EST. PATIENT-LVL IV: CPT | Mod: PBBFAC,,, | Performed by: INTERNAL MEDICINE

## 2022-05-06 PROCEDURE — 1159F PR MEDICATION LIST DOCUMENTED IN MEDICAL RECORD: ICD-10-PCS | Mod: CPTII,S$GLB,, | Performed by: INTERNAL MEDICINE

## 2022-05-06 PROCEDURE — 4010F ACE/ARB THERAPY RXD/TAKEN: CPT | Mod: CPTII,S$GLB,, | Performed by: INTERNAL MEDICINE

## 2022-05-06 PROCEDURE — 84439 ASSAY OF FREE THYROXINE: CPT | Performed by: INTERNAL MEDICINE

## 2022-05-06 PROCEDURE — 1160F PR REVIEW ALL MEDS BY PRESCRIBER/CLIN PHARMACIST DOCUMENTED: ICD-10-PCS | Mod: CPTII,S$GLB,, | Performed by: INTERNAL MEDICINE

## 2022-05-06 PROCEDURE — 99214 OFFICE O/P EST MOD 30 MIN: CPT | Mod: S$GLB,,, | Performed by: INTERNAL MEDICINE

## 2022-05-06 PROCEDURE — 99214 PR OFFICE/OUTPT VISIT, EST, LEVL IV, 30-39 MIN: ICD-10-PCS | Mod: S$GLB,,, | Performed by: INTERNAL MEDICINE

## 2022-05-06 PROCEDURE — 85025 COMPLETE CBC W/AUTO DIFF WBC: CPT | Performed by: INTERNAL MEDICINE

## 2022-05-06 PROCEDURE — 84443 ASSAY THYROID STIM HORMONE: CPT | Performed by: INTERNAL MEDICINE

## 2022-05-06 PROCEDURE — 3288F PR FALLS RISK ASSESSMENT DOCUMENTED: ICD-10-PCS | Mod: CPTII,S$GLB,, | Performed by: INTERNAL MEDICINE

## 2022-05-06 PROCEDURE — 3079F PR MOST RECENT DIASTOLIC BLOOD PRESSURE 80-89 MM HG: ICD-10-PCS | Mod: CPTII,S$GLB,, | Performed by: INTERNAL MEDICINE

## 2022-05-06 PROCEDURE — 3288F FALL RISK ASSESSMENT DOCD: CPT | Mod: CPTII,S$GLB,, | Performed by: INTERNAL MEDICINE

## 2022-05-06 PROCEDURE — 99499 RISK ADDL DX/OHS AUDIT: ICD-10-PCS | Mod: S$GLB,,, | Performed by: INTERNAL MEDICINE

## 2022-05-06 PROCEDURE — 3079F DIAST BP 80-89 MM HG: CPT | Mod: CPTII,S$GLB,, | Performed by: INTERNAL MEDICINE

## 2022-05-06 PROCEDURE — 1126F PR PAIN SEVERITY QUANTIFIED, NO PAIN PRESENT: ICD-10-PCS | Mod: CPTII,S$GLB,, | Performed by: INTERNAL MEDICINE

## 2022-05-06 PROCEDURE — 3008F PR BODY MASS INDEX (BMI) DOCUMENTED: ICD-10-PCS | Mod: CPTII,S$GLB,, | Performed by: INTERNAL MEDICINE

## 2022-05-06 PROCEDURE — 1101F PR PT FALLS ASSESS DOC 0-1 FALLS W/OUT INJ PAST YR: ICD-10-PCS | Mod: CPTII,S$GLB,, | Performed by: INTERNAL MEDICINE

## 2022-05-06 PROCEDURE — 80053 COMPREHEN METABOLIC PANEL: CPT | Performed by: INTERNAL MEDICINE

## 2022-05-06 PROCEDURE — 3008F BODY MASS INDEX DOCD: CPT | Mod: CPTII,S$GLB,, | Performed by: INTERNAL MEDICINE

## 2022-05-06 PROCEDURE — 4010F PR ACE/ARB THEARPY RXD/TAKEN: ICD-10-PCS | Mod: CPTII,S$GLB,, | Performed by: INTERNAL MEDICINE

## 2022-05-06 PROCEDURE — 1126F AMNT PAIN NOTED NONE PRSNT: CPT | Mod: CPTII,S$GLB,, | Performed by: INTERNAL MEDICINE

## 2022-05-06 PROCEDURE — 3075F PR MOST RECENT SYSTOLIC BLOOD PRESS GE 130-139MM HG: ICD-10-PCS | Mod: CPTII,S$GLB,, | Performed by: INTERNAL MEDICINE

## 2022-05-06 PROCEDURE — 1160F RVW MEDS BY RX/DR IN RCRD: CPT | Mod: CPTII,S$GLB,, | Performed by: INTERNAL MEDICINE

## 2022-05-06 PROCEDURE — 82728 ASSAY OF FERRITIN: CPT | Performed by: INTERNAL MEDICINE

## 2022-05-06 PROCEDURE — 3075F SYST BP GE 130 - 139MM HG: CPT | Mod: CPTII,S$GLB,, | Performed by: INTERNAL MEDICINE

## 2022-05-06 PROCEDURE — 84466 ASSAY OF TRANSFERRIN: CPT | Performed by: INTERNAL MEDICINE

## 2022-05-06 PROCEDURE — 1101F PT FALLS ASSESS-DOCD LE1/YR: CPT | Mod: CPTII,S$GLB,, | Performed by: INTERNAL MEDICINE

## 2022-05-06 PROCEDURE — 1159F MED LIST DOCD IN RCRD: CPT | Mod: CPTII,S$GLB,, | Performed by: INTERNAL MEDICINE

## 2022-05-06 PROCEDURE — 99999 PR PBB SHADOW E&M-EST. PATIENT-LVL IV: ICD-10-PCS | Mod: PBBFAC,,, | Performed by: INTERNAL MEDICINE

## 2022-05-06 PROCEDURE — 82607 VITAMIN B-12: CPT | Performed by: INTERNAL MEDICINE

## 2022-05-06 PROCEDURE — 36415 COLL VENOUS BLD VENIPUNCTURE: CPT | Mod: PO | Performed by: INTERNAL MEDICINE

## 2022-05-06 PROCEDURE — 99499 UNLISTED E&M SERVICE: CPT | Mod: S$GLB,,, | Performed by: INTERNAL MEDICINE

## 2022-05-06 RX ORDER — LOSARTAN POTASSIUM 50 MG/1
50 TABLET ORAL DAILY
Qty: 90 TABLET | Refills: 3 | Status: SHIPPED | OUTPATIENT
Start: 2022-05-06 | End: 2023-02-09 | Stop reason: SDUPTHER

## 2022-05-06 RX ORDER — METOPROLOL SUCCINATE 50 MG/1
50 TABLET, EXTENDED RELEASE ORAL DAILY
Qty: 90 TABLET | Refills: 3 | Status: SHIPPED | OUTPATIENT
Start: 2022-05-06 | End: 2022-05-06 | Stop reason: SDUPTHER

## 2022-05-06 RX ORDER — OMEPRAZOLE 40 MG/1
40 CAPSULE, DELAYED RELEASE ORAL
Qty: 90 CAPSULE | Refills: 3 | Status: SHIPPED | OUTPATIENT
Start: 2022-05-06 | End: 2022-05-06 | Stop reason: SDUPTHER

## 2022-05-06 RX ORDER — IBANDRONATE SODIUM 150 MG/1
150 TABLET, FILM COATED ORAL
Qty: 12 TABLET | Refills: 3 | Status: SHIPPED | OUTPATIENT
Start: 2022-05-06 | End: 2022-05-06 | Stop reason: SDUPTHER

## 2022-05-06 RX ORDER — IBANDRONATE SODIUM 150 MG/1
150 TABLET, FILM COATED ORAL
Qty: 12 TABLET | Refills: 3 | Status: SHIPPED | OUTPATIENT
Start: 2022-05-06 | End: 2023-02-09 | Stop reason: SDUPTHER

## 2022-05-06 RX ORDER — METOPROLOL SUCCINATE 50 MG/1
50 TABLET, EXTENDED RELEASE ORAL DAILY
Qty: 90 TABLET | Refills: 3 | Status: SHIPPED | OUTPATIENT
Start: 2022-05-06 | End: 2023-02-09 | Stop reason: SDUPTHER

## 2022-05-06 RX ORDER — OMEPRAZOLE 40 MG/1
40 CAPSULE, DELAYED RELEASE ORAL
Qty: 90 CAPSULE | Refills: 3 | Status: ON HOLD | OUTPATIENT
Start: 2022-05-06 | End: 2022-12-06 | Stop reason: HOSPADM

## 2022-05-06 RX ORDER — ATORVASTATIN CALCIUM 20 MG/1
20 TABLET, FILM COATED ORAL DAILY
Qty: 90 TABLET | Refills: 3 | Status: SHIPPED | OUTPATIENT
Start: 2022-05-06 | End: 2023-02-09 | Stop reason: SDUPTHER

## 2022-05-06 RX ORDER — ATORVASTATIN CALCIUM 20 MG/1
20 TABLET, FILM COATED ORAL DAILY
Qty: 90 TABLET | Refills: 3 | Status: SHIPPED | OUTPATIENT
Start: 2022-05-06 | End: 2022-05-06 | Stop reason: SDUPTHER

## 2022-05-06 RX ORDER — LOSARTAN POTASSIUM 50 MG/1
50 TABLET ORAL DAILY
Qty: 90 TABLET | Refills: 3 | Status: SHIPPED | OUTPATIENT
Start: 2022-05-06 | End: 2022-05-06 | Stop reason: SDUPTHER

## 2022-05-06 NOTE — PROGRESS NOTES
Patient ID: Nicole Medina     Chief Complaint:   Chief Complaint   Patient presents with    Annual Exam        HPI:  New patient to me and transferring care.  She has had quite an extensive medical history as of late starting with a diagnosis of sarcoidosis that thankfully is mostly quiescent.  She still does take prednisone when needed and is seeing a lung doctor down the street.  Part of the workup for sarcoid found a adenocarcinoma in her lung and she is getting SBRT. She does have a history of atrial fibrillation and was maintained on metoprolol and then flecainide was added.  She was also on Eliquis but that was stopped after she had some bleeds.  Dr. Cuevas has recently placed a loop recorder to see if she has a need for the Eliquis or not.  She is currently getting some vein stripping to a local vascular clinic and showed me pictures of some bleeds she had in her left lower extremity a little over week ago after a skin cancer was removed.  She does complain today of being very tired and cold much more than normal and I do think she is anemic after looking at the pictures and giving her physical exam.  Will get some labs today and I will check an iron level to see if we get by with some IV iron.  She also complains of constipation and she does take an over-the-counter Colace which I agree with.  She does note over the last week or so some spasms in her legs mainly on the right and feeling like it is walking through concrete when she gets up to walk.  I wonder if her anemia is significant enough to cause that.  She does have some fluid in her legs and does take Lasix so want her to continue that.  She does need some refills on medicines and request paper prescriptions to be happy to give her.  Her vital signs do look good but again she is very pale.      Review of Systems   Constitutional: Positive for fatigue.   HENT: Negative.    Eyes: Negative.    Respiratory: Negative.    Cardiovascular: Negative.   "  Gastrointestinal: Negative.    Endocrine: Negative.    Genitourinary: Negative.    Musculoskeletal: Positive for back pain and gait problem.   Skin: Negative.    Allergic/Immunologic: Negative.    Hematological: Negative.    Psychiatric/Behavioral: Negative.           Objective:      Physical Exam   Physical Exam  Vitals and nursing note reviewed.   Constitutional:       Appearance: Normal appearance. She is well-developed. She is obese.   HENT:      Head: Normocephalic and atraumatic.      Nose: Nose normal.   Eyes:      Extraocular Movements: Extraocular movements intact.      Conjunctiva/sclera: Conjunctivae normal.      Pupils: Pupils are equal, round, and reactive to light.   Cardiovascular:      Rate and Rhythm: Normal rate and regular rhythm.      Pulses: Normal pulses.      Heart sounds: Normal heart sounds.   Pulmonary:      Effort: Pulmonary effort is normal.      Comments: Decreased breath sounds in bases bilaterally   Abdominal:      General: Bowel sounds are normal.      Palpations: Abdomen is soft.   Musculoskeletal:         General: Normal range of motion.      Cervical back: Normal range of motion and neck supple.      Right lower leg: Edema present.      Left lower leg: Edema present.   Skin:     General: Skin is warm and dry.      Capillary Refill: Capillary refill takes less than 2 seconds.   Neurological:      General: No focal deficit present.      Mental Status: She is alert and oriented to person, place, and time.   Psychiatric:         Mood and Affect: Mood normal.         Behavior: Behavior normal.         Thought Content: Thought content normal.         Judgment: Judgment normal.            Vitals:   Vitals:    05/06/22 1330   BP: 130/84   Pulse: 67   SpO2: 95%   Weight: 95.1 kg (209 lb 10.5 oz)   Height: 5' 3" (1.6 m)          Current Outpatient Medications:     albuterol (PROVENTIL) 2.5 mg /3 mL (0.083 %) nebulizer solution, Take 3 mLs (2.5 mg total) by nebulization every 6 (six) hours " as needed for Wheezing or Shortness of Breath. Rescue, Disp: 180 mL, Rfl: 11    albuterol sulfate 2.5 mg/0.5 mL Nebu, Take 2.5 mg by nebulization. Rescue, Disp: , Rfl:     aspirin 81 MG Chew, Take 1 tablet (81 mg total) by mouth once daily., Disp: 30 tablet, Rfl: 11    budesonide-formoterol 160-4.5 mcg (SYMBICORT) 160-4.5 mcg/actuation HFAA, Inhale 2 puffs into the lungs every 12 (twelve) hours., Disp: 3 g, Rfl: 3    cholecalciferol, vitamin D3, 125 mcg (5,000 unit) Tab, Take 5,000 Units by mouth once daily., Disp: , Rfl:     flecainide (TAMBOCOR) 150 MG Tab, Take 1 tablet (150 mg total) by mouth every 12 (twelve) hours., Disp: 180 tablet, Rfl: 3    fluticasone propionate (FLONASE) 50 mcg/actuation nasal spray, 1 spray (50 mcg total) by Each Nostril route daily as needed for Allergies., Disp: 48 g, Rfl: 3    furosemide (LASIX) 20 MG tablet, Take 1 tablet (20 mg total) by mouth once daily., Disp: 90 tablet, Rfl: 3    mv,Ca,min-folic acid-vit K1 400-20 mcg Tab, Take 1 tablet by mouth once daily at 6am., Disp: , Rfl:     predniSONE (DELTASONE) 10 MG tablet, Take 1 tablet (10 mg total) by mouth once daily. As needed, Disp: 120 tablet, Rfl: 1    atorvastatin (LIPITOR) 20 MG tablet, Take 1 tablet (20 mg total) by mouth once daily., Disp: 90 tablet, Rfl: 3    ibandronate (BONIVA) 150 mg tablet, Take 1 tablet (150 mg total) by mouth every 30 days., Disp: 12 tablet, Rfl: 3    LORazepam (ATIVAN) 1 MG tablet, Take 1 tablet (1 mg total) by mouth On call Procedure for Anxiety (Take 1 tablet 30 minutes prior to procedure)., Disp: 5 tablet, Rfl: 0    losartan (COZAAR) 50 MG tablet, Take 1 tablet (50 mg total) by mouth once daily., Disp: 90 tablet, Rfl: 3    metoprolol succinate (TOPROL XL) 50 MG 24 hr tablet, Take 1 tablet (50 mg total) by mouth once daily., Disp: 90 tablet, Rfl: 3    omeprazole (PRILOSEC) 40 MG capsule, Take 1 capsule (40 mg total) by mouth 2 (two) times daily before meals., Disp: 90 capsule, Rfl:  3   Assessment:       Patient Active Problem List    Diagnosis Date Noted    ILD (interstitial lung disease)     Other nonthrombocytopenic purpura 11/04/2021    Epigastric pain 02/19/2021    Chest pain 01/01/2021    Encounter for observation for suspected exposure to other biological agents ruled out 12/14/2020    Adenocarcinoma, lung, left 11/19/2020    Paroxysmal atrial fibrillation 11/18/2020    Personal history of lung cancer 11/16/2020    Primary malignant neoplasm of left upper lobe of lung     ARGENTINA (obstructive sleep apnea)     Morbid (severe) obesity with alveolar hypoventilation     Acute non intractable tension-type headache 08/25/2019    Lung nodule 05/09/2019    Vertigo 03/28/2019    Esophageal foreign body, initial encounter 11/22/2018    Senile nuclear sclerosis 03/27/2018    Essential hypertension 08/21/2017    Hyperlipidemia LDL goal <70 08/21/2017    History of ischemic right MCA stroke 06/01/2017    Atherosclerosis of aorta 09/08/2016    Sarcoidosis of lung 02/10/2015    Nuclear sclerosis - Both Eyes 06/20/2013    Dry eyes - Both Eyes 06/20/2013    PSVT (paroxysmal supraventricular tachycardia)     GERD (gastroesophageal reflux disease)           Plan:       Nicole Medina  was seen today for follow-up and may need lab work.    Diagnoses and all orders for this visit:    Nicole was seen today for annual exam.    Diagnoses and all orders for this visit:    Fatigue, unspecified type  -     CBC Auto Differential; Future  -     Comprehensive Metabolic Panel; Future  -     TSH; Future  -     T4, Free; Future  -     Vitamin B12; Future  Check labs      Achlorhydria   -     Vitamin B12; Future    Anemia, unspecified type  -     Iron and TIBC; Future  -     Ferritin; Future  IV iron?     History of CVA (cerebrovascular accident)  -     atorvastatin (LIPITOR) 20 MG tablet; Take 1 tablet (20 mg total) by mouth once daily.  Stop Atorvastatin for the weekend and Let me know if your legs  feel better     Essential hypertension  -     losartan (COZAAR) 50 MG tablet; Take 1 tablet (50 mg total) by mouth once daily.  Controlled     Paroxysmal atrial fibrillation  -     metoprolol succinate (TOPROL XL) 50 MG 24 hr tablet; Take 1 tablet (50 mg total) by mouth once daily.  Per Dr. Law     Gastroesophageal reflux disease without esophagitis  -     omeprazole (PRILOSEC) 40 MG capsule; Take 1 capsule (40 mg total) by mouth 2 (two) times daily before meals.    Osteopenia of multiple sites  -     ibandronate (BONIVA) 150 mg tablet; Take 1 tablet (150 mg total) by mouth every 30 days.    Morbid (severe) obesity with alveolar hypoventilation  Monitor    Other nonthrombocytopenic purpura    Menopause  -     DXA Bone Density Spine And Hip; Future

## 2022-05-08 PROBLEM — I83.92 VARICOSE VEINS OF LEFT LOWER EXTREMITY: Status: ACTIVE | Noted: 2022-05-08

## 2022-05-08 PROBLEM — Z71.89 ACP (ADVANCE CARE PLANNING): Status: ACTIVE | Noted: 2022-05-08

## 2022-05-08 PROBLEM — D62 ACUTE BLOOD LOSS ANEMIA: Status: ACTIVE | Noted: 2022-05-08

## 2022-05-08 PROBLEM — D64.9 SYMPTOMATIC ANEMIA: Status: ACTIVE | Noted: 2022-05-08

## 2022-05-09 ENCOUNTER — PATIENT MESSAGE (OUTPATIENT)
Dept: SMOKING CESSATION | Facility: CLINIC | Age: 77
End: 2022-05-09
Payer: MEDICARE

## 2022-05-09 ENCOUNTER — TELEPHONE (OUTPATIENT)
Dept: FAMILY MEDICINE | Facility: CLINIC | Age: 77
End: 2022-05-09
Payer: MEDICARE

## 2022-05-09 PROBLEM — R53.81 DEBILITY: Status: ACTIVE | Noted: 2022-05-09

## 2022-05-09 NOTE — TELEPHONE ENCOUNTER
Left message for pt to call back regarding giving status on how she is doing    ----- Message from Layla Salgado sent at 5/9/2022  8:05 AM CDT -----  Contact: spouse  Type: Needs Medical Advice  Who Called:  Jenaro Medina - spouse  Symptoms (please be specific):  blood issues seen on Friday, has been admitted to [facility], has had 1 liter of blood and will have another liter today  How long has patient had these symptoms:    Pharmacy name and phone #:    Best Call Back Number: [phone] patient  [phone] spouse  Additional Information: requesting a return call

## 2022-05-10 ENCOUNTER — TELEPHONE (OUTPATIENT)
Dept: FAMILY MEDICINE | Facility: CLINIC | Age: 77
End: 2022-05-10
Payer: MEDICARE

## 2022-05-10 DIAGNOSIS — D50.0 IRON DEFICIENCY ANEMIA DUE TO CHRONIC BLOOD LOSS: Primary | ICD-10-CM

## 2022-05-10 NOTE — TELEPHONE ENCOUNTER
Spoke with pt  regarding her ER visit.   ER doc recommend her getting iron transfusions due to levels. Pt  states she is being discharged today.  Pt  states she is doing better.    Dr. Goldstein stated with 2 blood infusions she should be okay.    Pt  if this was okay?    Thank you!          ----- Message from Ta Adkins sent at 5/9/2022  3:32 PM CDT -----  Contact: Jenaro  Type:  Patient Returning Call  Who Called:  Patient  Jenaro  Who Left Message for Patient:  Fatmata  Does the patient know what this is regarding?:  pts status on how she is doing  Best Call Back Number:  [phone]  Additional Information:  Pt  requesting another call back from Fatmata.Pt  missed her call.

## 2022-05-10 NOTE — TELEPHONE ENCOUNTER
Spoke with patients  , advised him that Dr. Medina would like to repeat patient labs in 2 weeks to follow up. Got patient scheduled for 05/23.

## 2022-05-10 NOTE — TELEPHONE ENCOUNTER
----- Message from Yeimi Zamudio sent at 5/10/2022  2:20 PM CDT -----  Contact: gurdeep  Type:  Patient Returning Call    Who Called:  Gurdeep,   Who Left Message for Patient:  Fatmata  Does the patient know what this is regarding?:    Best Call Back Number:  [phone]  Additional Information:

## 2022-05-13 ENCOUNTER — HOSPITAL ENCOUNTER (OUTPATIENT)
Dept: RADIOLOGY | Facility: HOSPITAL | Age: 77
Discharge: HOME | End: 2022-05-13
Attending: INTERNAL MEDICINE
Payer: MEDICARE

## 2022-05-13 DIAGNOSIS — Z78.0 MENOPAUSE: ICD-10-CM

## 2022-05-13 PROCEDURE — 77080 DXA BONE DENSITY AXIAL: CPT | Mod: 26,,, | Performed by: RADIOLOGY

## 2022-05-13 PROCEDURE — 77080 DXA BONE DENSITY AXIAL: CPT | Mod: TC,PO

## 2022-05-13 PROCEDURE — 77080 DEXA BONE DENSITY SPINE HIP: ICD-10-PCS | Mod: 26,,, | Performed by: RADIOLOGY

## 2022-05-15 ENCOUNTER — PATIENT MESSAGE (OUTPATIENT)
Dept: FAMILY MEDICINE | Facility: CLINIC | Age: 77
End: 2022-05-15
Payer: MEDICARE

## 2022-05-17 ENCOUNTER — OFFICE VISIT (OUTPATIENT)
Dept: FAMILY MEDICINE | Facility: CLINIC | Age: 77
End: 2022-05-17
Payer: MEDICARE

## 2022-05-17 ENCOUNTER — LAB VISIT (OUTPATIENT)
Dept: LAB | Facility: HOSPITAL | Age: 77
End: 2022-05-17
Attending: INTERNAL MEDICINE
Payer: MEDICARE

## 2022-05-17 ENCOUNTER — PATIENT MESSAGE (OUTPATIENT)
Dept: FAMILY MEDICINE | Facility: CLINIC | Age: 77
End: 2022-05-17

## 2022-05-17 VITALS
SYSTOLIC BLOOD PRESSURE: 118 MMHG | BODY MASS INDEX: 37.03 KG/M2 | HEART RATE: 63 BPM | HEIGHT: 63 IN | WEIGHT: 209 LBS | OXYGEN SATURATION: 96 % | DIASTOLIC BLOOD PRESSURE: 64 MMHG

## 2022-05-17 DIAGNOSIS — D62 ACUTE BLOOD LOSS ANEMIA: ICD-10-CM

## 2022-05-17 DIAGNOSIS — D86.0 SARCOIDOSIS OF LUNG: ICD-10-CM

## 2022-05-17 DIAGNOSIS — D62 ACUTE BLOOD LOSS ANEMIA: Primary | ICD-10-CM

## 2022-05-17 DIAGNOSIS — D50.0 IRON DEFICIENCY ANEMIA DUE TO CHRONIC BLOOD LOSS: ICD-10-CM

## 2022-05-17 DIAGNOSIS — E78.5 HYPERLIPIDEMIA LDL GOAL <70: ICD-10-CM

## 2022-05-17 LAB
BASOPHILS # BLD AUTO: 0.08 K/UL (ref 0–0.2)
BASOPHILS NFR BLD: 0.9 % (ref 0–1.9)
DIFFERENTIAL METHOD: ABNORMAL
EOSINOPHIL # BLD AUTO: 0.5 K/UL (ref 0–0.5)
EOSINOPHIL NFR BLD: 5.4 % (ref 0–8)
ERYTHROCYTE [DISTWIDTH] IN BLOOD BY AUTOMATED COUNT: 15.2 % (ref 11.5–14.5)
FERRITIN SERPL-MCNC: 34 NG/ML (ref 20–300)
HCT VFR BLD AUTO: 32.9 % (ref 37–48.5)
HGB BLD-MCNC: 10.1 G/DL (ref 12–16)
IMM GRANULOCYTES # BLD AUTO: 0.05 K/UL (ref 0–0.04)
IMM GRANULOCYTES NFR BLD AUTO: 0.6 % (ref 0–0.5)
IRON SERPL-MCNC: 21 UG/DL (ref 30–160)
LYMPHOCYTES # BLD AUTO: 1.9 K/UL (ref 1–4.8)
LYMPHOCYTES NFR BLD: 21.4 % (ref 18–48)
MCH RBC QN AUTO: 25.7 PG (ref 27–31)
MCHC RBC AUTO-ENTMCNC: 30.7 G/DL (ref 32–36)
MCV RBC AUTO: 84 FL (ref 82–98)
MONOCYTES # BLD AUTO: 0.9 K/UL (ref 0.3–1)
MONOCYTES NFR BLD: 10.2 % (ref 4–15)
NEUTROPHILS # BLD AUTO: 5.4 K/UL (ref 1.8–7.7)
NEUTROPHILS NFR BLD: 61.5 % (ref 38–73)
NRBC BLD-RTO: 0 /100 WBC
PLATELET # BLD AUTO: 238 K/UL (ref 150–450)
PMV BLD AUTO: 12.4 FL (ref 9.2–12.9)
RBC # BLD AUTO: 3.93 M/UL (ref 4–5.4)
SATURATED IRON: 4 % (ref 20–50)
TOTAL IRON BINDING CAPACITY: 555 UG/DL (ref 250–450)
TRANSFERRIN SERPL-MCNC: 375 MG/DL (ref 200–375)
WBC # BLD AUTO: 8.82 K/UL (ref 3.9–12.7)

## 2022-05-17 PROCEDURE — 99214 OFFICE O/P EST MOD 30 MIN: CPT | Mod: S$GLB,,, | Performed by: PHYSICIAN ASSISTANT

## 2022-05-17 PROCEDURE — 4010F PR ACE/ARB THEARPY RXD/TAKEN: ICD-10-PCS | Mod: CPTII,S$GLB,, | Performed by: PHYSICIAN ASSISTANT

## 2022-05-17 PROCEDURE — 1126F PR PAIN SEVERITY QUANTIFIED, NO PAIN PRESENT: ICD-10-PCS | Mod: CPTII,S$GLB,, | Performed by: PHYSICIAN ASSISTANT

## 2022-05-17 PROCEDURE — 3074F SYST BP LT 130 MM HG: CPT | Mod: CPTII,S$GLB,, | Performed by: PHYSICIAN ASSISTANT

## 2022-05-17 PROCEDURE — 99999 PR PBB SHADOW E&M-EST. PATIENT-LVL V: ICD-10-PCS | Mod: PBBFAC,,, | Performed by: PHYSICIAN ASSISTANT

## 2022-05-17 PROCEDURE — 99999 PR PBB SHADOW E&M-EST. PATIENT-LVL V: CPT | Mod: PBBFAC,,, | Performed by: PHYSICIAN ASSISTANT

## 2022-05-17 PROCEDURE — 1159F PR MEDICATION LIST DOCUMENTED IN MEDICAL RECORD: ICD-10-PCS | Mod: CPTII,S$GLB,, | Performed by: PHYSICIAN ASSISTANT

## 2022-05-17 PROCEDURE — 36415 COLL VENOUS BLD VENIPUNCTURE: CPT | Mod: PO | Performed by: INTERNAL MEDICINE

## 2022-05-17 PROCEDURE — 3288F PR FALLS RISK ASSESSMENT DOCUMENTED: ICD-10-PCS | Mod: CPTII,S$GLB,, | Performed by: PHYSICIAN ASSISTANT

## 2022-05-17 PROCEDURE — 1101F PR PT FALLS ASSESS DOC 0-1 FALLS W/OUT INJ PAST YR: ICD-10-PCS | Mod: CPTII,S$GLB,, | Performed by: PHYSICIAN ASSISTANT

## 2022-05-17 PROCEDURE — 3008F BODY MASS INDEX DOCD: CPT | Mod: CPTII,S$GLB,, | Performed by: PHYSICIAN ASSISTANT

## 2022-05-17 PROCEDURE — 82728 ASSAY OF FERRITIN: CPT | Performed by: PHYSICIAN ASSISTANT

## 2022-05-17 PROCEDURE — 4010F ACE/ARB THERAPY RXD/TAKEN: CPT | Mod: CPTII,S$GLB,, | Performed by: PHYSICIAN ASSISTANT

## 2022-05-17 PROCEDURE — 3288F FALL RISK ASSESSMENT DOCD: CPT | Mod: CPTII,S$GLB,, | Performed by: PHYSICIAN ASSISTANT

## 2022-05-17 PROCEDURE — 99214 PR OFFICE/OUTPT VISIT, EST, LEVL IV, 30-39 MIN: ICD-10-PCS | Mod: S$GLB,,, | Performed by: PHYSICIAN ASSISTANT

## 2022-05-17 PROCEDURE — 3008F PR BODY MASS INDEX (BMI) DOCUMENTED: ICD-10-PCS | Mod: CPTII,S$GLB,, | Performed by: PHYSICIAN ASSISTANT

## 2022-05-17 PROCEDURE — 1126F AMNT PAIN NOTED NONE PRSNT: CPT | Mod: CPTII,S$GLB,, | Performed by: PHYSICIAN ASSISTANT

## 2022-05-17 PROCEDURE — 85025 COMPLETE CBC W/AUTO DIFF WBC: CPT | Performed by: INTERNAL MEDICINE

## 2022-05-17 PROCEDURE — 3078F PR MOST RECENT DIASTOLIC BLOOD PRESSURE < 80 MM HG: ICD-10-PCS | Mod: CPTII,S$GLB,, | Performed by: PHYSICIAN ASSISTANT

## 2022-05-17 PROCEDURE — 1101F PT FALLS ASSESS-DOCD LE1/YR: CPT | Mod: CPTII,S$GLB,, | Performed by: PHYSICIAN ASSISTANT

## 2022-05-17 PROCEDURE — 3074F PR MOST RECENT SYSTOLIC BLOOD PRESSURE < 130 MM HG: ICD-10-PCS | Mod: CPTII,S$GLB,, | Performed by: PHYSICIAN ASSISTANT

## 2022-05-17 PROCEDURE — 84466 ASSAY OF TRANSFERRIN: CPT | Performed by: INTERNAL MEDICINE

## 2022-05-17 PROCEDURE — 1159F MED LIST DOCD IN RCRD: CPT | Mod: CPTII,S$GLB,, | Performed by: PHYSICIAN ASSISTANT

## 2022-05-17 PROCEDURE — 3078F DIAST BP <80 MM HG: CPT | Mod: CPTII,S$GLB,, | Performed by: PHYSICIAN ASSISTANT

## 2022-05-17 NOTE — PATIENT INSTRUCTIONS
Take a 1/2 capful of miralax in a tall glass of water daily.    Resume lipitor.    Thanks for seeing me,  Pattie Rooney PA-C

## 2022-05-17 NOTE — PROGRESS NOTES
"Subjective:      Patient ID: Nicole Medina is a 76 y.o. female.    Chief Complaint: Hospital Follow Up (Last Sunday; trouble walking; [place]; verrucose vein blew)  Patient is new to me.    HPI   Patient has PMH of right MCA stroke, sarcoidosis of lung, HTN, HLD, afib, left lung adenocarcinoma, anemia, GERD, and ARGENTINA.    Patient went to [facility] ED 5/8/2022 to 5/10/2022 with fatigue after episode of significant bleeding from varicose vein.  Given blood transfusion.  Will probably need IV iron infusion in future.    Dr. Vicente, [facility], is managing her varicose veins.  She reports no varicose veins bleeding since before hospital stay.    She has had improved fatigue with intermittent shortness of breath.    Has hard dry stool every other day most of the time.    Review of Systems   Constitutional: Positive for fatigue.   Respiratory: Positive for shortness of breath (intermittent).    Cardiovascular: Negative for chest pain.   Gastrointestinal: Negative for abdominal pain, blood in stool, constipation, diarrhea, nausea and vomiting.   Genitourinary: Negative for hematuria.   Skin: Positive for wound (left anterior shin wrapped).   Neurological: Positive for headaches (intermittent). Negative for dizziness, syncope and light-headedness.       Objective:   /64   Pulse 63   Ht 5' 3" (1.6 m)   Wt 94.8 kg (208 lb 15.9 oz)   SpO2 96%   BMI 37.02 kg/m²      Physical Exam  Vitals reviewed.   Constitutional:       General: She is not in acute distress.     Appearance: Normal appearance. She is well-developed and well-groomed.   HENT:      Head: Normocephalic and atraumatic.      Right Ear: Hearing and external ear normal.      Left Ear: Hearing and external ear normal.   Eyes:      General: Lids are normal.      Conjunctiva/sclera: Conjunctivae normal.   Neck:      Trachea: Phonation normal.   Cardiovascular:      Rate and Rhythm: Normal rate and regular rhythm.      Heart sounds: Normal heart sounds. " No murmur heard.    No friction rub. No gallop.   Pulmonary:      Effort: Pulmonary effort is normal. No respiratory distress.      Breath sounds: Examination of the right-upper field reveals wheezing. Examination of the left-upper field reveals wheezing. Wheezing present. No decreased breath sounds, rhonchi or rales.   Abdominal:      General: Bowel sounds are normal.      Palpations: Abdomen is soft.      Tenderness: There is no abdominal tenderness.   Musculoskeletal:         General: Normal range of motion.      Cervical back: Normal range of motion.   Skin:     General: Skin is warm and dry.      Coloration: Skin is not ashen, cyanotic, jaundiced or pale.      Findings: Wound (left lower leg is wrapped ) present. No rash.   Neurological:      General: No focal deficit present.      Mental Status: She is alert and oriented to person, place, and time.   Psychiatric:         Attention and Perception: Attention normal.         Mood and Affect: Mood normal.         Speech: Speech normal.         Behavior: Behavior normal. Behavior is cooperative.         Judgment: Judgment normal.        Assessment:      1. Acute blood loss anemia    2. Sarcoidosis of lung    3. Hyperlipidemia LDL goal <70       Plan:   1. Acute blood loss anemia  Bloodwork today.  No new blood loss.  - Ferritin; Future  - Ambulatory referral/consult to Hematology / Oncology; Future    2. Sarcoidosis of lung  Stable.    3. Hyperlipidemia LDL goal <70  Resume lipitor.    Follow up as needed.  Patient agreed with plan and expressed understanding.    Thank you for allowing me to serve you,

## 2022-05-18 ENCOUNTER — TELEPHONE (OUTPATIENT)
Dept: HEMATOLOGY/ONCOLOGY | Facility: CLINIC | Age: 77
End: 2022-05-18
Payer: MEDICARE

## 2022-05-18 PROBLEM — D50.0 IRON DEFICIENCY ANEMIA DUE TO CHRONIC BLOOD LOSS: Status: ACTIVE | Noted: 2022-05-18

## 2022-05-18 RX ORDER — EPINEPHRINE 0.3 MG/.3ML
0.3 INJECTION SUBCUTANEOUS ONCE AS NEEDED
Status: CANCELLED | OUTPATIENT
Start: 2022-05-18

## 2022-05-18 RX ORDER — SODIUM CHLORIDE 0.9 % (FLUSH) 0.9 %
10 SYRINGE (ML) INJECTION
Status: CANCELLED | OUTPATIENT
Start: 2022-05-18

## 2022-05-18 RX ORDER — METHYLPREDNISOLONE SOD SUCC 125 MG
125 VIAL (EA) INJECTION ONCE AS NEEDED
Status: CANCELLED | OUTPATIENT
Start: 2022-05-18

## 2022-05-18 RX ORDER — DIPHENHYDRAMINE HYDROCHLORIDE 50 MG/ML
50 INJECTION INTRAMUSCULAR; INTRAVENOUS ONCE AS NEEDED
Status: CANCELLED | OUTPATIENT
Start: 2022-05-18

## 2022-05-18 RX ORDER — HEPARIN 100 UNIT/ML
500 SYRINGE INTRAVENOUS
Status: CANCELLED | OUTPATIENT
Start: 2022-05-18

## 2022-05-20 ENCOUNTER — TELEPHONE (OUTPATIENT)
Dept: FAMILY MEDICINE | Facility: CLINIC | Age: 77
End: 2022-05-20
Payer: MEDICARE

## 2022-05-20 RX ORDER — HEPARIN 100 UNIT/ML
500 SYRINGE INTRAVENOUS
Status: CANCELLED | OUTPATIENT
Start: 2022-05-24

## 2022-05-20 RX ORDER — SODIUM CHLORIDE 0.9 % (FLUSH) 0.9 %
10 SYRINGE (ML) INJECTION
Status: CANCELLED | OUTPATIENT
Start: 2022-05-24

## 2022-05-23 ENCOUNTER — PATIENT MESSAGE (OUTPATIENT)
Dept: FAMILY MEDICINE | Facility: CLINIC | Age: 77
End: 2022-05-23
Payer: MEDICARE

## 2022-05-23 DIAGNOSIS — D50.0 IRON DEFICIENCY ANEMIA DUE TO CHRONIC BLOOD LOSS: Primary | ICD-10-CM

## 2022-05-24 ENCOUNTER — INFUSION (OUTPATIENT)
Dept: INFUSION THERAPY | Facility: HOSPITAL | Age: 77
End: 2022-05-24
Attending: INTERNAL MEDICINE
Payer: MEDICARE

## 2022-05-24 ENCOUNTER — TELEPHONE (OUTPATIENT)
Dept: FAMILY MEDICINE | Facility: CLINIC | Age: 77
End: 2022-05-24
Payer: MEDICARE

## 2022-05-24 ENCOUNTER — PATIENT MESSAGE (OUTPATIENT)
Dept: HEMATOLOGY/ONCOLOGY | Facility: CLINIC | Age: 77
End: 2022-05-24
Payer: MEDICARE

## 2022-05-24 ENCOUNTER — OFFICE VISIT (OUTPATIENT)
Dept: HEMATOLOGY/ONCOLOGY | Facility: CLINIC | Age: 77
End: 2022-05-24
Payer: MEDICARE

## 2022-05-24 VITALS
HEART RATE: 58 BPM | HEIGHT: 63 IN | DIASTOLIC BLOOD PRESSURE: 80 MMHG | BODY MASS INDEX: 36.64 KG/M2 | SYSTOLIC BLOOD PRESSURE: 145 MMHG | RESPIRATION RATE: 18 BRPM | DIASTOLIC BLOOD PRESSURE: 64 MMHG | HEART RATE: 59 BPM | WEIGHT: 206.81 LBS | TEMPERATURE: 98 F | HEIGHT: 63 IN | OXYGEN SATURATION: 97 % | WEIGHT: 207.88 LBS | SYSTOLIC BLOOD PRESSURE: 134 MMHG | TEMPERATURE: 98 F | BODY MASS INDEX: 36.83 KG/M2

## 2022-05-24 DIAGNOSIS — D50.0 IRON DEFICIENCY ANEMIA DUE TO CHRONIC BLOOD LOSS: Primary | ICD-10-CM

## 2022-05-24 DIAGNOSIS — I10 HYPERTENSION, UNSPECIFIED TYPE: ICD-10-CM

## 2022-05-24 DIAGNOSIS — Z85.118 HISTORY OF LUNG CANCER: ICD-10-CM

## 2022-05-24 DIAGNOSIS — D62 ACUTE BLOOD LOSS ANEMIA: ICD-10-CM

## 2022-05-24 DIAGNOSIS — J84.10 PULMONARY FIBROSIS: ICD-10-CM

## 2022-05-24 PROCEDURE — 99204 OFFICE O/P NEW MOD 45 MIN: CPT | Mod: S$GLB,,, | Performed by: INTERNAL MEDICINE

## 2022-05-24 PROCEDURE — 99499 UNLISTED E&M SERVICE: CPT | Mod: S$GLB,,, | Performed by: INTERNAL MEDICINE

## 2022-05-24 PROCEDURE — 1101F PR PT FALLS ASSESS DOC 0-1 FALLS W/OUT INJ PAST YR: ICD-10-PCS | Mod: CPTII,S$GLB,, | Performed by: INTERNAL MEDICINE

## 2022-05-24 PROCEDURE — 25000003 PHARM REV CODE 250: Mod: PN | Performed by: INTERNAL MEDICINE

## 2022-05-24 PROCEDURE — 4010F ACE/ARB THERAPY RXD/TAKEN: CPT | Mod: CPTII,S$GLB,, | Performed by: INTERNAL MEDICINE

## 2022-05-24 PROCEDURE — 99204 PR OFFICE/OUTPT VISIT, NEW, LEVL IV, 45-59 MIN: ICD-10-PCS | Mod: S$GLB,,, | Performed by: INTERNAL MEDICINE

## 2022-05-24 PROCEDURE — 3008F PR BODY MASS INDEX (BMI) DOCUMENTED: ICD-10-PCS | Mod: CPTII,S$GLB,, | Performed by: INTERNAL MEDICINE

## 2022-05-24 PROCEDURE — 4010F PR ACE/ARB THEARPY RXD/TAKEN: ICD-10-PCS | Mod: CPTII,S$GLB,, | Performed by: INTERNAL MEDICINE

## 2022-05-24 PROCEDURE — 3079F DIAST BP 80-89 MM HG: CPT | Mod: CPTII,S$GLB,, | Performed by: INTERNAL MEDICINE

## 2022-05-24 PROCEDURE — 1101F PT FALLS ASSESS-DOCD LE1/YR: CPT | Mod: CPTII,S$GLB,, | Performed by: INTERNAL MEDICINE

## 2022-05-24 PROCEDURE — 3079F PR MOST RECENT DIASTOLIC BLOOD PRESSURE 80-89 MM HG: ICD-10-PCS | Mod: CPTII,S$GLB,, | Performed by: INTERNAL MEDICINE

## 2022-05-24 PROCEDURE — 99499 RISK ADDL DX/OHS AUDIT: ICD-10-PCS | Mod: S$GLB,,, | Performed by: INTERNAL MEDICINE

## 2022-05-24 PROCEDURE — 99999 PR PBB SHADOW E&M-EST. PATIENT-LVL IV: CPT | Mod: PBBFAC,,, | Performed by: INTERNAL MEDICINE

## 2022-05-24 PROCEDURE — 63600175 PHARM REV CODE 636 W HCPCS: Mod: PN | Performed by: INTERNAL MEDICINE

## 2022-05-24 PROCEDURE — 3075F SYST BP GE 130 - 139MM HG: CPT | Mod: CPTII,S$GLB,, | Performed by: INTERNAL MEDICINE

## 2022-05-24 PROCEDURE — 3288F PR FALLS RISK ASSESSMENT DOCUMENTED: ICD-10-PCS | Mod: CPTII,S$GLB,, | Performed by: INTERNAL MEDICINE

## 2022-05-24 PROCEDURE — 1159F MED LIST DOCD IN RCRD: CPT | Mod: CPTII,S$GLB,, | Performed by: INTERNAL MEDICINE

## 2022-05-24 PROCEDURE — 3075F PR MOST RECENT SYSTOLIC BLOOD PRESS GE 130-139MM HG: ICD-10-PCS | Mod: CPTII,S$GLB,, | Performed by: INTERNAL MEDICINE

## 2022-05-24 PROCEDURE — 1126F PR PAIN SEVERITY QUANTIFIED, NO PAIN PRESENT: ICD-10-PCS | Mod: CPTII,S$GLB,, | Performed by: INTERNAL MEDICINE

## 2022-05-24 PROCEDURE — 1126F AMNT PAIN NOTED NONE PRSNT: CPT | Mod: CPTII,S$GLB,, | Performed by: INTERNAL MEDICINE

## 2022-05-24 PROCEDURE — 96365 THER/PROPH/DIAG IV INF INIT: CPT | Mod: PN

## 2022-05-24 PROCEDURE — 99999 PR PBB SHADOW E&M-EST. PATIENT-LVL IV: ICD-10-PCS | Mod: PBBFAC,,, | Performed by: INTERNAL MEDICINE

## 2022-05-24 PROCEDURE — 3008F BODY MASS INDEX DOCD: CPT | Mod: CPTII,S$GLB,, | Performed by: INTERNAL MEDICINE

## 2022-05-24 PROCEDURE — 3288F FALL RISK ASSESSMENT DOCD: CPT | Mod: CPTII,S$GLB,, | Performed by: INTERNAL MEDICINE

## 2022-05-24 PROCEDURE — 1159F PR MEDICATION LIST DOCUMENTED IN MEDICAL RECORD: ICD-10-PCS | Mod: CPTII,S$GLB,, | Performed by: INTERNAL MEDICINE

## 2022-05-24 RX ORDER — SODIUM CHLORIDE 0.9 % (FLUSH) 0.9 %
10 SYRINGE (ML) INJECTION
Status: CANCELLED | OUTPATIENT
Start: 2022-05-31

## 2022-05-24 RX ORDER — SODIUM CHLORIDE 0.9 % (FLUSH) 0.9 %
10 SYRINGE (ML) INJECTION
Status: DISCONTINUED | OUTPATIENT
Start: 2022-05-24 | End: 2022-05-24 | Stop reason: HOSPADM

## 2022-05-24 RX ORDER — HEPARIN 100 UNIT/ML
500 SYRINGE INTRAVENOUS
Status: CANCELLED | OUTPATIENT
Start: 2022-05-31

## 2022-05-24 RX ADMIN — IRON SUCROSE 200 MG: 20 INJECTION, SOLUTION INTRAVENOUS at 12:05

## 2022-05-24 RX ADMIN — SODIUM CHLORIDE: 0.9 INJECTION, SOLUTION INTRAVENOUS at 12:05

## 2022-05-24 NOTE — TELEPHONE ENCOUNTER
Both I ans Dr. Arauz ordered the same repeat labs, but she only needs to get his- 8 weeks after the last dose of IV iron.

## 2022-05-24 NOTE — PLAN OF CARE
Problem: Adult Inpatient Plan of Care  Goal: Plan of Care Review  Outcome: Ongoing, Progressing  Flowsheets (Taken 5/24/2022 1305)  Plan of Care Reviewed With:   patient   spouse  Goal: Patient-Specific Goal (Individualized)  Outcome: Ongoing, Progressing  Flowsheets (Taken 5/24/2022 1305)  Anxieties, Fears or Concerns: None  Individualized Care Needs: Warm blanket, recliner, education  Patient-Specific Goals (Include Timeframe): Free of S/S of reaction with infusion.  Goal: Optimal Comfort and Wellbeing  Outcome: Ongoing, Progressing     Patient to Infusion for Venofer. Accompanied by her . Treatment plan reviewed with patient. VSS. Tolerated infusion. Provided with copy of upcoming appointment schedule. Escorted to the front lobby for discharge to home.

## 2022-05-24 NOTE — PROGRESS NOTES
PATIENT: Nicole Medina  MRN: [id number]  DATE: 5/24/2022      Diagnosis:   1. Iron deficiency anemia due to chronic blood loss    2. Acute blood loss anemia    3. History of lung cancer    4. Pulmonary fibrosis    5. Hypertension, unspecified type        Chief Complaint: Establish Care (Iron Deficiency Anemia)    Subjective:    Initial History: Ms. Medina is a 76 y.o. female with HTN, GERD, h/o polio, pulmonary fibrosis, Sarcoidosis who presents for iron deficiency anemia from acute blood loss.  The patient recently went to [facility] from 5/8/2022 to 5/10/2022 with fatigue after episode of significant bleeding from varicose vein in the left leg.  The patient's hemoglobin on admission was 7.3g/dL.  She was given two units of blood with hemoglobin of 8.9g/dL on d/c.  Iron studies and hemoglobin on 5/17/22 showed ferritin of 34ng/mL. TIBC of 555, iron of 21, and hemoglobin of 10.1g/dL.  The patient was then seen by PCP Dr Henok Medina on 5/17/22 and sent to see hematology and scheduled for IV iron injections.  The patient is to receive 5 IV iron injections with venofer 200mg each dose with last treatment on 6/21/22.      Currently the patient endorses SOB with activity which she attributes to her underlying pulmonary fibrosis.  She endorses cold intolerance, occasional headaches, and occasional left ankle swelling.  The patient denies CP, cough, SOB, abdominal pain, N/V, constipation, diarrhea.  The patient denies fever, chills, night sweats, weight loss, new lumps or bumps, easy bruising or bleeding.    Prior History: The patient was previously diagnosed and treated for NSCLC.  CT chest 3/25/19 showed a ground-glass consolidation in the left lung apex measuring 1.9 by 1.2 cm. CT chest on 10/09/19 showed the lesion measuring 18 x 12 mm overall diameter with a central solid component measuring 9 mm.  Biopsy on 12/20/19 showed adenocarcinoma.  The patient was then treated with SBRT 55Gy from 2/07/20 to 2/13/20 in 6 tx. She is  scheduled for a repeat Ct chest 8/26/22.    Past Medical History:   Past Medical History:   Diagnosis Date    Acute ischemic right MCA stroke 06/02/2017    Cancer 02/2020    lung cancer, small cell     Cancer 06/2020    skin    Cataract     done ou    Colon polyp 11/22/2010    Essential hypertension 08/21/2017    GERD (gastroesophageal reflux disease)     Hiatal hernia     History of radiation therapy     History of seasonal allergies     Iron deficiency anemia due to chronic blood loss 5/18/2022    On home oxygen therapy     while sleeping    Polio     as a child    Presbyopia     PSVT (paroxysmal supraventricular tachycardia)     Pulmonary fibrosis     Dr. Miramontes    Sarcoidosis 2013    Sciatica     TIA (transient ischemic attack) 06/02/2017    [facility]//    Vertigo     Wound of left leg        Past Surgical HIstory:   Past Surgical History:   Procedure Laterality Date    CATARACT EXTRACTION W/  INTRAOCULAR LENS IMPLANT Left 03/27/2018    Dr Higginbotham    CATARACT EXTRACTION W/  INTRAOCULAR LENS IMPLANT Right 05/08/2018    Dr Higginbotham    CHOLECYSTECTOMY      ESOPHAGEAL DILATION N/A 11/22/2018    Procedure: DILATION, ESOPHAGUS;  Surgeon: Robb Fitzgerald Jr., MD;  Location: [facility];  Service: Endoscopy;  Laterality: N/A;    ESOPHAGOGASTRODUODENOSCOPY N/A 11/22/2018    Procedure: ESOPHAGOGASTRODUODENOSCOPY (EGD);  Surgeon: Robb Fitzgerald Jr., MD;  Location: [facility];  Service: Endoscopy;  Laterality: N/A;    ESOPHAGOGASTRODUODENOSCOPY N/A 2/19/2021    Procedure: EGD (ESOPHAGOGASTRODUODENOSCOPY);  Surgeon: Robb Fitzgerald Jr., MD;  Location: [facility];  Service: Endoscopy;  Laterality: N/A;    EYE SURGERY      HAND SURGERY Right     trauma repair    INSERTION OF IMPLANTABLE LOOP RECORDER Left 4/19/2022    Procedure: Insertion, Implantable Loop Recorder;  Surgeon: Thanh Fuentes MD;  [facility]: [facility];  Service: Cardiology;  Laterality: Left;    LEFT HEART  CATHETERIZATION N/A 1/4/2021    Procedure: Left heart cath;  Surgeon: Laron Falcon MD;  Location: [facility] CATH;  Service: Cardiology;  Laterality: N/A;    TONSILLECTOMY      TOTAL ABDOMINAL HYSTERECTOMY      VARICOSE VEIN SURGERY Bilateral     bilateral legs    VIDEO-ASSISTED THORACOSCOPIC SURGERY (VATS)  1/16/2020    Procedure: VATS (VIDEO-ASSISTED THORACOSCOPIC SURGERY) - exploratory;  Surgeon: Ritesh Mccall MD;  Location: [facility] OR [address] [place] [address];  Service: Thoracic;;       Family History:   Family History   Problem Relation Age of Onset    Cataracts Mother     Macular degeneration Mother     Cancer Mother         lymphoma    Diabetes Father     Hypertension Father     Macular degeneration Sister     Cancer Sister         breast    Breast cancer Maternal Grandmother     Thyroid disease Daughter     Cancer Daughter         thyroid    Breast cancer Other     Amblyopia Neg Hx     Blindness Neg Hx     Glaucoma Neg Hx     Retinal detachment Neg Hx     Strabismus Neg Hx     Stroke Neg Hx        Social History:  reports that she has quit smoking. She has never used smokeless tobacco. She reports that she does not drink alcohol and does not use drugs.    Allergies:  Review of patient's allergies indicates:   Allergen Reactions    Latex Itching and Swelling     Itching and swelling to site (local reaction)       Medications:  Current Outpatient Medications   Medication Sig Dispense Refill    albuterol (PROVENTIL) 2.5 mg /3 mL (0.083 %) nebulizer solution Take 3 mLs (2.5 mg total) by nebulization every 6 (six) hours as needed for Wheezing or Shortness of Breath. Rescue 180 mL 11    aspirin 81 MG Chew Take 1 tablet (81 mg total) by mouth once daily. 30 tablet 11    atorvastatin (LIPITOR) 20 MG tablet Take 1 tablet (20 mg total) by mouth once daily. 90 tablet 3    budesonide-formoterol 160-4.5 mcg (SYMBICORT) 160-4.5 mcg/actuation HFAA Inhale 2 puffs into the lungs every 12 (twelve) hours.  (Patient taking differently: Inhale 2 puffs into the lungs 2 (two) times daily as needed (shortness of breath).) 3 g 3    cholecalciferol, vitamin D3, 125 mcg (5,000 unit) Tab Take 5,000 Units by mouth once daily.      flecainide (TAMBOCOR) 150 MG Tab Take 1 tablet (150 mg total) by mouth every 12 (twelve) hours. 180 tablet 3    fluticasone propionate (FLONASE) 50 mcg/actuation nasal spray 1 spray (50 mcg total) by Each Nostril route daily as needed for Allergies. 48 g 3    furosemide (LASIX) 20 MG tablet Take 1 tablet (20 mg total) by mouth once daily. 90 tablet 3    ibandronate (BONIVA) 150 mg tablet Take 1 tablet (150 mg total) by mouth every 30 days. 12 tablet 3    losartan (COZAAR) 50 MG tablet Take 1 tablet (50 mg total) by mouth once daily. 90 tablet 3    metoprolol succinate (TOPROL XL) 50 MG 24 hr tablet Take 1 tablet (50 mg total) by mouth once daily. 90 tablet 3    mv,Ca,min-folic acid-vit K1 400-20 mcg Tab Take 1 tablet by mouth once daily at 6am.      omeprazole (PRILOSEC) 40 MG capsule Take 1 capsule (40 mg total) by mouth 2 (two) times daily before meals. (Patient taking differently: Take 40 mg by mouth every morning.) 90 capsule 3     No current facility-administered medications for this visit.     Facility-Administered Medications Ordered in Other Visits   Medication Dose Route Frequency Provider Last Rate Last Admin    sodium chloride 0.9% flush 10 mL  10 mL Intravenous PRN Henok Medina MD           Review of Systems   Constitutional: Negative for appetite change, chills, fatigue, fever and unexpected weight change.   HENT: Negative for sore throat and trouble swallowing.    Eyes: Negative for photophobia, pain and visual disturbance.   Respiratory: Positive for shortness of breath (with activity). Negative for cough and chest tightness.    Cardiovascular: Positive for leg swelling (left ankle). Negative for chest pain and palpitations.   Gastrointestinal: Negative for abdominal pain,  "constipation, diarrhea, nausea and vomiting.   Endocrine: Positive for cold intolerance.   Genitourinary: Negative for difficulty urinating and dysuria.   Musculoskeletal: Negative for arthralgias and back pain.   Skin: Negative for color change and rash.   Neurological: Positive for headaches (on occasion). Negative for dizziness, weakness, light-headedness and numbness.   Hematological: Negative for adenopathy. Does not bruise/bleed easily.       ECOG Performance Status: 2   Objective:      Vitals:   Vitals:    05/24/22 1448   BP: 134/80   BP Location: Left arm   Patient Position: Sitting   BP Method: Medium (Manual)   Pulse: (!) 58   Temp: 97.6 °F (36.4 °C)   TempSrc: Temporal   SpO2: 97%   Weight: 93.8 kg (206 lb 12.7 oz)   Height: 5' 3" (1.6 m)       Physical Exam  Constitutional:       General: She is not in acute distress.     Appearance: She is well-developed. She is not diaphoretic.   HENT:      Head: Normocephalic and atraumatic.   Eyes:      General: No scleral icterus.        Right eye: No discharge.         Left eye: No discharge.   Cardiovascular:      Rate and Rhythm: Normal rate and regular rhythm.      Heart sounds: Normal heart sounds. No murmur heard.    No friction rub. No gallop.   Pulmonary:      Effort: Pulmonary effort is normal. No respiratory distress.      Breath sounds: Wheezing present. No rales.   Chest:      Chest wall: No tenderness.   Breasts:      Right: No axillary adenopathy or supraclavicular adenopathy.      Left: No axillary adenopathy or supraclavicular adenopathy.       Abdominal:      General: Bowel sounds are normal. There is no distension.      Palpations: Abdomen is soft. There is no mass.      Tenderness: There is no abdominal tenderness. There is no guarding or rebound.   Musculoskeletal:         General: No tenderness. Normal range of motion.   Lymphadenopathy:      Cervical: No cervical adenopathy.      Upper Body:      Right upper body: No supraclavicular or axillary " adenopathy.      Left upper body: No supraclavicular or axillary adenopathy.   Skin:     Findings: No erythema or rash.   Neurological:      Mental Status: She is alert and oriented to person, place, and time.   Psychiatric:         Behavior: Behavior normal.         Laboratory Data:  No visits with results within 1 Week(s) from this visit.   Latest known visit with results is:   Lab Visit on 05/17/2022   Component Date Value Ref Range Status    WBC 05/17/2022 8.82  3.90 - 12.70 K/uL Final    RBC 05/17/2022 3.93 (A) 4.00 - 5.40 M/uL Final    Hemoglobin 05/17/2022 10.1 (A) 12.0 - 16.0 g/dL Final    Hematocrit 05/17/2022 32.9 (A) 37.0 - 48.5 % Final    MCV 05/17/2022 84  82 - 98 fL Final    MCH 05/17/2022 25.7 (A) 27.0 - 31.0 pg Final    MCHC 05/17/2022 30.7 (A) 32.0 - 36.0 g/dL Final    RDW 05/17/2022 15.2 (A) 11.5 - 14.5 % Final    Platelets 05/17/2022 238  150 - 450 K/uL Final    MPV 05/17/2022 12.4  9.2 - 12.9 fL Final    Immature Granulocytes 05/17/2022 0.6 (A) 0.0 - 0.5 % Final    Gran # (ANC) 05/17/2022 5.4  1.8 - 7.7 K/uL Final    Immature Grans (Abs) 05/17/2022 0.05 (A) 0.00 - 0.04 K/uL Final    Comment: Mild elevation in immature granulocytes is non specific and   can be seen in a variety of conditions including stress response,   acute inflammation, trauma and pregnancy. Correlation with other   laboratory and clinical findings is essential.      Lymph # 05/17/2022 1.9  1.0 - 4.8 K/uL Final    Mono # 05/17/2022 0.9  0.3 - 1.0 K/uL Final    Eos # 05/17/2022 0.5  0.0 - 0.5 K/uL Final    Baso # 05/17/2022 0.08  0.00 - 0.20 K/uL Final    nRBC 05/17/2022 0  0 /100 WBC Final    Gran % 05/17/2022 61.5  38.0 - 73.0 % Final    Lymph % 05/17/2022 21.4  18.0 - 48.0 % Final    Mono % 05/17/2022 10.2  4.0 - 15.0 % Final    Eosinophil % 05/17/2022 5.4  0.0 - 8.0 % Final    Basophil % 05/17/2022 0.9  0.0 - 1.9 % Final    Differential Method 05/17/2022 Automated   Final    Iron 05/17/2022 21 (A) 30  - 160 ug/dL Final    Transferrin 05/17/2022 375  200 - 375 mg/dL Final    TIBC 05/17/2022 555 (A) 250 - 450 ug/dL Final    Saturated Iron 05/17/2022 4 (A) 20 - 50 % Final    Ferritin 05/17/2022 34  20.0 - 300.0 ng/mL Final         Imaging: Reviewed    Assessment:       1. Iron deficiency anemia due to chronic blood loss    2. Acute blood loss anemia    3. History of lung cancer    4. Pulmonary fibrosis    5. Hypertension, unspecified type           Plan:     Iron Deficiency Anemia - Pt with iron deficiency anemia due to lower extremity bleeding from vericose veins  -The patient has an iron deficit of 926mg based on Ganzoni equation using hemoglobin of 12 as her goal  -Pt is to receive 5 doses of venofer 200mg each with alst dose 6/21/22  -Treatment with IV iron is appropriate  -Will repeat iron studies and CBC 8 weeks after the last iron dose    Acute Blood Loss Anemia - Pt with spontaneous bleeding from vericose veins in her left leg at the site of a prior skin cancer   -Pt following with dermatology     H/O Lung Cancer - Pt with adenocarcinoma of the left lung s/p SBRT 55Gy from 2/07/20 to 2/13/20  -Pt to undergo repeat CT chest 8/26/22  -Will follow up scan    Pulmonary Fibrosis - from pt's underlying sarcoidosis  -Pt following with Dr Garcia in pulmonary  -Management per pulmonary    HTN - pt on losartan, metoprolol  -PCP managing    Route Chart for Scheduling    Med Onc Chart Routing      Follow up with physician 3 months. The patient needs a CBC, CMP, ferrirtin and iron/TIBC at the end of August with and appt with me a week after the labs.   Follow up with STANISLAW    Labs    Imaging    Pharmacy appointment    Other referrals            Therapy Plan Information  Medications  iron sucrose (VENOFER) 200 mg in sodium chloride 0.9% 100 mL IVPB  200 mg, Intravenous, 1 time a week  Flushes  sodium chloride 0.9% 250 mL flush bag  Intravenous, 1 time a week  sodium chloride 0.9% flush 10 mL  10 mL, Intravenous, 1 time  a week  heparin, porcine (PF) 100 unit/mL injection flush 500 Units  500 Units, Intravenous, 1 time a week  alteplase injection 2 mg  2 mg, Intra-Catheter, 1 time a week      Brandon Arauz MD  [facility]  Hematology and Oncology  [facility]   [address] [place] [address]   O: [phone]  F: [phone]

## 2022-05-24 NOTE — TELEPHONE ENCOUNTER
----- Message from Brandon Arauz MD sent at 5/24/2022  3:48 PM CDT -----  Henok Solorio.  Thanks for sending the patient to see me.  I am not worried about a bleeding disorder.  I think all of her anemia is from recurrent blood loss from her vericose veins.  I will follow her up with repeat CBC and iron studies 8 weeks after her last dose of IV iron.

## 2022-05-31 ENCOUNTER — INFUSION (OUTPATIENT)
Dept: INFUSION THERAPY | Facility: HOSPITAL | Age: 77
End: 2022-05-31
Attending: INTERNAL MEDICINE
Payer: MEDICARE

## 2022-05-31 VITALS
RESPIRATION RATE: 18 BRPM | BODY MASS INDEX: 36.88 KG/M2 | HEART RATE: 61 BPM | WEIGHT: 208.13 LBS | SYSTOLIC BLOOD PRESSURE: 147 MMHG | TEMPERATURE: 98 F | HEIGHT: 63 IN | DIASTOLIC BLOOD PRESSURE: 63 MMHG

## 2022-05-31 DIAGNOSIS — D50.0 IRON DEFICIENCY ANEMIA DUE TO CHRONIC BLOOD LOSS: Primary | ICD-10-CM

## 2022-05-31 PROCEDURE — 96365 THER/PROPH/DIAG IV INF INIT: CPT | Mod: PN

## 2022-05-31 PROCEDURE — 25000003 PHARM REV CODE 250: Mod: PN | Performed by: INTERNAL MEDICINE

## 2022-05-31 PROCEDURE — 63600175 PHARM REV CODE 636 W HCPCS: Mod: PN | Performed by: INTERNAL MEDICINE

## 2022-05-31 RX ORDER — SODIUM CHLORIDE 0.9 % (FLUSH) 0.9 %
10 SYRINGE (ML) INJECTION
Status: CANCELLED | OUTPATIENT
Start: 2022-06-07

## 2022-05-31 RX ORDER — HEPARIN 100 UNIT/ML
500 SYRINGE INTRAVENOUS
Status: CANCELLED | OUTPATIENT
Start: 2022-06-07

## 2022-05-31 RX ORDER — SODIUM CHLORIDE 0.9 % (FLUSH) 0.9 %
10 SYRINGE (ML) INJECTION
Status: DISCONTINUED | OUTPATIENT
Start: 2022-05-31 | End: 2022-05-31 | Stop reason: HOSPADM

## 2022-05-31 RX ADMIN — IRON SUCROSE 200 MG: 20 INJECTION, SOLUTION INTRAVENOUS at 01:05

## 2022-05-31 RX ADMIN — SODIUM CHLORIDE: 0.9 INJECTION, SOLUTION INTRAVENOUS at 01:05

## 2022-05-31 NOTE — PLAN OF CARE
Problem: Adult Inpatient Plan of Care  Goal: Plan of Care Review  Outcome: Ongoing, Progressing  Flowsheets (Taken 5/31/2022 1352)  Plan of Care Reviewed With:   patient   spouse  Goal: Patient-Specific Goal (Individualized)  Outcome: Ongoing, Progressing  Flowsheets (Taken 5/31/2022 1352)  Anxieties, Fears or Concerns: None  Individualized Care Needs: Warm blanket, recliner, education  Patient-Specific Goals (Include Timeframe): Free of S/S of reaction with infusion.     Problem: Fatigue  Goal: Improved Activity Tolerance  Outcome: Ongoing, Progressing  Intervention: Promote Improved Energy  Flowsheets (Taken 5/31/2022 1352)  Fatigue Management:   frequent rest breaks encouraged   paced activity encouraged  Sleep/Rest Enhancement: regular sleep/rest pattern promoted  Activity Management: Ambulated -L4       Patient to Infusion for Venofer. Accompanied by her . Treatment plan reviewed. VSS. Tolerated treatment. Provided with copy of upcoming appointment schedule. Escorted to the front lobby for discharge to home.

## 2022-06-01 ENCOUNTER — CLINICAL SUPPORT (OUTPATIENT)
Dept: CARDIOLOGY | Facility: HOSPITAL | Age: 77
End: 2022-06-01
Payer: MEDICARE

## 2022-06-01 DIAGNOSIS — Z95.818 PRESENCE OF OTHER CARDIAC IMPLANTS AND GRAFTS: ICD-10-CM

## 2022-06-01 PROCEDURE — G2066 INTER DEVC REMOTE 30D: HCPCS | Mod: PO | Performed by: INTERNAL MEDICINE

## 2022-06-01 PROCEDURE — 93298 CARDIAC DEVICE CHECK - REMOTE: ICD-10-PCS | Mod: ,,, | Performed by: INTERNAL MEDICINE

## 2022-06-01 PROCEDURE — 93298 REM INTERROG DEV EVAL SCRMS: CPT | Mod: ,,, | Performed by: INTERNAL MEDICINE

## 2022-06-07 ENCOUNTER — INFUSION (OUTPATIENT)
Dept: INFUSION THERAPY | Facility: HOSPITAL | Age: 77
End: 2022-06-07
Attending: INTERNAL MEDICINE
Payer: MEDICARE

## 2022-06-07 VITALS
HEIGHT: 63 IN | RESPIRATION RATE: 18 BRPM | HEART RATE: 62 BPM | TEMPERATURE: 98 F | WEIGHT: 208.13 LBS | BODY MASS INDEX: 36.88 KG/M2 | SYSTOLIC BLOOD PRESSURE: 157 MMHG | DIASTOLIC BLOOD PRESSURE: 72 MMHG

## 2022-06-07 DIAGNOSIS — D50.0 IRON DEFICIENCY ANEMIA DUE TO CHRONIC BLOOD LOSS: Primary | ICD-10-CM

## 2022-06-07 PROCEDURE — 25000003 PHARM REV CODE 250: Mod: PN | Performed by: INTERNAL MEDICINE

## 2022-06-07 PROCEDURE — 63600175 PHARM REV CODE 636 W HCPCS: Mod: PN | Performed by: INTERNAL MEDICINE

## 2022-06-07 PROCEDURE — 96365 THER/PROPH/DIAG IV INF INIT: CPT | Mod: PN

## 2022-06-07 RX ORDER — HEPARIN 100 UNIT/ML
500 SYRINGE INTRAVENOUS
Status: CANCELLED | OUTPATIENT
Start: 2022-06-14

## 2022-06-07 RX ORDER — SODIUM CHLORIDE 0.9 % (FLUSH) 0.9 %
10 SYRINGE (ML) INJECTION
Status: DISCONTINUED | OUTPATIENT
Start: 2022-06-07 | End: 2022-06-07 | Stop reason: HOSPADM

## 2022-06-07 RX ORDER — SODIUM CHLORIDE 0.9 % (FLUSH) 0.9 %
10 SYRINGE (ML) INJECTION
Status: CANCELLED | OUTPATIENT
Start: 2022-06-14

## 2022-06-07 RX ADMIN — SODIUM CHLORIDE: 9 INJECTION, SOLUTION INTRAVENOUS at 01:06

## 2022-06-07 RX ADMIN — IRON SUCROSE 200 MG: 20 INJECTION, SOLUTION INTRAVENOUS at 02:06

## 2022-06-07 NOTE — PLAN OF CARE
Problem: Adult Inpatient Plan of Care  Goal: Plan of Care Review  Outcome: Ongoing, Progressing  Flowsheets (Taken 6/7/2022 1555)  Plan of Care Reviewed With:   patient   spouse  Goal: Patient-Specific Goal (Individualized)  Outcome: Ongoing, Progressing  Flowsheets (Taken 6/7/2022 1555)  Anxieties, Fears or Concerns: none voiced  Individualized Care Needs: blanket, recliner, education, spouse at bedside  Patient-Specific Goals (Include Timeframe): no signs of reaction during treatment     Problem: Fatigue  Goal: Improved Activity Tolerance  Outcome: Ongoing, Progressing  Intervention: Promote Improved Energy  Flowsheets (Taken 6/7/2022 1555)  Fatigue Management:   paced activity encouraged   frequent rest breaks encouraged   fatigue-related activity identified  Sleep/Rest Enhancement:   relaxation techniques promoted   natural light exposure provided   consistent schedule promoted   noise level reduced   therapeutic touch utilized   family presence promoted  Activity Management: Ambulated -L4  Pt arrived to clinic for Venofer infusion and tolerated well with no changes throughout therapy, with being monitored 30 min post infusion. Pt aware of side effects and f/u appts and discharged to home in NAD with .

## 2022-06-14 ENCOUNTER — INFUSION (OUTPATIENT)
Dept: INFUSION THERAPY | Facility: HOSPITAL | Age: 77
End: 2022-06-14
Attending: INTERNAL MEDICINE
Payer: MEDICARE

## 2022-06-14 VITALS
WEIGHT: 209 LBS | BODY MASS INDEX: 37.03 KG/M2 | HEART RATE: 60 BPM | TEMPERATURE: 98 F | HEIGHT: 63 IN | SYSTOLIC BLOOD PRESSURE: 162 MMHG | DIASTOLIC BLOOD PRESSURE: 72 MMHG

## 2022-06-14 DIAGNOSIS — D50.0 IRON DEFICIENCY ANEMIA DUE TO CHRONIC BLOOD LOSS: Primary | ICD-10-CM

## 2022-06-14 PROCEDURE — 96365 THER/PROPH/DIAG IV INF INIT: CPT | Mod: PN

## 2022-06-14 PROCEDURE — 25000003 PHARM REV CODE 250: Mod: PN | Performed by: INTERNAL MEDICINE

## 2022-06-14 PROCEDURE — 63600175 PHARM REV CODE 636 W HCPCS: Mod: PN | Performed by: INTERNAL MEDICINE

## 2022-06-14 RX ORDER — HEPARIN 100 UNIT/ML
500 SYRINGE INTRAVENOUS
Status: CANCELLED | OUTPATIENT
Start: 2022-06-21

## 2022-06-14 RX ORDER — SODIUM CHLORIDE 0.9 % (FLUSH) 0.9 %
10 SYRINGE (ML) INJECTION
Status: CANCELLED | OUTPATIENT
Start: 2022-06-21

## 2022-06-14 RX ORDER — SODIUM CHLORIDE 0.9 % (FLUSH) 0.9 %
10 SYRINGE (ML) INJECTION
Status: DISCONTINUED | OUTPATIENT
Start: 2022-06-14 | End: 2022-06-14 | Stop reason: HOSPADM

## 2022-06-14 RX ADMIN — SODIUM CHLORIDE: 0.9 INJECTION, SOLUTION INTRAVENOUS at 01:06

## 2022-06-14 RX ADMIN — IRON SUCROSE 200 MG: 20 INJECTION, SOLUTION INTRAVENOUS at 02:06

## 2022-06-14 NOTE — PLAN OF CARE
Problem: Adult Inpatient Plan of Care  Goal: Plan of Care Review  Outcome: Ongoing, Progressing  Flowsheets (Taken 6/14/2022 1401)  Plan of Care Reviewed With:   patient   spouse  Goal: Patient-Specific Goal (Individualized)  Outcome: Ongoing, Progressing  Flowsheets (Taken 6/14/2022 1401)  Anxieties, Fears or Concerns: None  Individualized Care Needs: Warm blanket, recliner, spouse at chairside, education  Patient-Specific Goals (Include Timeframe): Free of S/S of reaction during treatment.     Problem: Fatigue  Goal: Improved Activity Tolerance  Outcome: Ongoing, Progressing  Intervention: Promote Improved Energy  Flowsheets (Taken 6/14/2022 1401)  Fatigue Management:   frequent rest breaks encouraged   paced activity encouraged  Sleep/Rest Enhancement: regular sleep/rest pattern promoted  Activity Management: Ambulated -L4     Patient to Infusion for Venofer. Accompanied by her . Treatment plan reviewed. VSS. Tolerated infusion. Provided with copy of upcoming appointment schedule. Escorted to the front lobby for discharge to home.

## 2022-06-21 ENCOUNTER — INFUSION (OUTPATIENT)
Dept: INFUSION THERAPY | Facility: HOSPITAL | Age: 77
End: 2022-06-21
Attending: INTERNAL MEDICINE
Payer: MEDICARE

## 2022-06-21 VITALS
HEIGHT: 63 IN | TEMPERATURE: 98 F | SYSTOLIC BLOOD PRESSURE: 151 MMHG | WEIGHT: 209 LBS | BODY MASS INDEX: 37.03 KG/M2 | HEART RATE: 65 BPM | RESPIRATION RATE: 16 BRPM | DIASTOLIC BLOOD PRESSURE: 71 MMHG

## 2022-06-21 DIAGNOSIS — D50.0 IRON DEFICIENCY ANEMIA DUE TO CHRONIC BLOOD LOSS: Primary | ICD-10-CM

## 2022-06-21 PROCEDURE — 25000003 PHARM REV CODE 250: Mod: PN | Performed by: INTERNAL MEDICINE

## 2022-06-21 PROCEDURE — 63600175 PHARM REV CODE 636 W HCPCS: Mod: PN | Performed by: INTERNAL MEDICINE

## 2022-06-21 PROCEDURE — 96365 THER/PROPH/DIAG IV INF INIT: CPT | Mod: PN

## 2022-06-21 RX ORDER — SODIUM CHLORIDE 0.9 % (FLUSH) 0.9 %
10 SYRINGE (ML) INJECTION
Status: CANCELLED | OUTPATIENT
Start: 2022-06-28

## 2022-06-21 RX ORDER — HEPARIN 100 UNIT/ML
500 SYRINGE INTRAVENOUS
Status: CANCELLED | OUTPATIENT
Start: 2022-06-28

## 2022-06-21 RX ORDER — SODIUM CHLORIDE 0.9 % (FLUSH) 0.9 %
10 SYRINGE (ML) INJECTION
Status: DISCONTINUED | OUTPATIENT
Start: 2022-06-21 | End: 2022-06-21 | Stop reason: HOSPADM

## 2022-06-21 RX ADMIN — SODIUM CHLORIDE: 0.9 INJECTION, SOLUTION INTRAVENOUS at 01:06

## 2022-06-21 RX ADMIN — IRON SUCROSE 200 MG: 20 INJECTION, SOLUTION INTRAVENOUS at 01:06

## 2022-06-21 NOTE — PLAN OF CARE
Problem: Adult Inpatient Plan of Care  Goal: Plan of Care Review  Outcome: Ongoing, Progressing  Flowsheets (Taken 6/21/2022 1400)  Plan of Care Reviewed With:   patient   spouse  Goal: Patient-Specific Goal (Individualized)  Outcome: Ongoing, Progressing  Flowsheets (Taken 6/21/2022 1400)  Anxieties, Fears or Concerns: None  Individualized Care Needs: Warm blanket, recliner, spouse at chairside  Patient-Specific Goals (Include Timeframe): Free of S/S of reaction during treatment.     Problem: Fatigue  Goal: Improved Activity Tolerance  Outcome: Ongoing, Progressing  Intervention: Promote Improved Energy  Flowsheets (Taken 6/21/2022 1400)  Fatigue Management:   frequent rest breaks encouraged   paced activity encouraged  Sleep/Rest Enhancement: regular sleep/rest pattern promoted  Activity Management: Ambulated -L4       Patient to Infusion for her last day of Venofer. Accompanied by her . Treatment plan reviewed. VSS. Tolerated infusion. Uses [facility] for appointment schedule. Escorted to the front lobby for discharge to home.

## 2022-07-01 ENCOUNTER — CLINICAL SUPPORT (OUTPATIENT)
Dept: CARDIOLOGY | Facility: HOSPITAL | Age: 77
End: 2022-07-01
Payer: MEDICARE

## 2022-07-01 DIAGNOSIS — Z95.818 PRESENCE OF OTHER CARDIAC IMPLANTS AND GRAFTS: ICD-10-CM

## 2022-07-01 PROCEDURE — G2066 INTER DEVC REMOTE 30D: HCPCS | Mod: PO | Performed by: INTERNAL MEDICINE

## 2022-07-27 ENCOUNTER — PATIENT OUTREACH (OUTPATIENT)
Dept: ADMINISTRATIVE | Facility: HOSPITAL | Age: 77
End: 2022-07-27
Payer: MEDICARE

## 2022-07-27 ENCOUNTER — PATIENT MESSAGE (OUTPATIENT)
Dept: ADMINISTRATIVE | Facility: HOSPITAL | Age: 77
End: 2022-07-27
Payer: MEDICARE

## 2022-07-31 ENCOUNTER — CLINICAL SUPPORT (OUTPATIENT)
Dept: CARDIOLOGY | Facility: HOSPITAL | Age: 77
End: 2022-07-31
Payer: MEDICARE

## 2022-07-31 DIAGNOSIS — Z95.818 PRESENCE OF OTHER CARDIAC IMPLANTS AND GRAFTS: ICD-10-CM

## 2022-07-31 PROCEDURE — 93298 CARDIAC DEVICE CHECK - REMOTE: ICD-10-PCS | Mod: ,,, | Performed by: INTERNAL MEDICINE

## 2022-07-31 PROCEDURE — 93298 REM INTERROG DEV EVAL SCRMS: CPT | Mod: ,,, | Performed by: INTERNAL MEDICINE

## 2022-07-31 PROCEDURE — G2066 INTER DEVC REMOTE 30D: HCPCS | Mod: PO | Performed by: INTERNAL MEDICINE

## 2022-08-10 ENCOUNTER — IMMUNIZATION (OUTPATIENT)
Dept: PHARMACY | Facility: CLINIC | Age: 77
End: 2022-08-10
Payer: MEDICARE

## 2022-08-10 ENCOUNTER — OFFICE VISIT (OUTPATIENT)
Dept: OPTOMETRY | Facility: CLINIC | Age: 77
End: 2022-08-10
Payer: MEDICARE

## 2022-08-10 ENCOUNTER — OFFICE VISIT (OUTPATIENT)
Dept: FAMILY MEDICINE | Facility: CLINIC | Age: 77
End: 2022-08-10
Payer: MEDICARE

## 2022-08-10 VITALS
BODY MASS INDEX: 36.32 KG/M2 | OXYGEN SATURATION: 95 % | SYSTOLIC BLOOD PRESSURE: 134 MMHG | DIASTOLIC BLOOD PRESSURE: 80 MMHG | HEART RATE: 66 BPM | HEIGHT: 63 IN | WEIGHT: 205 LBS

## 2022-08-10 DIAGNOSIS — Z83.518 FAMILY HISTORY OF MACULAR DEGENERATION: ICD-10-CM

## 2022-08-10 DIAGNOSIS — Z23 NEED FOR VACCINATION: Primary | ICD-10-CM

## 2022-08-10 DIAGNOSIS — Z86.73 HISTORY OF ISCHEMIC RIGHT MCA STROKE: ICD-10-CM

## 2022-08-10 DIAGNOSIS — Z85.118 PERSONAL HISTORY OF LUNG CANCER: ICD-10-CM

## 2022-08-10 DIAGNOSIS — I48.0 PAROXYSMAL ATRIAL FIBRILLATION: ICD-10-CM

## 2022-08-10 DIAGNOSIS — Z12.31 BREAST CANCER SCREENING BY MAMMOGRAM: ICD-10-CM

## 2022-08-10 DIAGNOSIS — I67.2 CEREBRAL ATHEROSCLEROSIS: ICD-10-CM

## 2022-08-10 DIAGNOSIS — I10 ESSENTIAL HYPERTENSION: ICD-10-CM

## 2022-08-10 DIAGNOSIS — Z96.1 PSEUDOPHAKIA OF BOTH EYES: ICD-10-CM

## 2022-08-10 DIAGNOSIS — D50.0 IRON DEFICIENCY ANEMIA DUE TO CHRONIC BLOOD LOSS: Primary | ICD-10-CM

## 2022-08-10 DIAGNOSIS — H04.123 BILATERAL DRY EYES: ICD-10-CM

## 2022-08-10 DIAGNOSIS — D86.0 SARCOIDOSIS OF LUNG: Primary | ICD-10-CM

## 2022-08-10 DIAGNOSIS — E55.9 VITAMIN D DEFICIENCY: ICD-10-CM

## 2022-08-10 DIAGNOSIS — K21.9 GASTROESOPHAGEAL REFLUX DISEASE WITHOUT ESOPHAGITIS: ICD-10-CM

## 2022-08-10 PROBLEM — R10.13 EPIGASTRIC PAIN: Status: RESOLVED | Noted: 2021-02-19 | Resolved: 2022-08-10

## 2022-08-10 PROBLEM — E78.5 HYPERLIPIDEMIA LDL GOAL <70: Status: RESOLVED | Noted: 2017-08-21 | Resolved: 2022-08-10

## 2022-08-10 PROCEDURE — 4010F ACE/ARB THERAPY RXD/TAKEN: CPT | Mod: CPTII,S$GLB,, | Performed by: INTERNAL MEDICINE

## 2022-08-10 PROCEDURE — 3288F FALL RISK ASSESSMENT DOCD: CPT | Mod: CPTII,S$GLB,, | Performed by: OPTOMETRIST

## 2022-08-10 PROCEDURE — 3079F PR MOST RECENT DIASTOLIC BLOOD PRESSURE 80-89 MM HG: ICD-10-PCS | Mod: CPTII,S$GLB,, | Performed by: INTERNAL MEDICINE

## 2022-08-10 PROCEDURE — 4010F PR ACE/ARB THEARPY RXD/TAKEN: ICD-10-PCS | Mod: CPTII,S$GLB,, | Performed by: INTERNAL MEDICINE

## 2022-08-10 PROCEDURE — 3079F DIAST BP 80-89 MM HG: CPT | Mod: CPTII,S$GLB,, | Performed by: INTERNAL MEDICINE

## 2022-08-10 PROCEDURE — 1100F PR PT FALLS ASSESS DOC 2+ FALLS/FALL W/INJURY/YR: ICD-10-PCS | Mod: CPTII,S$GLB,, | Performed by: OPTOMETRIST

## 2022-08-10 PROCEDURE — 3288F FALL RISK ASSESSMENT DOCD: CPT | Mod: CPTII,S$GLB,, | Performed by: INTERNAL MEDICINE

## 2022-08-10 PROCEDURE — 1126F PR PAIN SEVERITY QUANTIFIED, NO PAIN PRESENT: ICD-10-PCS | Mod: CPTII,S$GLB,, | Performed by: INTERNAL MEDICINE

## 2022-08-10 PROCEDURE — 1126F AMNT PAIN NOTED NONE PRSNT: CPT | Mod: CPTII,S$GLB,, | Performed by: INTERNAL MEDICINE

## 2022-08-10 PROCEDURE — 1160F RVW MEDS BY RX/DR IN RCRD: CPT | Mod: CPTII,S$GLB,, | Performed by: OPTOMETRIST

## 2022-08-10 PROCEDURE — 4010F ACE/ARB THERAPY RXD/TAKEN: CPT | Mod: CPTII,S$GLB,, | Performed by: OPTOMETRIST

## 2022-08-10 PROCEDURE — 1160F PR REVIEW ALL MEDS BY PRESCRIBER/CLIN PHARMACIST DOCUMENTED: ICD-10-PCS | Mod: CPTII,S$GLB,, | Performed by: OPTOMETRIST

## 2022-08-10 PROCEDURE — 1126F AMNT PAIN NOTED NONE PRSNT: CPT | Mod: CPTII,S$GLB,, | Performed by: OPTOMETRIST

## 2022-08-10 PROCEDURE — 3075F PR MOST RECENT SYSTOLIC BLOOD PRESS GE 130-139MM HG: ICD-10-PCS | Mod: CPTII,S$GLB,, | Performed by: INTERNAL MEDICINE

## 2022-08-10 PROCEDURE — 99999 PR PBB SHADOW E&M-EST. PATIENT-LVL III: ICD-10-PCS | Mod: PBBFAC,,, | Performed by: OPTOMETRIST

## 2022-08-10 PROCEDURE — 99214 OFFICE O/P EST MOD 30 MIN: CPT | Mod: S$GLB,,, | Performed by: INTERNAL MEDICINE

## 2022-08-10 PROCEDURE — 99999 PR PBB SHADOW E&M-EST. PATIENT-LVL III: CPT | Mod: PBBFAC,,, | Performed by: OPTOMETRIST

## 2022-08-10 PROCEDURE — 92014 PR EYE EXAM, EST PATIENT,COMPREHESV: ICD-10-PCS | Mod: S$GLB,,, | Performed by: OPTOMETRIST

## 2022-08-10 PROCEDURE — 92014 COMPRE OPH EXAM EST PT 1/>: CPT | Mod: S$GLB,,, | Performed by: OPTOMETRIST

## 2022-08-10 PROCEDURE — 1101F PT FALLS ASSESS-DOCD LE1/YR: CPT | Mod: CPTII,S$GLB,, | Performed by: INTERNAL MEDICINE

## 2022-08-10 PROCEDURE — 3288F PR FALLS RISK ASSESSMENT DOCUMENTED: ICD-10-PCS | Mod: CPTII,S$GLB,, | Performed by: INTERNAL MEDICINE

## 2022-08-10 PROCEDURE — 1126F PR PAIN SEVERITY QUANTIFIED, NO PAIN PRESENT: ICD-10-PCS | Mod: CPTII,S$GLB,, | Performed by: OPTOMETRIST

## 2022-08-10 PROCEDURE — 99999 PR PBB SHADOW E&M-EST. PATIENT-LVL IV: ICD-10-PCS | Mod: PBBFAC,,, | Performed by: INTERNAL MEDICINE

## 2022-08-10 PROCEDURE — 1101F PR PT FALLS ASSESS DOC 0-1 FALLS W/OUT INJ PAST YR: ICD-10-PCS | Mod: CPTII,S$GLB,, | Performed by: INTERNAL MEDICINE

## 2022-08-10 PROCEDURE — 1160F RVW MEDS BY RX/DR IN RCRD: CPT | Mod: CPTII,S$GLB,, | Performed by: INTERNAL MEDICINE

## 2022-08-10 PROCEDURE — 1159F MED LIST DOCD IN RCRD: CPT | Mod: CPTII,S$GLB,, | Performed by: OPTOMETRIST

## 2022-08-10 PROCEDURE — 1100F PTFALLS ASSESS-DOCD GE2>/YR: CPT | Mod: CPTII,S$GLB,, | Performed by: OPTOMETRIST

## 2022-08-10 PROCEDURE — 99214 PR OFFICE/OUTPT VISIT, EST, LEVL IV, 30-39 MIN: ICD-10-PCS | Mod: S$GLB,,, | Performed by: INTERNAL MEDICINE

## 2022-08-10 PROCEDURE — 4010F PR ACE/ARB THEARPY RXD/TAKEN: ICD-10-PCS | Mod: CPTII,S$GLB,, | Performed by: OPTOMETRIST

## 2022-08-10 PROCEDURE — 1160F PR REVIEW ALL MEDS BY PRESCRIBER/CLIN PHARMACIST DOCUMENTED: ICD-10-PCS | Mod: CPTII,S$GLB,, | Performed by: INTERNAL MEDICINE

## 2022-08-10 PROCEDURE — 1159F PR MEDICATION LIST DOCUMENTED IN MEDICAL RECORD: ICD-10-PCS | Mod: CPTII,S$GLB,, | Performed by: INTERNAL MEDICINE

## 2022-08-10 PROCEDURE — 1159F PR MEDICATION LIST DOCUMENTED IN MEDICAL RECORD: ICD-10-PCS | Mod: CPTII,S$GLB,, | Performed by: OPTOMETRIST

## 2022-08-10 PROCEDURE — 3075F SYST BP GE 130 - 139MM HG: CPT | Mod: CPTII,S$GLB,, | Performed by: INTERNAL MEDICINE

## 2022-08-10 PROCEDURE — 3288F PR FALLS RISK ASSESSMENT DOCUMENTED: ICD-10-PCS | Mod: CPTII,S$GLB,, | Performed by: OPTOMETRIST

## 2022-08-10 PROCEDURE — 99999 PR PBB SHADOW E&M-EST. PATIENT-LVL IV: CPT | Mod: PBBFAC,,, | Performed by: INTERNAL MEDICINE

## 2022-08-10 PROCEDURE — 1159F MED LIST DOCD IN RCRD: CPT | Mod: CPTII,S$GLB,, | Performed by: INTERNAL MEDICINE

## 2022-08-10 NOTE — PROGRESS NOTES
Patient ID: Nicole Medina     Chief Complaint:   Chief Complaint   Patient presents with    wellness exam        HPI:  Routine follow-up after she went to the hospital a few months ago for increased bleeding from her left lower extremity necessitating a blood transfusion.  We did check her iron levels and blood counts and even though her hemoglobin had responded a little bit after the transfusion, she was still very iron deficient so I ordered some IV iron.  She has completed the 5 doses and will be getting repeat labs in a few weeks.  They did refer her to a local hematologist but I do not see the need for that because I can't handle this.  Thankfully, the wounds on her left lower extremity have almost fully healed.  She does have some concern about some minor left ankle swelling but I truly believe it is due to some venous insufficiency and we will monitor that.  She does have a question about which vitamin she should take I do recommend that she take over-the-counter vitamin D3 4000 units a day along with a multivitamin and toe Q10 100 mg a day since she takes a statin.  She does admit to some sluggishness and bilateral thighs when she gets going to do things at a fast pace but think that could just be some transient debility and it should get better with time. She will get her Mammo and Colonoscopy soon.     Review of Systems   Constitutional: Negative.    HENT: Negative.    Eyes: Negative.    Respiratory: Negative.    Cardiovascular: Negative.    Gastrointestinal: Negative.    Endocrine: Negative.    Genitourinary: Negative.    Musculoskeletal: Negative.    Skin: Negative.    Allergic/Immunologic: Negative.    Neurological: Negative.    Hematological: Negative.    Psychiatric/Behavioral: Negative.           Objective:      Physical Exam   Physical Exam  Vitals and nursing note reviewed.   Constitutional:       Appearance: Normal appearance. She is well-developed. She is obese.   HENT:      Head: Normocephalic  "and atraumatic.      Nose: Nose normal.   Eyes:      Extraocular Movements: Extraocular movements intact.      Conjunctiva/sclera: Conjunctivae normal.      Pupils: Pupils are equal, round, and reactive to light.   Cardiovascular:      Rate and Rhythm: Normal rate and regular rhythm.      Pulses: Normal pulses.      Heart sounds: Normal heart sounds.   Pulmonary:      Effort: Pulmonary effort is normal.      Breath sounds: Normal breath sounds.   Abdominal:      General: Bowel sounds are normal.      Palpations: Abdomen is soft.   Musculoskeletal:         General: Normal range of motion.      Cervical back: Normal range of motion and neck supple.      Left lower leg: Edema present.   Skin:     General: Skin is warm and dry.      Capillary Refill: Capillary refill takes less than 2 seconds.   Neurological:      General: No focal deficit present.      Mental Status: She is alert and oriented to person, place, and time.   Psychiatric:         Mood and Affect: Mood normal.         Behavior: Behavior normal.         Thought Content: Thought content normal.         Judgment: Judgment normal.            Vitals:   Vitals:    08/10/22 1432   BP: 134/80   Pulse: 66   SpO2: 95%   Weight: 93 kg (205 lb)   Height: 5' 3" (1.6 m)          Current Outpatient Medications:     albuterol (PROVENTIL) 2.5 mg /3 mL (0.083 %) nebulizer solution, Take 3 mLs (2.5 mg total) by nebulization every 6 (six) hours as needed for Wheezing or Shortness of Breath. Rescue, Disp: 180 mL, Rfl: 11    aspirin 81 MG Chew, Take 1 tablet (81 mg total) by mouth once daily., Disp: 30 tablet, Rfl: 11    atorvastatin (LIPITOR) 20 MG tablet, Take 1 tablet (20 mg total) by mouth once daily., Disp: 90 tablet, Rfl: 3    budesonide-formoterol 160-4.5 mcg (SYMBICORT) 160-4.5 mcg/actuation HFAA, Inhale 2 puffs into the lungs every 12 (twelve) hours. (Patient taking differently: Inhale 2 puffs into the lungs 2 (two) times daily as needed (shortness of breath).), " Disp: 3 g, Rfl: 3    cholecalciferol, vitamin D3, 125 mcg (5,000 unit) Tab, Take 5,000 Units by mouth once daily., Disp: , Rfl:     flecainide (TAMBOCOR) 150 MG Tab, Take 1 tablet (150 mg total) by mouth every 12 (twelve) hours., Disp: 180 tablet, Rfl: 3    fluticasone propionate (FLONASE) 50 mcg/actuation nasal spray, 1 spray (50 mcg total) by Each Nostril route daily as needed for Allergies., Disp: 48 g, Rfl: 3    furosemide (LASIX) 20 MG tablet, Take 1 tablet (20 mg total) by mouth once daily., Disp: 90 tablet, Rfl: 3    ibandronate (BONIVA) 150 mg tablet, Take 1 tablet (150 mg total) by mouth every 30 days., Disp: 12 tablet, Rfl: 3    losartan (COZAAR) 50 MG tablet, Take 1 tablet (50 mg total) by mouth once daily., Disp: 90 tablet, Rfl: 3    metoprolol succinate (TOPROL XL) 50 MG 24 hr tablet, Take 1 tablet (50 mg total) by mouth once daily., Disp: 90 tablet, Rfl: 3    mv,Ca,min-folic acid-vit K1 400-20 mcg Tab, Take 1 tablet by mouth once daily at 6am., Disp: , Rfl:     omeprazole (PRILOSEC) 40 MG capsule, Take 1 capsule (40 mg total) by mouth 2 (two) times daily before meals. (Patient taking differently: Take 40 mg by mouth every morning.), Disp: 90 capsule, Rfl: 3    sars-cov-2, covid-19, (MODERNA COVID-19) 50 mcg/0.25 ml injection (BOOSTER), Inject 0.25 mLs into the muscle once. for 1 dose, Disp: 0.25 mL, Rfl: 0   Assessment:       Patient Active Problem List    Diagnosis Date Noted    Vitamin D deficiency 08/10/2022    Cerebral atherosclerosis  08/10/2022    Iron deficiency anemia due to chronic blood loss 05/18/2022    Debility 05/09/2022    Symptomatic anemia 05/08/2022    Acute blood loss anemia 05/08/2022    Varicose veins of left lower extremity 05/08/2022    ACP (advance care planning) 05/08/2022    ILD (interstitial lung disease)     Other nonthrombocytopenic purpura 11/04/2021    Chest pain 01/01/2021    Encounter for observation for suspected exposure to other biological  agents ruled out 12/14/2020    Adenocarcinoma, lung, left 11/19/2020    Paroxysmal atrial fibrillation 11/18/2020    Personal history of lung cancer 11/16/2020    Primary malignant neoplasm of left upper lobe of lung     [place] (obstructive sleep apnea)     Morbid (severe) obesity with alveolar hypoventilation     Acute non intractable tension-type headache 08/25/2019    Lung nodule 05/09/2019    Vertigo 03/28/2019    Esophageal foreign body, initial encounter 11/22/2018    Senile nuclear sclerosis 03/27/2018    Essential hypertension 08/21/2017    History of ischemic right MCA stroke 06/01/2017    Atherosclerosis of aorta 09/08/2016    Sarcoidosis of lung 02/10/2015    Nuclear sclerosis - Both Eyes 06/20/2013    Dry eyes - Both Eyes 06/20/2013    PSVT (paroxysmal supraventricular tachycardia)     GERD (gastroesophageal reflux disease)           Plan:       Nicole Medina  was seen today for follow-up and may need lab work.    Diagnoses and all orders for this visit:    Nicole was seen today for wellness exam.    Diagnoses and all orders for this visit:    Iron deficiency anemia due to chronic blood loss  Check labs after iron infusions     Breast cancer screening by mammogram  -     Mammo Digital Screening Bilat w/ Estevan; Future    Vitamin D deficiency  -     Vitamin D; Future  Check labs      History of ischemic right MCA stroke  -     Lipid Panel; Future  Check labs      Essential hypertension  Controlled with med     Paroxysmal atrial fibrillation  Heart rate Controlled     Personal history of lung cancer  CT soon and will see Dr. Sibley     Gastroesophageal reflux disease without esophagitis  Controlled with med     Cerebral atherosclerosis   -     Lipid Panel; Future

## 2022-08-10 NOTE — PROGRESS NOTES
HPI     Pt here for annual eye exam DLS- 08/05/21    Pt states her DVA is stable, using readers and is happy with those.   Denies DM, HTN is stable on meds.   No floaters, FOL and GTTS.     Last edited by Sangita Jenkins MA on 8/10/2022  9:46 AM. (History)            Assessment /Plan     For exam results, see Encounter Report.    Sarcoidosis of lung    Bilateral dry eyes    Pseudophakia of both eyes    Family history of macular degeneration      1. No ocular signs of inflammation OU, Monitor condition. Patient to report any changes. RTC 1 year recheck.  2. Recommend artificial tears. 1 drop 2x per day. Chronicity of disease and treatment discussed.  3. Monitor condition. Patient to report any changes. RTC 1 year recheck.       4. Monitor for condition. Patient to report any changes. RTC 1 year recheck.

## 2022-08-26 ENCOUNTER — OFFICE VISIT (OUTPATIENT)
Dept: RADIATION ONCOLOGY | Facility: CLINIC | Age: 77
End: 2022-08-26
Payer: MEDICARE

## 2022-08-26 ENCOUNTER — HOSPITAL ENCOUNTER (OUTPATIENT)
Dept: RADIOLOGY | Facility: HOSPITAL | Age: 77
Discharge: HOME | End: 2022-08-26
Attending: RADIOLOGY
Payer: MEDICARE

## 2022-08-26 VITALS
HEART RATE: 66 BPM | RESPIRATION RATE: 17 BRPM | OXYGEN SATURATION: 94 % | TEMPERATURE: 97 F | BODY MASS INDEX: 36.6 KG/M2 | SYSTOLIC BLOOD PRESSURE: 138 MMHG | HEIGHT: 63 IN | DIASTOLIC BLOOD PRESSURE: 86 MMHG | WEIGHT: 206.56 LBS

## 2022-08-26 DIAGNOSIS — Z85.118 PERSONAL HISTORY OF LUNG CANCER: ICD-10-CM

## 2022-08-26 DIAGNOSIS — Z85.118 PERSONAL HISTORY OF LUNG CANCER: Primary | ICD-10-CM

## 2022-08-26 PROCEDURE — 3079F PR MOST RECENT DIASTOLIC BLOOD PRESSURE 80-89 MM HG: ICD-10-PCS | Mod: CPTII,S$GLB,, | Performed by: RADIOLOGY

## 2022-08-26 PROCEDURE — 3075F PR MOST RECENT SYSTOLIC BLOOD PRESS GE 130-139MM HG: ICD-10-PCS | Mod: CPTII,S$GLB,, | Performed by: RADIOLOGY

## 2022-08-26 PROCEDURE — 3079F DIAST BP 80-89 MM HG: CPT | Mod: CPTII,S$GLB,, | Performed by: RADIOLOGY

## 2022-08-26 PROCEDURE — 71250 CT THORAX DX C-: CPT | Mod: TC

## 2022-08-26 PROCEDURE — 3075F SYST BP GE 130 - 139MM HG: CPT | Mod: CPTII,S$GLB,, | Performed by: RADIOLOGY

## 2022-08-26 PROCEDURE — 1100F PR PT FALLS ASSESS DOC 2+ FALLS/FALL W/INJURY/YR: ICD-10-PCS | Mod: CPTII,S$GLB,, | Performed by: RADIOLOGY

## 2022-08-26 PROCEDURE — 4010F PR ACE/ARB THEARPY RXD/TAKEN: ICD-10-PCS | Mod: CPTII,S$GLB,, | Performed by: RADIOLOGY

## 2022-08-26 PROCEDURE — 1159F MED LIST DOCD IN RCRD: CPT | Mod: CPTII,S$GLB,, | Performed by: RADIOLOGY

## 2022-08-26 PROCEDURE — 99213 PR OFFICE/OUTPT VISIT, EST, LEVL III, 20-29 MIN: ICD-10-PCS | Mod: S$GLB,,, | Performed by: RADIOLOGY

## 2022-08-26 PROCEDURE — 3288F PR FALLS RISK ASSESSMENT DOCUMENTED: ICD-10-PCS | Mod: CPTII,S$GLB,, | Performed by: RADIOLOGY

## 2022-08-26 PROCEDURE — 71250 CT THORAX DX C-: CPT | Mod: 26,,, | Performed by: RADIOLOGY

## 2022-08-26 PROCEDURE — 99999 PR PBB SHADOW E&M-EST. PATIENT-LVL IV: CPT | Mod: PBBFAC,,, | Performed by: RADIOLOGY

## 2022-08-26 PROCEDURE — 99213 OFFICE O/P EST LOW 20 MIN: CPT | Mod: S$GLB,,, | Performed by: RADIOLOGY

## 2022-08-26 PROCEDURE — 99999 PR PBB SHADOW E&M-EST. PATIENT-LVL IV: ICD-10-PCS | Mod: PBBFAC,,, | Performed by: RADIOLOGY

## 2022-08-26 PROCEDURE — 1125F PR PAIN SEVERITY QUANTIFIED, PAIN PRESENT: ICD-10-PCS | Mod: CPTII,S$GLB,, | Performed by: RADIOLOGY

## 2022-08-26 PROCEDURE — 1159F PR MEDICATION LIST DOCUMENTED IN MEDICAL RECORD: ICD-10-PCS | Mod: CPTII,S$GLB,, | Performed by: RADIOLOGY

## 2022-08-26 PROCEDURE — 71250 CT CHEST WITHOUT CONTRAST: ICD-10-PCS | Mod: 26,,, | Performed by: RADIOLOGY

## 2022-08-26 PROCEDURE — 3288F FALL RISK ASSESSMENT DOCD: CPT | Mod: CPTII,S$GLB,, | Performed by: RADIOLOGY

## 2022-08-26 PROCEDURE — 1100F PTFALLS ASSESS-DOCD GE2>/YR: CPT | Mod: CPTII,S$GLB,, | Performed by: RADIOLOGY

## 2022-08-26 PROCEDURE — 4010F ACE/ARB THERAPY RXD/TAKEN: CPT | Mod: CPTII,S$GLB,, | Performed by: RADIOLOGY

## 2022-08-26 PROCEDURE — 1125F AMNT PAIN NOTED PAIN PRSNT: CPT | Mod: CPTII,S$GLB,, | Performed by: RADIOLOGY

## 2022-08-26 NOTE — PROGRESS NOTES
08/26/2022    Radiation Oncology Follow-Up Visit      Prior Radiation History:    Site  Technique  Energy  Dose/Fx (Gy)  #Fx  Total Dose (Gy)  Start Date  End Date  Elapsed Days    DOC Lung  SBRT  6X  11  5 / 5  55  2/7/2020 2/13/2020  6        Assessment   This is a 77 y.o. y/o female with Stage IA2 (cT1b cN0 M0) DOC NSCLC (adeno) diagnosed on biopsy 12/20/19. History significant for sarcoidosis. CT Chest 1/14/20 demonstrated 1.7 cm DOC primary and multiple other stable sub-centimeter nodules with mild hilar and mediastinal adenopathy. She underwent attempted VATS resection by Dr. Mccall 1/16/20, which was aborted due to poor toleration of single lung ventilation. She completed definitive SBRT 55 Gy in 5 fx on 2/13/20.     No significant late toxicity from treatment.  CT Chest today 8/26/22 demonstrates stable post-radiation changes in the DOC as well as stable smaller scattered nodules in the remaining lungs; no evidence of disease by my review.         Plan   1) F/U with me and CT Chest prior for re-staging in 6 months per NCCN guidelines.          HPI: Overall doing well since I last saw her, though earlier this year had multiple episodes of bleeding due to eliquis; now receiving iron infusions. Has fatigue and dyspnea on exertion related to this. Denies fevers, cough, or chest pain. Otherwise has been feeling well.       Past Medical History:   Diagnosis Date    Acute ischemic right MCA stroke 06/02/2017    Cancer 02/2020    lung cancer, small cell     Cancer 06/2020    skin    Cataract     done ou    Colon polyp 11/22/2010    Essential hypertension 08/21/2017    GERD (gastroesophageal reflux disease)     Hiatal hernia     History of radiation therapy     History of seasonal allergies     Iron deficiency anemia due to chronic blood loss 5/18/2022    On home oxygen therapy     while sleeping    Polio     as a child    Presbyopia     PSVT (paroxysmal supraventricular tachycardia)     Pulmonary  fibrosis     Dr. Miramontes    Sarcoidosis 2013    Sciatica     [facility]transient [facility]02[facility]    [facility]    Vertigo     Wound of left leg        Past Surgical History:   Procedure Laterality Date    CATARACT EXTRACTION W/  INTRAOCULAR LENS IMPLANT Left 03/27/2018    Dr Higginbotham    CATARACT EXTRACTION W/  INTRAOCULAR LENS IMPLANT Right 05/08/2018    Dr Higginbotham    CHOLECYSTECTOMY      ESOPHAGEAL DILATION N/A 11/22/2018    Procedure: DILATION, ESOPHAGUS;  Surgeon: Robb Fitzgerald Jr., MD;  Location: [facility];  Service: Endoscopy;  Laterality: N/A;    ESOPHAGOGASTRODUODENOSCOPY N/A 11/22/2018    Procedure: ESOPHAGOGASTRODUODENOSCOPY (EGD);  Surgeon: Robb Fitzgerald Jr., MD;  Location: [facility];  Service: Endoscopy;  Laterality: N/A;    ESOPHAGOGASTRODUODENOSCOPY N/A 2/19/2021    Procedure: EGD (ESOPHAGOGASTRODUODENOSCOPY);  Surgeon: Robb Fitzgerald Jr., MD;  Location: [facility];  Service: Endoscopy;  Laterality: N/A;    EYE SURGERY      HAND SURGERY Right     trauma repair    INSERTION OF IMPLANTABLE LOOP RECORDER Left 4/19/2022    Procedure: Insertion, Implantable Loop Recorder;  Surgeon: Thanh Fuentes MD;  Location: [facility] CATH;  Service: Cardiology;  Laterality: Left;    LEFT HEART CATHETERIZATION N/A 1/4/2021    Procedure: Left heart cath;  Surgeon: Laron Falcon MD;  Location: [facility] CATH;  Service: Cardiology;  Laterality: N/A;    TONSILLECTOMY      TOTAL ABDOMINAL HYSTERECTOMY      VARICOSE VEIN SURGERY Bilateral     bilateral legs    VIDEO-ASSISTED THORACOSCOPIC SURGERY (VATS)  1/16/2020    Procedure: VATS (VIDEO-ASSISTED THORACOSCOPIC SURGERY) - exploratory;  Surgeon: Ritesh Mccall MD;  Location: [address];  Service: Thoracic;;       Social History     Tobacco Use    Smoking status: Former Smoker    Smokeless tobacco: Never Used    Tobacco comment: as teenager   Substance Use Topics    Alcohol use: No    Drug use: No       Cancer-related  family history includes Breast cancer in her maternal grandmother and another family member; Cancer in her daughter, mother, and sister.    Current Outpatient Medications on File Prior to Visit   Medication Sig Dispense Refill    albuterol (PROVENTIL) 2.5 mg /3 mL (0.083 %) nebulizer solution Take 3 mLs (2.5 mg total) by nebulization every 6 (six) hours as needed for Wheezing or Shortness of Breath. Rescue 180 mL 11    aspirin 81 MG Chew Take 1 tablet (81 mg total) by mouth once daily. 30 tablet 11    atorvastatin (LIPITOR) 20 MG tablet Take 1 tablet (20 mg total) by mouth once daily. 90 tablet 3    budesonide-formoterol 160-4.5 mcg (SYMBICORT) 160-4.5 mcg/actuation HFAA Inhale 2 puffs into the lungs every 12 (twelve) hours. (Patient taking differently: Inhale 2 puffs into the lungs 2 (two) times daily as needed (shortness of breath).) 3 g 3    cholecalciferol, vitamin D3, 125 mcg (5,000 unit) Tab Take 5,000 Units by mouth once daily.      flecainide (TAMBOCOR) 150 MG Tab Take 1 tablet (150 mg total) by mouth every 12 (twelve) hours. 180 tablet 3    fluticasone propionate (FLONASE) 50 mcg/actuation nasal spray 1 spray (50 mcg total) by Each Nostril route daily as needed for Allergies. 48 g 3    furosemide (LASIX) 20 MG tablet Take 1 tablet (20 mg total) by mouth once daily. 90 tablet 3    ibandronate (BONIVA) 150 mg tablet Take 1 tablet (150 mg total) by mouth every 30 days. 12 tablet 3    losartan (COZAAR) 50 MG tablet Take 1 tablet (50 mg total) by mouth once daily. 90 tablet 3    metoprolol succinate (TOPROL XL) 50 MG 24 hr tablet Take 1 tablet (50 mg total) by mouth once daily. 90 tablet 3    mv,Ca,min-folic acid-vit K1 400-20 mcg Tab Take 1 tablet by mouth once daily at 6am.      omeprazole (PRILOSEC) 40 MG capsule Take 1 capsule (40 mg total) by mouth 2 (two) times daily before meals. (Patient taking differently: Take 40 mg by mouth every morning.) 90 capsule 3     No current facility-administered  "medications on file prior to visit.       Review of patient's allergies indicates:   Allergen Reactions    Latex Itching and Swelling     Itching and swelling to site (local reaction)       Review of Systems   Constitutional: Positive for malaise/fatigue. Negative for weight loss.   HENT: Positive for hearing loss. Negative for ear pain.    Eyes: Negative for blurred vision and double vision.   Respiratory: Positive for shortness of breath. Negative for hemoptysis and wheezing.    Cardiovascular: Positive for leg swelling.   Gastrointestinal: Negative for constipation, diarrhea and heartburn.   Genitourinary: Positive for frequency. Negative for dysuria and hematuria.   Musculoskeletal: Positive for back pain. Negative for falls and joint pain.   Neurological: Negative for dizziness, tingling, speech change, focal weakness and seizures.   Psychiatric/Behavioral: Negative for depression. The patient is not nervous/anxious.         Vital Signs: /86 (BP Location: Right arm, Patient Position: Sitting)   Pulse 66   Temp 97 °F (36.1 °C)   Resp 17   Ht 5' 3" (1.6 m)   Wt 93.7 kg (206 lb 9.1 oz)   SpO2 (!) 94%   BMI 36.59 kg/m²     ECOG Performance Status: 1    Physical Exam  Vitals reviewed.   Constitutional:       Appearance: Normal appearance.   HENT:      Head: Normocephalic and atraumatic.   Eyes:      General: No scleral icterus.     Extraocular Movements: Extraocular movements intact.   Pulmonary:      Effort: No accessory muscle usage or respiratory distress.   Abdominal:      General: There is no distension.   Musculoskeletal:         General: Normal range of motion.      Cervical back: Normal range of motion and neck supple.   Lymphadenopathy:      Cervical: No cervical adenopathy.   Skin:     General: Skin is warm and dry.   Neurological:      Mental Status: She is alert and oriented to person, place, and time.      Cranial Nerves: No cranial nerve deficit.   Psychiatric:         Mood and Affect: " Mood and affect normal.         Judgment: Judgment normal.          Labs:    Imaging: I have personally reviewed the patient's available images and reports and summarized pertinent findings above in HPI.     Pathology: No new path

## 2022-08-30 ENCOUNTER — CLINICAL SUPPORT (OUTPATIENT)
Dept: CARDIOLOGY | Facility: HOSPITAL | Age: 77
End: 2022-08-30
Attending: INTERNAL MEDICINE
Payer: MEDICARE

## 2022-08-30 ENCOUNTER — TELEPHONE (OUTPATIENT)
Dept: FAMILY MEDICINE | Facility: CLINIC | Age: 77
End: 2022-08-30
Payer: MEDICARE

## 2022-08-30 ENCOUNTER — HOSPITAL ENCOUNTER (OUTPATIENT)
Dept: RADIOLOGY | Facility: HOSPITAL | Age: 77
Discharge: HOME | End: 2022-08-30
Attending: INTERNAL MEDICINE
Payer: MEDICARE

## 2022-08-30 DIAGNOSIS — Z12.31 BREAST CANCER SCREENING BY MAMMOGRAM: ICD-10-CM

## 2022-08-30 DIAGNOSIS — Z95.818 PRESENCE OF OTHER CARDIAC IMPLANTS AND GRAFTS: ICD-10-CM

## 2022-08-30 PROCEDURE — 77063 BREAST TOMOSYNTHESIS BI: CPT | Mod: TC,PO

## 2022-08-30 PROCEDURE — 77063 BREAST TOMOSYNTHESIS BI: CPT | Mod: 26,,, | Performed by: RADIOLOGY

## 2022-08-30 PROCEDURE — G2066 INTER DEVC REMOTE 30D: HCPCS | Mod: PO | Performed by: INTERNAL MEDICINE

## 2022-08-30 PROCEDURE — 77063 MAMMO DIGITAL SCREENING BILAT WITH TOMO: ICD-10-PCS | Mod: 26,,, | Performed by: RADIOLOGY

## 2022-08-30 PROCEDURE — 77067 MAMMO DIGITAL SCREENING BILAT WITH TOMO: ICD-10-PCS | Mod: 26,,, | Performed by: RADIOLOGY

## 2022-08-30 PROCEDURE — 77067 SCR MAMMO BI INCL CAD: CPT | Mod: TC,PO

## 2022-08-30 PROCEDURE — 77067 SCR MAMMO BI INCL CAD: CPT | Mod: 26,,, | Performed by: RADIOLOGY

## 2022-08-30 NOTE — TELEPHONE ENCOUNTER
Pt calling to see if iron level was drawn at lab appt today. Informed her of all the tests that were done and we will notify her once results are processed and reviewed

## 2022-08-30 NOTE — TELEPHONE ENCOUNTER
----- Message from Yeimi Zamudio sent at 8/30/2022 11:25 AM CDT -----  Contact: patient  Type:  Test Results    Who Called:  patient  Name of Test (Lab/Mammo/Etc):  labs  Date of Test:  8/30  Ordering Provider:  Adam  Where the test was performed:  lab  Best Call Back Number:  [phone]  Additional Information:

## 2022-09-01 ENCOUNTER — TELEPHONE (OUTPATIENT)
Dept: FAMILY MEDICINE | Facility: CLINIC | Age: 77
End: 2022-09-01
Payer: MEDICARE

## 2022-09-01 NOTE — TELEPHONE ENCOUNTER
----- Message from Grant Go sent at 9/1/2022  4:11 PM CDT -----  Type:  Patient Returning Call    Who Called:  patient  Who Left Message for Patient:  Souleymane  Does the patient know what this is regarding?:    Best Call Back Number:  [phone]   Additional Information:  she said the phone hung up.

## 2022-09-01 NOTE — TELEPHONE ENCOUNTER
----- Message from Stacia Buchanan sent at 9/1/2022 12:33 PM CDT -----  Type: Needs Medical Advice  Who Called:  pt  Symptoms (please be specific):  pt said she need for nurse Souleymane to give her a call--said she have a few questions--please call and advise--  Best Call Back Number: [phone] (home)     Additional Information: please advise--thank you

## 2022-09-01 NOTE — TELEPHONE ENCOUNTER
Pt reports heaviness in legs and difficulty walking, would like to get lab results and find out what Dr Medina wants her to do.

## 2022-09-02 ENCOUNTER — TELEPHONE (OUTPATIENT)
Dept: FAMILY MEDICINE | Facility: CLINIC | Age: 77
End: 2022-09-02
Payer: MEDICARE

## 2022-09-02 NOTE — TELEPHONE ENCOUNTER
Informed pt of Dr Medina's response in regards to labs, tests look good/normal and she may be dehydrated

## 2022-09-02 NOTE — TELEPHONE ENCOUNTER
----- Message from Yanelis Browne sent at 9/2/2022 12:41 PM CDT -----  Contact: Patient  Type: Patient Call Back         Who called: Patient         What is the request in detail: I have patient returning missed call from office MA; states she was told to reach back out to clinic; regarding results/doctor's findings; please advise            Best call back number: [phone]         Additional Information:            Thank You

## 2022-09-05 ENCOUNTER — PATIENT MESSAGE (OUTPATIENT)
Dept: FAMILY MEDICINE | Facility: CLINIC | Age: 77
End: 2022-09-05
Payer: MEDICARE

## 2022-09-06 ENCOUNTER — PATIENT MESSAGE (OUTPATIENT)
Dept: FAMILY MEDICINE | Facility: CLINIC | Age: 77
End: 2022-09-06
Payer: MEDICARE

## 2022-09-06 DIAGNOSIS — K31.83 ACHLORHYDRIA: ICD-10-CM

## 2022-09-06 DIAGNOSIS — R29.898 WEAKNESS OF BOTH LOWER EXTREMITIES: Primary | ICD-10-CM

## 2022-09-09 ENCOUNTER — PATIENT MESSAGE (OUTPATIENT)
Dept: HEMATOLOGY/ONCOLOGY | Facility: CLINIC | Age: 77
End: 2022-09-09
Payer: MEDICARE

## 2022-09-09 DIAGNOSIS — D50.0 IRON DEFICIENCY ANEMIA DUE TO CHRONIC BLOOD LOSS: Primary | ICD-10-CM

## 2022-09-12 ENCOUNTER — HOSPITAL ENCOUNTER (OUTPATIENT)
Dept: RADIOLOGY | Facility: HOSPITAL | Age: 77
Discharge: HOME | End: 2022-09-12
Attending: INTERNAL MEDICINE
Payer: MEDICARE

## 2022-09-12 DIAGNOSIS — R29.898 WEAKNESS OF BOTH LOWER EXTREMITIES: ICD-10-CM

## 2022-09-12 PROCEDURE — 72110 X-RAY EXAM L-2 SPINE 4/>VWS: CPT | Mod: TC,FY,PO

## 2022-09-12 PROCEDURE — 72110 XR LUMBAR SPINE COMPLETE 5 VIEW: ICD-10-PCS | Mod: 26,,, | Performed by: RADIOLOGY

## 2022-09-12 PROCEDURE — 72110 X-RAY EXAM L-2 SPINE 4/>VWS: CPT | Mod: 26,,, | Performed by: RADIOLOGY

## 2022-09-29 ENCOUNTER — CLINICAL SUPPORT (OUTPATIENT)
Dept: CARDIOLOGY | Facility: HOSPITAL | Age: 77
End: 2022-09-29
Payer: MEDICARE

## 2022-09-29 DIAGNOSIS — Z95.818 PRESENCE OF OTHER CARDIAC IMPLANTS AND GRAFTS: ICD-10-CM

## 2022-09-29 PROCEDURE — G2066 INTER DEVC REMOTE 30D: HCPCS | Mod: PO | Performed by: INTERNAL MEDICINE

## 2022-09-29 PROCEDURE — 93298 CARDIAC DEVICE CHECK - REMOTE: ICD-10-PCS | Mod: ,,, | Performed by: INTERNAL MEDICINE

## 2022-09-29 PROCEDURE — 93298 REM INTERROG DEV EVAL SCRMS: CPT | Mod: ,,, | Performed by: INTERNAL MEDICINE

## 2022-10-29 ENCOUNTER — CLINICAL SUPPORT (OUTPATIENT)
Dept: CARDIOLOGY | Facility: HOSPITAL | Age: 77
End: 2022-10-29
Payer: MEDICARE

## 2022-10-29 DIAGNOSIS — Z95.818 PRESENCE OF OTHER CARDIAC IMPLANTS AND GRAFTS: ICD-10-CM

## 2022-10-29 PROCEDURE — G2066 INTER DEVC REMOTE 30D: HCPCS | Mod: PO | Performed by: INTERNAL MEDICINE

## 2022-10-31 ENCOUNTER — IMMUNIZATION (OUTPATIENT)
Dept: PHARMACY | Facility: CLINIC | Age: 77
End: 2022-10-31
Payer: MEDICARE

## 2022-10-31 ENCOUNTER — OFFICE VISIT (OUTPATIENT)
Dept: CARDIOLOGY | Facility: CLINIC | Age: 77
End: 2022-10-31
Payer: MEDICARE

## 2022-10-31 VITALS
DIASTOLIC BLOOD PRESSURE: 61 MMHG | SYSTOLIC BLOOD PRESSURE: 158 MMHG | HEART RATE: 61 BPM | HEIGHT: 63 IN | BODY MASS INDEX: 36.72 KG/M2 | WEIGHT: 207.25 LBS

## 2022-10-31 DIAGNOSIS — I48.0 PAROXYSMAL ATRIAL FIBRILLATION: ICD-10-CM

## 2022-10-31 DIAGNOSIS — I10 ESSENTIAL HYPERTENSION: ICD-10-CM

## 2022-10-31 DIAGNOSIS — I70.0 AORTIC ATHEROSCLEROSIS: Primary | ICD-10-CM

## 2022-10-31 DIAGNOSIS — I47.10 PSVT (PAROXYSMAL SUPRAVENTRICULAR TACHYCARDIA): ICD-10-CM

## 2022-10-31 DIAGNOSIS — Z23 NEED FOR VACCINATION: Primary | ICD-10-CM

## 2022-10-31 PROBLEM — R07.9 CHEST PAIN: Status: RESOLVED | Noted: 2021-01-01 | Resolved: 2022-10-31

## 2022-10-31 PROCEDURE — 1101F PT FALLS ASSESS-DOCD LE1/YR: CPT | Mod: CPTII,S$GLB,, | Performed by: INTERNAL MEDICINE

## 2022-10-31 PROCEDURE — 99499 UNLISTED E&M SERVICE: CPT | Mod: HCNC,S$GLB,, | Performed by: INTERNAL MEDICINE

## 2022-10-31 PROCEDURE — 4010F PR ACE/ARB THEARPY RXD/TAKEN: ICD-10-PCS | Mod: CPTII,S$GLB,, | Performed by: INTERNAL MEDICINE

## 2022-10-31 PROCEDURE — 99999 PR PBB SHADOW E&M-EST. PATIENT-LVL III: ICD-10-PCS | Mod: PBBFAC,,, | Performed by: INTERNAL MEDICINE

## 2022-10-31 PROCEDURE — 1159F PR MEDICATION LIST DOCUMENTED IN MEDICAL RECORD: ICD-10-PCS | Mod: CPTII,S$GLB,, | Performed by: INTERNAL MEDICINE

## 2022-10-31 PROCEDURE — 1159F MED LIST DOCD IN RCRD: CPT | Mod: CPTII,S$GLB,, | Performed by: INTERNAL MEDICINE

## 2022-10-31 PROCEDURE — 3078F PR MOST RECENT DIASTOLIC BLOOD PRESSURE < 80 MM HG: ICD-10-PCS | Mod: CPTII,S$GLB,, | Performed by: INTERNAL MEDICINE

## 2022-10-31 PROCEDURE — 3288F PR FALLS RISK ASSESSMENT DOCUMENTED: ICD-10-PCS | Mod: CPTII,S$GLB,, | Performed by: INTERNAL MEDICINE

## 2022-10-31 PROCEDURE — 99999 PR PBB SHADOW E&M-EST. PATIENT-LVL III: CPT | Mod: PBBFAC,,, | Performed by: INTERNAL MEDICINE

## 2022-10-31 PROCEDURE — 1160F RVW MEDS BY RX/DR IN RCRD: CPT | Mod: CPTII,S$GLB,, | Performed by: INTERNAL MEDICINE

## 2022-10-31 PROCEDURE — 3077F PR MOST RECENT SYSTOLIC BLOOD PRESSURE >= 140 MM HG: ICD-10-PCS | Mod: CPTII,S$GLB,, | Performed by: INTERNAL MEDICINE

## 2022-10-31 PROCEDURE — 4010F ACE/ARB THERAPY RXD/TAKEN: CPT | Mod: CPTII,S$GLB,, | Performed by: INTERNAL MEDICINE

## 2022-10-31 PROCEDURE — 1101F PR PT FALLS ASSESS DOC 0-1 FALLS W/OUT INJ PAST YR: ICD-10-PCS | Mod: CPTII,S$GLB,, | Performed by: INTERNAL MEDICINE

## 2022-10-31 PROCEDURE — 3078F DIAST BP <80 MM HG: CPT | Mod: CPTII,S$GLB,, | Performed by: INTERNAL MEDICINE

## 2022-10-31 PROCEDURE — 1126F AMNT PAIN NOTED NONE PRSNT: CPT | Mod: CPTII,S$GLB,, | Performed by: INTERNAL MEDICINE

## 2022-10-31 PROCEDURE — 1126F PR PAIN SEVERITY QUANTIFIED, NO PAIN PRESENT: ICD-10-PCS | Mod: CPTII,S$GLB,, | Performed by: INTERNAL MEDICINE

## 2022-10-31 PROCEDURE — 99214 PR OFFICE/OUTPT VISIT, EST, LEVL IV, 30-39 MIN: ICD-10-PCS | Mod: S$GLB,,, | Performed by: INTERNAL MEDICINE

## 2022-10-31 PROCEDURE — 3288F FALL RISK ASSESSMENT DOCD: CPT | Mod: CPTII,S$GLB,, | Performed by: INTERNAL MEDICINE

## 2022-10-31 PROCEDURE — 3077F SYST BP >= 140 MM HG: CPT | Mod: CPTII,S$GLB,, | Performed by: INTERNAL MEDICINE

## 2022-10-31 PROCEDURE — 99499 RISK ADDL DX/OHS AUDIT: ICD-10-PCS | Mod: HCNC,S$GLB,, | Performed by: INTERNAL MEDICINE

## 2022-10-31 PROCEDURE — 99214 OFFICE O/P EST MOD 30 MIN: CPT | Mod: S$GLB,,, | Performed by: INTERNAL MEDICINE

## 2022-10-31 PROCEDURE — 1160F PR REVIEW ALL MEDS BY PRESCRIBER/CLIN PHARMACIST DOCUMENTED: ICD-10-PCS | Mod: CPTII,S$GLB,, | Performed by: INTERNAL MEDICINE

## 2022-10-31 RX ORDER — FLECAINIDE ACETATE 150 MG/1
150 TABLET ORAL EVERY 12 HOURS
Qty: 180 TABLET | Refills: 3 | Status: SHIPPED | OUTPATIENT
Start: 2022-10-31 | End: 2023-06-02

## 2022-10-31 RX ORDER — FLECAINIDE ACETATE 150 MG/1
150 TABLET ORAL EVERY 12 HOURS
Qty: 180 TABLET | Refills: 3 | OUTPATIENT
Start: 2022-10-31 | End: 2022-10-31

## 2022-10-31 NOTE — PROGRESS NOTES
"Subjective:    Patient ID:  Nicole Medina is a 77 y.o. female who presents for follow-up of Atrial Fibrillation and Hypertension      Problem List Items Addressed This Visit          Cardiac/Vascular    Aortic atherosclerosis - Primary    Overview     CT chest 12/29/15: "Atherosclerotic vascular calcifications including coronary and aortic calcifications are seen."         Essential hypertension    Paroxysmal atrial fibrillation    Overview     12/28/20 - JEB/DCCV           PSVT (paroxysmal supraventricular tachycardia)       HPI    Patient was last seen on 04/11/2022 at which time she was doing OK from a cardiac standpoint.    On assessment today, the patient states that she has her oxygen increased secondary to shortness of breath during longer walks.   Had CTA of BLE 11/2021    No chest pain.  No recent aFib seen on loop recorder    Palpitations - No   Recurrence of atrial fibrillation - Does not think so   How are legs doing - Healing       Objective:     Vitals:    10/31/22 1338   BP: (!) 158/61   BP Location: Left arm   Patient Position: Sitting   BP Method: Large (Automatic)   Pulse: 61   Weight: 94 kg (207 lb 3.7 oz)   Height: 5' 3" (1.6 m)        Physical Exam  Vitals and nursing note reviewed.   Constitutional:       General: She is not in acute distress.     Appearance: She is well-developed.   HENT:      Head: Normocephalic and atraumatic.   Neck:      Vascular: No JVD.   Cardiovascular:      Rate and Rhythm: Normal rate and regular rhythm.      Heart sounds: Normal heart sounds. No murmur heard.    No friction rub. No gallop.   Pulmonary:      Effort: Pulmonary effort is normal. No respiratory distress.      Breath sounds: Normal breath sounds. No wheezing or rales.   Abdominal:      General: Bowel sounds are normal.      Palpations: Abdomen is soft.      Tenderness: There is no abdominal tenderness. There is no guarding or rebound.   Musculoskeletal:         General: No tenderness.      Cervical back: " Neck supple.   Skin:     General: Skin is warm and dry.   Neurological:      Mental Status: She is alert and oriented to person, place, and time.   Psychiatric:         Behavior: Behavior normal.           Current Outpatient Medications on File Prior to Visit   Medication Sig    albuterol (PROVENTIL) 2.5 mg /3 mL (0.083 %) nebulizer solution Take 3 mLs (2.5 mg total) by nebulization every 6 (six) hours as needed for Wheezing or Shortness of Breath. Rescue    albuterol (PROVENTIL/VENTOLIN HFA) 90 mcg/actuation inhaler Inhale 1 puff into the lungs once daily. Rescue    aspirin 81 MG Chew Take 1 tablet (81 mg total) by mouth once daily.    atorvastatin (LIPITOR) 20 MG tablet Take 1 tablet (20 mg total) by mouth once daily.    cholecalciferol, vitamin D3, 125 mcg (5,000 unit) Tab Take 5,000 Units by mouth once daily.    flecainide (TAMBOCOR) 150 MG Tab Take 1 tablet (150 mg total) by mouth every 12 (twelve) hours.    fluticasone propion-salmeterol 115-21 mcg/dose (ADVAIR HFA) 115-21 mcg/actuation HFAA inhaler Inhale 2 puffs into the lungs every 12 (twelve) hours. Controller    fluticasone propionate (FLONASE) 50 mcg/actuation nasal spray 1 spray (50 mcg total) by Each Nostril route daily as needed for Allergies.    furosemide (LASIX) 20 MG tablet Take 1 tablet (20 mg total) by mouth once daily.    ibandronate (BONIVA) 150 mg tablet Take 1 tablet (150 mg total) by mouth every 30 days.    losartan (COZAAR) 50 MG tablet Take 1 tablet (50 mg total) by mouth once daily.    metoprolol succinate (TOPROL XL) 50 MG 24 hr tablet Take 1 tablet (50 mg total) by mouth once daily.    mv,Ca,min-folic acid-vit K1 400-20 mcg Tab Take 1 tablet by mouth once daily at 6am.    omeprazole (PRILOSEC) 40 MG capsule Take 1 capsule (40 mg total) by mouth 2 (two) times daily before meals. (Patient taking differently: Take 40 mg by mouth every morning.)    predniSONE (DELTASONE) 10 MG tablet Take 1 tablet (10 mg total) by mouth once daily.     No  current facility-administered medications on file prior to visit.       Lipid Panel:   Lab Results   Component Value Date    CHOL 153 08/30/2022    HDL 85 (H) 08/30/2022    LDLCALC 59.4 (L) 08/30/2022    TRIG 43 08/30/2022    CHOLHDL 55.6 (H) 08/30/2022       The 10-year ASCVD risk score (Viktor LAYTON, et al., 2019) is: 29.9%    Values used to calculate the score:      Age: 77 years      Sex: Female      Is Non- : No      Diabetic: No      Tobacco smoker: No      Systolic Blood Pressure: 158 mmHg      Is BP treated: Yes      HDL Cholesterol: 85 mg/dL      Total Cholesterol: 153 mg/dL    All pertinent labs, imaging, and EKGs reviewed.  Patient's most recent EKG tracing was personally interpreted by this provider.    Most Recent Echocardiogram Results  Results for orders placed during the hospital encounter of 10/05/21    Echo    Interpretation Summary  · The left ventricle is normal in size with concentric remodeling and normal systolic function.  · The estimated ejection fraction is 55%.  · Grade III left ventricular diastolic dysfunction.  · Normal right ventricular size with normal right ventricular systolic function.  · Moderate left atrial enlargement.  · Mild mitral regurgitation.  · Moderate tricuspid regurgitation.  · Elevated central venous pressure (15 mmHg).  · The estimated PA systolic pressure is 78 mmHg.  · There is pulmonary hypertension.        Most Recent EKG  Results for orders placed or performed during the hospital encounter of 05/08/22   EKG 12-lead    Collection Time: 05/08/22 12:23 PM    Narrative    Test Reason : R53.1,    Vent. Rate : 057 BPM     Atrial Rate : 057 BPM     P-R Int : 228 ms          QRS Dur : 178 ms      QT Int : 480 ms       P-R-T Axes : 032 072 -11 degrees     QTc Int : 467 ms    Sinus bradycardia with 1st degree A-V block  Right bundle branch block  Abnormal ECG  When compared with ECG of 19-APR-2022 09:11,  Questionable change in The axis  Confirmed by  Riki CHU, Mason MORALES ([id number]) on 5/10/2022 8:54:00 AM    Referred By: [email or web address]   SELF           Confirmed By:Mason Lyn MD       No valid procedures specified.   No results found for this or any previous visit.    No valid procedures specified.      Assessment:       1. Aortic atherosclerosis    2. Essential hypertension    3. Paroxysmal atrial fibrillation    4. PSVT (paroxysmal supraventricular tachycardia)         Plan:     Symptoms of shortness of breath coming through on longer walks   BP elevated today  Most recent echocardiogram reviewed personally   Rhythm regular today     Continue aspirin 81 mg PO Daily   Continue atorvastatin 20 mg PO Daily   Continue flecainide 150 mg PO BID   Continue lasix 20mg PO Daily  Continue losartan 50mg PO Daily  Continue metoprolol succinate 50 mg PO daily    Continue other cardiac medications  Mediterranean Diet/Cardiovascular Exercise Program    Patient queried and all questions were answered.    F/u in 6-9 months to reassess      Signed:    Thanh Fuentes MD  10/31/2022 8:42 AM

## 2022-11-28 ENCOUNTER — CLINICAL SUPPORT (OUTPATIENT)
Dept: CARDIOLOGY | Facility: HOSPITAL | Age: 77
End: 2022-11-28
Payer: MEDICARE

## 2022-11-28 DIAGNOSIS — Z95.818 PRESENCE OF OTHER CARDIAC IMPLANTS AND GRAFTS: ICD-10-CM

## 2022-11-28 PROCEDURE — G2066 INTER DEVC REMOTE 30D: HCPCS | Mod: PO | Performed by: INTERNAL MEDICINE

## 2022-11-28 PROCEDURE — 93298 REM INTERROG DEV EVAL SCRMS: CPT | Mod: ,,, | Performed by: INTERNAL MEDICINE

## 2022-11-28 PROCEDURE — 93298 CARDIAC DEVICE CHECK - REMOTE: ICD-10-PCS | Mod: ,,, | Performed by: INTERNAL MEDICINE

## 2022-12-03 PROBLEM — J84.89 PNEUMONITIS, INTERSTITIAL: Status: ACTIVE | Noted: 2022-12-03

## 2022-12-04 PROBLEM — R13.12 OROPHARYNGEAL DYSPHAGIA: Status: ACTIVE | Noted: 2022-12-04

## 2022-12-05 PROBLEM — J18.9 PNEUMONIA DUE TO INFECTIOUS ORGANISM: Status: ACTIVE | Noted: 2022-12-05

## 2022-12-06 ENCOUNTER — TELEPHONE (OUTPATIENT)
Dept: FAMILY MEDICINE | Facility: CLINIC | Age: 77
End: 2022-12-06

## 2022-12-06 NOTE — TELEPHONE ENCOUNTER
----- Message from Nicole Torres sent at 12/6/2022 12:13 PM CST -----  Contact: Ledy  Type:  Patient Call          Who Called: [facility] Stefan Villafana         Does the patient know what this is regarding?: Requesting a call back and to have 1 week follow up appt ; pt is being discharged on today ; please advise           Would the patient rather a call back or a response via [facility]? Call           Best Call Back Number: [phone]             Additional Information:

## 2022-12-08 ENCOUNTER — OUTPATIENT CASE MANAGEMENT (OUTPATIENT)
Dept: ADMINISTRATIVE | Facility: OTHER | Age: 77
End: 2022-12-08
Payer: MEDICARE

## 2022-12-08 NOTE — LETTER
December 8, 2022    Nicole Medina  [address] [address]             [facility]  [address] [facility] [place] [address] Dear Mrs. Simsbre:    Welcome to [facility]s Complex Care Management Program.  It was a pleasure talking with you today.  My name is Iva Bruno RN. I look forward to working with you as your .  My goal is to help you function at the healthiest and highest level possible.  You can contact me directly at [phone].    As an [facility] patient with [employer], some of the services we may be able to provide include:      Development of an individualized care plan with a Registered Nurse    Connection with a    Connection with available resources and services     Coordinate communication among your care team members    Provide coaching and education    Help you understand your doctors treatment plan   Help you obtain information about your insurance coverage.     All services provided by [facility]s Complex Care Managers and other care team members are coordinated with and communicated to your primary care team.    As part of your enrollment, you will be receiving education materials and more information about these services in your [facility] account, by phone or through the mail.  If you do not wish to participate or receive information, please contact our office at [phone].      Sincerely,    Iva Bruno RN   [facility]   Out-patient RN Complex Care Manager

## 2022-12-08 NOTE — PROGRESS NOTES
Outpatient Care Management  Initial Patient Assessment    Patient: Nicole Medina  MRN: [id number]  Date of Service: 12/08/2022  Completed by: Iva Bruno RN  Referral Date: 12/05/2022  Program: High Risk  Status: Ongoing  Effective Dates: 12/8/2022 - present  Responsible Staff: Iva Bruno RN        Reason for Visit   Patient presents with    OPCM Chart Review     12/08/22    OPCM Enrollment Call    Initial Assessment    Nursing Assessment    Plan Of Care       Brief Summary:  Nicole Medina was referred by Dr. Ventura for pneumonia. Patient qualifies for program based on high risk score of 65.4%. Areas of need identified by patient include getting humidifier for oxygen.   Complex care plan created with patient/caregiver input.  Med rec done.   Pt is on 3-4 liters of oxygen. Has a portable concentrator. Also has a BP cuff at home.   Pt was driving before hospitalization but not at this moment because some of her medication causes drowsiness.   Will continue to follow.   Next steps: verify pt received humidifier.

## 2022-12-22 ENCOUNTER — OUTPATIENT CASE MANAGEMENT (OUTPATIENT)
Dept: ADMINISTRATIVE | Facility: OTHER | Age: 77
End: 2022-12-22
Payer: MEDICARE

## 2022-12-22 NOTE — PROGRESS NOTES
Outpatient Care Management  Plan of Care Follow Up Visit    Patient: Nicole Medina  MRN: [id number]  Date of Service: 12/22/2022  Completed by: Iva Bruno RN  Referral Date: 12/05/2022    Reason for Visit   Patient presents with    OPCM Chart Review     12/22/22    Update Plan Of Care     12/22/22       Brief Summary: Phone contact made with Ms. Medina today. We discussed her care plan. No new needs identified at this time. Will continue to follow.

## 2022-12-28 ENCOUNTER — CLINICAL SUPPORT (OUTPATIENT)
Dept: CARDIOLOGY | Facility: HOSPITAL | Age: 77
End: 2022-12-28
Payer: MEDICARE

## 2022-12-28 DIAGNOSIS — Z95.818 PRESENCE OF OTHER CARDIAC IMPLANTS AND GRAFTS: ICD-10-CM

## 2022-12-28 PROCEDURE — G2066 INTER DEVC REMOTE 30D: HCPCS | Mod: HCNC,PO | Performed by: INTERNAL MEDICINE

## 2022-12-29 ENCOUNTER — PATIENT MESSAGE (OUTPATIENT)
Dept: FAMILY MEDICINE | Facility: CLINIC | Age: 77
End: 2022-12-29
Payer: MEDICARE

## 2022-12-29 ENCOUNTER — TELEPHONE (OUTPATIENT)
Dept: FAMILY MEDICINE | Facility: CLINIC | Age: 77
End: 2022-12-29
Payer: MEDICARE

## 2022-12-29 DIAGNOSIS — K21.9 GASTROESOPHAGEAL REFLUX DISEASE WITHOUT ESOPHAGITIS: Primary | ICD-10-CM

## 2022-12-29 DIAGNOSIS — D50.0 IRON DEFICIENCY ANEMIA DUE TO CHRONIC BLOOD LOSS: ICD-10-CM

## 2022-12-29 NOTE — TELEPHONE ENCOUNTER
----- Message from Stacia Buchanan sent at 12/29/2022 10:30 AM CST -----  Type: Needs Medical Advice  Who Called:  pt  Symptoms (please be specific):  pt said she need to speak to the nurse said it's not an emergency but it is something that need to be addressed--please call and advise  Best Call Back Number: [phone]    Additional Information: thank you

## 2023-01-05 ENCOUNTER — OUTPATIENT CASE MANAGEMENT (OUTPATIENT)
Dept: ADMINISTRATIVE | Facility: OTHER | Age: 78
End: 2023-01-05
Payer: MEDICARE

## 2023-01-06 NOTE — PROGRESS NOTES
Outpatient Care Management  Plan of Care Follow Up Visit    Patient: Nicole Medina  MRN: [id number]  Date of Service: 01/05/2023  Completed by: Iva Bruno RN  Referral Date: 12/05/2022    Reason for Visit   Patient presents with    OPCM Chart Review     01/06/23    Update Plan Of Care       Brief Summary: Phone contact made with Ms. Medina today. We discussed her care plan. Reached out to provider regarding pt's concerns. Will continue to follow.

## 2023-01-25 ENCOUNTER — OUTPATIENT CASE MANAGEMENT (OUTPATIENT)
Dept: ADMINISTRATIVE | Facility: OTHER | Age: 78
End: 2023-01-25
Payer: MEDICARE

## 2023-01-25 NOTE — PROGRESS NOTES
01/15/23 Attempt assessment with patient/caregiver. No answer. Left message requesting call back. RN OPCM  first f/u attempt.

## 2023-01-27 ENCOUNTER — CLINICAL SUPPORT (OUTPATIENT)
Dept: CARDIOLOGY | Facility: HOSPITAL | Age: 78
End: 2023-01-27
Payer: MEDICARE

## 2023-01-27 DIAGNOSIS — Z95.818 PRESENCE OF OTHER CARDIAC IMPLANTS AND GRAFTS: ICD-10-CM

## 2023-01-27 PROCEDURE — G2066 INTER DEVC REMOTE 30D: HCPCS | Mod: HCNC,PO | Performed by: INTERNAL MEDICINE

## 2023-01-27 PROCEDURE — 93298 CARDIAC DEVICE CHECK - REMOTE: ICD-10-PCS | Mod: HCNC,,, | Performed by: INTERNAL MEDICINE

## 2023-01-27 PROCEDURE — 93298 REM INTERROG DEV EVAL SCRMS: CPT | Mod: HCNC,,, | Performed by: INTERNAL MEDICINE

## 2023-02-02 ENCOUNTER — OUTPATIENT CASE MANAGEMENT (OUTPATIENT)
Dept: ADMINISTRATIVE | Facility: OTHER | Age: 78
End: 2023-02-02
Payer: MEDICARE

## 2023-02-02 ENCOUNTER — LAB VISIT (OUTPATIENT)
Dept: LAB | Facility: HOSPITAL | Age: 78
End: 2023-02-02
Attending: INTERNAL MEDICINE
Payer: MEDICARE

## 2023-02-02 DIAGNOSIS — D86.0 SARCOIDOSIS OF LUNG: ICD-10-CM

## 2023-02-02 DIAGNOSIS — D50.0 IRON DEFICIENCY ANEMIA DUE TO CHRONIC BLOOD LOSS: ICD-10-CM

## 2023-02-02 LAB
BASOPHILS # BLD AUTO: 0.07 K/UL (ref 0–0.2)
BASOPHILS NFR BLD: 0.5 % (ref 0–1.9)
CRP SERPL-MCNC: 22.3 MG/L (ref 0–8.2)
DIFFERENTIAL METHOD: ABNORMAL
EOSINOPHIL # BLD AUTO: 0.5 K/UL (ref 0–0.5)
EOSINOPHIL NFR BLD: 3.2 % (ref 0–8)
ERYTHROCYTE [DISTWIDTH] IN BLOOD BY AUTOMATED COUNT: 15.6 % (ref 11.5–14.5)
FERRITIN SERPL-MCNC: 241 NG/ML (ref 20–300)
HCT VFR BLD AUTO: 45.1 % (ref 37–48.5)
HGB BLD-MCNC: 13.8 G/DL (ref 12–16)
IMM GRANULOCYTES # BLD AUTO: 0.08 K/UL (ref 0–0.04)
IMM GRANULOCYTES NFR BLD AUTO: 0.5 % (ref 0–0.5)
IRON SERPL-MCNC: 61 UG/DL (ref 30–160)
LYMPHOCYTES # BLD AUTO: 1.8 K/UL (ref 1–4.8)
LYMPHOCYTES NFR BLD: 12 % (ref 18–48)
MCH RBC QN AUTO: 28.4 PG (ref 27–31)
MCHC RBC AUTO-ENTMCNC: 30.6 G/DL (ref 32–36)
MCV RBC AUTO: 93 FL (ref 82–98)
MONOCYTES # BLD AUTO: 1.1 K/UL (ref 0.3–1)
MONOCYTES NFR BLD: 7.2 % (ref 4–15)
NEUTROPHILS # BLD AUTO: 11.4 K/UL (ref 1.8–7.7)
NEUTROPHILS NFR BLD: 76.6 % (ref 38–73)
NRBC BLD-RTO: 0 /100 WBC
PLATELET # BLD AUTO: 195 K/UL (ref 150–450)
PMV BLD AUTO: 13.1 FL (ref 9.2–12.9)
RBC # BLD AUTO: 4.86 M/UL (ref 4–5.4)
SATURATED IRON: 19 % (ref 20–50)
TOTAL IRON BINDING CAPACITY: 317 UG/DL (ref 250–450)
TRANSFERRIN SERPL-MCNC: 214 MG/DL (ref 200–375)
WBC # BLD AUTO: 14.87 K/UL (ref 3.9–12.7)

## 2023-02-02 PROCEDURE — 85025 COMPLETE CBC W/AUTO DIFF WBC: CPT | Mod: HCNC | Performed by: INTERNAL MEDICINE

## 2023-02-02 PROCEDURE — 86140 C-REACTIVE PROTEIN: CPT | Mod: HCNC | Performed by: INTERNAL MEDICINE

## 2023-02-02 PROCEDURE — 82164 ANGIOTENSIN I ENZYME TEST: CPT | Mod: HCNC | Performed by: INTERNAL MEDICINE

## 2023-02-02 PROCEDURE — 82728 ASSAY OF FERRITIN: CPT | Mod: HCNC | Performed by: INTERNAL MEDICINE

## 2023-02-02 PROCEDURE — 84466 ASSAY OF TRANSFERRIN: CPT | Mod: HCNC | Performed by: INTERNAL MEDICINE

## 2023-02-02 NOTE — LETTER
February 3, 2023    Nicole Medina  [address] [address]             [facility] [address] [place] [address] Dear Mrs. Medina:    I work with [facility] Outpatient Case Management Department. I have been unsuccessful at reaching you to follow-up to see how you have been doing. Please call me back at your earliest convenience to discuss your health care needs.      I can be reached at [phone] from 8:00AM to 4:30 PM on Monday thru Friday. [facility] is a program offered through [facility] where a nurse is available 24/7 to answer questions or provide medical advice, their number is [phone].        Best Regards,      JAN McarthurN, RN  Outpatient Complex Care Management  [phone]        This transmission (including any attachments) may contain confidential information, privileged material (including material protected by the solicitor-client or other applicable privileges), or constitute non-public information. Any use of this information by anyone other than the intended recipient is prohibited. If you have received this transmission in error, please immediately reply to the sender and delete this information from your system. Use, dissemination, distribution, or reproduction of this transmission by unintended recipients is not authorized and may be unlawful.  Privileged and Confidential Pursuant to [place]:3715.3

## 2023-02-03 NOTE — PROGRESS NOTES
02/03/23 Attempt assessment with patient/caregiver. No answer. Left message requesting call back. RN OPCM  second f/u attempt. Letter mailed

## 2023-02-04 LAB — ACE SERPL-CCNC: 64 U/L (ref 16–85)

## 2023-02-07 DIAGNOSIS — Z00.00 ENCOUNTER FOR MEDICARE ANNUAL WELLNESS EXAM: ICD-10-CM

## 2023-02-09 ENCOUNTER — PATIENT MESSAGE (OUTPATIENT)
Dept: FAMILY MEDICINE | Facility: CLINIC | Age: 78
End: 2023-02-09

## 2023-02-09 ENCOUNTER — OFFICE VISIT (OUTPATIENT)
Dept: FAMILY MEDICINE | Facility: CLINIC | Age: 78
End: 2023-02-09
Payer: MEDICARE

## 2023-02-09 VITALS
WEIGHT: 199.31 LBS | OXYGEN SATURATION: 93 % | BODY MASS INDEX: 35.3 KG/M2 | HEART RATE: 84 BPM | DIASTOLIC BLOOD PRESSURE: 74 MMHG | SYSTOLIC BLOOD PRESSURE: 132 MMHG

## 2023-02-09 DIAGNOSIS — G47.33 OSA (OBSTRUCTIVE SLEEP APNEA): ICD-10-CM

## 2023-02-09 DIAGNOSIS — M85.89 OSTEOPENIA OF MULTIPLE SITES: ICD-10-CM

## 2023-02-09 DIAGNOSIS — D69.2 OTHER NONTHROMBOCYTOPENIC PURPURA: ICD-10-CM

## 2023-02-09 DIAGNOSIS — J84.9 ILD (INTERSTITIAL LUNG DISEASE): ICD-10-CM

## 2023-02-09 DIAGNOSIS — E66.2 MORBID (SEVERE) OBESITY WITH ALVEOLAR HYPOVENTILATION: ICD-10-CM

## 2023-02-09 DIAGNOSIS — Z00.00 ENCOUNTER FOR MEDICARE ANNUAL WELLNESS EXAM: ICD-10-CM

## 2023-02-09 DIAGNOSIS — Z86.73 HISTORY OF CVA (CEREBROVASCULAR ACCIDENT): ICD-10-CM

## 2023-02-09 DIAGNOSIS — D86.0 SARCOIDOSIS OF LUNG: ICD-10-CM

## 2023-02-09 DIAGNOSIS — D50.0 IRON DEFICIENCY ANEMIA DUE TO CHRONIC BLOOD LOSS: ICD-10-CM

## 2023-02-09 DIAGNOSIS — I70.0 AORTIC ATHEROSCLEROSIS: ICD-10-CM

## 2023-02-09 DIAGNOSIS — I48.0 PAROXYSMAL ATRIAL FIBRILLATION: ICD-10-CM

## 2023-02-09 DIAGNOSIS — Z12.11 COLON CANCER SCREENING: ICD-10-CM

## 2023-02-09 DIAGNOSIS — K21.9 GASTROESOPHAGEAL REFLUX DISEASE WITHOUT ESOPHAGITIS: ICD-10-CM

## 2023-02-09 DIAGNOSIS — I10 ESSENTIAL HYPERTENSION: Primary | ICD-10-CM

## 2023-02-09 PROCEDURE — 1159F MED LIST DOCD IN RCRD: CPT | Mod: HCNC,CPTII,S$GLB, | Performed by: INTERNAL MEDICINE

## 2023-02-09 PROCEDURE — 1126F AMNT PAIN NOTED NONE PRSNT: CPT | Mod: HCNC,CPTII,S$GLB, | Performed by: INTERNAL MEDICINE

## 2023-02-09 PROCEDURE — 99214 PR OFFICE/OUTPT VISIT, EST, LEVL IV, 30-39 MIN: ICD-10-PCS | Mod: HCNC,S$GLB,, | Performed by: INTERNAL MEDICINE

## 2023-02-09 PROCEDURE — 1126F PR PAIN SEVERITY QUANTIFIED, NO PAIN PRESENT: ICD-10-PCS | Mod: HCNC,CPTII,S$GLB, | Performed by: INTERNAL MEDICINE

## 2023-02-09 PROCEDURE — 1160F PR REVIEW ALL MEDS BY PRESCRIBER/CLIN PHARMACIST DOCUMENTED: ICD-10-PCS | Mod: HCNC,CPTII,S$GLB, | Performed by: INTERNAL MEDICINE

## 2023-02-09 PROCEDURE — 99999 PR PBB SHADOW E&M-EST. PATIENT-LVL IV: ICD-10-PCS | Mod: PBBFAC,HCNC,, | Performed by: INTERNAL MEDICINE

## 2023-02-09 PROCEDURE — 3075F PR MOST RECENT SYSTOLIC BLOOD PRESS GE 130-139MM HG: ICD-10-PCS | Mod: HCNC,CPTII,S$GLB, | Performed by: INTERNAL MEDICINE

## 2023-02-09 PROCEDURE — 3075F SYST BP GE 130 - 139MM HG: CPT | Mod: HCNC,CPTII,S$GLB, | Performed by: INTERNAL MEDICINE

## 2023-02-09 PROCEDURE — 3078F DIAST BP <80 MM HG: CPT | Mod: HCNC,CPTII,S$GLB, | Performed by: INTERNAL MEDICINE

## 2023-02-09 PROCEDURE — 1159F PR MEDICATION LIST DOCUMENTED IN MEDICAL RECORD: ICD-10-PCS | Mod: HCNC,CPTII,S$GLB, | Performed by: INTERNAL MEDICINE

## 2023-02-09 PROCEDURE — 3078F PR MOST RECENT DIASTOLIC BLOOD PRESSURE < 80 MM HG: ICD-10-PCS | Mod: HCNC,CPTII,S$GLB, | Performed by: INTERNAL MEDICINE

## 2023-02-09 PROCEDURE — 1160F RVW MEDS BY RX/DR IN RCRD: CPT | Mod: HCNC,CPTII,S$GLB, | Performed by: INTERNAL MEDICINE

## 2023-02-09 PROCEDURE — 99999 PR PBB SHADOW E&M-EST. PATIENT-LVL IV: CPT | Mod: PBBFAC,HCNC,, | Performed by: INTERNAL MEDICINE

## 2023-02-09 PROCEDURE — 99214 OFFICE O/P EST MOD 30 MIN: CPT | Mod: HCNC,S$GLB,, | Performed by: INTERNAL MEDICINE

## 2023-02-09 RX ORDER — LOSARTAN POTASSIUM 50 MG/1
50 TABLET ORAL DAILY
Qty: 90 TABLET | Refills: 3 | Status: SHIPPED | OUTPATIENT
Start: 2023-02-09 | End: 2023-06-02

## 2023-02-09 RX ORDER — OMEPRAZOLE 40 MG/1
40 CAPSULE, DELAYED RELEASE ORAL DAILY
Qty: 90 CAPSULE | Refills: 3 | Status: SHIPPED | OUTPATIENT
Start: 2023-02-09 | End: 2023-10-05 | Stop reason: SDUPTHER

## 2023-02-09 RX ORDER — OMEPRAZOLE 40 MG/1
40 CAPSULE, DELAYED RELEASE ORAL DAILY
COMMUNITY
End: 2023-02-09 | Stop reason: SDUPTHER

## 2023-02-09 RX ORDER — BUDESONIDE AND FORMOTEROL FUMARATE DIHYDRATE 160; 4.5 UG/1; UG/1
2 AEROSOL RESPIRATORY (INHALATION) EVERY 12 HOURS
COMMUNITY
End: 2023-08-23

## 2023-02-09 RX ORDER — IBANDRONATE SODIUM 150 MG/1
150 TABLET, FILM COATED ORAL
Qty: 12 TABLET | Refills: 3 | Status: SHIPPED | OUTPATIENT
Start: 2023-02-09 | End: 2023-10-05 | Stop reason: SDUPTHER

## 2023-02-09 RX ORDER — ATORVASTATIN CALCIUM 20 MG/1
20 TABLET, FILM COATED ORAL DAILY
Qty: 90 TABLET | Refills: 3 | Status: SHIPPED | OUTPATIENT
Start: 2023-02-09 | End: 2023-06-02 | Stop reason: ALTCHOICE

## 2023-02-09 RX ORDER — METOPROLOL SUCCINATE 50 MG/1
50 TABLET, EXTENDED RELEASE ORAL DAILY
Qty: 90 TABLET | Refills: 3 | Status: SHIPPED | OUTPATIENT
Start: 2023-02-09 | End: 2023-06-02 | Stop reason: SDUPTHER

## 2023-02-09 NOTE — PROGRESS NOTES
Patient ID: Nicloe Medina     Chief Complaint:   Chief Complaint   Patient presents with    Hypertension     Follow up        HPI:  Routine office visit.  Since our last office visit, she did get a few doses of IV iron and has been taking an over-the-counter iron pill every day.  Her blood counts do look very good and her iron levels are in the acceptable range, so I think she can safely decrease her iron supplement to every 2 or 3 days.  She is suffer with constipation so if that does not improve things I want her to message me and I will give her some Linzess.  She is taking Colace and I suggested MiraLax if she can tolerate it.  Since our last office visit she is also been in the hospital for what we think is a multilobar pneumonia superimposed on sarcoidosis as well as interstitial lung disease.  I did review the hospital records and I am very happy that she improved with a combination of steroids and oxygen and antibiotics.  She still is on prednisone 30 mg a day and the plan is to taper that at her upcoming office visit with a pulmonologist in March.  He think he is going to do a CT scan at that time to see if the bilateral infiltrates have improved or not.  Also investigating other causes of the interstitial lung disease.  For now she will continue oxygen as she does have significant hypoxia on ambulation.  She is interested in a tetanus booster so I would like her to go to the pharmacy to get the latex free 1.  She is due for colonoscopy but I definitely do not want to anesthetize her at this time so we will do a stool occult blood test.  Her vital signs do look good and the other labs look very good, but she does have an elevated white blood cell count but I did not realize she was on steroids so that explains that.    Review of Systems   Constitutional: Negative.    HENT: Negative.     Eyes: Negative.    Respiratory:  Positive for shortness of breath.    Cardiovascular: Negative.    Gastrointestinal:  Negative.    Endocrine: Negative.    Genitourinary: Negative.    Musculoskeletal: Negative.    Skin: Negative.    Allergic/Immunologic: Negative.    Neurological: Negative.    Hematological: Negative.    Psychiatric/Behavioral: Negative.          Objective:      Physical Exam   Physical Exam  Vitals and nursing note reviewed.   Constitutional:       Appearance: Normal appearance. She is well-developed. She is obese.   HENT:      Head: Normocephalic and atraumatic.      Nose: Nose normal.      Mouth/Throat:      Mouth: Mucous membranes are moist.   Eyes:      Extraocular Movements: Extraocular movements intact.      Conjunctiva/sclera: Conjunctivae normal.      Pupils: Pupils are equal, round, and reactive to light.   Cardiovascular:      Rate and Rhythm: Normal rate and regular rhythm.      Pulses: Normal pulses.      Heart sounds: Normal heart sounds.   Pulmonary:      Effort: Pulmonary effort is normal.      Comments: Decreased breath sounds bilaterally with inspiratory squeaks.   Abdominal:      General: Bowel sounds are normal.      Palpations: Abdomen is soft.   Musculoskeletal:         General: Normal range of motion.      Cervical back: Normal range of motion and neck supple.   Skin:     General: Skin is warm and dry.      Capillary Refill: Capillary refill takes less than 2 seconds.   Neurological:      General: No focal deficit present.      Mental Status: She is alert and oriented to person, place, and time.   Psychiatric:         Mood and Affect: Mood normal.         Behavior: Behavior normal.         Thought Content: Thought content normal.         Judgment: Judgment normal.          Vitals:   Vitals:    02/09/23 1451   BP: 132/74   BP Location: Left arm   Patient Position: Sitting   Pulse: 84   SpO2: (!) 93%   Weight: 90.4 kg (199 lb 4.7 oz)          Current Outpatient Medications:     albuterol (PROVENTIL) 2.5 mg /3 mL (0.083 %) nebulizer solution, Take 3 mLs (2.5 mg total) by nebulization every 6  (six) hours as needed for Wheezing or Shortness of Breath. Rescue, Disp: 270 mL, Rfl: 5    albuterol (PROVENTIL/VENTOLIN HFA) 90 mcg/actuation inhaler, Inhale 2 puffs into the lungs every 6 (six) hours as needed for Wheezing. Rescue, Disp: 54 g, Rfl: 3    aspirin 81 MG Chew, Take 1 tablet (81 mg total) by mouth once daily., Disp: 30 tablet, Rfl: 11    budesonide-formoterol 160-4.5 mcg (SYMBICORT) 160-4.5 mcg/actuation HFAA, Inhale 2 puffs into the lungs every 12 (twelve) hours. Controller, Disp: , Rfl:     cholecalciferol, vitamin D3, 125 mcg (5,000 unit) Tab, Take 5,000 Units by mouth once daily., Disp: , Rfl:     flecainide (TAMBOCOR) 150 MG Tab, Take 1 tablet (150 mg total) by mouth every 12 (twelve) hours., Disp: 180 tablet, Rfl: 3    fluticasone propionate (FLONASE) 50 mcg/actuation nasal spray, 1 spray (50 mcg total) by Each Nostril route daily as needed for Allergies., Disp: 3 g, Rfl: 3    furosemide (LASIX) 20 MG tablet, Take 1 tablet (20 mg total) by mouth daily as needed (worsening SOB or swelling)., Disp: 90 tablet, Rfl: 3    Lactobacillus rhamnosus GG (CULTURELLE) 10 billion cell capsule, Take 1 capsule by mouth once daily., Disp: 30 capsule, Rfl: 0    mv,Ca,min-folic acid-vit K1 400-20 mcg Tab, Take 1 tablet by mouth once daily at 6am., Disp: , Rfl:     predniSONE (DELTASONE) 20 MG tablet, Take 1 tablet (20 mg total) by mouth once daily., Disp: 90 tablet, Rfl: 2    atorvastatin (LIPITOR) 20 MG tablet, Take 1 tablet (20 mg total) by mouth once daily., Disp: 90 tablet, Rfl: 3    famotidine (PEPCID) 20 MG tablet, Take 1 tablet (20 mg total) by mouth 2 (two) times daily., Disp: 60 tablet, Rfl: 11    fluticasone propion-salmeterol 115-21 mcg/dose (ADVAIR HFA) 115-21 mcg/actuation HFAA inhaler, Inhale 2 puffs into the lungs every 12 (twelve) hours. Controller (Patient not taking: Reported on 2/9/2023), Disp: 12 g, Rfl: 5    ibandronate (BONIVA) 150 mg tablet, Take 1 tablet (150 mg total) by mouth every 30  days., Disp: 12 tablet, Rfl: 3    losartan (COZAAR) 50 MG tablet, Take 1 tablet (50 mg total) by mouth once daily., Disp: 90 tablet, Rfl: 3    metoprolol succinate (TOPROL XL) 50 MG 24 hr tablet, Take 1 tablet (50 mg total) by mouth once daily., Disp: 90 tablet, Rfl: 3    omeprazole (PRILOSEC) 40 MG capsule, Take 1 capsule (40 mg total) by mouth once daily., Disp: 90 capsule, Rfl: 3   Assessment:       Patient Active Problem List    Diagnosis Date Noted    Osteopenia of multiple sites 02/09/2023    Pneumonia due to infectious organism 12/05/2022    Oropharyngeal dysphagia 12/04/2022    Pneumonitis, interstitial 12/03/2022    Vitamin D deficiency 08/10/2022    Cerebral atherosclerosis  08/10/2022    Iron deficiency anemia due to chronic blood loss 05/18/2022    Debility 05/09/2022    Symptomatic anemia 05/08/2022    Acute blood loss anemia 05/08/2022    Varicose veins of left lower extremity 05/08/2022    ACP (advance care planning) 05/08/2022    ILD (interstitial lung disease)     Other nonthrombocytopenic purpura 11/04/2021    Encounter for observation for suspected exposure to other biological agents ruled out 12/14/2020    Adenocarcinoma, lung, left 11/19/2020    Paroxysmal atrial fibrillation 11/18/2020    Personal history of lung cancer 11/16/2020    Primary malignant neoplasm of left upper lobe of lung     [place] (obstructive sleep apnea)     Morbid (severe) obesity with alveolar hypoventilation     Acute non intractable tension-type headache 08/25/2019    Lung nodule 05/09/2019    Vertigo 03/28/2019    Esophageal foreign body, initial encounter 11/22/2018    Senile nuclear sclerosis 03/27/2018    Essential hypertension 08/21/2017    History of CVA (cerebrovascular accident) 06/01/2017    Aortic atherosclerosis 09/08/2016    Sarcoidosis of lung 02/10/2015    Nuclear sclerosis - Both Eyes 06/20/2013    Dry eyes - Both Eyes 06/20/2013    PSVT (paroxysmal supraventricular tachycardia)     GERD (gastroesophageal  reflux disease)           Plan:       Nicole Medina  was seen today for follow-up and may need lab work.    Diagnoses and all orders for this visit:    Nicole was seen today for hypertension.    Diagnoses and all orders for this visit:    Essential hypertension  -     losartan (COZAAR) 50 MG tablet; Take 1 tablet (50 mg total) by mouth once daily.  Controlled with med     Colon cancer screening  -     Fecal Immunochemical Test (iFOBT); Future    History of CVA (cerebrovascular accident)  -     atorvastatin (LIPITOR) 20 MG tablet; Take 1 tablet (20 mg total) by mouth once daily.  Stable    Osteopenia of multiple sites  -     ibandronate (BONIVA) 150 mg tablet; Take 1 tablet (150 mg total) by mouth every 30 days.    Paroxysmal atrial fibrillation  -     metoprolol succinate (TOPROL XL) 50 MG 24 hr tablet; Take 1 tablet (50 mg total) by mouth once daily.  Controlled with med     Gastroesophageal reflux disease without esophagitis  -     omeprazole (PRILOSEC) 40 MG capsule; Take 1 capsule (40 mg total) by mouth once daily.    ILD (interstitial lung disease)  Monitor    Sarcoidosis of lung  Continue steroids     Iron deficiency anemia due to chronic blood loss  Decrease iron pills to every other day or every 3 days dosing     Morbid (severe) obesity with alveolar hypoventilation  Monitor    [place] (obstructive sleep apnea)  Monitor    Other nonthrombocytopenic purpura    Aortic atherosclerosis

## 2023-02-13 ENCOUNTER — LAB VISIT (OUTPATIENT)
Dept: LAB | Facility: HOSPITAL | Age: 78
End: 2023-02-13
Attending: INTERNAL MEDICINE
Payer: MEDICARE

## 2023-02-13 DIAGNOSIS — Z12.11 COLON CANCER SCREENING: ICD-10-CM

## 2023-02-13 PROCEDURE — 82274 ASSAY TEST FOR BLOOD FECAL: CPT | Mod: HCNC | Performed by: INTERNAL MEDICINE

## 2023-02-14 LAB — HEMOCCULT STL QL IA: NEGATIVE

## 2023-02-15 ENCOUNTER — OUTPATIENT CASE MANAGEMENT (OUTPATIENT)
Dept: ADMINISTRATIVE | Facility: OTHER | Age: 78
End: 2023-02-15
Payer: MEDICARE

## 2023-02-15 NOTE — PROGRESS NOTES
02/15/23 Attempt assessment with patient/caregiver. No answer. Left message requesting call back. RN OPCM  third follow up attempt.

## 2023-02-24 ENCOUNTER — HOSPITAL ENCOUNTER (OUTPATIENT)
Dept: RADIOLOGY | Facility: HOSPITAL | Age: 78
Discharge: HOME | End: 2023-02-24
Attending: RADIOLOGY
Payer: MEDICARE

## 2023-02-24 ENCOUNTER — OFFICE VISIT (OUTPATIENT)
Dept: RADIATION ONCOLOGY | Facility: CLINIC | Age: 78
End: 2023-02-24
Payer: MEDICARE

## 2023-02-24 VITALS
DIASTOLIC BLOOD PRESSURE: 76 MMHG | RESPIRATION RATE: 20 BRPM | BODY MASS INDEX: 35.54 KG/M2 | OXYGEN SATURATION: 88 % | TEMPERATURE: 96 F | SYSTOLIC BLOOD PRESSURE: 180 MMHG | HEART RATE: 71 BPM | WEIGHT: 200.63 LBS

## 2023-02-24 DIAGNOSIS — Z85.118 PERSONAL HISTORY OF LUNG CANCER: Primary | ICD-10-CM

## 2023-02-24 DIAGNOSIS — Z85.118 PERSONAL HISTORY OF LUNG CANCER: ICD-10-CM

## 2023-02-24 PROCEDURE — 1159F PR MEDICATION LIST DOCUMENTED IN MEDICAL RECORD: ICD-10-PCS | Mod: HCNC,CPTII,S$GLB, | Performed by: RADIOLOGY

## 2023-02-24 PROCEDURE — 99999 PR PBB SHADOW E&M-EST. PATIENT-LVL IV: CPT | Mod: PBBFAC,HCNC,, | Performed by: RADIOLOGY

## 2023-02-24 PROCEDURE — 99213 PR OFFICE/OUTPT VISIT, EST, LEVL III, 20-29 MIN: ICD-10-PCS | Mod: HCNC,S$GLB,, | Performed by: RADIOLOGY

## 2023-02-24 PROCEDURE — 1159F MED LIST DOCD IN RCRD: CPT | Mod: HCNC,CPTII,S$GLB, | Performed by: RADIOLOGY

## 2023-02-24 PROCEDURE — 3078F PR MOST RECENT DIASTOLIC BLOOD PRESSURE < 80 MM HG: ICD-10-PCS | Mod: HCNC,CPTII,S$GLB, | Performed by: RADIOLOGY

## 2023-02-24 PROCEDURE — 71250 CT THORAX DX C-: CPT | Mod: TC,HCNC

## 2023-02-24 PROCEDURE — 71250 CT THORAX DX C-: CPT | Mod: 26,HCNC,, | Performed by: RADIOLOGY

## 2023-02-24 PROCEDURE — 3288F FALL RISK ASSESSMENT DOCD: CPT | Mod: HCNC,CPTII,S$GLB, | Performed by: RADIOLOGY

## 2023-02-24 PROCEDURE — 1101F PT FALLS ASSESS-DOCD LE1/YR: CPT | Mod: HCNC,CPTII,S$GLB, | Performed by: RADIOLOGY

## 2023-02-24 PROCEDURE — 3078F DIAST BP <80 MM HG: CPT | Mod: HCNC,CPTII,S$GLB, | Performed by: RADIOLOGY

## 2023-02-24 PROCEDURE — 71250 CT CHEST WITHOUT CONTRAST: ICD-10-PCS | Mod: 26,HCNC,, | Performed by: RADIOLOGY

## 2023-02-24 PROCEDURE — 1126F AMNT PAIN NOTED NONE PRSNT: CPT | Mod: HCNC,CPTII,S$GLB, | Performed by: RADIOLOGY

## 2023-02-24 PROCEDURE — 3077F SYST BP >= 140 MM HG: CPT | Mod: HCNC,CPTII,S$GLB, | Performed by: RADIOLOGY

## 2023-02-24 PROCEDURE — 99999 PR PBB SHADOW E&M-EST. PATIENT-LVL IV: ICD-10-PCS | Mod: PBBFAC,HCNC,, | Performed by: RADIOLOGY

## 2023-02-24 PROCEDURE — 99213 OFFICE O/P EST LOW 20 MIN: CPT | Mod: HCNC,S$GLB,, | Performed by: RADIOLOGY

## 2023-02-24 PROCEDURE — 3077F PR MOST RECENT SYSTOLIC BLOOD PRESSURE >= 140 MM HG: ICD-10-PCS | Mod: HCNC,CPTII,S$GLB, | Performed by: RADIOLOGY

## 2023-02-24 PROCEDURE — 1126F PR PAIN SEVERITY QUANTIFIED, NO PAIN PRESENT: ICD-10-PCS | Mod: HCNC,CPTII,S$GLB, | Performed by: RADIOLOGY

## 2023-02-24 PROCEDURE — 1101F PR PT FALLS ASSESS DOC 0-1 FALLS W/OUT INJ PAST YR: ICD-10-PCS | Mod: HCNC,CPTII,S$GLB, | Performed by: RADIOLOGY

## 2023-02-24 PROCEDURE — 3288F PR FALLS RISK ASSESSMENT DOCUMENTED: ICD-10-PCS | Mod: HCNC,CPTII,S$GLB, | Performed by: RADIOLOGY

## 2023-02-24 NOTE — PROGRESS NOTES
02/24/2023    Radiation Oncology Follow-Up Visit      Prior Radiation History:    Site  Technique  Energy  Dose/Fx (Gy)  #Fx  Total Dose (Gy)  Start Date  End Date  Elapsed Days    DOC Lung  SBRT  6X  11  5 / 5  55  2/7/2020 2/13/2020  6        Assessment   This is a 77 y.o. y/o female with Stage IA2 (cT1b cN0 M0) DOC NSCLC (adeno) diagnosed on biopsy 12/20/19. History significant for sarcoidosis. CT Chest 1/14/20 demonstrated 1.7 cm DOC primary and multiple other stable sub-centimeter nodules with mild hilar and mediastinal adenopathy. She underwent attempted VATS resection by Dr. Mccall 1/16/20, which was aborted due to poor toleration of single lung ventilation. She completed definitive SBRT 55 Gy in 5 fx on 2/13/20.     No significant late toxicity from treatment.  CT Chest today 2/24/23 demonstrates increased bilateral infiltrates relative to her CT in December 2022; this is discordant with her symptoms (she feels breathing is better now), so I am not sure of the significance. In the treated DOC, post-radiation changes more prominent, but I do not have concern for recurrence now 3 years out from treatment.        Plan   1) F/U with me and CT Chest prior for re-staging in 6 months per NCCN guidelines.          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 CT Chest 12/3/22 demonstrated worsened interstitial changes relative to her last CT in August 2022. She was started on high dose prednisone with improvement and continues to follow with Dr. Garcia for this.     Today in clinic, she is accompanied by her . She feels that breathing has improved over the past few month. She is using her supplemental O2 at home on 2L but did not bring it with her today. Denies cough or chest pain.       Past Medical History:   Diagnosis Date    Acute ischemic right MCA stroke 06/02/2017    Cancer 02/2020     lung cancer, small cell     Cancer 06/2020    skin    Cataract     done ou    Colon polyp 11/22/2010    Essential hypertension 08/21/2017    GERD (gastroesophageal reflux disease)     Hiatal hernia     History of radiation therapy     History of seasonal allergies     Iron deficiency anemia due to chronic blood loss 5/18/2022    On home oxygen therapy     while sleeping    Polio     as a child    Presbyopia     PSVT (paroxysmal supraventricular tachycardia)     Pulmonary fibrosis     Dr. Miramontes    Sarcoidosis 2013    Sciatica     TIA (transient ischemic attack) 06/02/2017    [facility]//    Vertigo     Wound of left leg        Past Surgical History:   Procedure Laterality Date    CATARACT EXTRACTION W/  INTRAOCULAR LENS IMPLANT Left 03/27/2018    Dr Higginbotham    CATARACT EXTRACTION W/  INTRAOCULAR LENS IMPLANT Right 05/08/2018    Dr Higginbotham    CHOLECYSTECTOMY      ESOPHAGEAL DILATION N/A 11/22/2018    Procedure: DILATION, ESOPHAGUS;  Surgeon: Robb Fitzgerald Jr., MD;  Location: [facility];  Service: Endoscopy;  Laterality: N/A;    ESOPHAGOGASTRODUODENOSCOPY N/A 11/22/2018    Procedure: ESOPHAGOGASTRODUODENOSCOPY (EGD);  Surgeon: Robb Fitzgerald Jr., MD;  Location: [facility];  Service: Endoscopy;  Laterality: N/A;    ESOPHAGOGASTRODUODENOSCOPY N/A 2/19/2021    Procedure: EGD (ESOPHAGOGASTRODUODENOSCOPY);  Surgeon: Robb Fitzgerald Jr., MD;  Location: [facility];  Service: Endoscopy;  Laterality: N/A;    EYE SURGERY      HAND SURGERY Right     trauma repair    INSERTION OF IMPLANTABLE LOOP RECORDER Left 4/19/2022    Procedure: Insertion, Implantable Loop Recorder;  Surgeon: Thanh Fuentes MD;  Location: [facility] CATH;  Service: Cardiology;  Laterality: Left;    LEFT HEART CATHETERIZATION N/A 1/4/2021    Procedure: Left heart cath;  Surgeon: Laron Falcon MD;  Location: [facility] CATH;  Service: Cardiology;  Laterality: N/A;    TONSILLECTOMY      TOTAL ABDOMINAL HYSTERECTOMY      VARICOSE VEIN SURGERY  Bilateral     bilateral legs    VIDEO-ASSISTED THORACOSCOPIC SURGERY (VATS)  1/16/2020    Procedure: VATS (VIDEO-ASSISTED THORACOSCOPIC SURGERY) - exploratory;  Surgeon: Ritesh Mccall MD;  Location: [facility] OR [address] [place] [address];  Service: Thoracic;;       Social History     Tobacco Use    Smoking status: Former    Smokeless tobacco: Never    Tobacco comments:     as teenager   Substance Use Topics    Alcohol use: No    Drug use: No       Cancer-related family history includes Breast cancer in her maternal grandmother and another family member; Cancer in her daughter, mother, and sister.    Current Outpatient Medications on File Prior to Visit   Medication Sig Dispense Refill    albuterol (PROVENTIL) 2.5 mg /3 mL (0.083 %) nebulizer solution Take 3 mLs (2.5 mg total) by nebulization every 6 (six) hours as needed for Wheezing or Shortness of Breath. Rescue 270 mL 5    albuterol (PROVENTIL/VENTOLIN HFA) 90 mcg/actuation inhaler Inhale 2 puffs into the lungs every 6 (six) hours as needed for Wheezing. Rescue 54 g 3    aspirin 81 MG Chew Take 1 tablet (81 mg total) by mouth once daily. 30 tablet 11    atorvastatin (LIPITOR) 20 MG tablet Take 1 tablet (20 mg total) by mouth once daily. 90 tablet 3    budesonide-formoterol 160-4.5 mcg (SYMBICORT) 160-4.5 mcg/actuation HFAA Inhale 2 puffs into the lungs every 12 (twelve) hours. Controller      cholecalciferol, vitamin D3, 125 mcg (5,000 unit) Tab Take 5,000 Units by mouth once daily.      flecainide (TAMBOCOR) 150 MG Tab Take 1 tablet (150 mg total) by mouth every 12 (twelve) hours. 180 tablet 3    ibandronate (BONIVA) 150 mg tablet Take 1 tablet (150 mg total) by mouth every 30 days. 12 tablet 3    Lactobacillus rhamnosus GG (CULTURELLE) 10 billion cell capsule Take 1 capsule by mouth once daily. 30 capsule 0    losartan (COZAAR) 50 MG tablet Take 1 tablet (50 mg total) by mouth once daily. 90 tablet 3    metoprolol succinate (TOPROL XL) 50 MG 24 hr tablet Take 1 tablet (50 mg  total) by mouth once daily. 90 tablet 3    mv,Ca,min-folic acid-vit K1 400-20 mcg Tab Take 1 tablet by mouth once daily at 6am.      omeprazole (PRILOSEC) 40 MG capsule Take 1 capsule (40 mg total) by mouth once daily. 90 capsule 3    predniSONE (DELTASONE) 20 MG tablet Take 1 tablet (20 mg total) by mouth once daily. 90 tablet 2    fluticasone propion-salmeterol 115-21 mcg/dose (ADVAIR HFA) 115-21 mcg/actuation HFAA inhaler Inhale 2 puffs into the lungs every 12 (twelve) hours. Controller (Patient not taking: Reported on 2/9/2023) 12 g 5    fluticasone propionate (FLONASE) 50 mcg/actuation nasal spray 1 spray (50 mcg total) by Each Nostril route daily as needed for Allergies. 3 g 3    furosemide (LASIX) 20 MG tablet Take 1 tablet (20 mg total) by mouth daily as needed (worsening SOB or swelling). 90 tablet 3     No current facility-administered medications on file prior to visit.       Review of patient's allergies indicates:   Allergen Reactions    Latex Itching and Swelling     Itching and swelling to site (local reaction)         Vital Signs: BP (!) 180/76   Pulse 71   Temp 96.1 °F (35.6 °C)   Resp 20   Wt 91 kg (200 lb 9.9 oz)   SpO2 (!) 88%   BMI 35.54 kg/m²     ECOG Performance Status: 2    Physical Exam  Constitutional:       Appearance: Normal appearance.   HENT:      Head: Normocephalic and atraumatic.   Eyes:      General: No scleral icterus.     Extraocular Movements: Extraocular movements intact.   Pulmonary:      Effort: Pulmonary effort is normal. No accessory muscle usage or respiratory distress.   Musculoskeletal:      Cervical back: Normal range of motion.   Neurological:      Mental Status: She is alert and oriented to person, place, and time.   Psychiatric:         Mood and Affect: Mood and affect normal.         Judgment: Judgment normal.        Labs:    Imaging: I have personally reviewed the patient's available images and reports and summarized pertinent findings above in HPI.      Pathology: No new path

## 2023-02-24 NOTE — Clinical Note
Hi Dr. Garcia, I saw Mrs. Medina today. To me her interstitial changes are more prominent on the CT today, but she feels her breathing is stable over the past month. No evidence for recurrent cancer, so I am planning for repeat CT Chest in 6 months from an oncologic standpoint.  -Kirby

## 2023-02-26 ENCOUNTER — CLINICAL SUPPORT (OUTPATIENT)
Dept: CARDIOLOGY | Facility: HOSPITAL | Age: 78
End: 2023-02-26
Payer: MEDICARE

## 2023-02-26 DIAGNOSIS — Z95.818 PRESENCE OF OTHER CARDIAC IMPLANTS AND GRAFTS: ICD-10-CM

## 2023-02-26 PROCEDURE — G2066 INTER DEVC REMOTE 30D: HCPCS | Mod: HCNC,PO | Performed by: INTERNAL MEDICINE

## 2023-03-06 PROBLEM — J18.9 PNEUMONIA DUE TO INFECTIOUS ORGANISM: Status: RESOLVED | Noted: 2022-12-05 | Resolved: 2023-03-06

## 2023-03-06 PROBLEM — J84.89 PNEUMONITIS, INTERSTITIAL: Status: RESOLVED | Noted: 2022-12-03 | Resolved: 2023-03-06

## 2023-03-28 ENCOUNTER — CLINICAL SUPPORT (OUTPATIENT)
Dept: CARDIOLOGY | Facility: HOSPITAL | Age: 78
End: 2023-03-28
Payer: MEDICARE

## 2023-03-28 DIAGNOSIS — Z95.818 PRESENCE OF OTHER CARDIAC IMPLANTS AND GRAFTS: ICD-10-CM

## 2023-03-28 PROCEDURE — G2066 INTER DEVC REMOTE 30D: HCPCS | Mod: HCNC,PO | Performed by: INTERNAL MEDICINE

## 2023-03-28 PROCEDURE — 93298 CARDIAC DEVICE CHECK - REMOTE: ICD-10-PCS | Mod: HCNC,,, | Performed by: INTERNAL MEDICINE

## 2023-03-28 PROCEDURE — 93298 REM INTERROG DEV EVAL SCRMS: CPT | Mod: HCNC,,, | Performed by: INTERNAL MEDICINE

## 2023-04-27 ENCOUNTER — CLINICAL SUPPORT (OUTPATIENT)
Dept: CARDIOLOGY | Facility: HOSPITAL | Age: 78
End: 2023-04-27
Payer: MEDICARE

## 2023-04-27 DIAGNOSIS — Z95.818 PRESENCE OF OTHER CARDIAC IMPLANTS AND GRAFTS: ICD-10-CM

## 2023-04-27 PROCEDURE — G2066 INTER DEVC REMOTE 30D: HCPCS | Mod: HCNC,PO | Performed by: INTERNAL MEDICINE

## 2023-05-01 ENCOUNTER — TELEPHONE (OUTPATIENT)
Dept: CARDIOLOGY | Facility: CLINIC | Age: 78
End: 2023-05-01
Payer: MEDICARE

## 2023-05-01 NOTE — TELEPHONE ENCOUNTER
----- Message from Maria De Jesus Go sent at 5/1/2023  9:19 AM CDT -----  Who Called: Patient     What is the request in detail: Requesting call back to discuss heart test.pls advise    Can the clinic reply by [facility]? No    Best Call Back Number: [phone]      Additional Information:

## 2023-05-04 ENCOUNTER — LAB VISIT (OUTPATIENT)
Dept: LAB | Facility: HOSPITAL | Age: 78
End: 2023-05-04
Payer: MEDICARE

## 2023-05-04 ENCOUNTER — OFFICE VISIT (OUTPATIENT)
Dept: CARDIOLOGY | Facility: CLINIC | Age: 78
End: 2023-05-04
Payer: MEDICARE

## 2023-05-04 VITALS
SYSTOLIC BLOOD PRESSURE: 149 MMHG | BODY MASS INDEX: 35.62 KG/M2 | HEIGHT: 63 IN | DIASTOLIC BLOOD PRESSURE: 70 MMHG | WEIGHT: 201.06 LBS | HEART RATE: 62 BPM

## 2023-05-04 DIAGNOSIS — I48.0 PAROXYSMAL ATRIAL FIBRILLATION: ICD-10-CM

## 2023-05-04 DIAGNOSIS — I70.0 AORTIC ATHEROSCLEROSIS: ICD-10-CM

## 2023-05-04 DIAGNOSIS — I51.89 DIASTOLIC DYSFUNCTION: Primary | ICD-10-CM

## 2023-05-04 DIAGNOSIS — Z86.73 HISTORY OF CVA (CEREBROVASCULAR ACCIDENT): ICD-10-CM

## 2023-05-04 DIAGNOSIS — I10 ESSENTIAL HYPERTENSION: ICD-10-CM

## 2023-05-04 DIAGNOSIS — D86.0 SARCOIDOSIS OF LUNG: ICD-10-CM

## 2023-05-04 DIAGNOSIS — I83.92 ASYMPTOMATIC VARICOSE VEINS OF LEFT LOWER EXTREMITY: ICD-10-CM

## 2023-05-04 DIAGNOSIS — I51.89 DIASTOLIC DYSFUNCTION: ICD-10-CM

## 2023-05-04 LAB
ALBUMIN SERPL BCP-MCNC: 3.6 G/DL (ref 3.5–5.2)
ALP SERPL-CCNC: 64 U/L (ref 55–135)
ALT SERPL W/O P-5'-P-CCNC: 23 U/L (ref 10–44)
ANION GAP SERPL CALC-SCNC: 10 MMOL/L (ref 8–16)
AST SERPL-CCNC: 20 U/L (ref 10–40)
BILIRUB SERPL-MCNC: 0.6 MG/DL (ref 0.1–1)
BNP SERPL-MCNC: 112 PG/ML (ref 0–99)
BUN SERPL-MCNC: 18 MG/DL (ref 8–23)
CALCIUM SERPL-MCNC: 9.5 MG/DL (ref 8.7–10.5)
CHLORIDE SERPL-SCNC: 101 MMOL/L (ref 95–110)
CO2 SERPL-SCNC: 29 MMOL/L (ref 23–29)
CREAT SERPL-MCNC: 0.9 MG/DL (ref 0.5–1.4)
EST. GFR  (NO RACE VARIABLE): >60 ML/MIN/1.73 M^2
GLUCOSE SERPL-MCNC: 115 MG/DL (ref 70–110)
POTASSIUM SERPL-SCNC: 4.1 MMOL/L (ref 3.5–5.1)
PROT SERPL-MCNC: 7.7 G/DL (ref 6–8.4)
SODIUM SERPL-SCNC: 140 MMOL/L (ref 136–145)

## 2023-05-04 PROCEDURE — 99214 PR OFFICE/OUTPT VISIT, EST, LEVL IV, 30-39 MIN: ICD-10-PCS | Mod: S$GLB,,,

## 2023-05-04 PROCEDURE — 3288F PR FALLS RISK ASSESSMENT DOCUMENTED: ICD-10-PCS | Mod: CPTII,S$GLB,,

## 2023-05-04 PROCEDURE — 99999 PR PBB SHADOW E&M-EST. PATIENT-LVL IV: ICD-10-PCS | Mod: PBBFAC,,,

## 2023-05-04 PROCEDURE — 1159F PR MEDICATION LIST DOCUMENTED IN MEDICAL RECORD: ICD-10-PCS | Mod: CPTII,S$GLB,,

## 2023-05-04 PROCEDURE — 1126F AMNT PAIN NOTED NONE PRSNT: CPT | Mod: CPTII,S$GLB,,

## 2023-05-04 PROCEDURE — 1101F PR PT FALLS ASSESS DOC 0-1 FALLS W/OUT INJ PAST YR: ICD-10-PCS | Mod: CPTII,S$GLB,,

## 2023-05-04 PROCEDURE — 36415 COLL VENOUS BLD VENIPUNCTURE: CPT | Mod: PO

## 2023-05-04 PROCEDURE — 99214 OFFICE O/P EST MOD 30 MIN: CPT | Mod: S$GLB,,,

## 2023-05-04 PROCEDURE — 83880 ASSAY OF NATRIURETIC PEPTIDE: CPT

## 2023-05-04 PROCEDURE — 3078F DIAST BP <80 MM HG: CPT | Mod: CPTII,S$GLB,,

## 2023-05-04 PROCEDURE — 3078F PR MOST RECENT DIASTOLIC BLOOD PRESSURE < 80 MM HG: ICD-10-PCS | Mod: CPTII,S$GLB,,

## 2023-05-04 PROCEDURE — 1126F PR PAIN SEVERITY QUANTIFIED, NO PAIN PRESENT: ICD-10-PCS | Mod: CPTII,S$GLB,,

## 2023-05-04 PROCEDURE — 80053 COMPREHEN METABOLIC PANEL: CPT

## 2023-05-04 PROCEDURE — 1101F PT FALLS ASSESS-DOCD LE1/YR: CPT | Mod: CPTII,S$GLB,,

## 2023-05-04 PROCEDURE — 3077F PR MOST RECENT SYSTOLIC BLOOD PRESSURE >= 140 MM HG: ICD-10-PCS | Mod: CPTII,S$GLB,,

## 2023-05-04 PROCEDURE — 1159F MED LIST DOCD IN RCRD: CPT | Mod: CPTII,S$GLB,,

## 2023-05-04 PROCEDURE — 3288F FALL RISK ASSESSMENT DOCD: CPT | Mod: CPTII,S$GLB,,

## 2023-05-04 PROCEDURE — 99999 PR PBB SHADOW E&M-EST. PATIENT-LVL IV: CPT | Mod: PBBFAC,,,

## 2023-05-04 PROCEDURE — 3077F SYST BP >= 140 MM HG: CPT | Mod: CPTII,S$GLB,,

## 2023-05-04 NOTE — PROGRESS NOTES
Subjective:    Patient ID:  Nicole Medina is a 77 y.o. female patient here for evaluation aortic atherosclerosis and Hypertension    History of Present Illness:       Mrs. Davidson is a 77 year old F who follows with Dr. Fuentes here today because she has been having worsening shortness of breath despite following closely with Dr. Garcia for her history of adenocarcinoma of the lung and sarcoid of the lung.   There has been no atrial fibrillation of her loop recorder or signs of any other arrhythmia. Blood pressure has been stable.   She also complains of her quadriceps aching for the past several years on and off- she has not tried a drug holiday with lipitor. Does have a history of several venous procedures for venous insufficiency.       Most Recent Echocardiogram Results  Results for orders placed during the hospital encounter of 10/05/21    Echo    Interpretation Summary  · The left ventricle is normal in size with concentric remodeling and normal systolic function.  · The estimated ejection fraction is 55%.  · Grade III left ventricular diastolic dysfunction.  · Normal right ventricular size with normal right ventricular systolic function.  · Moderate left atrial enlargement.  · Mild mitral regurgitation.  · Moderate tricuspid regurgitation.  · Elevated central venous pressure (15 mmHg).  · The estimated PA systolic pressure is 78 mmHg.  · There is pulmonary hypertension.      Most Recent Cardiovascular Angiogram results  Results for orders placed during the hospital encounter of 01/01/21    Cardiac catheterization    Conclusion  · The coronary arteries were normal..  · The left ventricular systolic function was normal.  · The left ventricular end diastolic pressure was elevated.    The procedure log was documented by Documenter: Rupesh West RN and verified by Laron Falcon MD.    Date: 1/4/2021  Time: 2:01 PM      Other Most Recent Cardiology Results  Results for orders placed in visit on  04/27/23    Cardiac device check - Remote    Narrative  Battery and Leads (BL)  Normal battery parameters    Presenting Rhythm (MO)  Normal Sinus Rhythm    Arrhythmic events (AE)  No new arrhythmic events in monitoring period    Transmission Information (TI)  Device Summary Report  Implant indication: Atrial Fibrillation Management    Follow Up (FU)  Continue remote monitoring with monthly reporting    Additional Comments  ILR Report  Monitoring period:3/9/23-4/8/23    Battery Status:Good      REVIEW OF SYSTEMS: As noted in HPI   CARDIOVASCULAR: No recent chest pain, palpitations, arm, neck, or jaw pain.  RESPIRATORY: +SOB. No recent fever, cough chills.  : No blood in the urine  GI: No nausea, vomiting, or blood in stool.   MUSCULOSKELETAL: + myalgias. No falls.   NEURO: No syncope, lightheadedness, or dizziness.  EYES: No sudden changes in vision.     Past Medical History:   Diagnosis Date    Acute ischemic right MCA stroke 06/02/2017    Cancer 02/2020    lung cancer, small cell     Cancer 06/2020    skin    Cataract     done ou    Colon polyp 11/22/2010    Essential hypertension 08/21/2017    GERD (gastroesophageal reflux disease)     Hiatal hernia     History of radiation therapy     History of seasonal allergies     Iron deficiency anemia due to chronic blood loss 5/18/2022    On home oxygen therapy     while sleeping    Polio     as a child    Presbyopia     PSVT (paroxysmal supraventricular tachycardia)     Pulmonary fibrosis     Dr. Miramontes    Sarcoidosis 2013    Sciatica     TIA (transient ischemic attack) 06/02/2017    [facility]//    Vertigo     Wound of left leg      Past Surgical History:   Procedure Laterality Date    CATARACT EXTRACTION W/  INTRAOCULAR LENS IMPLANT Left 03/27/2018    Dr Higginbotham    CATARACT EXTRACTION W/  INTRAOCULAR LENS IMPLANT Right 05/08/2018    Dr Higginbotham    CHOLECYSTECTOMY      ESOPHAGEAL DILATION N/A 11/22/2018    Procedure: DILATION, ESOPHAGUS;  Surgeon: Robb PEDRO  Amilacr Simon MD;  Location: [facility];  Service: Endoscopy;  Laterality: N/A;    ESOPHAGOGASTRODUODENOSCOPY N/A 11/22/2018    Procedure: ESOPHAGOGASTRODUODENOSCOPY (EGD);  Surgeon: Robb Fitzgerald Jr., MD;  Location: [facility] ENDO;  Service: Endoscopy;  Laterality: N/A;    ESOPHAGOGASTRODUODENOSCOPY N/A 2/19/2021    Procedure: EGD (ESOPHAGOGASTRODUODENOSCOPY);  Surgeon: Robb Fitzgerald Jr., MD;  Location: [facility] ENDO;  Service: Endoscopy;  Laterality: N/A;    EYE SURGERY      HAND SURGERY Right     trauma repair    INSERTION OF IMPLANTABLE LOOP RECORDER Left 4/19/2022    Procedure: Insertion, Implantable Loop Recorder;  Surgeon: Thanh Fuentes MD;  Location: [facility] CATH;  Service: Cardiology;  Laterality: Left;    LEFT HEART CATHETERIZATION N/A 1/4/2021    Procedure: Left heart cath;  Surgeon: Laron Falcon MD;  Location: [facility] CATH;  Service: Cardiology;  Laterality: N/A;    TONSILLECTOMY      TOTAL ABDOMINAL HYSTERECTOMY      VARICOSE VEIN SURGERY Bilateral     bilateral legs    VIDEO-ASSISTED THORACOSCOPIC SURGERY (VATS)  1/16/2020    Procedure: VATS (VIDEO-ASSISTED THORACOSCOPIC SURGERY) - exploratory;  Surgeon: Ritesh Mccall MD;  Location: [address];  Service: Thoracic;;     Social History     Tobacco Use    Smoking status: Former    Smokeless tobacco: Never    Tobacco comments:     as teenager   Substance Use Topics    Alcohol use: No    Drug use: No         Objective      Vitals:    05/04/23 1401   BP: (!) 149/70   Pulse: 62       LAST EKG  Results for orders placed or performed during the hospital encounter of 12/03/22   EKG 12-lead    Collection Time: 12/03/22 11:31 AM    Narrative    Test Reason : J18.9,    Vent. Rate : 087 BPM     Atrial Rate : 087 BPM     P-R Int : 186 ms          QRS Dur : 146 ms      QT Int : 382 ms       P-R-T Axes : 061 085 056 degrees     QTc Int : 459 ms    Normal sinus rhythm  Right bundle branch block  Abnormal ECG            Confirmed by Tyrell CHU, Eileen CHRISTINE  ([id number]) on 12/7/2022 8:02:15 AM    Referred By: [email or web address]   SELF           Confirmed By:Eileen Santillan MD     LIPIDS - LAST 2   Lab Results   Component Value Date    CHOL 153 08/30/2022    CHOL 144 10/21/2021    HDL 85 (H) 08/30/2022    HDL 82 (H) 10/21/2021    LDLCALC 59.4 (L) 08/30/2022    LDLCALC 51.8 (L) 10/21/2021    TRIG 43 08/30/2022    TRIG 51 10/21/2021    CHOLHDL 55.6 (H) 08/30/2022    CHOLHDL 56.9 (H) 10/21/2021     CARDIAC PROFILE - LAST 2  Lab Results   Component Value Date     (H) 10/08/2021    CPK 52 01/05/2021     01/05/2021    TROPONINI <0.012 12/03/2022    TROPONINI 0.013 12/03/2022      CBC - LAST 2  Lab Results   Component Value Date    WBC 14.87 (H) 02/02/2023    WBC 7.64 12/28/2022    RBC 4.86 02/02/2023    RBC 4.62 12/28/2022    HGB 13.8 02/02/2023    HGB 12.9 12/28/2022    HCT 45.1 02/02/2023    HCT 41.7 12/28/2022     02/02/2023     12/28/2022     Lab Results   Component Value Date    LABPT 18.0 (H) 12/28/2020    LABPT 13.4 06/02/2017    INR 1.6 12/28/2020    INR 1.0 11/27/2019    APTT 34.3 12/28/2020    APTT 26.5 07/30/2008     CHEMISTRY - LAST 2  Lab Results   Component Value Date     12/28/2022     12/06/2022    K 4.1 12/28/2022    K 4.6 12/06/2022    CL 96 12/28/2022    CL 99 12/06/2022    CO2 38 (H) 12/28/2022    CO2 33 (H) 12/06/2022    ANIONGAP 4 (L) 12/28/2022    ANIONGAP 4 (L) 12/06/2022    BUN 16 12/28/2022    BUN 19 (H) 12/06/2022    CREATININE 0.70 12/28/2022    CREATININE 0.57 12/06/2022     12/28/2022     (H) 12/06/2022    CALCIUM 9.1 12/28/2022    CALCIUM 9.2 12/06/2022    MG 1.7 12/03/2022    MG 1.6 12/03/2022    ALBUMIN 4.3 12/03/2022    ALBUMIN 3.5 08/30/2022    PROT 8.6 (H) 12/03/2022    PROT 8.2 08/30/2022    ALKPHOS 85 12/03/2022    ALKPHOS 77 08/30/2022    ALT 22 12/03/2022    ALT 20 08/30/2022    AST 43 (H) 12/03/2022    AST 19 08/30/2022    BILITOT 0.8 12/03/2022    BILITOT 0.5 08/30/2022      ENDOCRINE - LAST  2  Lab Results   Component Value Date    HGBA1C 5.7 (H) 12/03/2022    HGBA1C 6.2 (H) 12/28/2020    TSH 0.850 12/03/2022    TSH 1.806 05/06/2022        PHYSICAL EXAM  CONSTITUTIONAL: Well built, well nourished in no apparent distress  NECK: no carotid bruit, no JVD  LUNGS: CTA  CHEST WALL: no tenderness  HEART: regular rate and rhythm, S1, S2 normal, no murmur, click, rub or gallop   ABDOMEN: soft, non-tender; bowel sounds normal; no masses,  no organomegaly  EXTREMITIES: Extremities normal, no edema, no calf tenderness noted  NEURO: AAO X 3    I HAVE REVIEWED :    The vital signs, most recent cardiac testing, and most recent pertinent non-cardiology provider notes.    Current Outpatient Medications   Medication Instructions    albuterol (PROVENTIL) 2.5 mg, Nebulization, Every 6 hours PRN, Rescue    aspirin 81 mg, Oral, Daily    atorvastatin (LIPITOR) 20 mg, Oral, Daily    budesonide-formoterol 160-4.5 mcg (SYMBICORT) 160-4.5 mcg/actuation HFAA 2 puffs, Inhalation, Every 12 hours, Controller    cholecalciferol (vitamin D3) 5,000 Units, Oral, Daily    flecainide (TAMBOCOR) 150 mg, Oral, Every 12 hours    fluticasone propion-salmeterol 115-21 mcg/dose (ADVAIR HFA) 115-21 mcg/actuation HFAA inhaler 2 puffs, Inhalation, Every 12 hours, Controller    fluticasone propionate (FLONASE) 50 mcg, Each Nostril, Daily PRN    furosemide (LASIX) 20 mg, Oral, Daily PRN    ibandronate (BONIVA) 150 mg, Oral, Every 30 days    Lactobacillus rhamnosus GG (CULTURELLE) 10 billion cell capsule 1 capsule, Oral, Daily    levocetirizine (XYZAL) 5 mg, Oral, Nightly PRN    levocetirizine (XYZAL) 5 mg, Oral, Nightly    losartan (COZAAR) 50 mg, Oral, Daily    metoprolol succinate (TOPROL XL) 50 mg, Oral, Daily    mv,Ca,min-folic acid-vit K1 400-20 mcg Tab 1 tablet, Oral, Daily    omeprazole (PRILOSEC) 40 mg, Oral, Daily    predniSONE (DELTASONE) 20 mg, Oral, Daily      Assessment & Plan     Varicose veins of left lower extremity  Follows with vein  clinics  Hx of multiple foam procedures   Wear compression socks     Paroxysmal atrial fibrillation  Continue flecanide   No afib seen on ILR recently   Bleeds excessively on eliquis     Essential hypertension  BP has been well controlled at home   Continue toprol and losartan  Low Na diet     Aortic atherosclerosis  Take 5 d break with lipitor to see if myalgias improve     Sarcoidosis of lung  Follows with Dr. Garcia   Will move up echo to reassess EF valves and PASP due to worsening SOB   Check BMP BNP today     Diastolic dysfunction  Move up echo to reassess EF valves PASP   Check labs today   Continue lasix- may need to increase to daily despite labs           Follow up as previously scheduled with Dr. Fuentes.     Andie Fraser PA-C   [facility]   Office: [phone]

## 2023-05-04 NOTE — ASSESSMENT & PLAN NOTE
Move up echo to reassess EF valves PASP   Check labs today   Continue lasix- may need to increase to daily despite labs

## 2023-05-04 NOTE — ASSESSMENT & PLAN NOTE
Follows with Dr. Garcia   Will move up echo to reassess EF valves and PASP due to worsening SOB   Check BMP BNP today

## 2023-05-15 ENCOUNTER — CLINICAL SUPPORT (OUTPATIENT)
Dept: CARDIOLOGY | Facility: HOSPITAL | Age: 78
End: 2023-05-15
Attending: INTERNAL MEDICINE
Payer: MEDICARE

## 2023-05-15 VITALS — HEART RATE: 67 BPM | HEIGHT: 63 IN | BODY MASS INDEX: 35.61 KG/M2 | WEIGHT: 201 LBS

## 2023-05-15 DIAGNOSIS — I27.20 PULMONARY HYPERTENSION: ICD-10-CM

## 2023-05-15 DIAGNOSIS — G47.33 OSA (OBSTRUCTIVE SLEEP APNEA): ICD-10-CM

## 2023-05-15 DIAGNOSIS — D86.0 SARCOIDOSIS OF LUNG: ICD-10-CM

## 2023-05-15 LAB
ASCENDING AORTA: 2.28 CM
AV INDEX (PROSTH): 0.5
AV MEAN GRADIENT: 7 MMHG
AV PEAK GRADIENT: 12 MMHG
AV REGURGITATION PRESSURE HALF TIME: 398.94 MS
AV VALVE AREA: 2.09 CM2
AV VELOCITY RATIO: 0.59
BSA FOR ECHO PROCEDURE: 2.01 M2
CV ECHO LV RWT: 0.39 CM
DOP CALC AO PEAK VEL: 1.71 M/S
DOP CALC AO VTI: 46 CM
DOP CALC LVOT AREA: 4.2 CM2
DOP CALC LVOT DIAMETER: 2.3 CM
DOP CALC LVOT PEAK VEL: 1.01 M/S
DOP CALC LVOT STROKE VOLUME: 96.34 CM3
DOP CALCLVOT PEAK VEL VTI: 23.2 CM
E WAVE DECELERATION TIME: 147.18 MSEC
E/A RATIO: 2.89
E/E' RATIO: 17.33 M/S
ECHO LV POSTERIOR WALL: 1.01 CM (ref 0.6–1.1)
EJECTION FRACTION: 60 %
FRACTIONAL SHORTENING: 35 % (ref 28–44)
INTERVENTRICULAR SEPTUM: 1.12 CM (ref 0.6–1.1)
IVRT: 68.51 MSEC
LA MAJOR: 5.53 CM
LA MINOR: 5.45 CM
LA WIDTH: 4.3 CM
LEFT ATRIUM SIZE: 3.81 CM
LEFT ATRIUM VOLUME INDEX: 39.4 ML/M2
LEFT ATRIUM VOLUME: 76.45 CM3
LEFT INTERNAL DIMENSION IN SYSTOLE: 3.39 CM (ref 2.1–4)
LEFT VENTRICLE DIASTOLIC VOLUME INDEX: 67.27 ML/M2
LEFT VENTRICLE DIASTOLIC VOLUME: 130.51 ML
LEFT VENTRICLE MASS INDEX: 110 G/M2
LEFT VENTRICLE SYSTOLIC VOLUME INDEX: 24.3 ML/M2
LEFT VENTRICLE SYSTOLIC VOLUME: 47.15 ML
LEFT VENTRICULAR INTERNAL DIMENSION IN DIASTOLE: 5.22 CM (ref 3.5–6)
LEFT VENTRICULAR MASS: 212.62 G
LV LATERAL E/E' RATIO: 13 M/S
LV SEPTAL E/E' RATIO: 26 M/S
LVOT MG: 1.91 MMHG
LVOT MV: 0.63 CM/S
MV PEAK A VEL: 0.45 M/S
MV PEAK E VEL: 1.3 M/S
MV STENOSIS PRESSURE HALF TIME: 42.68 MS
MV VALVE AREA P 1/2 METHOD: 5.15 CM2
PISA AR MAX VEL: 4.73 M/S
PISA MRMAX VEL: 6 M/S
PISA TR MAX VEL: 3.86 M/S
PULM VEIN S/D RATIO: 0.39
PV PEAK D VEL: 0.77 M/S
PV PEAK S VEL: 0.3 M/S
RA MAJOR: 4.96 CM
RA PRESSURE: 3 MMHG
RA WIDTH: 3.2 CM
RIGHT VENTRICULAR END-DIASTOLIC DIMENSION: 4.24 CM
RIGHT VENTRICULAR LENGTH IN DIASTOLE (APICAL 4-CHAMBER VIEW): 4.92 CM
RV MID DIAMA: 3.85 CM
RV TISSUE DOPPLER FREE WALL SYSTOLIC VELOCITY 1 (APICAL 4 CHAMBER VIEW): 0.01 CM/S
SINUS: 2.45 CM
STJ: 2.11 CM
TDI LATERAL: 0.1 M/S
TDI SEPTAL: 0.05 M/S
TDI: 0.08 M/S
TR MAX PG: 60 MMHG
TRICUSPID ANNULAR PLANE SYSTOLIC EXCURSION: 1.96 CM
TV REST PULMONARY ARTERY PRESSURE: 63 MMHG

## 2023-05-15 PROCEDURE — 93306 TTE W/DOPPLER COMPLETE: CPT | Mod: PO

## 2023-05-15 PROCEDURE — 93306 ECHO (CUPID ONLY): ICD-10-PCS | Mod: 26,,, | Performed by: INTERNAL MEDICINE

## 2023-05-15 PROCEDURE — 93306 TTE W/DOPPLER COMPLETE: CPT | Mod: 26,,, | Performed by: INTERNAL MEDICINE

## 2023-05-16 ENCOUNTER — TELEPHONE (OUTPATIENT)
Dept: CARDIOLOGY | Facility: CLINIC | Age: 78
End: 2023-05-16
Payer: MEDICARE

## 2023-05-16 NOTE — TELEPHONE ENCOUNTER
Pt and  asking if you have an answer why her legs from knees to hips, continue to swell and hurt when she walks; she also continues to get short of breath with exertion (follows up with pulmonary 6/21)  She is schedule to see you 6/20

## 2023-05-16 NOTE — TELEPHONE ENCOUNTER
----- Message from Reba Aceves sent at 5/16/2023 10:37 AM CDT -----  Regarding: call back  Name of Who is Calling: CHAPARRO GEORGES [[id number]]      What is the request in detail: Pt took Echo yesterday and would like for the provider to look over the results and call her. Please advise.      Can the clinic reply by [facility]: no

## 2023-05-27 ENCOUNTER — CLINICAL SUPPORT (OUTPATIENT)
Dept: CARDIOLOGY | Facility: HOSPITAL | Age: 78
End: 2023-05-27
Payer: MEDICARE

## 2023-05-27 DIAGNOSIS — Z95.818 PRESENCE OF OTHER CARDIAC IMPLANTS AND GRAFTS: ICD-10-CM

## 2023-05-27 PROCEDURE — 93298 CARDIAC DEVICE CHECK - REMOTE: ICD-10-PCS | Mod: HCNC,,, | Performed by: INTERNAL MEDICINE

## 2023-05-27 PROCEDURE — 93298 REM INTERROG DEV EVAL SCRMS: CPT | Mod: HCNC,,, | Performed by: INTERNAL MEDICINE

## 2023-05-27 PROCEDURE — G2066 INTER DEVC REMOTE 30D: HCPCS | Mod: HCNC,PO | Performed by: INTERNAL MEDICINE

## 2023-06-02 PROBLEM — I27.20 PULMONARY HYPERTENSION: Status: ACTIVE | Noted: 2023-06-02

## 2023-06-02 PROBLEM — Z95.818 STATUS POST PLACEMENT OF IMPLANTABLE LOOP RECORDER: Status: ACTIVE | Noted: 2023-06-02

## 2023-06-14 ENCOUNTER — HOSPITAL ENCOUNTER (OUTPATIENT)
Dept: RADIOLOGY | Facility: HOSPITAL | Age: 78
Discharge: HOME | End: 2023-06-14
Attending: ORTHOPAEDIC SURGERY
Payer: MEDICARE

## 2023-06-14 ENCOUNTER — OFFICE VISIT (OUTPATIENT)
Dept: ORTHOPEDICS | Facility: CLINIC | Age: 78
End: 2023-06-14
Payer: MEDICARE

## 2023-06-14 VITALS — WEIGHT: 199.88 LBS | HEIGHT: 63 IN | BODY MASS INDEX: 35.41 KG/M2

## 2023-06-14 DIAGNOSIS — M50.30 DDD (DEGENERATIVE DISC DISEASE), CERVICAL: ICD-10-CM

## 2023-06-14 DIAGNOSIS — M51.36 DDD (DEGENERATIVE DISC DISEASE), LUMBAR: ICD-10-CM

## 2023-06-14 DIAGNOSIS — M54.9 DORSALGIA, UNSPECIFIED: ICD-10-CM

## 2023-06-14 DIAGNOSIS — M43.10 SPONDYLOLISTHESIS, ACQUIRED: Primary | ICD-10-CM

## 2023-06-14 DIAGNOSIS — M47.816 LUMBAR SPONDYLOSIS: ICD-10-CM

## 2023-06-14 DIAGNOSIS — M54.2 CERVICALGIA: ICD-10-CM

## 2023-06-14 PROCEDURE — 1159F MED LIST DOCD IN RCRD: CPT | Mod: CPTII,S$GLB,, | Performed by: ORTHOPAEDIC SURGERY

## 2023-06-14 PROCEDURE — 72110 X-RAY EXAM L-2 SPINE 4/>VWS: CPT | Mod: TC,HCNC

## 2023-06-14 PROCEDURE — 1126F PR PAIN SEVERITY QUANTIFIED, NO PAIN PRESENT: ICD-10-PCS | Mod: CPTII,S$GLB,, | Performed by: ORTHOPAEDIC SURGERY

## 2023-06-14 PROCEDURE — 72050 X-RAY EXAM NECK SPINE 4/5VWS: CPT | Mod: 26,HCNC,, | Performed by: RADIOLOGY

## 2023-06-14 PROCEDURE — 99999 PR PBB SHADOW E&M-EST. PATIENT-LVL III: CPT | Mod: PBBFAC,,, | Performed by: ORTHOPAEDIC SURGERY

## 2023-06-14 PROCEDURE — 99204 PR OFFICE/OUTPT VISIT, NEW, LEVL IV, 45-59 MIN: ICD-10-PCS | Mod: S$GLB,,, | Performed by: ORTHOPAEDIC SURGERY

## 2023-06-14 PROCEDURE — 72110 XR LUMBAR SPINE AP AND LAT WITH FLEX/EXT: ICD-10-PCS | Mod: 26,HCNC,, | Performed by: RADIOLOGY

## 2023-06-14 PROCEDURE — 72110 X-RAY EXAM L-2 SPINE 4/>VWS: CPT | Mod: 26,HCNC,, | Performed by: RADIOLOGY

## 2023-06-14 PROCEDURE — 99204 OFFICE O/P NEW MOD 45 MIN: CPT | Mod: S$GLB,,, | Performed by: ORTHOPAEDIC SURGERY

## 2023-06-14 PROCEDURE — 72050 X-RAY EXAM NECK SPINE 4/5VWS: CPT | Mod: TC,HCNC

## 2023-06-14 PROCEDURE — 99999 PR PBB SHADOW E&M-EST. PATIENT-LVL III: ICD-10-PCS | Mod: PBBFAC,,, | Performed by: ORTHOPAEDIC SURGERY

## 2023-06-14 PROCEDURE — 72050 XR CERVICAL SPINE AP LAT WITH FLEX EXTEN: ICD-10-PCS | Mod: 26,HCNC,, | Performed by: RADIOLOGY

## 2023-06-14 PROCEDURE — 1159F PR MEDICATION LIST DOCUMENTED IN MEDICAL RECORD: ICD-10-PCS | Mod: CPTII,S$GLB,, | Performed by: ORTHOPAEDIC SURGERY

## 2023-06-14 PROCEDURE — 1126F AMNT PAIN NOTED NONE PRSNT: CPT | Mod: CPTII,S$GLB,, | Performed by: ORTHOPAEDIC SURGERY

## 2023-06-14 RX ORDER — DIAZEPAM 5 MG/1
5 TABLET ORAL
Qty: 1 TABLET | Refills: 0 | Status: SHIPPED | OUTPATIENT
Start: 2023-06-14 | End: 2023-06-15

## 2023-06-15 ENCOUNTER — TELEPHONE (OUTPATIENT)
Dept: GASTROENTEROLOGY | Facility: CLINIC | Age: 78
End: 2023-06-15
Payer: MEDICARE

## 2023-06-15 RX ORDER — DIAZEPAM 5 MG/1
5 TABLET ORAL
Qty: 1 TABLET | Refills: 0 | Status: SHIPPED | OUTPATIENT
Start: 2023-06-15 | End: 2023-06-21 | Stop reason: DRUGHIGH
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
## 2023-06-15 NOTE — TELEPHONE ENCOUNTER
----- Message from Jeanette Sainz MA sent at 6/15/2023  4:19 PM CDT -----  Contact: self  Type:  Sooner Appointment Request    Caller is requesting a sooner appointment.  Caller declined first available appointment listed below.  Caller will not accept being placed on the waitlist and is requesting a message be sent to doctor.    Name of Caller:  pt   When is the first available appointment?  10/13  Symptoms:  hard swollen  Best Call Back Number:  [phone]    Additional Information:  please advise

## 2023-06-16 ENCOUNTER — PATIENT MESSAGE (OUTPATIENT)
Dept: ORTHOPEDICS | Facility: CLINIC | Age: 78
End: 2023-06-16
Payer: MEDICARE

## 2023-06-17 ENCOUNTER — TELEPHONE (OUTPATIENT)
Dept: FAMILY MEDICINE | Facility: CLINIC | Age: 78
End: 2023-06-17
Payer: MEDICARE

## 2023-06-17 NOTE — TELEPHONE ENCOUNTER
----- Message from Dorita Franco sent at 6/16/2023  3:32 PM CDT -----  Contact: Jenaro  Type:  Needs Medical Advice    Who Called:   Jenaro    Would the patient rather a call back or a response via [facility]?   Call back  Best Call Back Number:   [phone]    Additional Information: States wife is scheduled for an MRI on Monday and her valium was sent to [facility] and states she won't have it for the MRI on Monday - States he wants to speak with someone from Dr Medina's office as soon as possible today - states he wants Dr Medina to send a valium for his wife to the [facility] - states he wants to speak with someone in the office today - please call - thank you

## 2023-06-19 ENCOUNTER — HOSPITAL ENCOUNTER (OUTPATIENT)
Dept: RADIOLOGY | Facility: HOSPITAL | Age: 78
Discharge: HOME | End: 2023-06-19
Attending: ORTHOPAEDIC SURGERY
Payer: MEDICARE

## 2023-06-19 ENCOUNTER — PATIENT MESSAGE (OUTPATIENT)
Dept: GASTROENTEROLOGY | Facility: CLINIC | Age: 78
End: 2023-06-19
Payer: MEDICARE

## 2023-06-19 DIAGNOSIS — M54.9 DORSALGIA, UNSPECIFIED: ICD-10-CM

## 2023-06-19 DIAGNOSIS — M54.2 CERVICALGIA: ICD-10-CM

## 2023-06-19 PROCEDURE — 72148 MRI LUMBAR SPINE WITHOUT CONTRAST: ICD-10-PCS | Mod: 26,,, | Performed by: RADIOLOGY

## 2023-06-19 PROCEDURE — 72148 MRI LUMBAR SPINE W/O DYE: CPT | Mod: TC,PO

## 2023-06-19 PROCEDURE — 72141 MRI NECK SPINE W/O DYE: CPT | Mod: TC,PO

## 2023-06-19 PROCEDURE — 72141 MRI NECK SPINE W/O DYE: CPT | Mod: 26,,, | Performed by: RADIOLOGY

## 2023-06-19 PROCEDURE — 72148 MRI LUMBAR SPINE W/O DYE: CPT | Mod: 26,,, | Performed by: RADIOLOGY

## 2023-06-19 PROCEDURE — 72141 MRI CERVICAL SPINE WITHOUT CONTRAST: ICD-10-PCS | Mod: 26,,, | Performed by: RADIOLOGY

## 2023-06-26 ENCOUNTER — CLINICAL SUPPORT (OUTPATIENT)
Dept: CARDIOLOGY | Facility: HOSPITAL | Age: 78
End: 2023-06-26
Payer: MEDICARE

## 2023-06-26 DIAGNOSIS — Z95.818 PRESENCE OF OTHER CARDIAC IMPLANTS AND GRAFTS: ICD-10-CM

## 2023-06-26 PROCEDURE — G2066 INTER DEVC REMOTE 30D: HCPCS | Mod: PO | Performed by: INTERNAL MEDICINE

## 2023-06-30 ENCOUNTER — OFFICE VISIT (OUTPATIENT)
Dept: ORTHOPEDICS | Facility: CLINIC | Age: 78
End: 2023-06-30
Payer: MEDICARE

## 2023-06-30 VITALS — WEIGHT: 195.56 LBS | BODY MASS INDEX: 34.65 KG/M2 | HEIGHT: 63 IN

## 2023-06-30 DIAGNOSIS — M47.816 LUMBAR SPONDYLOSIS: ICD-10-CM

## 2023-06-30 DIAGNOSIS — M47.812 CERVICAL SPONDYLOSIS: Primary | ICD-10-CM

## 2023-06-30 PROCEDURE — 3288F PR FALLS RISK ASSESSMENT DOCUMENTED: ICD-10-PCS | Mod: CPTII,S$GLB,, | Performed by: ORTHOPAEDIC SURGERY

## 2023-06-30 PROCEDURE — 99214 PR OFFICE/OUTPT VISIT, EST, LEVL IV, 30-39 MIN: ICD-10-PCS | Mod: S$GLB,,, | Performed by: ORTHOPAEDIC SURGERY

## 2023-06-30 PROCEDURE — 1126F PR PAIN SEVERITY QUANTIFIED, NO PAIN PRESENT: ICD-10-PCS | Mod: CPTII,S$GLB,, | Performed by: ORTHOPAEDIC SURGERY

## 2023-06-30 PROCEDURE — 1159F MED LIST DOCD IN RCRD: CPT | Mod: CPTII,S$GLB,, | Performed by: ORTHOPAEDIC SURGERY

## 2023-06-30 PROCEDURE — 3288F FALL RISK ASSESSMENT DOCD: CPT | Mod: CPTII,S$GLB,, | Performed by: ORTHOPAEDIC SURGERY

## 2023-06-30 PROCEDURE — 1126F AMNT PAIN NOTED NONE PRSNT: CPT | Mod: CPTII,S$GLB,, | Performed by: ORTHOPAEDIC SURGERY

## 2023-06-30 PROCEDURE — 1159F PR MEDICATION LIST DOCUMENTED IN MEDICAL RECORD: ICD-10-PCS | Mod: CPTII,S$GLB,, | Performed by: ORTHOPAEDIC SURGERY

## 2023-06-30 PROCEDURE — 1101F PT FALLS ASSESS-DOCD LE1/YR: CPT | Mod: CPTII,S$GLB,, | Performed by: ORTHOPAEDIC SURGERY

## 2023-06-30 PROCEDURE — 99214 OFFICE O/P EST MOD 30 MIN: CPT | Mod: S$GLB,,, | Performed by: ORTHOPAEDIC SURGERY

## 2023-06-30 PROCEDURE — 99999 PR PBB SHADOW E&M-EST. PATIENT-LVL III: CPT | Mod: PBBFAC,,, | Performed by: ORTHOPAEDIC SURGERY

## 2023-06-30 PROCEDURE — 99999 PR PBB SHADOW E&M-EST. PATIENT-LVL III: ICD-10-PCS | Mod: PBBFAC,,, | Performed by: ORTHOPAEDIC SURGERY

## 2023-06-30 PROCEDURE — 1101F PR PT FALLS ASSESS DOC 0-1 FALLS W/OUT INJ PAST YR: ICD-10-PCS | Mod: CPTII,S$GLB,, | Performed by: ORTHOPAEDIC SURGERY
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
## 2023-07-05 ENCOUNTER — PATIENT MESSAGE (OUTPATIENT)
Dept: ENDOSCOPY | Facility: HOSPITAL | Age: 78
End: 2023-07-05
Payer: MEDICARE

## 2023-07-06 ENCOUNTER — OFFICE VISIT (OUTPATIENT)
Dept: FAMILY MEDICINE | Facility: CLINIC | Age: 78
End: 2023-07-06
Payer: MEDICARE

## 2023-07-06 VITALS
HEIGHT: 63 IN | OXYGEN SATURATION: 95 % | BODY MASS INDEX: 34.61 KG/M2 | DIASTOLIC BLOOD PRESSURE: 72 MMHG | WEIGHT: 195.31 LBS | HEART RATE: 76 BPM | SYSTOLIC BLOOD PRESSURE: 138 MMHG

## 2023-07-06 DIAGNOSIS — D86.0 SARCOIDOSIS OF LUNG: ICD-10-CM

## 2023-07-06 DIAGNOSIS — I48.0 PAROXYSMAL ATRIAL FIBRILLATION: ICD-10-CM

## 2023-07-06 DIAGNOSIS — I50.32 CHRONIC DIASTOLIC CONGESTIVE HEART FAILURE: ICD-10-CM

## 2023-07-06 DIAGNOSIS — D50.0 IRON DEFICIENCY ANEMIA DUE TO CHRONIC BLOOD LOSS: ICD-10-CM

## 2023-07-06 DIAGNOSIS — I27.20 PULMONARY HYPERTENSION: ICD-10-CM

## 2023-07-06 DIAGNOSIS — E66.2 MORBID (SEVERE) OBESITY WITH ALVEOLAR HYPOVENTILATION: ICD-10-CM

## 2023-07-06 DIAGNOSIS — I10 ESSENTIAL HYPERTENSION: Primary | ICD-10-CM

## 2023-07-06 DIAGNOSIS — R13.19 ESOPHAGEAL DYSPHAGIA: ICD-10-CM

## 2023-07-06 DIAGNOSIS — G47.01 INSOMNIA DUE TO MEDICAL CONDITION: ICD-10-CM

## 2023-07-06 PROCEDURE — 1101F PR PT FALLS ASSESS DOC 0-1 FALLS W/OUT INJ PAST YR: ICD-10-PCS | Mod: HCNC,CPTII,S$GLB, | Performed by: INTERNAL MEDICINE

## 2023-07-06 PROCEDURE — 3075F PR MOST RECENT SYSTOLIC BLOOD PRESS GE 130-139MM HG: ICD-10-PCS | Mod: HCNC,CPTII,S$GLB, | Performed by: INTERNAL MEDICINE

## 2023-07-06 PROCEDURE — 1126F PR PAIN SEVERITY QUANTIFIED, NO PAIN PRESENT: ICD-10-PCS | Mod: HCNC,CPTII,S$GLB, | Performed by: INTERNAL MEDICINE

## 2023-07-06 PROCEDURE — 99999 PR PBB SHADOW E&M-EST. PATIENT-LVL IV: CPT | Mod: PBBFAC,HCNC,, | Performed by: INTERNAL MEDICINE

## 2023-07-06 PROCEDURE — 1160F RVW MEDS BY RX/DR IN RCRD: CPT | Mod: HCNC,CPTII,S$GLB, | Performed by: INTERNAL MEDICINE

## 2023-07-06 PROCEDURE — 99214 PR OFFICE/OUTPT VISIT, EST, LEVL IV, 30-39 MIN: ICD-10-PCS | Mod: HCNC,S$GLB,, | Performed by: INTERNAL MEDICINE

## 2023-07-06 PROCEDURE — 99999 PR PBB SHADOW E&M-EST. PATIENT-LVL IV: ICD-10-PCS | Mod: PBBFAC,HCNC,, | Performed by: INTERNAL MEDICINE

## 2023-07-06 PROCEDURE — 3078F DIAST BP <80 MM HG: CPT | Mod: HCNC,CPTII,S$GLB, | Performed by: INTERNAL MEDICINE

## 2023-07-06 PROCEDURE — 1101F PT FALLS ASSESS-DOCD LE1/YR: CPT | Mod: HCNC,CPTII,S$GLB, | Performed by: INTERNAL MEDICINE

## 2023-07-06 PROCEDURE — 1126F AMNT PAIN NOTED NONE PRSNT: CPT | Mod: HCNC,CPTII,S$GLB, | Performed by: INTERNAL MEDICINE

## 2023-07-06 PROCEDURE — 1159F PR MEDICATION LIST DOCUMENTED IN MEDICAL RECORD: ICD-10-PCS | Mod: HCNC,CPTII,S$GLB, | Performed by: INTERNAL MEDICINE

## 2023-07-06 PROCEDURE — 3288F FALL RISK ASSESSMENT DOCD: CPT | Mod: HCNC,CPTII,S$GLB, | Performed by: INTERNAL MEDICINE

## 2023-07-06 PROCEDURE — 1159F MED LIST DOCD IN RCRD: CPT | Mod: HCNC,CPTII,S$GLB, | Performed by: INTERNAL MEDICINE

## 2023-07-06 PROCEDURE — 3075F SYST BP GE 130 - 139MM HG: CPT | Mod: HCNC,CPTII,S$GLB, | Performed by: INTERNAL MEDICINE

## 2023-07-06 PROCEDURE — 3078F PR MOST RECENT DIASTOLIC BLOOD PRESSURE < 80 MM HG: ICD-10-PCS | Mod: HCNC,CPTII,S$GLB, | Performed by: INTERNAL MEDICINE

## 2023-07-06 PROCEDURE — 99499 UNLISTED E&M SERVICE: CPT | Mod: S$GLB,,, | Performed by: INTERNAL MEDICINE

## 2023-07-06 PROCEDURE — 99214 OFFICE O/P EST MOD 30 MIN: CPT | Mod: HCNC,S$GLB,, | Performed by: INTERNAL MEDICINE

## 2023-07-06 PROCEDURE — 1160F PR REVIEW ALL MEDS BY PRESCRIBER/CLIN PHARMACIST DOCUMENTED: ICD-10-PCS | Mod: HCNC,CPTII,S$GLB, | Performed by: INTERNAL MEDICINE

## 2023-07-06 PROCEDURE — 3288F PR FALLS RISK ASSESSMENT DOCUMENTED: ICD-10-PCS | Mod: HCNC,CPTII,S$GLB, | Performed by: INTERNAL MEDICINE

## 2023-07-06 NOTE — PROGRESS NOTES
"[facility]  Primary Care   [address] [facility] [address]       Patient ID: Nicole Medina     Chief Complaint:   Chief Complaint   Patient presents with    Follow-up     4 month        HPI: Routine Follow up and since our last office visit, she has changed cardiologists to Dr. Johnson. He added/ subtracted some meds and she is doing MUCH better. Losartan stopped and Entresto started. On Lasix and Aldactone.   Steroids weaning down slowly for Sarcoidosis. Only using oxygen at nighttime.   EGD with Dil for dysphagia yesterday. She has been having food/ meds stick in mid-esophagus, but that may have improved.   Blood Pressure has been good on Entresto and doesn't seem too low and not having side effects.   Requests Med for insomnia and has some Diazepam 2 mg at home- try it at night and Let me know how it works     Review of Systems       Breathing better     Objective:      Physical Exam   Physical Exam       Obese   Some inspiratory squeaks  Left Lower Extremity edema    Vitals:   Vitals:    07/06/23 1529   BP: 138/72   Pulse: 76   SpO2: 95%   Weight: 88.6 kg (195 lb 5.2 oz)   Height: 5' 3" (1.6 m)        Assessment:           Plan:       Nicole Medina  was seen today for follow-up and may need lab work.    Diagnoses and all orders for this visit:    Nicole was seen today for follow-up.    Diagnoses and all orders for this visit:    Essential hypertension  Controlled with med   Monitor for overmedication     Iron deficiency anemia due to chronic blood loss  -     CBC Auto Differential; Future  -     Iron and TIBC; Future  -     Ferritin; Future  Check labs      Sarcoidosis of lung  Long steroid taper     Pulmonary hypertension  On O2 at night   May need RHC     Chronic diastolic congestive heart failure  Volume status is much better on lasix and Aldactone     Paroxysmal atrial fibrillation  May need a Watchman     Morbid (severe) obesity with alveolar hypoventilation  Monitor     Insomnia- Try Diazepam 2 mg " at night and Let me know      Dysphagia- likely due to GERD - s/p EGD with Dil   Monitor       Henok Medina MD

## 2023-07-22 NOTE — ANESTHESIA POSTPROCEDURE EVALUATION
"Anesthesia Post Evaluation    Patient: Nicole Medina    Procedure(s) Performed: Procedure(s) (LRB):  PHACOEMULSIFICATION-ASPIRATION-CATARACT (Left)  INSERTION-INTRAOCULAR LENS (IOL) (Left)    Final Anesthesia Type: MAC  Patient location during evaluation: PACU  Patient participation: Yes- Able to Participate  Level of consciousness: awake and alert  Post-procedure vital signs: reviewed and stable  Pain management: adequate  Airway patency: patent  PONV status at discharge: No PONV  Anesthetic complications: no      Cardiovascular status: blood pressure returned to baseline and hemodynamically stable  Respiratory status: unassisted, spontaneous ventilation and room air  Hydration status: euvolemic  Follow-up not needed.        Visit Vitals  BP (!) 163/73 (BP Location: Left arm)   Pulse 74   Temp 36.3 °C (97.3 °F) (Skin)   Resp 16   Ht 5' 3" (1.6 m)   Wt 86.2 kg (190 lb)   SpO2 98%   Breastfeeding? No   BMI 33.66 kg/m²       Pain/Sasha Score: Pain Assessment Performed: Yes (3/27/2018  9:47 AM)  Presence of Pain: denies (3/27/2018  8:56 AM)      " 4 = No assist / stand by assistance

## 2023-07-25 ENCOUNTER — PATIENT MESSAGE (OUTPATIENT)
Dept: FAMILY MEDICINE | Facility: CLINIC | Age: 78
End: 2023-07-25
Payer: MEDICARE

## 2023-07-26 ENCOUNTER — CLINICAL SUPPORT (OUTPATIENT)
Dept: CARDIOLOGY | Facility: HOSPITAL | Age: 78
End: 2023-07-26
Payer: MEDICARE

## 2023-07-26 DIAGNOSIS — Z95.818 PRESENCE OF OTHER CARDIAC IMPLANTS AND GRAFTS: ICD-10-CM

## 2023-07-26 PROCEDURE — G2066 INTER DEVC REMOTE 30D: HCPCS | Mod: HCNC,PO | Performed by: INTERNAL MEDICINE

## 2023-07-26 PROCEDURE — 93298 REM INTERROG DEV EVAL SCRMS: CPT | Mod: HCNC,,, | Performed by: INTERNAL MEDICINE

## 2023-07-26 PROCEDURE — 93298 CARDIAC DEVICE CHECK - REMOTE: ICD-10-PCS | Mod: HCNC,,, | Performed by: INTERNAL MEDICINE

## 2023-08-02 ENCOUNTER — LAB VISIT (OUTPATIENT)
Dept: LAB | Facility: HOSPITAL | Age: 78
End: 2023-08-02
Attending: INTERNAL MEDICINE
Payer: MEDICARE

## 2023-08-02 DIAGNOSIS — I47.10 PSVT (PAROXYSMAL SUPRAVENTRICULAR TACHYCARDIA): ICD-10-CM

## 2023-08-02 DIAGNOSIS — I48.0 PAROXYSMAL ATRIAL FIBRILLATION: ICD-10-CM

## 2023-08-02 DIAGNOSIS — D50.0 IRON DEFICIENCY ANEMIA DUE TO CHRONIC BLOOD LOSS: ICD-10-CM

## 2023-08-02 DIAGNOSIS — I10 ESSENTIAL HYPERTENSION: ICD-10-CM

## 2023-08-02 DIAGNOSIS — I70.0 AORTIC ATHEROSCLEROSIS: ICD-10-CM

## 2023-08-02 LAB
ALBUMIN SERPL BCP-MCNC: 3.4 G/DL (ref 3.5–5.2)
ALP SERPL-CCNC: 55 U/L (ref 55–135)
ALT SERPL W/O P-5'-P-CCNC: 17 U/L (ref 10–44)
ANION GAP SERPL CALC-SCNC: 13 MMOL/L (ref 8–16)
AST SERPL-CCNC: 24 U/L (ref 10–40)
BASOPHILS # BLD AUTO: 0.07 K/UL (ref 0–0.2)
BASOPHILS NFR BLD: 0.9 % (ref 0–1.9)
BILIRUB SERPL-MCNC: 0.5 MG/DL (ref 0.1–1)
BNP SERPL-MCNC: 318 PG/ML (ref 0–99)
BUN SERPL-MCNC: 13 MG/DL (ref 8–23)
CALCIUM SERPL-MCNC: 9.7 MG/DL (ref 8.7–10.5)
CHLORIDE SERPL-SCNC: 105 MMOL/L (ref 95–110)
CHOLEST SERPL-MCNC: 220 MG/DL (ref 120–199)
CHOLEST/HDLC SERPL: 2.5 {RATIO} (ref 2–5)
CO2 SERPL-SCNC: 27 MMOL/L (ref 23–29)
CREAT SERPL-MCNC: 0.9 MG/DL (ref 0.5–1.4)
DIFFERENTIAL METHOD: ABNORMAL
EOSINOPHIL # BLD AUTO: 0.4 K/UL (ref 0–0.5)
EOSINOPHIL NFR BLD: 5.6 % (ref 0–8)
ERYTHROCYTE [DISTWIDTH] IN BLOOD BY AUTOMATED COUNT: 15 % (ref 11.5–14.5)
EST. GFR  (NO RACE VARIABLE): >60 ML/MIN/1.73 M^2
FERRITIN SERPL-MCNC: 178 NG/ML (ref 20–300)
GLUCOSE SERPL-MCNC: 93 MG/DL (ref 70–110)
HCT VFR BLD AUTO: 43.3 % (ref 37–48.5)
HDLC SERPL-MCNC: 88 MG/DL (ref 40–75)
HDLC SERPL: 40 % (ref 20–50)
HGB BLD-MCNC: 13.9 G/DL (ref 12–16)
IMM GRANULOCYTES # BLD AUTO: 0.01 K/UL (ref 0–0.04)
IMM GRANULOCYTES NFR BLD AUTO: 0.1 % (ref 0–0.5)
IRON SERPL-MCNC: 60 UG/DL (ref 30–160)
LDLC SERPL CALC-MCNC: 116.4 MG/DL (ref 63–159)
LYMPHOCYTES # BLD AUTO: 2.1 K/UL (ref 1–4.8)
LYMPHOCYTES NFR BLD: 28.6 % (ref 18–48)
MCH RBC QN AUTO: 28.7 PG (ref 27–31)
MCHC RBC AUTO-ENTMCNC: 32.1 G/DL (ref 32–36)
MCV RBC AUTO: 89 FL (ref 82–98)
MONOCYTES # BLD AUTO: 0.8 K/UL (ref 0.3–1)
MONOCYTES NFR BLD: 10.4 % (ref 4–15)
NEUTROPHILS # BLD AUTO: 4 K/UL (ref 1.8–7.7)
NEUTROPHILS NFR BLD: 54.4 % (ref 38–73)
NONHDLC SERPL-MCNC: 132 MG/DL
NRBC BLD-RTO: 0 /100 WBC
PLATELET # BLD AUTO: 197 K/UL (ref 150–450)
PMV BLD AUTO: 12.8 FL (ref 9.2–12.9)
POTASSIUM SERPL-SCNC: 4.4 MMOL/L (ref 3.5–5.1)
PROT SERPL-MCNC: 7.5 G/DL (ref 6–8.4)
RBC # BLD AUTO: 4.85 M/UL (ref 4–5.4)
SATURATED IRON: 16 % (ref 20–50)
SODIUM SERPL-SCNC: 145 MMOL/L (ref 136–145)
TOTAL IRON BINDING CAPACITY: 369 UG/DL (ref 250–450)
TRANSFERRIN SERPL-MCNC: 249 MG/DL (ref 200–375)
TRIGL SERPL-MCNC: 78 MG/DL (ref 30–150)
WBC # BLD AUTO: 7.37 K/UL (ref 3.9–12.7)

## 2023-08-02 PROCEDURE — 80061 LIPID PANEL: CPT | Mod: HCNC | Performed by: INTERNAL MEDICINE

## 2023-08-02 PROCEDURE — 85025 COMPLETE CBC W/AUTO DIFF WBC: CPT | Mod: HCNC | Performed by: INTERNAL MEDICINE

## 2023-08-02 PROCEDURE — 83880 ASSAY OF NATRIURETIC PEPTIDE: CPT | Mod: HCNC | Performed by: INTERNAL MEDICINE

## 2023-08-02 PROCEDURE — 82728 ASSAY OF FERRITIN: CPT | Mod: HCNC | Performed by: INTERNAL MEDICINE

## 2023-08-02 PROCEDURE — 80053 COMPREHEN METABOLIC PANEL: CPT | Mod: HCNC | Performed by: INTERNAL MEDICINE

## 2023-08-02 PROCEDURE — 84466 ASSAY OF TRANSFERRIN: CPT | Mod: HCNC | Performed by: INTERNAL MEDICINE

## 2023-08-02 PROCEDURE — 36415 COLL VENOUS BLD VENIPUNCTURE: CPT | Mod: HCNC,PO | Performed by: INTERNAL MEDICINE

## 2023-08-07 ENCOUNTER — TELEPHONE (OUTPATIENT)
Dept: CARDIOLOGY | Facility: HOSPITAL | Age: 78
End: 2023-08-07
Payer: MEDICARE

## 2023-08-07 ENCOUNTER — CLINICAL SUPPORT (OUTPATIENT)
Dept: REHABILITATION | Facility: HOSPITAL | Age: 78
End: 2023-08-07
Attending: ORTHOPAEDIC SURGERY
Payer: MEDICARE

## 2023-08-07 DIAGNOSIS — M47.816 LUMBAR SPONDYLOSIS: ICD-10-CM

## 2023-08-07 DIAGNOSIS — R29.898 DECREASED ROM OF NECK: ICD-10-CM

## 2023-08-07 DIAGNOSIS — M47.812 CERVICAL SPONDYLOSIS: ICD-10-CM

## 2023-08-07 DIAGNOSIS — R29.898 DECREASED STRENGTH OF TRUNK AND BACK: ICD-10-CM

## 2023-08-07 PROCEDURE — 97110 THERAPEUTIC EXERCISES: CPT | Mod: HCNC,PO

## 2023-08-07 PROCEDURE — 97161 PT EVAL LOW COMPLEX 20 MIN: CPT | Mod: HCNC,PO

## 2023-08-07 NOTE — PLAN OF CARE
[facility]  Physical Therapy Initial Evaluation    Date: 8/7/2023   Name: Nicole Medina  Clinic Number: [id number]    Therapy Diagnosis:   Encounter Diagnoses   Name Primary?    Decreased strength of trunk and back     Decreased ROM of neck     Cervical spondylosis     Lumbar spondylosis      Physician: Randy Subramanian MD    Physician Orders: PT Eval and Treat   Medical Diagnosis from Referral: Cervical spondylosis [M47.812], Lumbar spondylosis [M47.816]  Evaluation Date: 8/7/2023  Authorization Period Expiration: 6/29/2024  Plan of Care Expiration: 10/7/2023  Progress Note Due: 9/7/2023  Visit # / Visits authorized: 1/ 1   FOTO: 1/3    Precautions: Standard, blood thinners, CHF, and cancer     Time In: 0905  Time Out: 1000  Total Appointment Time (timed & untimed codes): 55 minutes      SUBJECTIVE   Date of onset: chronic     History of current condition - Lety reports: mid-low right sided back pain with ADLs. Acute-chronic LBP for several years. No pain at rest. Prolonged standing increases pain (specifically sweeping and mopping) and sitting reduces. Neck pain began 3 weeks after fall in a whiplash-like fashion. LBP>neck pain. Denies radicular sx and other red flags.     Falls: last August 2022 avoiding automatic vacuum.       Red Flag Screening:   Cough  Sneeze  Strain: (--)  Bladder/ bowel: (--)  Falls: (--)  Night pain: (--)  Unexplained weight loss: (--)  General health: healthy     Imaging, MRI studies: lumbar spine    Impression:   1. There is degenerative change discussed above.  There is no fracture.  There is marked disc space narrowing at the L3-4 and L5-S1 levels.  There is multilevel facet joint arthropathy.  There is some degree of disc bulge with osteophytic ridging at several levels.  These findings are present in the setting of a spinal canal which may be borderline small on a developmental basis.  There is no significant spinal stenosis.  There is only borderline to  "mild spinal stenosis at the L3-4 level.  There is mild-to-moderate right and only mild left foraminal stenosis at this level as well.  2. There is mild left foraminal stenosis and crowding of the left lateral recess at the L4-5 level.  3. There is mild bilateral foraminal stenosis at the L5-S1 level.  4. There is a large extrarenal pelvis in the right kidney.       Prior Therapy: none  Social History:  lives with their spouse  Occupation: retired  Prior Level of Function: independent with ADLs  Current Level of Function: pain limiting ADLs    Pain:  Current 0/10, worst 5/10, best 0/10   Location:  mid-low back (right sided)  Description: Aching and Dull  Aggravating Factors: mopping and swepping   Easing Factors: sitting     Patients goals: "to be able to sweep and mop without pain"     Medical History:   Past Medical History:   Diagnosis Date    Acute ischemic right MCA stroke 06/02/2017    Cancer 02/2020    lung cancer, small cell     Cancer 06/2020    skin    Cataract     done ou    Colon polyp 11/22/2010    Essential hypertension 08/21/2017    GERD (gastroesophageal reflux disease)     Hiatal hernia     History of radiation therapy     History of seasonal allergies     Iron deficiency anemia due to chronic blood loss 5/18/2022    On home oxygen therapy     while sleeping    Polio     as a child    Presbyopia     PSVT (paroxysmal supraventricular tachycardia)     Pulmonary fibrosis     Dr. Miramontes    Sarcoidosis 2013    Sciatica     TIA (transient ischemic attack) 06/02/2017    [facility]//    Vertigo     Wound of left leg        Surgical History:   Nicole Medina  has a past surgical history that includes Total abdominal hysterectomy; Cholecystectomy; Varicose vein surgery (Bilateral); Hand surgery (Right); Tonsillectomy; Cataract extraction w/  intraocular lens implant (Left, 03/27/2018); Cataract extraction w/  intraocular lens implant (Right, 05/08/2018); Esophagogastroduodenoscopy (N/A, 11/22/2018); " Esophageal dilation (N/A, 11/22/2018); Video-assisted thoracoscopic surgery (VATS) (1/16/2020); Left heart catheterization (N/A, 1/4/2021); Eye surgery; Esophagogastroduodenoscopy (N/A, 2/19/2021); Insertion of implantable loop recorder (Left, 4/19/2022); Esophagogastroduodenoscopy (N/A, 7/5/2023); and Esophageal dilation (N/A, 7/5/2023).    Medications:   Nicole has a current medication list which includes the following prescription(s): albuterol, aspirin, budesonide-formoterol 160-4.5 mcg, cholecalciferol (vitamin d3), clonidine, diazepam, flecainide, fluticasone propionate, furosemide, ibandronate, levocetirizine, levocetirizine, metoprolol succinate, mv,ca,min-folic acid-vit k1, omeprazole, prednisone, entresto, and spironolactone, and the following Facility-Administered Medications: lactated ringers and sodium chloride 0.9%.    Allergies:   Review of patient's allergies indicates:   Allergen Reactions    Latex Itching and Swelling     Itching and swelling to site (local reaction)          Objective     Observation: unremarkable     Posture:  unremarkable     Lumbar Range of Motion:    % Limitation Pain   Flexion 25    No reversal         Extension 0   No pain        Left Side Bending 25 Tight         Right Side Bending 0 none        Left rotation   25 Tight         Right Rotation   0 None              Lower Extremity Strength  Right LE  Left LE    Knee extension: 4+/5 Knee extension: 4/5   Knee flexion: 4+/5 Knee flexion: 4/5   Hip flexion: 4+/5 Hip flexion: 4/5   Hip extension:  4-/5 Hip extension: 3+/5   Hip abduction: 3+/5 Hip abduction: 3/5   Hip adduction: 4/5 Hip adduction 4-/5   Ankle dorsiflexion: 5/5 Ankle dorsiflexion: 5/5   Ankle plantarflexion: 4/5 Ankle plantarflexion: 4/5         Special Tests:  -Repeated Flexion: no change   -Repeated Ext: no change       Neuro Dynamic Testing:    Sciatic nerve:      SLR: R = -     L = -       Femoral Nerve:    Femoral nerve test: -         CMS  Impairment/Limitation/Restriction for FOTO lumbar Survey    Therapist reviewed FOTO scores for Nicole Medina on 8/7/2023.   FOTO documents entered into [employer] - see Media section.    Limitation Score: 20%  Category: Mobility         TREATMENT   Treatment Time In: 0945  Treatment Time Out: 0955  Total Treatment time separate from Evaluation: 10 minutes    Next visit: patient can't lay supine, prone (goals to be able to sweep and mop) core and anti-rotational exercise, hip strengthening      Lety received therapeutic exercises to develop strength, endurance, and ROM for 10 minutes including:  Recumbent SKC 3x20s   Recumbent modified piriformis stretch 3x30s     Home Exercises and Patient Education Provided    Education provided:   - Role of PT, PT POC    Written Home Exercises Provided: yes.  Exercises were reviewed and Lety was able to demonstrate them prior to the end of the session.  Lety demonstrated good  understanding of the education provided.     See EMR under Patient Instructions for exercises provided 8/7/2023.    Assessment   Nicole is a 78 y.o. female referred to outpatient Physical Therapy with a medical diagnosis of Cervical spondylosis and  Lumbar spondylosis . Physical exam is consistent with lumbar facet arthropathy. Primary impairments include postural dysfunction, decreased core and LE strength, decreased lumbar and LE flexibility and mobility, impaired neuromuscular control of core and hip musculature and pain with functional activities. This pt is an good candidate for skilled PT tx and stands to benefit from a combination of manual therapy including joint mobilizations with trigger point/myofacscial release, therapeutic exercise to establish core/joint stability, neuromuscular re-education, dry needling, and modalities Prn. The pt has been educated on their dx/POC and consents to further PT tx.     Pt prognosis is Good.   Pt will benefit from skilled outpatient Physical Therapy to address the  deficits stated above and in the chart below, provide pt/family education, and to maximize pt's level of independence.     Plan of care discussed with patient: Yes  Pt's spiritual, cultural and educational needs considered and patient is agreeable to the plan of care and goals as stated below:     Anticipated Barriers for therapy: PMHx    Medical Necessity is demonstrated by the following  History  Co-morbidities and personal factors that may impact the plan of care [] LOW: no personal factors / co-morbidities  [] MODERATE: 1-2 personal factors / co-morbidities  [x] HIGH: 3+ personal factors / co-morbidities    Moderate / High Support Documentation:   Co-morbidities affecting plan of care: see above     Personal Factors:   age     Examination  Body Structures and Functions, activity limitations and participation restrictions that may impact the plan of care [x] LOW: addressing 1-2 elements  [] MODERATE: 3+ elements  [] HIGH: 4+ elements (please support below)    Moderate / High Support Documentation: n/a     Clinical Presentation [x] LOW: stable  [] MODERATE: Evolving  [] HIGH: Unstable     Decision Making/ Complexity Score: low       Goals:   Short Term Goals (2-4 Weeks):   1) Pt will demonstrate compliance with initial home exercise program as prescribed by physical therapist to improve independence with management of condition.  2) Pt to improve active range of motion in lumbar rotation/lateral flexion by 15% to allow for improved functional mobility.  3) Pt to report low back pain of <3/10 at worst during sweeping to improve tolerance to ADLs.    Long Term Goals (6-8 Weeks):   1) Pt to achieve <10% limitation as measured by the FOTO to demonstrate decreased low back pain disability.  2) Pt to increase strength to at least 4+/5 of muscles tested to allow for improvement in functional activities such as performing chores.  3) Pt to improve active range of motion in lumbar rotation/lateral flexion by 25% to allow  for improved functional mobility.  4) Pt to report low back pain of <1/10 at worst during sweeping to improve tolerance to ADLs.      PLAN   Plan of care Certification: 8/7/2023 to 10/7/2023.    Outpatient Physical Therapy 1 times weekly for 8 weeks to include the following interventions: Aquatic Therapy, Cervical/Lumbar Traction, Gait Training, Manual Therapy, Moist Heat/ Ice, Neuromuscular Re-ed, Patient Education, Self Care, Therapeutic Activities, Therapeutic Exercise, and Dry Needling .     Jt Larsen, PT      I CERTIFY THE NEED FOR THESE SERVICES FURNISHED UNDER THIS PLAN OF TREATMENT AND WHILE UNDER MY CARE   Physician's comments:     Physician's Signature: ___________________________________________________

## 2023-08-07 NOTE — TELEPHONE ENCOUNTER
Per Dr. Johnson note appt 6/2  HPI  Here to establish/transfer care from Dr. Fuentes    Patient monitored [facility]  Notification received from home monitor  New Onset AF/Afl with no OAC    Transfer completed in [facility]  Please accept patient   No longer following with Dr. Fuentes  F/u scheduled with Dr. Johnson 9/11

## 2023-08-09 ENCOUNTER — TELEPHONE (OUTPATIENT)
Dept: CARDIOLOGY | Facility: HOSPITAL | Age: 78
End: 2023-08-09
Payer: MEDICARE

## 2023-08-09 NOTE — TELEPHONE ENCOUNTER
Pt's  called and asked why they were not alerted for pt's Afib on July 24th until the 7th.  I told him I will look into it and call him back.    He also asked why [facility] is monitoring her loop recorder since she has switched to Dr. Johnson.  Informed him we continue to monitor until we are notified a patient is not longer with us.  When the alert came through, we saw in the chart she is following Dr. Johnson.  The alert was sent to Dr Johnson and we notified his staff we are transferring him and to have [facility] pick him up.  Informed him [facility] is not monitoring her loop recorder

## 2023-08-09 NOTE — TELEPHONE ENCOUNTER
Spoke with Fabio Adam re: episode in question.  Informed him the alert parameters are if a person has average V-rate during device defined AT or AF greater than or equal to 110 BPM for greater than or equal to Two hours, it will trigger an event.  Informed him her duration was 1hr and 48 mins, which it didn't trigger an alert but would be reported on the billable.  Also informed him that I am not sure what the parameters are at [facility], but if he wants to know, he will need to call and ask them to be aware.  He stated he appreciated me explaining to him the situation and said he understands now.

## 2023-08-14 ENCOUNTER — OFFICE VISIT (OUTPATIENT)
Dept: OPTOMETRY | Facility: CLINIC | Age: 78
End: 2023-08-14
Payer: MEDICARE

## 2023-08-14 DIAGNOSIS — Z96.1 PSEUDOPHAKIA OF BOTH EYES: ICD-10-CM

## 2023-08-14 DIAGNOSIS — Z83.518 FAMILY HISTORY OF MACULAR DEGENERATION: ICD-10-CM

## 2023-08-14 DIAGNOSIS — D86.0 SARCOIDOSIS OF LUNG: Primary | ICD-10-CM

## 2023-08-14 DIAGNOSIS — H04.123 BILATERAL DRY EYES: ICD-10-CM

## 2023-08-14 PROCEDURE — 92014 PR EYE EXAM, EST PATIENT,COMPREHESV: ICD-10-PCS | Mod: HCNC,S$GLB,, | Performed by: OPTOMETRIST

## 2023-08-14 PROCEDURE — 1159F MED LIST DOCD IN RCRD: CPT | Mod: HCNC,CPTII,S$GLB, | Performed by: OPTOMETRIST

## 2023-08-14 PROCEDURE — 1126F AMNT PAIN NOTED NONE PRSNT: CPT | Mod: HCNC,CPTII,S$GLB, | Performed by: OPTOMETRIST

## 2023-08-14 PROCEDURE — 99999 PR PBB SHADOW E&M-EST. PATIENT-LVL II: ICD-10-PCS | Mod: PBBFAC,HCNC,, | Performed by: OPTOMETRIST

## 2023-08-14 PROCEDURE — 1126F PR PAIN SEVERITY QUANTIFIED, NO PAIN PRESENT: ICD-10-PCS | Mod: HCNC,CPTII,S$GLB, | Performed by: OPTOMETRIST

## 2023-08-14 PROCEDURE — 1159F PR MEDICATION LIST DOCUMENTED IN MEDICAL RECORD: ICD-10-PCS | Mod: HCNC,CPTII,S$GLB, | Performed by: OPTOMETRIST

## 2023-08-14 PROCEDURE — 1160F RVW MEDS BY RX/DR IN RCRD: CPT | Mod: HCNC,CPTII,S$GLB, | Performed by: OPTOMETRIST

## 2023-08-14 PROCEDURE — 99999 PR PBB SHADOW E&M-EST. PATIENT-LVL II: CPT | Mod: PBBFAC,HCNC,, | Performed by: OPTOMETRIST

## 2023-08-14 PROCEDURE — 1160F PR REVIEW ALL MEDS BY PRESCRIBER/CLIN PHARMACIST DOCUMENTED: ICD-10-PCS | Mod: HCNC,CPTII,S$GLB, | Performed by: OPTOMETRIST

## 2023-08-14 PROCEDURE — 92014 COMPRE OPH EXAM EST PT 1/>: CPT | Mod: HCNC,S$GLB,, | Performed by: OPTOMETRIST

## 2023-08-14 NOTE — PROGRESS NOTES
HPI     Concerns About Ocular Health     Additional comments: Glare ou at dist, x mos, no assoc pain or red, no   relief over time, constant            Comments    Happy with reading glasses          Last edited by Jorge Mason, OD on 8/14/2023 11:29 AM.            Assessment /Plan     For exam results, see Encounter Report.    Sarcoidosis of lung    Bilateral dry eyes    Pseudophakia of both eyes    Family history of macular degeneration      No ocular signs of inflammation, RTC yearly  Recommend artificial tears. 1 drop 2x per day. Chronicity of disease and treatment discussed.   3. Monitor condition. Patient to report any changes. RTC 1 year recheck.   4. Monitor for condition. Patient to report any changes. RTC 1 year recheck.

## 2023-08-16 ENCOUNTER — CLINICAL SUPPORT (OUTPATIENT)
Dept: REHABILITATION | Facility: HOSPITAL | Age: 78
End: 2023-08-16
Payer: MEDICARE

## 2023-08-16 DIAGNOSIS — R29.898 DECREASED STRENGTH OF TRUNK AND BACK: Primary | ICD-10-CM

## 2023-08-16 DIAGNOSIS — R29.898 DECREASED ROM OF NECK: ICD-10-CM

## 2023-08-16 DIAGNOSIS — I48.91 UNSPECIFIED ATRIAL FIBRILLATION: ICD-10-CM

## 2023-08-16 PROCEDURE — 97110 THERAPEUTIC EXERCISES: CPT | Mod: HCNC,PO,CQ

## 2023-08-16 PROCEDURE — 97112 NEUROMUSCULAR REEDUCATION: CPT | Mod: HCNC,PO,CQ

## 2023-08-16 NOTE — PROGRESS NOTES
[facility] OUTPAT[facility] THERAPY AND [facility]LLNESS   Physical Therapy Treatment Note     Name: Nicole Medina  Clinic Number: [id number]    Therapy Diagnosis:   Encounter Diagnoses   Name Primary?    Decreased strength of trunk and back Yes    Decreased ROM of neck      Physician: Randy Subramanian MD    Visit Date: 8/16/2023  Physician Orders: PT Eval and Treat   Medical Diagnosis from Referral: Cervical spondylosis [M47.812], Lumbar spondylosis [M47.816]  Evaluation Date: 8/7/2023  Authorization Period Expiration: 6/29/2024  Plan of Care Expiration: 10/7/2023  Progress Note Due: 9/7/2023  Visit # / Visits authorized: 2/ 20   FOTO: 1/3     Precautions: Standard, blood thinners, CHF, and cancer      Time In: 9:15 AM  Time Out: 10:00  Total Appointment Time (timed & untimed codes): 45 minutes        SUBJECTIVE     Pt reports: that she is w/o complaint at this time. No issues post last tx or with HEP.  She was compliant with home exercise program.  Response to previous treatment: no adverse effects  Functional change: too soon    Pain: 0/10  Location:       OBJECTIVE     Objective Measures updated at progress report unless specified.     Treatment     Lety received the treatments listed below:      therapeutic exercises to develop strength, endurance, ROM, flexibility, posture, and core stabilization for 30 minutes including:  Treadmill x 5 min 1.1 mph  EIS 10x  All supine exs performed with wedge due to vertigo  SKTC 10x 5 sec hold B  DKTC 10x 5 sec hold  LX rotation 20x  Bridging 20x      neuromuscular re-education activities to improve: Coordination, Kinesthetic, Sense, Proprioception, and Posture for 15 minutes. The following activities were included:  PPT with iso hip ADD with ball 3 sec hold x 3 min  PPT with hip ABD with red t-band x 3 min  Supine clamshell 20x  Slouch over correct 20x 3 sec hold        Patient Education and Home Exercises     Home Exercises Provided and Patient Education Provided     Education provided:    - effects of therapy  - proper sitting posture    Written Home Exercises Provided: Patient instructed to cont prior HEP. Exercises were reviewed and Lety was able to demonstrate them prior to the end of the session.  Lety demonstrated good  understanding of the education provided. See EMR under Patient Instructions for exercises provided during therapy sessions    ASSESSMENT     Lety mague today's tx with ther ex and neuromuscular re-ed well.She was w/o complaint throughout tx. She is unable to lay supine or prone due to provocation of vertigo sx. She required few VCs for proper form, posture and core stab tech with exs post instruction. Maintaining TA contraction is a challenge with exs but she is able to re-initiate w/o difficulty. Will continue to address core stab and postural issues.    Lety Is progressing well towards her goals.   Pt prognosis is Good.     Pt will continue to benefit from skilled outpatient physical therapy to address the deficits listed in the problem list box on initial evaluation, provide pt/family education and to maximize pt's level of independence in the home and community environment.     Pt's spiritual, cultural and educational needs considered and pt agreeable to plan of care and goals.     Anticipated barriers to physical therapy:  PMHx     Goals: Short Term Goals (2-4 Weeks):   1) Pt will demonstrate compliance with initial home exercise program as prescribed by physical therapist to improve independence with management of condition.  2) Pt to improve active range of motion in lumbar rotation/lateral flexion by 15% to allow for improved functional mobility.  3) Pt to report low back pain of <3/10 at worst during sweeping to improve tolerance to ADLs.     Long Term Goals (6-8 Weeks):   1) Pt to achieve <10% limitation as measured by the FOTO to demonstrate decreased low back pain disability.  2) Pt to increase strength to at least 4+/5 of muscles tested to allow for improvement  in functional activities such as performing chores.  3) Pt to improve active range of motion in lumbar rotation/lateral flexion by 25% to allow for improved functional mobility.  4) Pt to report low back pain of <1/10 at worst during sweeping to improve tolerance to ADLs.    PLAN     Plan of care Certification: 8/7/2023 to 10/7/2023.     Outpatient Physical Therapy 1 times weekly for 8 weeks to include the following interventions: Aquatic Therapy, Cervical/Lumbar Traction, Gait Training, Manual Therapy, Moist Heat/ Ice, Neuromuscular Re-ed, Patient Education, Self Care, Therapeutic Activities, Therapeutic Exercise, and Dry Needling .        Donald Carter, PTA

## 2023-08-17 NOTE — PROGRESS NOTES
08/18/2023    Radiation Oncology Follow-Up Visit      Prior Radiation History:    Site  Technique  Energy  Dose/Fx (Gy)  #Fx  Total Dose (Gy)  Start Date  End Date  Elapsed Days    DOC Lung  SBRT  6X  11  5 / 5  55  2/7/2020 2/13/2020  6        Assessment   This is a 78 y.o. y/o female with Stage IA2 (cT1b cN0 M0) DOC NSCLC (adeno) diagnosed on biopsy 12/20/19. History significant for sarcoidosis. CT Chest 1/14/20 demonstrated 1.7 cm DOC primary and multiple other stable sub-centimeter nodules with mild hilar and mediastinal adenopathy. She underwent attempted VATS resection by Dr. Mccall 1/16/20, which was aborted due to poor toleration of single lung ventilation. She completed definitive SBRT 55 Gy in 5 fx on 2/13/20.     No significant late toxicity from treatment.  CT Chest today 8/17/23 demonstrates stable post-radiation changes in the DOC; other interstitial changes also appear stable.        Plan   1) I will see her back in 6 months with CT Chest prior for re-staging. At that point she will be 4 years out from treatment; we will discuss moving to annual CT scans then.          HPI:  Doing better since last visit. She was diagnosed with CHF and started on entresto, which significantly improved her breathing. BLE pain improved after discontinuing lipitor. Denies baseline dyspnea, cough, or chest pain.       Past Medical History:   Diagnosis Date    Acute ischemic right MCA stroke 06/02/2017    Cancer 02/2020    lung cancer, small cell     Cancer 06/2020    skin    Cataract     done ou    Colon polyp 11/22/2010    Essential hypertension 08/21/2017    GERD (gastroesophageal reflux disease)     Hiatal hernia     History of radiation therapy     History of seasonal allergies     Iron deficiency anemia due to chronic blood loss 5/18/2022    On home oxygen therapy     while sleeping    Polio     as a child    Presbyopia     PSVT (paroxysmal supraventricular tachycardia)     Pulmonary fibrosis     Dr. Miramontes     Sarcoidosis 2013    Sciatica     TIA (transient ischemic attack) 06/02/2017    [facility]//    Vertigo     Wound of left leg        Past Surgical History:   Procedure Laterality Date    CATARACT EXTRACTION W/  INTRAOCULAR LENS IMPLANT Left 03/27/2018    Dr Higginbotham    CATARACT EXTRACTION W/  INTRAOCULAR LENS IMPLANT Right 05/08/2018    Dr Higginbotham    CHOLECYSTECTOMY      ESOPHAGEAL DILATION N/A 11/22/2018    Procedure: DILATION, ESOPHAGUS;  Surgeon: Robb Fitzgerald Jr., MD;  Location: [facility];  Service: Endoscopy;  Laterality: N/A;    ESOPHAGEAL DILATION N/A 7/5/2023    Procedure: DILATION, ESOPHAGUS;  Surgeon: Yaakov Schroeder MD;  Location: [facility];  Service: Endoscopy;  Laterality: N/A;    ESOPHAGOGASTRODUODENOSCOPY N/A 11/22/2018    Procedure: ESOPHAGOGASTRODUODENOSCOPY (EGD);  Surgeon: Robb Fitzgerald Jr., MD;  Location: [facility];  Service: Endoscopy;  Laterality: N/A;    ESOPHAGOGASTRODUODENOSCOPY N/A 2/19/2021    Procedure: EGD (ESOPHAGOGASTRODUODENOSCOPY);  Surgeon: Robb Fitzgerald Jr., MD;  Location: [facility];  Service: Endoscopy;  Laterality: N/A;    ESOPHAGOGASTRODUODENOSCOPY N/A 7/5/2023    Procedure: EGD (ESOPHAGOGASTRODUODENOSCOPY);  Surgeon: Yaakov Schroeder MD;  Location: [facility];  Service: Endoscopy;  Laterality: N/A;    EYE SURGERY      HAND SURGERY Right     trauma repair    INSERTION OF IMPLANTABLE LOOP RECORDER Left 4/19/2022    Procedure: Insertion, Implantable Loop Recorder;  Surgeon: Thanh Fuentes MD;  Location: [facility] CATH;  Service: Cardiology;  Laterality: Left;    LEFT HEART CATHETERIZATION N/A 1/4/2021    Procedure: Left heart cath;  Surgeon: Laron Falcon MD;  Location: [facility] CATH;  Service: Cardiology;  Laterality: N/A;    TONSILLECTOMY      TOTAL ABDOMINAL HYSTERECTOMY      VARICOSE VEIN SURGERY Bilateral     bilateral legs    VIDEO-ASSISTED THORACOSCOPIC SURGERY (VATS)  1/16/2020    Procedure: VATS (VIDEO-ASSISTED THORACOSCOPIC SURGERY) - exploratory;   Surgeon: Ritesh Mccall MD;  Location: [facility] OR [address] [place] [address];  Service: Thoracic;;       Social History     Tobacco Use    Smoking status: Former     Current packs/day: 0.00    Smokeless tobacco: Never    Tobacco comments:     as teenager   Substance Use Topics    Alcohol use: No    Drug use: No       Cancer-related family history includes Breast cancer in her maternal grandmother and another family member; Cancer in her daughter, mother, and sister.    Current Outpatient Medications on File Prior to Visit   Medication Sig Dispense Refill    albuterol (PROVENTIL) 2.5 mg /3 mL (0.083 %) nebulizer solution Take 3 mLs (2.5 mg total) by nebulization every 6 (six) hours as needed for Wheezing or Shortness of Breath. Rescue 270 mL 5    apixaban (ELIQUIS) 5 mg Tab Take 1 tablet (5 mg total) by mouth 2 (two) times daily. 180 tablet 3    aspirin 81 MG Chew Take 1 tablet (81 mg total) by mouth once daily. 30 tablet 11    budesonide-formoterol 160-4.5 mcg (SYMBICORT) 160-4.5 mcg/actuation HFAA Inhale 2 puffs into the lungs every 12 (twelve) hours. Controller      cholecalciferol, vitamin D3, 125 mcg (5,000 unit) Tab Take 5,000 Units by mouth once daily.      cloNIDine (CATAPRES) 0.1 MG tablet Take 1 tablet (0.1 mg total) by mouth 3 (three) times daily as needed (PRN SBP > 165 mmHg). 90 tablet 6    diazePAM (VALIUM) 2 MG tablet Take 1 tablet (2 mg total) by mouth every 12 (twelve) hours as needed for Anxiety or Insomnia. 20 tablet 1    ezetimibe (ZETIA) 10 mg tablet Take 1 tablet (10 mg total) by mouth once daily. 90 tablet 3    flecainide (TAMBOCOR) 100 MG Tab Take 1 tablet (100 mg total) by mouth every 12 (twelve) hours. 180 tablet 3    fluticasone propionate (FLONASE) 50 mcg/actuation nasal spray 1 spray (50 mcg total) by Each Nostril route daily as needed for Allergies. 3 g 3    furosemide (LASIX) 20 MG tablet Take 0.5 tablets (10 mg total) by mouth every morning. 90 tablet 3    ibandronate (BONIVA) 150 mg tablet Take 1  tablet (150 mg total) by mouth every 30 days. 12 tablet 3    levocetirizine (XYZAL) 5 MG tablet Take 1 tablet (5 mg total) by mouth nightly as needed for Allergies. 30 tablet 0    levocetirizine (XYZAL) 5 MG tablet Take 1 tablet (5 mg total) by mouth every evening. 90 tablet 3    metoprolol succinate (TOPROL XL) 50 MG 24 hr tablet Take 1 tablet (50 mg total) by mouth every 12 (twelve) hours. 180 tablet 4    mv,Ca,min-folic acid-vit K1 400-20 mcg Tab Take 1 tablet by mouth once daily at 6am.      omeprazole (PRILOSEC) 40 MG capsule Take 1 capsule (40 mg total) by mouth once daily. 90 capsule 3    predniSONE (DELTASONE) 5 MG tablet Take 1 tablet (5 mg total) by mouth once daily. 60 tablet 0    sacubitriL-valsartan (ENTRESTO) 24-26 mg per tablet Take 0.5 tablet by mouth twice daily 180 tablet 3    spironolactone (ALDACTONE) 25 MG tablet Take 0.5 tablets (12.5 mg total) by mouth every morning. 90 tablet 3     Current Facility-Administered Medications on File Prior to Visit   Medication Dose Route Frequency Provider Last Rate Last Admin    lactated ringers infusion   Intravenous Continuous Yaakov Schroeder MD 75 mL/hr at 07/05/23 1307 New Bag at 07/05/23 1307    sodium chloride 0.9% flush 10 mL  10 mL Intravenous PRN Yaakov Schroeder MD           Review of patient's allergies indicates:   Allergen Reactions    Latex Itching and Swelling     Itching and swelling to site (local reaction)         Vital Signs: BP (!) 127/57   Pulse (!) 59   Resp 20   Wt 86.5 kg (190 lb 11.2 oz)   SpO2 96%   BMI 33.78 kg/m²     ECOG Performance Status: 1    Physical Exam  Constitutional:       Appearance: Normal appearance.   HENT:      Head: Normocephalic and atraumatic.   Eyes:      General: No scleral icterus.     Extraocular Movements: Extraocular movements intact.   Pulmonary:      Effort: Pulmonary effort is normal. No accessory muscle usage or respiratory distress.   Musculoskeletal:      Cervical back: Normal range of motion.    Neurological:      Mental Status: She is alert and oriented to person, place, and time.   Psychiatric:         Mood and Affect: Mood and affect normal.         Judgment: Judgment normal.          Labs:    Imaging: I have personally reviewed the patient's available images and reports and summarized pertinent findings above in HPI.     Pathology: No new path

## 2023-08-18 ENCOUNTER — HOSPITAL ENCOUNTER (OUTPATIENT)
Dept: RADIOLOGY | Facility: HOSPITAL | Age: 78
Discharge: HOME | End: 2023-08-18
Attending: RADIOLOGY
Payer: MEDICARE

## 2023-08-18 ENCOUNTER — OFFICE VISIT (OUTPATIENT)
Dept: RADIATION ONCOLOGY | Facility: CLINIC | Age: 78
End: 2023-08-18
Payer: MEDICARE

## 2023-08-18 VITALS
RESPIRATION RATE: 20 BRPM | BODY MASS INDEX: 33.78 KG/M2 | WEIGHT: 190.69 LBS | DIASTOLIC BLOOD PRESSURE: 57 MMHG | OXYGEN SATURATION: 96 % | SYSTOLIC BLOOD PRESSURE: 127 MMHG | HEART RATE: 59 BPM

## 2023-08-18 DIAGNOSIS — Z85.118 PERSONAL HISTORY OF LUNG CANCER: Primary | ICD-10-CM

## 2023-08-18 DIAGNOSIS — Z85.118 PERSONAL HISTORY OF LUNG CANCER: ICD-10-CM

## 2023-08-18 PROCEDURE — 3074F SYST BP LT 130 MM HG: CPT | Mod: HCNC,CPTII,S$GLB, | Performed by: RADIOLOGY

## 2023-08-18 PROCEDURE — 99999 PR PBB SHADOW E&M-EST. PATIENT-LVL III: CPT | Mod: PBBFAC,HCNC,, | Performed by: RADIOLOGY

## 2023-08-18 PROCEDURE — 71250 CT THORAX DX C-: CPT | Mod: TC,HCNC

## 2023-08-18 PROCEDURE — 3078F PR MOST RECENT DIASTOLIC BLOOD PRESSURE < 80 MM HG: ICD-10-PCS | Mod: HCNC,CPTII,S$GLB, | Performed by: RADIOLOGY

## 2023-08-18 PROCEDURE — 99999 PR PBB SHADOW E&M-EST. PATIENT-LVL III: ICD-10-PCS | Mod: PBBFAC,HCNC,, | Performed by: RADIOLOGY

## 2023-08-18 PROCEDURE — 3078F DIAST BP <80 MM HG: CPT | Mod: HCNC,CPTII,S$GLB, | Performed by: RADIOLOGY

## 2023-08-18 PROCEDURE — 99213 OFFICE O/P EST LOW 20 MIN: CPT | Mod: HCNC,S$GLB,, | Performed by: RADIOLOGY

## 2023-08-18 PROCEDURE — 3074F PR MOST RECENT SYSTOLIC BLOOD PRESSURE < 130 MM HG: ICD-10-PCS | Mod: HCNC,CPTII,S$GLB, | Performed by: RADIOLOGY

## 2023-08-18 PROCEDURE — 99213 PR OFFICE/OUTPT VISIT, EST, LEVL III, 20-29 MIN: ICD-10-PCS | Mod: HCNC,S$GLB,, | Performed by: RADIOLOGY

## 2023-08-18 PROCEDURE — 1126F PR PAIN SEVERITY QUANTIFIED, NO PAIN PRESENT: ICD-10-PCS | Mod: HCNC,CPTII,S$GLB, | Performed by: RADIOLOGY

## 2023-08-18 PROCEDURE — 1126F AMNT PAIN NOTED NONE PRSNT: CPT | Mod: HCNC,CPTII,S$GLB, | Performed by: RADIOLOGY

## 2023-08-30 ENCOUNTER — CLINICAL SUPPORT (OUTPATIENT)
Dept: REHABILITATION | Facility: HOSPITAL | Age: 78
End: 2023-08-30
Payer: MEDICARE

## 2023-08-30 DIAGNOSIS — R29.898 DECREASED STRENGTH OF TRUNK AND BACK: Primary | ICD-10-CM

## 2023-08-30 DIAGNOSIS — R29.898 DECREASED ROM OF NECK: ICD-10-CM

## 2023-08-30 PROCEDURE — 97110 THERAPEUTIC EXERCISES: CPT | Mod: HCNC,PO,CQ

## 2023-08-30 PROCEDURE — 97112 NEUROMUSCULAR REEDUCATION: CPT | Mod: HCNC,PO,CQ

## 2023-08-30 NOTE — PROGRESS NOTES
[facility]   Physical Therapy Treatment Note     Name: iNcole Medina  Clinic Number: [id number]    Therapy Diagnosis:   Encounter Diagnoses   Name Primary?    Decreased strength of trunk and back Yes    Decreased ROM of neck      Physician: Randy Subramanian MD    Visit Date: 8/30/2023  Physician Orders: PT Eval and Treat   Medical Diagnosis from Referral: Cervical spondylosis [M47.812], Lumbar spondylosis [M47.816]  Evaluation Date: 8/7/2023  Authorization Period Expiration: 6/29/2024  Plan of Care Expiration: 10/7/2023  Progress Note Due: 9/7/2023  Visit # / Visits authorized: 3/ 20   FOTO: 1/3     Precautions: Standard, blood thinners, CHF, and cancer      Time In: 10:42 AM  Time Out: 11:30  Total Appointment Time (timed & untimed codes): 48 minutes        SUBJECTIVE     Pt reports: that she is w/o complaint of pn at this time.However does note some tightness in R hip  She was compliant with home exercise program.  Response to previous treatment: no adverse effects  Functional change: too soon    Pain: 0/10  Location:       OBJECTIVE     Objective Measures updated at progress report unless specified.     Treatment     Lety received the treatments listed below:      therapeutic exercises to develop strength, endurance, ROM, flexibility, posture, and core stabilization for 30 minutes including:  Treadmill x 5 min 1.1 mph  EIS 10x  All supine exs performed with wedge due to vertigo  SKTC 10x 5 sec hold B  DKTC 10x 5 sec hold  LX rotation 20x  Bridging 20x  horiz Row red t-band 20x  B shld ext red t-band 20x  Standing hip ext 20x      neuromuscular re-education activities to improve: Coordination, Kinesthetic, Sense, Proprioception, and Posture for 15 minutes. The following activities were included:  PPT with iso hip ADD with ball 3 sec hold x 3 min  PPT with hip ABD with red t-band x 3 min  Supine clamshell 20x red t-band  Slouch over correct 20x 3 sec hold  Mini Squat (emphasis on  posture and core stab) 20x        Patient Education and Home Exercises     Home Exercises Provided and Patient Education Provided     Education provided:   - effects of therapy  - proper sitting posture    Written Home Exercises Provided: Patient instructed to cont prior HEP. Exercises were reviewed and Lety was able to demonstrate them prior to the end of the session.  Lety demonstrated good  understanding of the education provided. See EMR under Patient Instructions for exercises provided during therapy sessions    ASSESSMENT     Lety mague today's tx with ther ex and neuromuscular re-ed well.She was w/o complaint throughout tx. She is unable to lay supine or prone due to provocation of vertigo sx but is able to perform all activities on wedge w/o provocation of sx. She required few VCs for proper form, posture and core stab tech with exs post instruction. Maintaining TA contraction continues to be a challenge with exs but she is able to re-initiate w/o difficulty. Will continue to address core stab and postural issues.    Lety Is progressing well towards her goals.   Pt prognosis is Good.     Pt will continue to benefit from skilled outpatient physical therapy to address the deficits listed in the problem list box on initial evaluation, provide pt/family education and to maximize pt's level of independence in the home and community environment.     Pt's spiritual, cultural and educational needs considered and pt agreeable to plan of care and goals.     Anticipated barriers to physical therapy:  PMHx     Goals: Short Term Goals (2-4 Weeks):   1) Pt will demonstrate compliance with initial home exercise program as prescribed by physical therapist to improve independence with management of condition.  2) Pt to improve active range of motion in lumbar rotation/lateral flexion by 15% to allow for improved functional mobility.  3) Pt to report low back pain of <3/10 at worst during sweeping to improve tolerance to  ADLs.     Long Term Goals (6-8 Weeks):   1) Pt to achieve <10% limitation as measured by the FOTO to demonstrate decreased low back pain disability.  2) Pt to increase strength to at least 4+/5 of muscles tested to allow for improvement in functional activities such as performing chores.  3) Pt to improve active range of motion in lumbar rotation/lateral flexion by 25% to allow for improved functional mobility.  4) Pt to report low back pain of <1/10 at worst during sweeping to improve tolerance to ADLs.    PLAN     Plan of care Certification: 8/7/2023 to 10/7/2023.     Outpatient Physical Therapy 1 times weekly for 8 weeks to include the following interventions: Aquatic Therapy, Cervical/Lumbar Traction, Gait Training, Manual Therapy, Moist Heat/ Ice, Neuromuscular Re-ed, Patient Education, Self Care, Therapeutic Activities, Therapeutic Exercise, and Dry Needling .        Donald Carter, PTA

## 2023-09-05 ENCOUNTER — CLINICAL SUPPORT (OUTPATIENT)
Dept: REHABILITATION | Facility: HOSPITAL | Age: 78
End: 2023-09-05
Payer: MEDICARE

## 2023-09-05 DIAGNOSIS — R29.898 DECREASED STRENGTH OF TRUNK AND BACK: Primary | ICD-10-CM

## 2023-09-05 DIAGNOSIS — R29.898 DECREASED ROM OF NECK: ICD-10-CM

## 2023-09-05 PROCEDURE — 97110 THERAPEUTIC EXERCISES: CPT | Mod: HCNC,PO

## 2023-09-05 NOTE — PROGRESS NOTES
"[facility]   Physical Therapy Treatment Note     Name: Nicole Medina  Clinic Number: [id number]    Therapy Diagnosis:   Encounter Diagnoses   Name Primary?    Decreased strength of trunk and back Yes    Decreased ROM of neck        Physician: Randy Subramanian MD    Visit Date: 9/5/2023  Physician Orders: PT Eval and Treat   Medical Diagnosis from Referral: Cervical spondylosis [M47.812], Lumbar spondylosis [M47.816]  Evaluation Date: 8/7/2023  Authorization Period Expiration: 6/29/2024  Plan of Care Expiration: 10/7/2023  Progress Note Due: 9/7/2023  Visit # / Visits authorized: 3/ 20   FOTO: 1/3     Precautions: Standard, blood thinners, CHF, and cancer      Time In: 0850  Time Out: 0935  Total Appointment Time (timed & untimed codes): 45 minutes        SUBJECTIVE     Pt reports: that she has been without pain for the last 2 weeks. She reports being "ready to discharge."  She was compliant with home exercise program.  Response to previous treatment: no adverse effects  Functional change: too soon    Pain: 0/10  Location:       OBJECTIVE        Lumbar Range of Motion:     % Limitation Pain   Flexion 10    No reversal          Extension 0    No pain         Left Side Bending 0 Tight          Right Side Bending 0 none         Left rotation    0 Tight          Right Rotation    0 None                Lower Extremity Strength  Right LE   Left LE     Knee extension: 4+/5 Knee extension: 4/5   Knee flexion: 4+/5 Knee flexion: 4/5   Hip flexion: 4+/5 Hip flexion: 4/5   Hip extension:  4-/5 Hip extension: 3+/5   Hip abduction: 3+/5 Hip abduction: 3/5   Hip adduction: 4/5 Hip adduction 4-/5   Ankle dorsiflexion: 5/5 Ankle dorsiflexion: 5/5   Ankle plantarflexion: 4/5 Ankle plantarflexion: 4/5            Special Tests:  -Repeated Flexion: no change   -Repeated Ext: no change        Treatment     Lety received the treatments listed below:        neuromuscular re-education activities to improve: " Coordination, Kinesthetic, Sense, Proprioception, and Posture for 55 minutes. The following activities were included:  The following exercises prescribed to HEP, performed, and reviewed:  Chin tucks 2x10  Bilateral UT stretch 2x20s   Bilateral LS stretch 2x20s   PPT with iso hip ADD with ball 3 sec hold x 3 min  PPT with hip ABD with red t-band x 3 min  LX rotation 20x  Bridging 20x  Sidelying clamshell 20x red t-band  Mini Squat (emphasis on posture and core stab) 20x  SLS 2x15s        Patient Education and Home Exercises     Home Exercises Provided and Patient Education Provided     Education provided:   - effects of therapy  - proper sitting posture    Written Home Exercises Provided: Patient instructed to cont prior HEP. Exercises were reviewed and Lety was able to demonstrate them prior to the end of the session.  Lety demonstrated good  understanding of the education provided. See EMR under Patient Instructions for exercises provided during therapy sessions    ASSESSMENT     Patient pain-free for the last two weeks with good demonstration of management techniques. Updated HEP to maximize carryover. Patient not appropriate for skilled services at this time.     Lety Is progressing well towards her goals.   Pt prognosis is Good.     Pt will continue to benefit from skilled outpatient physical therapy to address the deficits listed in the problem list box on initial evaluation, provide pt/family education and to maximize pt's level of independence in the home and community environment.     Pt's spiritual, cultural and educational needs considered and pt agreeable to plan of care and goals.     Anticipated barriers to physical therapy:  PMHx     Goals: Short Term Goals (2-4 Weeks):   1) Pt will demonstrate compliance with initial home exercise program as prescribed by physical therapist to improve independence with management of condition.  2) Pt to improve active range of motion in lumbar rotation/lateral  flexion by 15% to allow for improved functional mobility.  3) Pt to report low back pain of <3/10 at worst during sweeping to improve tolerance to ADLs.     Long Term Goals (6-8 Weeks):   1) Pt to achieve <10% limitation as measured by the FOTO to demonstrate decreased low back pain disability.  2) Pt to increase strength to at least 4+/5 of muscles tested to allow for improvement in functional activities such as performing chores.  3) Pt to improve active range of motion in lumbar rotation/lateral flexion by 25% to allow for improved functional mobility.  4) Pt to report low back pain of <1/10 at worst during sweeping to improve tolerance to ADLs.    PLAN     Plan of care Certification: 8/7/2023 to 10/7/2023.     Outpatient Physical Therapy 1 times weekly for 8 weeks to include the following interventions: Aquatic Therapy, Cervical/Lumbar Traction, Gait Training, Manual Therapy, Moist Heat/ Ice, Neuromuscular Re-ed, Patient Education, Self Care, Therapeutic Activities, Therapeutic Exercise, and Dry Needling .        Jt Larsen, PT

## 2023-09-12 NOTE — PATIENT INSTRUCTIONS
Try taking a supplement called CO-q-10  200 mg once a day, which can help with muscle cramps and specifically that related to statin medicines.     steady

## 2023-10-05 ENCOUNTER — OFFICE VISIT (OUTPATIENT)
Dept: FAMILY MEDICINE | Facility: CLINIC | Age: 78
End: 2023-10-05
Payer: MEDICARE

## 2023-10-05 VITALS
DIASTOLIC BLOOD PRESSURE: 62 MMHG | WEIGHT: 188.25 LBS | SYSTOLIC BLOOD PRESSURE: 116 MMHG | HEART RATE: 58 BPM | BODY MASS INDEX: 33.36 KG/M2 | HEIGHT: 63 IN | OXYGEN SATURATION: 95 %

## 2023-10-05 DIAGNOSIS — G47.33 OSA (OBSTRUCTIVE SLEEP APNEA): ICD-10-CM

## 2023-10-05 DIAGNOSIS — M85.89 OSTEOPENIA OF MULTIPLE SITES: ICD-10-CM

## 2023-10-05 DIAGNOSIS — K21.9 GASTROESOPHAGEAL REFLUX DISEASE WITHOUT ESOPHAGITIS: ICD-10-CM

## 2023-10-05 DIAGNOSIS — I48.0 PAROXYSMAL ATRIAL FIBRILLATION: ICD-10-CM

## 2023-10-05 DIAGNOSIS — E78.49 OTHER HYPERLIPIDEMIA: ICD-10-CM

## 2023-10-05 DIAGNOSIS — Z00.00 WELLNESS EXAMINATION: Primary | ICD-10-CM

## 2023-10-05 DIAGNOSIS — D86.0 SARCOIDOSIS OF LUNG: ICD-10-CM

## 2023-10-05 DIAGNOSIS — I10 ESSENTIAL HYPERTENSION: ICD-10-CM

## 2023-10-05 DIAGNOSIS — I27.20 PULMONARY HYPERTENSION: ICD-10-CM

## 2023-10-05 DIAGNOSIS — Z12.31 BREAST CANCER SCREENING BY MAMMOGRAM: ICD-10-CM

## 2023-10-05 DIAGNOSIS — I50.32 CHRONIC DIASTOLIC CONGESTIVE HEART FAILURE: ICD-10-CM

## 2023-10-05 DIAGNOSIS — E66.09 CLASS 1 OBESITY DUE TO EXCESS CALORIES WITH SERIOUS COMORBIDITY AND BODY MASS INDEX (BMI) OF 33.0 TO 33.9 IN ADULT: ICD-10-CM

## 2023-10-05 PROBLEM — E66.811 CLASS 1 OBESITY DUE TO EXCESS CALORIES WITH SERIOUS COMORBIDITY AND BODY MASS INDEX (BMI) OF 33.0 TO 33.9 IN ADULT: Status: ACTIVE | Noted: 2023-10-05

## 2023-10-05 PROCEDURE — 3074F SYST BP LT 130 MM HG: CPT | Mod: HCNC,CPTII,S$GLB, | Performed by: INTERNAL MEDICINE

## 2023-10-05 PROCEDURE — 3078F DIAST BP <80 MM HG: CPT | Mod: HCNC,CPTII,S$GLB, | Performed by: INTERNAL MEDICINE

## 2023-10-05 PROCEDURE — 1160F PR REVIEW ALL MEDS BY PRESCRIBER/CLIN PHARMACIST DOCUMENTED: ICD-10-PCS | Mod: HCNC,CPTII,S$GLB, | Performed by: INTERNAL MEDICINE

## 2023-10-05 PROCEDURE — 99999 PR PBB SHADOW E&M-EST. PATIENT-LVL IV: ICD-10-PCS | Mod: PBBFAC,HCNC,, | Performed by: INTERNAL MEDICINE

## 2023-10-05 PROCEDURE — 3074F PR MOST RECENT SYSTOLIC BLOOD PRESSURE < 130 MM HG: ICD-10-PCS | Mod: HCNC,CPTII,S$GLB, | Performed by: INTERNAL MEDICINE

## 2023-10-05 PROCEDURE — 1160F RVW MEDS BY RX/DR IN RCRD: CPT | Mod: HCNC,CPTII,S$GLB, | Performed by: INTERNAL MEDICINE

## 2023-10-05 PROCEDURE — 99999 PR PBB SHADOW E&M-EST. PATIENT-LVL IV: CPT | Mod: PBBFAC,HCNC,, | Performed by: INTERNAL MEDICINE

## 2023-10-05 PROCEDURE — 99214 PR OFFICE/OUTPT VISIT, EST, LEVL IV, 30-39 MIN: ICD-10-PCS | Mod: HCNC,S$GLB,, | Performed by: INTERNAL MEDICINE

## 2023-10-05 PROCEDURE — 3078F PR MOST RECENT DIASTOLIC BLOOD PRESSURE < 80 MM HG: ICD-10-PCS | Mod: HCNC,CPTII,S$GLB, | Performed by: INTERNAL MEDICINE

## 2023-10-05 PROCEDURE — 3288F FALL RISK ASSESSMENT DOCD: CPT | Mod: HCNC,CPTII,S$GLB, | Performed by: INTERNAL MEDICINE

## 2023-10-05 PROCEDURE — 1159F MED LIST DOCD IN RCRD: CPT | Mod: HCNC,CPTII,S$GLB, | Performed by: INTERNAL MEDICINE

## 2023-10-05 PROCEDURE — 3288F PR FALLS RISK ASSESSMENT DOCUMENTED: ICD-10-PCS | Mod: HCNC,CPTII,S$GLB, | Performed by: INTERNAL MEDICINE

## 2023-10-05 PROCEDURE — 99214 OFFICE O/P EST MOD 30 MIN: CPT | Mod: HCNC,S$GLB,, | Performed by: INTERNAL MEDICINE

## 2023-10-05 PROCEDURE — 1101F PT FALLS ASSESS-DOCD LE1/YR: CPT | Mod: HCNC,CPTII,S$GLB, | Performed by: INTERNAL MEDICINE

## 2023-10-05 PROCEDURE — 1159F PR MEDICATION LIST DOCUMENTED IN MEDICAL RECORD: ICD-10-PCS | Mod: HCNC,CPTII,S$GLB, | Performed by: INTERNAL MEDICINE

## 2023-10-05 PROCEDURE — 1101F PR PT FALLS ASSESS DOC 0-1 FALLS W/OUT INJ PAST YR: ICD-10-PCS | Mod: HCNC,CPTII,S$GLB, | Performed by: INTERNAL MEDICINE

## 2023-10-05 RX ORDER — FLUTICASONE PROPIONATE 50 MCG
1 SPRAY, SUSPENSION (ML) NASAL DAILY PRN
Qty: 48 G | Refills: 3 | Status: SHIPPED | OUTPATIENT
Start: 2023-10-05 | End: 2024-02-29 | Stop reason: SDUPTHER

## 2023-10-05 RX ORDER — IBANDRONATE SODIUM 150 MG/1
150 TABLET, FILM COATED ORAL
Qty: 12 TABLET | Refills: 3 | Status: SHIPPED | OUTPATIENT
Start: 2023-10-05 | End: 2023-10-05 | Stop reason: SDUPTHER

## 2023-10-05 RX ORDER — IBANDRONATE SODIUM 150 MG/1
150 TABLET, FILM COATED ORAL
Qty: 12 TABLET | Refills: 3 | Status: SHIPPED | OUTPATIENT
Start: 2023-10-05

## 2023-10-05 RX ORDER — OMEPRAZOLE 40 MG/1
40 CAPSULE, DELAYED RELEASE ORAL DAILY
Qty: 90 CAPSULE | Refills: 3 | Status: SHIPPED | OUTPATIENT
Start: 2023-10-05 | End: 2023-10-05 | Stop reason: SDUPTHER

## 2023-10-05 RX ORDER — FLUTICASONE PROPIONATE 50 MCG
1 SPRAY, SUSPENSION (ML) NASAL DAILY PRN
Qty: 48 G | Refills: 3 | Status: SHIPPED | OUTPATIENT
Start: 2023-10-05 | End: 2023-10-05 | Stop reason: SDUPTHER

## 2023-10-05 RX ORDER — OMEPRAZOLE 40 MG/1
40 CAPSULE, DELAYED RELEASE ORAL DAILY
Qty: 90 CAPSULE | Refills: 3 | Status: SHIPPED | OUTPATIENT
Start: 2023-10-05

## 2023-10-05 NOTE — PROGRESS NOTES
"[facility]  Primary Care   [address]       Patient ID: Nicole Medina     Chief Complaint:   Chief Complaint   Patient presents with    Follow-up     3 month         HPI:  Annual exam and overall she is doing much better since last time I saw her.  She is just finished weaning down from prednisone for her sarcoidosis.  She is going to have a Watchman device implanted next week and I do wish her the best.  The doing this to get her off the Eliquis because she had such problems with bleeding in the past.  Thankfully she is not been anemic for at least a year.  Vital signs do look good.  She did get a left heart catheterization recently which showed very minimal coronary artery disease.  Right heart catheterization did show moderate pulmonary hypertension so she is being medicated appropriately.  She is on the fence about a COVID booster and I will leave that up to her discretion.  She did get a flu shot and RSV vaccine and I am happy about that.  She does need a few refills printed out so she can bring them to her local pharmacy and I have done that for her.  I did review her labs and everything looks really good.    Review of Systems       Negative     Objective:      Physical Exam   Physical Exam       Obese     Vitals:   Vitals:    10/05/23 0859   BP: 116/62   Pulse: (!) 58   SpO2: 95%   Weight: 85.4 kg (188 lb 4.4 oz)   Height: 5' 3" (1.6 m)        Assessment:           Plan:       Nicole Medina  was seen today for follow-up and may need lab work.    Diagnoses and all orders for this visit:    Nicole was seen today for follow-up.    Diagnoses and all orders for this visit:    Wellness examination    Breast cancer screening by mammogram  -     Mammo Digital Screening Bilat w/ Estevan; Future    Sarcoidosis of lung  -     fluticasone propionate (FLONASE) 50 mcg/actuation nasal spray; 1 spray (50 mcg total) by Each Nostril route daily as needed for Allergies.  Weaned off Steroids "   Monitor     Osteopenia of multiple sites  -     ibandronate (BONIVA) 150 mg tablet; Take 1 tablet (150 mg total) by mouth every 30 days.    Gastroesophageal reflux disease without esophagitis  -     omeprazole (PRILOSEC) 40 MG capsule; Take 1 capsule (40 mg total) by mouth once daily.  Controlled with med     Chronic diastolic congestive heart failure  Euvolemic    Essential hypertension  Controlled with med     Paroxysmal atrial fibrillation  Controlled with med   To get Watchman next week to get off Eliquis     Pulmonary hypertension  Monitor on oxygen    [place] (obstructive sleep apnea)  Controlled     Class 1 obesity due to excess calories with serious comorbidity and body mass index (BMI) of 33.0 to 33.9 in adult  Monitor     Other hyperlipidemia  LDL higher than baseline but [facility] was ok  Monitor on Zetia          Henok Medina MD

## 2023-10-10 PROBLEM — Z95.818 PRESENCE OF WATCHMAN LEFT ATRIAL APPENDAGE CLOSURE DEVICE: Status: ACTIVE | Noted: 2023-10-10

## 2023-10-10 PROBLEM — Z91.89: Status: ACTIVE | Noted: 2023-10-10

## 2023-12-26 ENCOUNTER — TELEPHONE (OUTPATIENT)
Dept: FAMILY MEDICINE | Facility: CLINIC | Age: 78
End: 2023-12-26
Payer: MEDICARE

## 2023-12-27 ENCOUNTER — OFFICE VISIT (OUTPATIENT)
Dept: FAMILY MEDICINE | Facility: CLINIC | Age: 78
End: 2023-12-27
Payer: MEDICARE

## 2023-12-27 VITALS
HEIGHT: 63 IN | OXYGEN SATURATION: 98 % | WEIGHT: 187.81 LBS | HEART RATE: 67 BPM | BODY MASS INDEX: 33.28 KG/M2 | DIASTOLIC BLOOD PRESSURE: 64 MMHG | SYSTOLIC BLOOD PRESSURE: 128 MMHG

## 2023-12-27 DIAGNOSIS — M62.838 MUSCLE SPASM: ICD-10-CM

## 2023-12-27 DIAGNOSIS — M54.6 ACUTE BILATERAL THORACIC BACK PAIN: Primary | ICD-10-CM

## 2023-12-27 PROCEDURE — 1160F PR REVIEW ALL MEDS BY PRESCRIBER/CLIN PHARMACIST DOCUMENTED: ICD-10-PCS | Mod: HCNC,CPTII,S$GLB, | Performed by: INTERNAL MEDICINE

## 2023-12-27 PROCEDURE — 1125F PR PAIN SEVERITY QUANTIFIED, PAIN PRESENT: ICD-10-PCS | Mod: HCNC,CPTII,S$GLB, | Performed by: INTERNAL MEDICINE

## 2023-12-27 PROCEDURE — 3288F FALL RISK ASSESSMENT DOCD: CPT | Mod: HCNC,CPTII,S$GLB, | Performed by: INTERNAL MEDICINE

## 2023-12-27 PROCEDURE — 3288F PR FALLS RISK ASSESSMENT DOCUMENTED: ICD-10-PCS | Mod: HCNC,CPTII,S$GLB, | Performed by: INTERNAL MEDICINE

## 2023-12-27 PROCEDURE — 3074F PR MOST RECENT SYSTOLIC BLOOD PRESSURE < 130 MM HG: ICD-10-PCS | Mod: HCNC,CPTII,S$GLB, | Performed by: INTERNAL MEDICINE

## 2023-12-27 PROCEDURE — 1160F RVW MEDS BY RX/DR IN RCRD: CPT | Mod: HCNC,CPTII,S$GLB, | Performed by: INTERNAL MEDICINE

## 2023-12-27 PROCEDURE — 1125F AMNT PAIN NOTED PAIN PRSNT: CPT | Mod: HCNC,CPTII,S$GLB, | Performed by: INTERNAL MEDICINE

## 2023-12-27 PROCEDURE — 1159F PR MEDICATION LIST DOCUMENTED IN MEDICAL RECORD: ICD-10-PCS | Mod: HCNC,CPTII,S$GLB, | Performed by: INTERNAL MEDICINE

## 2023-12-27 PROCEDURE — 99999 PR PBB SHADOW E&M-EST. PATIENT-LVL IV: CPT | Mod: PBBFAC,HCNC,, | Performed by: INTERNAL MEDICINE

## 2023-12-27 PROCEDURE — 3074F SYST BP LT 130 MM HG: CPT | Mod: HCNC,CPTII,S$GLB, | Performed by: INTERNAL MEDICINE

## 2023-12-27 PROCEDURE — 1101F PR PT FALLS ASSESS DOC 0-1 FALLS W/OUT INJ PAST YR: ICD-10-PCS | Mod: HCNC,CPTII,S$GLB, | Performed by: INTERNAL MEDICINE

## 2023-12-27 PROCEDURE — 1101F PT FALLS ASSESS-DOCD LE1/YR: CPT | Mod: HCNC,CPTII,S$GLB, | Performed by: INTERNAL MEDICINE

## 2023-12-27 PROCEDURE — 3078F DIAST BP <80 MM HG: CPT | Mod: HCNC,CPTII,S$GLB, | Performed by: INTERNAL MEDICINE

## 2023-12-27 PROCEDURE — 1159F MED LIST DOCD IN RCRD: CPT | Mod: HCNC,CPTII,S$GLB, | Performed by: INTERNAL MEDICINE

## 2023-12-27 PROCEDURE — 99999 PR PBB SHADOW E&M-EST. PATIENT-LVL IV: ICD-10-PCS | Mod: PBBFAC,HCNC,, | Performed by: INTERNAL MEDICINE

## 2023-12-27 PROCEDURE — 99213 OFFICE O/P EST LOW 20 MIN: CPT | Mod: HCNC,S$GLB,, | Performed by: INTERNAL MEDICINE

## 2023-12-27 PROCEDURE — 3078F PR MOST RECENT DIASTOLIC BLOOD PRESSURE < 80 MM HG: ICD-10-PCS | Mod: HCNC,CPTII,S$GLB, | Performed by: INTERNAL MEDICINE

## 2023-12-27 PROCEDURE — 99213 PR OFFICE/OUTPT VISIT, EST, LEVL III, 20-29 MIN: ICD-10-PCS | Mod: HCNC,S$GLB,, | Performed by: INTERNAL MEDICINE

## 2023-12-27 RX ORDER — TRAMADOL HYDROCHLORIDE 50 MG/1
50 TABLET ORAL EVERY 6 HOURS PRN
Qty: 20 TABLET | Refills: 0 | Status: SHIPPED | OUTPATIENT
Start: 2023-12-27 | End: 2024-02-23

## 2023-12-27 RX ORDER — CYCLOBENZAPRINE HCL 5 MG
5 TABLET ORAL 3 TIMES DAILY PRN
Qty: 30 TABLET | Refills: 0 | Status: SHIPPED | OUTPATIENT
Start: 2023-12-27 | End: 2024-01-06

## 2023-12-27 NOTE — PROGRESS NOTES
"  Subjective     Nicole Medina is a 78 y.o. old, female here for Back Pain and Muscle Pain    Patient was cleaning and prepping for the Christmas holiday and "did too much". She started getting cramps in her lower thoracic back pain. Ibuprofen did not help. It has improved a little since onset but has effected her getting to sleep.  She had similar pain in the past that responded well to muscle relaxers and tramadol.    Review of Systems   Constitutional: Negative.    Gastrointestinal: Negative.    Genitourinary: Negative.      Medications     Outpatient Medications Marked as Taking for the 12/27/23 encounter (Office Visit) with Francesco Nguyen MD   Medication Sig Dispense Refill    albuterol (PROVENTIL) 2.5 mg /3 mL (0.083 %) nebulizer solution Take 3 mLs (2.5 mg total) by nebulization every 6 (six) hours as needed for Wheezing or Shortness of Breath. Rescue 270 mL 5    albuterol (PROVENTIL/VENTOLIN HFA) 90 mcg/actuation inhaler Inhale 1 puff into the lungs once daily. Rescue 3 g 5    aspirin (ECOTRIN) 81 MG EC tablet One tablet p.o. q.day with food (Patient taking differently: Take 81 mg by mouth once daily. One tablet p.o. q.day with food) 90 tablet 3    cholecalciferol, vitamin D3, 125 mcg (5,000 unit) Tab Take 5,000 Units by mouth once daily.      cloNIDine (CATAPRES) 0.1 MG tablet Take 1 tablet (0.1 mg total) by mouth 3 (three) times daily as needed (PRN SBP > 165 mmHg). 90 tablet 6    clopidogreL (PLAVIX) 75 mg tablet Take 1 tablet (75 mg total) by mouth once daily. 30 tablet 11    ezetimibe (ZETIA) 10 mg tablet Take 1 tablet (10 mg total) by mouth once daily. 90 tablet 3    ferrous sulfate (FEOSOL) 325 mg (65 mg iron) Tab tablet Take 325 mg by mouth once daily.      flecainide (TAMBOCOR) 100 MG Tab Take 1 tablet (100 mg total) by mouth every 12 (twelve) hours. 180 tablet 3    fluticasone propion-salmeterol 115-21 mcg/dose (ADVAIR HFA) 115-21 mcg/actuation HFAA inhaler Inhale 2 puffs into the lungs " "every 12 (twelve) hours. Controller 3 g 5    fluticasone propionate (FLONASE) 50 mcg/actuation nasal spray 1 spray (50 mcg total) by Each Nostril route daily as needed for Allergies. 48 g 3    furosemide (LASIX) 20 MG tablet Take 0.5 tablets (10 mg total) by mouth every morning. 90 tablet 3    ibandronate (BONIVA) 150 mg tablet Take 1 tablet (150 mg total) by mouth every 30 days. 12 tablet 3    levocetirizine (XYZAL) 5 MG tablet Take 1 tablet (5 mg total) by mouth nightly as needed for Allergies. 30 tablet 2    metoprolol succinate (TOPROL XL) 50 MG 24 hr tablet Take 1 tablet (50 mg total) by mouth every 12 (twelve) hours. 180 tablet 4    omeprazole (PRILOSEC) 40 MG capsule Take 1 capsule (40 mg total) by mouth once daily. 90 capsule 3    sacubitriL-valsartan (ENTRESTO) 24-26 mg per tablet Take 0.5 tablet by mouth twice daily (Patient taking differently: Take 1 tablet by mouth 2 (two) times daily. Take 0.5 tablet by mouth twice daily) 180 tablet 3    spironolactone (ALDACTONE) 25 MG tablet Take 0.5 tablets (12.5 mg total) by mouth every morning. 90 tablet 3     Objective     /64   Pulse 67   Ht 5' 3" (1.6 m)   Wt 85.2 kg (187 lb 13.3 oz)   SpO2 98%   BMI 33.27 kg/m²   Physical Exam  Constitutional:       Appearance: Normal appearance.   Musculoskeletal:        Back:       Comments: Pain and muscle spasms located above   Neurological:      Mental Status: She is alert.       Assessment and Plan     Acute bilateral thoracic back pain    Muscle spasm    Other orders  -     cyclobenzaprine (FLEXERIL) 5 MG tablet; Take 1 tablet (5 mg total) by mouth 3 (three) times daily as needed for Muscle spasms.  Dispense: 30 tablet; Refill: 0  -     traMADoL (ULTRAM) 50 mg tablet; Take 1 tablet (50 mg total) by mouth every 6 (six) hours as needed for Pain.  Dispense: 20 tablet; Refill: 0      Heat, stretching, massage, and sparing use of medications above.  ___________________  Francesco Nguyen MD  Internal Medicine and " Pediatrics

## 2023-12-28 NOTE — TELEPHONE ENCOUNTER
Per protocol   [Initial Plan] : Initial Plan [Able to exercise self-direction] : able to exercise self-direction [Able to set and pursue goals] : able to set and pursue goals [Adherent to treatment recommendations] : adherent to treatment recommendations [Articulate] : articulate [Attempting to realize their potential] : Attempting to realize their potential [Cognitively intact] : cognitively intact [Insightful] : insightful [Intelligent] : intelligent [Motivated to participate in treatment] : motivated to participate in treatment [Motivated and ready for change] : motivated and ready for change [Health literacy] : health literacy [Housing stability] : housing stability [English fluency] : English fluency [FreeTextEntry3] : 12/27/2023 [FreeTextEntry5] : To improve depressed mood, start being better able to function, and be less socially isolated [FreeTextEntry1] : Semaj French is a 61-year-old male, domiciled alone in a private residence,  for about 15 years, has two adult daughters (23y/o, 21y/o), retired [employer] worker following an accident in 2009, on SSI and pension for financial support, PMH of scoliosis, chronic back pain, renal cancer, past psychiatric history of depression, was following with an outpatient psychiatrist starting 15 years ago but stopped about 2 years ago, medication trials of Prozac 40 mg and short-course of augmenting with Abilify, no history of suicide attempts, 1 prior inpatient psychiatric admission in December 2023, no formal past substance use history, presents to the clinic for intake appointment following discharge from [facility].  On psychiatric evaluation, patient demonstrates symptoms consistent with a major depressive disorder - depressed most of the day, nearly every day, loss of interest and motivation, low appetite, difficulty sleeping, fatigue, feelings of guilt, and suicidal ideation. These symptoms have lasted for a two-week period, and patient endorses that his depressed mood initially worsened around 1.5-2 years ago due to feelings of isolation after his neighbor moved. There are also a number of additional psychosocial stressors, including divorce 15 years ago, ongoing social isolation, some conflict with his older daughter, chronic back pain, not working for about the last 15 years, and stopping Prozac about 2 years ago. Patient feels as though his depression has not improved since starting Prozac and being at 40 mg while at the inpatient psychiatric unit; he also endorses ongoing suicidal ideation with no intention. At this time, patient may benefit from trying an alternative SSRI for depression. Plan will be to cross-taper to initiate Zoloft and taper off Prozac. In addition, will also continue psychotherapy at the clinic. Patient is not acutely suicidal.  Patient has a number of risk factors for suicide, including being male, age > 45, , having depressed mood and suicidal thoughts, having chronic pain. His protective factors include being help seeking, now engaging in mental health treatment, having no previous suicide attempts.   Plan: - Cross-taper to start Zoloft and taper off Prozac ---Take Zoloft 25 mg qdaily for 3 days. If tolerating, may increase to 50 mg qdaily. ---Reduce Prozac to 20 mg qdaily (from 40 mg qdaily) with plan to taper as tolerated - Discontinuing Trazodone as patient reports not taking for the last 7 days and states it did not help with insomnia - Continue therapy with Anna - Obtain additional history at the next visit (including family psychiatric history) - Follow up in 2 weeks

## 2024-01-09 ENCOUNTER — PATIENT MESSAGE (OUTPATIENT)
Dept: FAMILY MEDICINE | Facility: CLINIC | Age: 79
End: 2024-01-09
Payer: MEDICARE

## 2024-01-22 ENCOUNTER — TELEPHONE (OUTPATIENT)
Dept: RADIATION ONCOLOGY | Facility: CLINIC | Age: 79
End: 2024-01-22
Payer: MEDICARE

## 2024-02-16 ENCOUNTER — HOSPITAL ENCOUNTER (OUTPATIENT)
Dept: RADIOLOGY | Facility: HOSPITAL | Age: 79
Discharge: HOME | End: 2024-02-16
Attending: RADIOLOGY
Payer: MEDICARE

## 2024-02-16 ENCOUNTER — OFFICE VISIT (OUTPATIENT)
Dept: RADIATION ONCOLOGY | Facility: CLINIC | Age: 79
End: 2024-02-16
Payer: MEDICARE

## 2024-02-16 VITALS
SYSTOLIC BLOOD PRESSURE: 124 MMHG | BODY MASS INDEX: 34.1 KG/M2 | OXYGEN SATURATION: 93 % | DIASTOLIC BLOOD PRESSURE: 68 MMHG | WEIGHT: 192.44 LBS | HEIGHT: 63 IN

## 2024-02-16 DIAGNOSIS — Z85.118 PERSONAL HISTORY OF LUNG CANCER: ICD-10-CM

## 2024-02-16 DIAGNOSIS — Z85.118 PERSONAL HISTORY OF LUNG CANCER: Primary | ICD-10-CM

## 2024-02-16 PROCEDURE — 99999 PR PBB SHADOW E&M-EST. PATIENT-LVL III: CPT | Mod: PBBFAC,HCNC,, | Performed by: RADIOLOGY

## 2024-02-16 PROCEDURE — 71250 CT THORAX DX C-: CPT | Mod: 26,HCNC,, | Performed by: RADIOLOGY

## 2024-02-16 PROCEDURE — 71250 CT THORAX DX C-: CPT | Mod: TC,HCNC

## 2024-02-16 PROCEDURE — 3074F SYST BP LT 130 MM HG: CPT | Mod: HCNC,CPTII,S$GLB, | Performed by: RADIOLOGY

## 2024-02-16 PROCEDURE — 99213 OFFICE O/P EST LOW 20 MIN: CPT | Mod: HCNC,S$GLB,, | Performed by: RADIOLOGY

## 2024-02-16 PROCEDURE — 3288F FALL RISK ASSESSMENT DOCD: CPT | Mod: HCNC,CPTII,S$GLB, | Performed by: RADIOLOGY

## 2024-02-16 PROCEDURE — 3078F DIAST BP <80 MM HG: CPT | Mod: HCNC,CPTII,S$GLB, | Performed by: RADIOLOGY

## 2024-02-16 PROCEDURE — 1126F AMNT PAIN NOTED NONE PRSNT: CPT | Mod: HCNC,CPTII,S$GLB, | Performed by: RADIOLOGY

## 2024-02-16 PROCEDURE — 1101F PT FALLS ASSESS-DOCD LE1/YR: CPT | Mod: HCNC,CPTII,S$GLB, | Performed by: RADIOLOGY

## 2024-02-16 NOTE — PROGRESS NOTES
02/16/2024    Radiation Oncology Follow-Up Visit      Prior Radiation History:    Site  Technique  Energy  Dose/Fx (Gy)  #Fx  Total Dose (Gy)  Start Date  End Date  Elapsed Days    DOC Lung  SBRT  6X  11  5 / 5  55  2/7/2020 2/13/2020  6        Assessment   This is a 78 y.o. y/o female with Stage IA2 (cT1b cN0 M0) DOC NSCLC (adeno) diagnosed on biopsy 12/20/19. History significant for sarcoidosis. CT Chest 1/14/20 demonstrated 1.7 cm DOC primary and multiple other stable sub-centimeter nodules with mild hilar and mediastinal adenopathy. She underwent attempted VATS resection by Dr. Mccall 1/16/20, which was aborted due to poor toleration of single lung ventilation. She completed definitive SBRT 55 Gy in 5 fx on 2/13/20.     No significant late toxicity from treatment.  CT Chest today 2/16/24 demonstrates stable post-radiation changes in the DOC apex; persistent pulmonary interstitial changes and lymphadenopathy related to sarcoid. These appear stable by my review.     She is now 4 years out from treatment. NSCLC is most likely to recur within the first 2-3 years following treatment. We discussed continuing with twice yearly CT Chest vs moving to annual exams. I am comfortable with either option. She elected to move to annual surveillance.        Plan   1) I will see her back in 1 year with CT Chest prior for re-staging.          HPI:   Has had issues related to her A-Fib since last visit. Scheduled for JEB and cardioversion next week. She denies chest pain. She does have dyspnea on exertion but feels that this is stable and worse when in A-Fib.       Past Medical History:   Diagnosis Date    Acute ischemic right MCA stroke 06/02/2017    Anticoagulant long-term use     Arthritis     Cancer 02/2020    lung cancer, small cell     Cancer 06/2020    skin    Cataract     done ou    Colon polyp 11/22/2010    Encounter for blood transfusion     Essential hypertension 08/21/2017    GERD (gastroesophageal reflux disease)      Hiatal hernia     History of radiation therapy     History of seasonal allergies     Iron deficiency anemia due to chronic blood loss 05/18/2022    On home oxygen therapy     while sleeping    Polio     as a child    Presbyopia     PSVT (paroxysmal supraventricular tachycardia)     Pulmonary fibrosis     Dr. Miramontes    Sarcoidosis 2013    Sciatica     TIA (transient ischemic attack) 06/02/2017    [facility]//    Vertigo     Wound of left leg        Past Surgical History:   Procedure Laterality Date    CATARACT EXTRACTION W/  INTRAOCULAR LENS IMPLANT Left 03/27/2018    Dr Higginbotham    CATARACT EXTRACTION W/  INTRAOCULAR LENS IMPLANT Right 05/08/2018    Dr Higginbotham    CHOLECYSTECTOMY      CLOSURE OF LEFT ATRIAL APPENDAGE USING DEVICE Left 10/10/2023    Procedure: Watchman;  Surgeon: Randy Rothman III, MD;  Location: [facility] CATH;  Service: Cardiology;  Laterality: Left;    ECHOCARDIOGRAM,TRANSESOPHAGEAL N/A 10/10/2023    Procedure: (JEB) intra-procedure;  Surgeon: Mason Lyn MD;  Location: [facility];  Service: Cardiology;  Laterality: N/A;    ECHOCARDIOGRAM,TRANSESOPHAGEAL N/A 11/22/2023    Procedure: Transesophageal echo (JEB) intra-procedure log documentation;  Surgeon: Heath Johnson MD;  Location: [facility];  Service: Cardiology;  Laterality: N/A;  6 week post-implant JEB for Watchman    ESOPHAGEAL DILATION N/A 11/22/2018    Procedure: DILATION, ESOPHAGUS;  Surgeon: Robb Fitzgerald Jr., MD;  Location: [facility];  Service: Endoscopy;  Laterality: N/A;    ESOPHAGEAL DILATION N/A 7/5/2023    Procedure: DILATION, ESOPHAGUS;  Surgeon: Yaakov Schroeder MD;  Location: [facility];  Service: Endoscopy;  Laterality: N/A;    ESOPHAGOGASTRODUODENOSCOPY N/A 11/22/2018    Procedure: ESOPHAGOGASTRODUODENOSCOPY (EGD);  Surgeon: Robb Fitzgerald Jr., MD;  Location: [facility];  Service: Endoscopy;  Laterality: N/A;    ESOPHAGOGASTRODUODENOSCOPY N/A 2/19/2021    Procedure: EGD (ESOPHAGOGASTRODUODENOSCOPY);  Surgeon:  Robb Fitzgerald Jr., MD;  Location: [facility] ENDO;  Service: Endoscopy;  Laterality: N/A;    ESOPHAGOGASTRODUODENOSCOPY N/A 7/5/2023    Procedure: EGD (ESOPHAGOGASTRODUODENOSCOPY);  Surgeon: Yaakov Schroeder MD;  Location: [facility] ENDO;  Service: Endoscopy;  Laterality: N/A;    EYE SURGERY      HAND SURGERY Right     trauma repair    INSERTION OF IMPLANTABLE LOOP RECORDER Left 4/19/2022    Procedure: Insertion, Implantable Loop Recorder;  Surgeon: Thanh Fuentes MD;  Location: [facility] CATH;  Service: Cardiology;  Laterality: Left;    LEFT HEART CATHETERIZATION N/A 1/4/2021    Procedure: Left heart cath;  Surgeon: Laron Falcon MD;  Location: [facility] CATH;  Service: Cardiology;  Laterality: N/A;    LEFT HEART CATHETERIZATION  8/22/2023    Procedure: Left heart cath;  Surgeon: Heath Johnson MD;  Location: [facility] CATH;  Service: Cardiology;;    RIGHT HEART CATHETERIZATION  8/22/2023    Procedure: Right heart cath;  Surgeon: Heath Johnson MD;  Location: [facility] CATH;  Service: Cardiology;;    TONSILLECTOMY      TOTAL ABDOMINAL HYSTERECTOMY      VARICOSE VEIN SURGERY Bilateral     bilateral legs    VIDEO-ASSISTED THORACOSCOPIC SURGERY (VATS)  1/16/2020    Procedure: VATS (VIDEO-ASSISTED THORACOSCOPIC SURGERY) - exploratory;  Surgeon: Ritesh Mccall MD;  Location: [address];  Service: Thoracic;;       Social History     Tobacco Use    Smoking status: Former    Smokeless tobacco: Never    Tobacco comments:     as teenager   Substance Use Topics    Alcohol use: No    Drug use: No       Cancer-related family history includes Breast cancer in her maternal grandmother and another family member; Cancer in her daughter, mother, and sister.    Current Outpatient Medications on File Prior to Visit   Medication Sig Dispense Refill    albuterol (PROVENTIL) 2.5 mg /3 mL (0.083 %) nebulizer solution Take 3 mLs (2.5 mg total) by nebulization every 6 (six) hours as needed for Wheezing or Shortness of Breath. Rescue 270 mL 5     albuterol (PROVENTIL/VENTOLIN HFA) 90 mcg/actuation inhaler Inhale 1 puff into the lungs once daily. Rescue 3 g 5    aspirin (ECOTRIN) 81 MG EC tablet One tablet p.o. q.day with food 90 tablet 3    cholecalciferol, vitamin D3, 125 mcg (5,000 unit) Tab Take 5,000 Units by mouth once daily.      cloNIDine (CATAPRES) 0.1 MG tablet Take 1 tablet (0.1 mg total) by mouth 3 (three) times daily as needed (PRN SBP > 165 mmHg). 90 tablet 6    clopidogreL (PLAVIX) 75 mg tablet Take 1 tablet (75 mg total) by mouth once daily. 30 tablet 11    dronedarone (MULTAQ) 400 mg Tab Take 1 tablet (400 mg total) by mouth 2 (two) times daily with meals. 180 tablet 3    ezetimibe (ZETIA) 10 mg tablet Take 1 tablet (10 mg total) by mouth once daily. 90 tablet 3    ferrous sulfate (FEOSOL) 325 mg (65 mg iron) Tab tablet Take 325 mg by mouth once daily.      fluticasone propion-salmeterol 115-21 mcg/dose (ADVAIR HFA) 115-21 mcg/actuation HFAA inhaler Inhale 2 puffs into the lungs every 12 (twelve) hours. Controller 3 g 5    fluticasone propionate (FLONASE) 50 mcg/actuation nasal spray 1 spray (50 mcg total) by Each Nostril route daily as needed for Allergies. 48 g 3    furosemide (LASIX) 20 MG tablet Take 0.5 tablets (10 mg total) by mouth every morning. 90 tablet 3    ibandronate (BONIVA) 150 mg tablet Take 1 tablet (150 mg total) by mouth every 30 days. 12 tablet 3    levocetirizine (XYZAL) 5 MG tablet Take 1 tablet (5 mg total) by mouth nightly as needed for Allergies. 30 tablet 2    metoprolol succinate (TOPROL XL) 50 MG 24 hr tablet Take 1 tablet (50 mg total) by mouth every 12 (twelve) hours. 180 tablet 4    omeprazole (PRILOSEC) 40 MG capsule Take 1 capsule (40 mg total) by mouth once daily. 90 capsule 3    sacubitriL-valsartan (ENTRESTO) 24-26 mg per tablet Take 0.5 tablet by mouth twice daily (Patient taking differently: Take 1 tablet by mouth 2 (two) times daily. Take 0.5 tablet by mouth twice daily) 180 tablet 3    spironolactone  "(ALDACTONE) 25 MG tablet Take 0.5 tablets (12.5 mg total) by mouth every morning. 90 tablet 3    traMADoL (ULTRAM) 50 mg tablet Take 1 tablet (50 mg total) by mouth every 6 (six) hours as needed for Pain. 20 tablet 0     No current facility-administered medications on file prior to visit.       Review of patient's allergies indicates:   Allergen Reactions    Latex Itching and Swelling     Itching and swelling to site (local reaction)         Vital Signs: /68   Ht 5' 3" (1.6 m)   Wt 87.3 kg (192 lb 7.4 oz)   SpO2 (!) 93%   BMI 34.09 kg/m²     ECOG Performance Status: 1    Physical Exam  Constitutional:       Appearance: Normal appearance.   HENT:      Head: Normocephalic and atraumatic.   Eyes:      General: No scleral icterus.     Extraocular Movements: Extraocular movements intact.   Pulmonary:      Effort: Pulmonary effort is normal. No accessory muscle usage or respiratory distress.   Musculoskeletal:      Cervical back: Normal range of motion.   Neurological:      Mental Status: She is alert and oriented to person, place, and time.   Psychiatric:         Mood and Affect: Mood and affect normal.         Judgment: Judgment normal.          Labs:    Imaging: I have personally reviewed the patient's available images and reports and summarized pertinent findings above in HPI.     Pathology: No new path                  "

## 2024-02-21 ENCOUNTER — TELEPHONE (OUTPATIENT)
Dept: RADIATION ONCOLOGY | Facility: CLINIC | Age: 79
End: 2024-02-21
Payer: MEDICARE

## 2024-02-21 NOTE — TELEPHONE ENCOUNTER
I spoke with patient to let her know that she had some interval enlargement of mediastinal nodes on her recent CT Chest; possibly related to sarcoidosis, but I would like to review her case at our weekly Thoracic Multidisciplinary conference. Her case is scheduled to be presented next Wednesday 2/28/24. I will call her after the conference to discuss recommendations.

## 2024-02-23 ENCOUNTER — LAB VISIT (OUTPATIENT)
Dept: LAB | Facility: HOSPITAL | Age: 79
End: 2024-02-23
Payer: MEDICARE

## 2024-02-23 DIAGNOSIS — J84.9 ILD (INTERSTITIAL LUNG DISEASE): ICD-10-CM

## 2024-02-23 DIAGNOSIS — I48.0 PAROXYSMAL ATRIAL FIBRILLATION: ICD-10-CM

## 2024-02-23 DIAGNOSIS — D86.0 SARCOIDOSIS OF LUNG: ICD-10-CM

## 2024-02-23 DIAGNOSIS — Z91.89 AT RISK FOR EXCESSIVE BLEEDING: ICD-10-CM

## 2024-02-23 DIAGNOSIS — D50.0 IRON DEFICIENCY ANEMIA DUE TO CHRONIC BLOOD LOSS: ICD-10-CM

## 2024-02-23 LAB
ALBUMIN SERPL BCP-MCNC: 3.5 G/DL (ref 3.5–5.2)
ALP SERPL-CCNC: 64 U/L (ref 55–135)
ALT SERPL W/O P-5'-P-CCNC: 13 U/L (ref 10–44)
ANION GAP SERPL CALC-SCNC: 8 MMOL/L (ref 8–16)
AST SERPL-CCNC: 18 U/L (ref 10–40)
BASOPHILS # BLD AUTO: 0.1 K/UL (ref 0–0.2)
BASOPHILS NFR BLD: 1.3 % (ref 0–1.9)
BILIRUB SERPL-MCNC: 0.7 MG/DL (ref 0.1–1)
BUN SERPL-MCNC: 16 MG/DL (ref 8–23)
CALCIUM SERPL-MCNC: 9.5 MG/DL (ref 8.7–10.5)
CHLORIDE SERPL-SCNC: 106 MMOL/L (ref 95–110)
CHOLEST SERPL-MCNC: 181 MG/DL (ref 120–199)
CHOLEST/HDLC SERPL: 2.3 {RATIO} (ref 2–5)
CO2 SERPL-SCNC: 26 MMOL/L (ref 23–29)
CREAT SERPL-MCNC: 0.8 MG/DL (ref 0.5–1.4)
CRP SERPL-MCNC: 17.5 MG/L (ref 0–8.2)
DIFFERENTIAL METHOD BLD: ABNORMAL
EOSINOPHIL # BLD AUTO: 0.8 K/UL (ref 0–0.5)
EOSINOPHIL NFR BLD: 10.6 % (ref 0–8)
ERYTHROCYTE [DISTWIDTH] IN BLOOD BY AUTOMATED COUNT: 13.9 % (ref 11.5–14.5)
EST. GFR  (NO RACE VARIABLE): >60 ML/MIN/1.73 M^2
GLUCOSE SERPL-MCNC: 82 MG/DL (ref 70–110)
HCT VFR BLD AUTO: 43.5 % (ref 37–48.5)
HDLC SERPL-MCNC: 80 MG/DL (ref 40–75)
HDLC SERPL: 44.2 % (ref 20–50)
HGB BLD-MCNC: 13.7 G/DL (ref 12–16)
IMM GRANULOCYTES # BLD AUTO: 0.02 K/UL (ref 0–0.04)
IMM GRANULOCYTES NFR BLD AUTO: 0.3 % (ref 0–0.5)
LDLC SERPL CALC-MCNC: 89.6 MG/DL (ref 63–159)
LYMPHOCYTES # BLD AUTO: 1.7 K/UL (ref 1–4.8)
LYMPHOCYTES NFR BLD: 21.8 % (ref 18–48)
MCH RBC QN AUTO: 29.1 PG (ref 27–31)
MCHC RBC AUTO-ENTMCNC: 31.5 G/DL (ref 32–36)
MCV RBC AUTO: 92 FL (ref 82–98)
MONOCYTES # BLD AUTO: 0.6 K/UL (ref 0.3–1)
MONOCYTES NFR BLD: 7.7 % (ref 4–15)
NEUTROPHILS # BLD AUTO: 4.5 K/UL (ref 1.8–7.7)
NEUTROPHILS NFR BLD: 58.3 % (ref 38–73)
NONHDLC SERPL-MCNC: 101 MG/DL
NRBC BLD-RTO: 0 /100 WBC
PLATELET # BLD AUTO: 209 K/UL (ref 150–450)
PMV BLD AUTO: 12.5 FL (ref 9.2–12.9)
POTASSIUM SERPL-SCNC: 5 MMOL/L (ref 3.5–5.1)
PROT SERPL-MCNC: 7.6 G/DL (ref 6–8.4)
RBC # BLD AUTO: 4.71 M/UL (ref 4–5.4)
SODIUM SERPL-SCNC: 140 MMOL/L (ref 136–145)
TRIGL SERPL-MCNC: 57 MG/DL (ref 30–150)
WBC # BLD AUTO: 7.63 K/UL (ref 3.9–12.7)

## 2024-02-23 PROCEDURE — 85025 COMPLETE CBC W/AUTO DIFF WBC: CPT | Mod: HCNC | Performed by: NURSE PRACTITIONER

## 2024-02-23 PROCEDURE — 36415 COLL VENOUS BLD VENIPUNCTURE: CPT | Mod: HCNC,PO | Performed by: INTERNAL MEDICINE

## 2024-02-23 PROCEDURE — 80061 LIPID PANEL: CPT | Mod: HCNC | Performed by: INTERNAL MEDICINE

## 2024-02-23 PROCEDURE — 80053 COMPREHEN METABOLIC PANEL: CPT | Mod: HCNC | Performed by: INTERNAL MEDICINE

## 2024-02-23 PROCEDURE — 86140 C-REACTIVE PROTEIN: CPT | Mod: HCNC | Performed by: INTERNAL MEDICINE

## 2024-02-24 ENCOUNTER — PATIENT MESSAGE (OUTPATIENT)
Dept: RADIATION ONCOLOGY | Facility: CLINIC | Age: 79
End: 2024-02-24
Payer: MEDICARE

## 2024-02-29 ENCOUNTER — TELEPHONE (OUTPATIENT)
Dept: RADIATION ONCOLOGY | Facility: CLINIC | Age: 79
End: 2024-02-29
Payer: MEDICARE

## 2024-02-29 NOTE — TELEPHONE ENCOUNTER
I called and spoke with patient and her  about recommendations from Thoracic Multidisciplinary Conference. Recommendation was for EBUS to sample enlarging lymph nodes to assess for sarcoidosis vs malignancy.     I think she very likely is having a flare of her sarcoidosis since lymph node recurrence of her Stage 1 lung cancer would be very unexpected this far out from treatment. She is meeting with Dr. Mcmahon today to discuss management of her sarcoidosis and possible bronchoscopy.

## 2024-03-26 PROBLEM — R59.0 HILAR LYMPHADENOPATHY: Status: ACTIVE | Noted: 2024-03-26

## 2024-03-26 PROBLEM — R59.0 MEDIASTINAL LYMPHADENOPATHY: Status: ACTIVE | Noted: 2024-03-26

## 2024-04-02 ENCOUNTER — PATIENT MESSAGE (OUTPATIENT)
Dept: FAMILY MEDICINE | Facility: CLINIC | Age: 79
End: 2024-04-02
Payer: MEDICARE

## 2024-04-02 PROBLEM — Z86.2 HISTORY OF SARCOIDOSIS: Status: ACTIVE | Noted: 2024-04-02

## 2024-04-02 PROBLEM — R04.2 HEMOPTYSIS: Status: ACTIVE | Noted: 2024-04-02

## 2024-04-02 PROBLEM — Z98.890 STATUS POST ABLATION OF ATRIAL FIBRILLATION: Status: ACTIVE | Noted: 2024-04-02

## 2024-04-02 PROBLEM — J96.01 ACUTE RESPIRATORY FAILURE WITH HYPOXIA: Status: ACTIVE | Noted: 2024-04-02

## 2024-04-02 PROBLEM — Z86.79 STATUS POST ABLATION OF ATRIAL FIBRILLATION: Status: ACTIVE | Noted: 2024-04-02

## 2024-04-04 DIAGNOSIS — C34.91 ADENOCARCINOMA OF RIGHT LUNG: Primary | ICD-10-CM

## 2024-04-10 ENCOUNTER — OFFICE VISIT (OUTPATIENT)
Dept: FAMILY MEDICINE | Facility: CLINIC | Age: 79
End: 2024-04-10
Payer: MEDICARE

## 2024-04-10 VITALS
SYSTOLIC BLOOD PRESSURE: 132 MMHG | WEIGHT: 179 LBS | HEART RATE: 82 BPM | BODY MASS INDEX: 31.71 KG/M2 | HEIGHT: 63 IN | OXYGEN SATURATION: 95 % | DIASTOLIC BLOOD PRESSURE: 66 MMHG

## 2024-04-10 DIAGNOSIS — E66.2 MORBID (SEVERE) OBESITY WITH ALVEOLAR HYPOVENTILATION: ICD-10-CM

## 2024-04-10 DIAGNOSIS — I10 ESSENTIAL HYPERTENSION: ICD-10-CM

## 2024-04-10 DIAGNOSIS — I50.32 CHRONIC DIASTOLIC CONGESTIVE HEART FAILURE: ICD-10-CM

## 2024-04-10 DIAGNOSIS — D62 ACUTE BLOOD LOSS ANEMIA: ICD-10-CM

## 2024-04-10 DIAGNOSIS — I70.0 AORTIC ATHEROSCLEROSIS: ICD-10-CM

## 2024-04-10 DIAGNOSIS — I27.20 PULMONARY HYPERTENSION: ICD-10-CM

## 2024-04-10 DIAGNOSIS — J84.9 INTERSTITIAL LUNG DISEASE: ICD-10-CM

## 2024-04-10 DIAGNOSIS — C34.31 PRIMARY ADENOCARCINOMA OF LOWER LOBE OF RIGHT LUNG: Primary | ICD-10-CM

## 2024-04-10 DIAGNOSIS — D69.2 OTHER NONTHROMBOCYTOPENIC PURPURA: ICD-10-CM

## 2024-04-10 DIAGNOSIS — I48.0 PAROXYSMAL ATRIAL FIBRILLATION: ICD-10-CM

## 2024-04-10 PROBLEM — R42 VERTIGO: Status: RESOLVED | Noted: 2019-03-28 | Resolved: 2024-04-10

## 2024-04-10 PROCEDURE — 1160F RVW MEDS BY RX/DR IN RCRD: CPT | Mod: CPTII,S$GLB,, | Performed by: INTERNAL MEDICINE

## 2024-04-10 PROCEDURE — 99999 PR PBB SHADOW E&M-EST. PATIENT-LVL IV: CPT | Mod: PBBFAC,,, | Performed by: INTERNAL MEDICINE

## 2024-04-10 PROCEDURE — 1126F AMNT PAIN NOTED NONE PRSNT: CPT | Mod: CPTII,S$GLB,, | Performed by: INTERNAL MEDICINE

## 2024-04-10 PROCEDURE — 1159F MED LIST DOCD IN RCRD: CPT | Mod: CPTII,S$GLB,, | Performed by: INTERNAL MEDICINE

## 2024-04-10 PROCEDURE — 3078F DIAST BP <80 MM HG: CPT | Mod: CPTII,S$GLB,, | Performed by: INTERNAL MEDICINE

## 2024-04-10 PROCEDURE — 1101F PT FALLS ASSESS-DOCD LE1/YR: CPT | Mod: CPTII,S$GLB,, | Performed by: INTERNAL MEDICINE

## 2024-04-10 PROCEDURE — 3288F FALL RISK ASSESSMENT DOCD: CPT | Mod: CPTII,S$GLB,, | Performed by: INTERNAL MEDICINE

## 2024-04-10 PROCEDURE — 1111F DSCHRG MED/CURRENT MED MERGE: CPT | Mod: CPTII,S$GLB,, | Performed by: INTERNAL MEDICINE

## 2024-04-10 PROCEDURE — 3075F SYST BP GE 130 - 139MM HG: CPT | Mod: CPTII,S$GLB,, | Performed by: INTERNAL MEDICINE

## 2024-04-10 PROCEDURE — 99214 OFFICE O/P EST MOD 30 MIN: CPT | Mod: S$GLB,,, | Performed by: INTERNAL MEDICINE

## 2024-04-10 RX ORDER — SACUBITRIL AND VALSARTAN 24; 26 MG/1; MG/1
TABLET, FILM COATED ORAL
Qty: 180 TABLET | Refills: 3 | Status: SHIPPED | OUTPATIENT
Start: 2024-04-10 | End: 2024-06-07 | Stop reason: SDUPTHER

## 2024-04-10 RX ORDER — CLOPIDOGREL BISULFATE 75 MG/1
75 TABLET ORAL DAILY
Qty: 90 TABLET | Refills: 3 | Status: SHIPPED | OUTPATIENT
Start: 2024-04-10 | End: 2024-05-03

## 2024-04-10 NOTE — PROGRESS NOTES
[facility]  Primary Care   [address]       Patient ID: Nicole Medina     Chief Complaint:   Chief Complaint   Patient presents with    Follow-up     Hospital follow up        HPI:  Patient is here for hospital follow-up.  I was able to follow a long in the her chart.  She went in for routine cryoablation for atrial fibrillation and had the complication of a pulmonary hemorrhage.  Per her, we think her cardiologist damage 1 of her pulmonary veins that caused her to hemorrhage blood into her lungs requiring intubation ventilation for 24 hours.  Thankfully she improved and was successively weaned off the ventilator but she is on an oxygen concentrator.  She does have a background history of interstitial lung disease as well as pulmonary sarcoidosis.  Shortness for breath is controlled at rest but she still gets significantly short of breath on exertion.  She did have a bronchoscopy recently and unfortunately they found recurrence of adenocarcinoma in her right lower lobe.  Previously was on her left upper lobe.  She does have an appointment with Oncology and Radiation Oncology coming up very soon.  At this point I do not think we know the extent of it and I hope for the best that is small and localized.  She has had a persistent cough since being extubated.  It was initially old congealed blood but he has cleared up to clear phlegm.  I do think this was a result of the hemorrhage and being ventilated and will take time to resolve.  I do want her to continue to use her Acapella valve and nebulized breathing treatments to help expectorate any phlegm.  She does request printed prescriptions for Entresto and clopidogrel for higher amounts as it is cheaper her to purchase more rather than less so I will provide that for her today.  She does complain of cold hands.  She has not that anemic as of 1 week ago so I doubt that is causing it.  She is also getting some cramps I think it could be due  "to a relatively low magnesium level so I want her to try over-the-counter magnesium oxide 400 mg at night.    Review of Systems       Cough     Objective:      Physical Exam   Physical Exam       Obese  Fair air movement with some fine crackles, but no wheezes    Vitals:   Vitals:    04/10/24 1041   BP: 132/66   Pulse: 82   SpO2: 95%   Weight: 81.2 kg (179 lb 0.2 oz)   Height: 5' 3" (1.6 m)        Assessment:           Plan:       Nicole Medina  was seen today for follow-up and may need lab work.    Diagnoses and all orders for this visit:    Nicole was seen today for follow-up.    Diagnoses and all orders for this visit:    Primary adenocarcinoma of lower lobe of right lung  To see Medical and Radiation oncology soon     Paroxysmal atrial fibrillation  -     clopidogreL (PLAVIX) 75 mg tablet; Take 1 tablet (75 mg total) by mouth once daily.  Heart rate Controlled     Essential hypertension  -     sacubitriL-valsartan (ENTRESTO) 24-26 mg per tablet; Take 0.5 tablet by mouth twice daily  Controlled with med     Aortic atherosclerosis  Monitor     Chronic diastolic congestive heart failure  -     sacubitriL-valsartan (ENTRESTO) 24-26 mg per tablet; Take 0.5 tablet by mouth twice daily  Continue Lasix and Spironolactone     Interstitial lung disease  Continue Oxygen     Pulmonary hypertension  Continue Oxygen     Acute blood loss anemia  Not that severe   Monitor labs     Morbid (severe) obesity with alveolar hypoventilation  Monitor     Other nonthrombocytopenic purpura  Due to blood thinners          Henok Medina MD    "

## 2024-04-12 ENCOUNTER — TELEPHONE (OUTPATIENT)
Dept: HEMATOLOGY/ONCOLOGY | Facility: CLINIC | Age: 79
End: 2024-04-12
Payer: MEDICARE

## 2024-04-12 NOTE — TELEPHONE ENCOUNTER
"----- Message from Stepan Fernandez sent at 2024 10:48 AM CDT -----  Consult/Advisory:      Name Of Caller: [employer]      Contact Preference?:  [phone] ext [phone]      Case # [id number]      Provider Name: Yen      Does patient feel the need to be seen today? No      What is the nature of the call?: Calling to offer peer to peer for genetics testing auth. Stating offer will  on  @ 12 pm CST      Additional Notes:  "Thank you for all that you do for our patients"       "

## 2024-04-13 ENCOUNTER — OFFICE VISIT (OUTPATIENT)
Dept: URGENT CARE | Facility: CLINIC | Age: 79
End: 2024-04-13
Payer: MEDICARE

## 2024-04-13 VITALS
OXYGEN SATURATION: 96 % | RESPIRATION RATE: 18 BRPM | HEIGHT: 63 IN | TEMPERATURE: 98 F | SYSTOLIC BLOOD PRESSURE: 143 MMHG | HEART RATE: 71 BPM | WEIGHT: 179 LBS | BODY MASS INDEX: 31.71 KG/M2 | DIASTOLIC BLOOD PRESSURE: 77 MMHG

## 2024-04-13 DIAGNOSIS — M79.10 MUSCULAR PAIN: ICD-10-CM

## 2024-04-13 DIAGNOSIS — M54.31 RIGHT SCIATIC NERVE PAIN: Primary | ICD-10-CM

## 2024-04-13 PROCEDURE — 99213 OFFICE O/P EST LOW 20 MIN: CPT | Mod: S$GLB,,, | Performed by: PHYSICIAN ASSISTANT

## 2024-04-13 RX ORDER — TRAMADOL HYDROCHLORIDE 50 MG/1
50 TABLET ORAL EVERY 6 HOURS PRN
Qty: 15 TABLET | Refills: 0 | Status: SHIPPED | OUTPATIENT
Start: 2024-04-13 | End: 2024-04-17

## 2024-04-13 RX ORDER — PREDNISONE 20 MG/1
20 TABLET ORAL DAILY
Qty: 5 TABLET | Refills: 0 | Status: SHIPPED | OUTPATIENT
Start: 2024-04-13 | End: 2024-04-18

## 2024-04-13 RX ORDER — METHOCARBAMOL 750 MG/1
750 TABLET, FILM COATED ORAL 4 TIMES DAILY
Qty: 20 TABLET | Refills: 0 | Status: SHIPPED | OUTPATIENT
Start: 2024-04-13 | End: 2024-04-18

## 2024-04-13 NOTE — PATIENT INSTRUCTIONS

## 2024-04-13 NOTE — PROGRESS NOTES
"Subjective:      Patient ID: Nicole Medina is a 78 y.o. female.    Vitals:  height is 5' 3" (1.6 m) and weight is 81.2 kg (179 lb). Her oral temperature is 97.7 °F (36.5 °C). Her blood pressure is 143/77 (abnormal) and her pulse is 71. Her respiration is 18 and oxygen saturation is 96%.     Chief Complaint: Leg Pain    Patient is here today with a c/o right leg pain. Patient states it started Thursday.  She states she have taken OTC ibuprofen and tylenol and it didn't help at all.    Leg Pain   The incident occurred 3 to 5 days ago. The incident occurred at home. There was no injury mechanism. The pain is present in the right leg. The quality of the pain is described as aching, burning and stabbing. The pain is at a severity of 10/10. The pain is severe. The pain has been Constant since onset. Associated symptoms include an inability to bear weight. She reports no foreign bodies present. The symptoms are aggravated by movement and weight bearing. She has tried acetaminophen, NSAIDs, heat and rest for the symptoms. The treatment provided mild relief.       Constitution: Negative for chills, sweating, fatigue and fever.   HENT:  Negative for ear pain, drooling, congestion, sore throat, trouble swallowing and voice change.    Neck: Negative for neck pain, neck stiffness, painful lymph nodes and neck swelling.   Cardiovascular:  Negative for chest pain, leg swelling, palpitations, sob on exertion and passing out.   Eyes:  Negative for eye pain, eye redness, photophobia, double vision, blurred vision and eyelid swelling.   Respiratory:  Negative for chest tightness, cough, sputum production, bloody sputum, shortness of breath, stridor and wheezing.    Gastrointestinal:  Negative for abdominal pain, abdominal bloating, nausea, vomiting, constipation, diarrhea and heartburn.   Musculoskeletal:  Negative for joint pain, joint swelling, abnormal ROM of joint, back pain, muscle cramps and muscle ache.   Skin:  Negative for " rash and hives.   Allergic/Immunologic: Negative for seasonal allergies, food allergies, hives, itching and sneezing.   Neurological:  Negative for dizziness, light-headedness, passing out, loss of balance, headaches, altered mental status, loss of consciousness and seizures.   Hematologic/Lymphatic: Negative for swollen lymph nodes.   Psychiatric/Behavioral:  Negative for altered mental status and nervous/anxious. The patient is not nervous/anxious.       Objective:     Physical Exam   Constitutional: She is oriented to person, place, and time. She appears well-developed. She is cooperative.   HENT:   Head: Normocephalic and atraumatic.   Ears:   Right Ear: Hearing, tympanic membrane, external ear and ear canal normal.   Left Ear: Hearing, tympanic membrane, external ear and ear canal normal.   Nose: Nose normal. No mucosal edema or nasal deformity. No epistaxis. Right sinus exhibits no maxillary sinus tenderness and no frontal sinus tenderness. Left sinus exhibits no maxillary sinus tenderness and no frontal sinus tenderness.   Mouth/Throat: Uvula is midline, oropharynx is clear and moist and mucous membranes are normal. No trismus in the jaw. Normal dentition. No uvula swelling.   Eyes: Conjunctivae and lids are normal.   Neck: Trachea normal and phonation normal. Neck supple.   Cardiovascular: Normal rate, regular rhythm, normal heart sounds and normal pulses.   Pulmonary/Chest: Effort normal and breath sounds normal.   Abdominal: Normal appearance and bowel sounds are normal. Soft.   Musculoskeletal: Normal range of motion.         General: Tenderness present. Normal range of motion.      Comments: +reproducible pain to right buttock with radiation down right leg   Neurological: She is alert and oriented to person, place, and time. She exhibits normal muscle tone.   Skin: Skin is warm, dry and intact.   Psychiatric: Her speech is normal and behavior is normal. Judgment and thought content normal.   Nursing note  and vitals reviewed.      Assessment:     1. Right sciatic nerve pain    2. Muscular pain        Plan:   Pt recently had ablation. Hx of afib. No swelling or signs of clotting in lower extremity. Follow up with PCP if pain continues. No SOB, CP.    Right sciatic nerve pain    Muscular pain    Other orders  -     methocarbamoL (ROBAXIN) 750 MG Tab; Take 1 tablet (750 mg total) by mouth 4 (four) times daily. for 5 days  Dispense: 20 tablet; Refill: 0  -     predniSONE (DELTASONE) 20 MG tablet; Take 1 tablet (20 mg total) by mouth once daily. for 5 days  Dispense: 5 tablet; Refill: 0  -     traMADoL (ULTRAM) 50 mg tablet; Take 1 tablet (50 mg total) by mouth every 6 (six) hours as needed for Pain.  Dispense: 15 tablet; Refill: 0      Patient Instructions   INSTRUCTIONS:  - Rest.  - Drink plenty of fluids.  - Take Tylenol and/or Ibuprofen as directed as needed for fever/pain.  Do not take more than the recommended dose.  - follow up with your PCP within the next 1-2 weeks as needed.  - You must understand that you have received an Urgent Care treatment only and that you may be released before all of your medical problems are known or treated.   - You, the patient, will arrange for follow up care as instructed.   - If your condition worsens or fails to improve we recommend that you receive another evaluation at the ER immediately or contact your PCP to discuss your concerns.   - You can call [phone] or [phone] to help schedule an appointment with the appropriate provider.     -If you smoke cigarettes, it would be beneficial for you to stop.

## 2024-04-16 NOTE — PROGRESS NOTES
4/17/2024    Radiation Oncology Follow-Up Visit      Prior Radiation History:    Site  Technique  Energy  Dose/Fx (Gy)  #Fx  Total Dose (Gy)  Start Date  End Date  Elapsed Days    DOC Lung  SBRT  6X  11  5 / 5  55  2/7/2020 2/13/2020  6        Assessment   This is a 78 y.o. female with Stage IA2 (cT1b cN0 M0) DOC NSCLC (adeno) diagnosed on biopsy 12/20/19. History significant for sarcoidosis. CT Chest 1/14/20 demonstrated 1.7 cm DOC primary and multiple other stable sub-centimeter nodules with mild hilar and mediastinal adenopathy. She underwent attempted VATS resection by Dr. Mccall 1/16/20, which was aborted due to poor toleration of single lung ventilation. She completed definitive SBRT 55 Gy in 5 fx on 2/13/20.     CT Chest 2/16/24 concerning for enlarging adenopathy. Bronch w/ EBUS by Dr. Garcia 3/26/24. 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 She returns for consideration of treatment options.    Her case was reviewed at the Thoracic Multidisciplinary Conference today with recommendation for consideration of systemic therapy options. Since there is not a clear target for radiation on her imaging (ILD changes indistinguishable from lepidic adeno), she does not have a local therapy option at this time. I discussed with the patient and her  that lepidic adenocarcinoma tends to be very slow growing and often present for years, but there is always a risk that it can develop metastatic potential if untreated.     I will refer her to Dr. Yanci Osborn at [facility] in [place] to discuss pros/cons of treatment at this time. If she decides to proceed with systemic therapy, then she can get routine imaging with Dr. Osborn. If she decides on observation, then I will resume monitoring her with CT Chest/Abd/Pelvis likely q4-6 months.       Plan   1) Referral to Dr. Osborn to discuss  systemic therapy options for lung cancer. Follow up with me pending decision for treatment.          HPI:   Since her last visit with me, she underwent bronch w/ EBUS by Dr. Garcia 3/26/24. 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 She underwent pulmonary vein cryoablation 4/1/24 that was complicated by hemoptysis. She was discharged 4/3/24.     She returns for consideration of treatment options.  In clinic today, she is feeling well and back to her baseline. Reports no further symptomatic Afib since her recent procedure.       Past Medical History:   Diagnosis Date    Acute ischemic right MCA stroke 06/02/2017    Anticoagulant long-term use     Arthritis     Atrial fibrillation     Cancer 02/2020    lung cancer, small cell     Cancer 06/2020    skin    Cataract     done ou    Colon polyp 11/22/2010    Coronary artery disease     loop recorder watchman    Encounter for blood transfusion     Essential hypertension 08/21/2017    GERD (gastroesophageal reflux disease)     Hiatal hernia     History of radiation therapy     History of seasonal allergies     Iron deficiency anemia due to chronic blood loss 05/18/2022    On home oxygen therapy     while sleeping    Polio     as a child    Presbyopia     PSVT (paroxysmal supraventricular tachycardia)     Pulmonary fibrosis     Dr. Miramontes    Sarcoidosis 2013    Sciatica     TIA (transient ischemic attack) 06/02/2017    [facility]//    Vertigo     Wound of left leg        Past Surgical History:   Procedure Laterality Date    ABLATION  4/1/2024    Procedure: Ablation A-Fib;  Surgeon: Randy Rothman III, MD;  Location: [facility];  Service: Cardiology;;    CATARACT EXTRACTION W/  INTRAOCULAR LENS IMPLANT Left 03/27/2018    Dr Higginbotham    CATARACT EXTRACTION W/  INTRAOCULAR LENS IMPLANT Right 05/08/2018    Dr Higginbotham    CHOLECYSTECTOMY       CLOSURE OF LEFT ATRIAL APPENDAGE USING DEVICE Left 10/10/2023    Procedure: Watchman;  Surgeon: Randy Rothman III, MD;  Location: [facility] CATH;  Service: Cardiology;  Laterality: Left;    ECHOCARDIOGRAM,TRANSESOPHAGEAL N/A 10/10/2023    Procedure: (JEB) intra-procedure;  Surgeon: Mason Lyn MD;  Location: [facility] CATH;  Service: Cardiology;  Laterality: N/A;    ECHOCARDIOGRAM,TRANSESOPHAGEAL N/A 11/22/2023    Procedure: Transesophageal echo (JEB) intra-procedure log documentation;  Surgeon: Heath Johnson MD;  Location: [facility];  Service: Cardiology;  Laterality: N/A;  6 week post-implant JEB for Watchman    ENDOBRONCHIAL ULTRASOUND Bilateral 3/26/2024    Procedure: ENDOBRONCHIAL ULTRASOUND (EBUS);  Surgeon: Du Garcia Jr., DO;  Location: [facility] OR;  Service: Pulmonary;  Laterality: Bilateral;  fluoroscopy please    ESOPHAGEAL DILATION N/A 11/22/2018    Procedure: DILATION, ESOPHAGUS;  Surgeon: Robb Fitzgerald Jr., MD;  Location: [facility];  Service: Endoscopy;  Laterality: N/A;    ESOPHAGEAL DILATION N/A 7/5/2023    Procedure: DILATION, ESOPHAGUS;  Surgeon: Yaakov cShroeder MD;  Location: [facility];  Service: Endoscopy;  Laterality: N/A;    ESOPHAGOGASTRODUODENOSCOPY N/A 11/22/2018    Procedure: ESOPHAGOGASTRODUODENOSCOPY (EGD);  Surgeon: Robb Fitzgerald Jr., MD;  Location: [facility];  Service: Endoscopy;  Laterality: N/A;    ESOPHAGOGASTRODUODENOSCOPY N/A 2/19/2021    Procedure: EGD (ESOPHAGOGASTRODUODENOSCOPY);  Surgeon: Robb Fitzgerald Jr., MD;  [facility];  Service: Endoscopy;  Laterality: N/A;    ESOPHAGOGASTRODUODENOSCOPY N/A 7/5/2023    Procedure: EGD (ESOPHAGOGASTRODUODENOSCOPY);  Surgeon: Yaakov Schroeder MD;  Location: [facility];  Service: Endoscopy;  Laterality: N/A;    EYE SURGERY      HAND SURGERY Right     trauma repair    INSERTION OF IMPLANTABLE LOOP RECORDER Left 4/19/2022    Procedure: Insertion, Implantable Loop Recorder;  Surgeon: Thanh Fuentes MD;  Location: [facility]  CATH;  Service: Cardiology;  Laterality: Left;    LEFT HEART CATHETERIZATION N/A 1/4/2021    Procedure: Left heart cath;  Surgeon: Laron Falcon MD;  Location: [facility] CATH;  Service: Cardiology;  Laterality: N/A;    LEFT HEART CATHETERIZATION  8/22/2023    Procedure: Left heart cath;  Surgeon: Heath Johnson MD;  Location: [facility] CATH;  Service: Cardiology;;    RIGHT HEART CATHETERIZATION  8/22/2023    Procedure: Right heart cath;  Surgeon: Heath Johnson MD;  Location: [facility] CATH;  Service: Cardiology;;    TONSILLECTOMY      TOTAL ABDOMINAL HYSTERECTOMY      TRANSESOPHAGEAL ECHOCARDIOGRAM WITH POSSIBLE CARDIOVERSION (JEB W/ POSS CARDIOVERSION) N/A 2/20/2024    Procedure: TRANSESOPHAGEAL ECHOCARDIOGRAM WITH POSSIBLE CARDIOVERSION (JEB W/ POSS CARDIOVERSION);  Surgeon: Heath Johnson MD;  Location: [facility];  Service: Cardiology;  Laterality: N/A;    VARICOSE VEIN SURGERY Bilateral     bilateral legs    VIDEO-ASSISTED THORACOSCOPIC SURGERY (VATS)  1/16/2020    Procedure: VATS (VIDEO-ASSISTED THORACOSCOPIC SURGERY) - exploratory;  Surgeon: Ritesh Mccall MD;  Location: [address];  Service: Thoracic;;       Social History     Tobacco Use    Smoking status: Former    Smokeless tobacco: Never    Tobacco comments:     as teenager   Substance Use Topics    Alcohol use: No    Drug use: No       Cancer-related family history includes Breast cancer in her maternal grandmother and another family member; Cancer in her daughter, mother, and sister.    Current Outpatient Medications on File Prior to Visit   Medication Sig Dispense Refill    albuterol (PROVENTIL) 2.5 mg /3 mL (0.083 %) nebulizer solution Take 3 mLs (2.5 mg total) by nebulization every 6 (six) hours as needed for Wheezing or Shortness of Breath. Rescue 270 mL 5    albuterol (PROVENTIL/VENTOLIN HFA) 90 mcg/actuation inhaler Inhale 1 puff into the lungs once daily. Rescue 3 g 5    apixaban (ELIQUIS) 5 mg Tab Take 1 tablet (5 mg total) by mouth 2  (two) times daily. 90 tablet 3    aspirin (ECOTRIN) 81 MG EC tablet One tablet p.o. q.day with food 90 tablet 3    cholecalciferol, vitamin D3, 125 mcg (5,000 unit) Tab Take 5,000 Units by mouth once daily.      cloNIDine (CATAPRES) 0.1 MG tablet Take 1 tablet (0.1 mg total) by mouth 3 (three) times daily as needed (PRN SBP > 165 mmHg). 90 tablet 6    clopidogreL (PLAVIX) 75 mg tablet Take 1 tablet (75 mg total) by mouth once daily. 90 tablet 3    dronedarone (MULTAQ) 400 mg Tab Take 1 tablet (400 mg total) by mouth 2 (two) times daily with meals. 180 tablet 3    ezetimibe (ZETIA) 10 mg tablet Take 1 tablet (10 mg total) by mouth once daily. 90 tablet 3    ferrous sulfate (FEOSOL) 325 mg (65 mg iron) Tab tablet Take 325 mg by mouth once daily.      fluticasone propion-salmeterol 115-21 mcg/dose (ADVAIR HFA) 115-21 mcg/actuation HFAA inhaler Inhale 2 puffs into the lungs every 12 (twelve) hours. Controller 3 g 5    fluticasone propionate (FLONASE) 50 mcg/actuation nasal spray 2 sprays (100 mcg total) by Each Nostril route once daily. 48 g 3    furosemide (LASIX) 20 MG tablet Take 0.5 tablets (10 mg total) by mouth every morning. 90 tablet 3    ibandronate (BONIVA) 150 mg tablet Take 1 tablet (150 mg total) by mouth every 30 days. 12 tablet 3    levocetirizine (XYZAL) 5 MG tablet Take 1 tablet (5 mg total) by mouth nightly as needed for Allergies. 30 tablet 2    methocarbamoL (ROBAXIN) 750 MG Tab Take 1 tablet (750 mg total) by mouth 4 (four) times daily. for 5 days 20 tablet 0    metoprolol succinate (TOPROL XL) 50 MG 24 hr tablet Take 1 tablet (50 mg total) by mouth every 12 (twelve) hours. 180 tablet 4    omeprazole (PRILOSEC) 40 MG capsule Take 1 capsule (40 mg total) by mouth once daily. 90 capsule 3    predniSONE (DELTASONE) 20 MG tablet Take 1 tablet (20 mg total) by mouth once daily. for 5 days 5 tablet 0    sacubitriL-valsartan (ENTRESTO) 24-26 mg per tablet Take 0.5 tablet by mouth twice daily 180 tablet 3  "   spironolactone (ALDACTONE) 25 MG tablet Take 0.5 tablets (12.5 mg total) by mouth every morning. 90 tablet 3    traMADoL (ULTRAM) 50 mg tablet Take 1 tablet (50 mg total) by mouth every 6 (six) hours as needed for Pain. 15 tablet 0     Current Facility-Administered Medications on File Prior to Visit   Medication Dose Route Frequency Provider Last Rate Last Admin    HYDROmorphone injection 0.5 mg  0.5 mg Intravenous Q5 Min PRN Selina Shea MD        ondansetron injection 4 mg  4 mg Intravenous Daily PRN Selina Shea MD        oxyCODONE-acetaminophen 5-325 mg per tablet 1 tablet  1 tablet Oral Q3H PRN Selina Shea MD           Review of patient's allergies indicates:   Allergen Reactions    Latex Itching and Swelling     Itching and swelling to site (local reaction)         Vital Signs: /85 (BP Location: Right arm, Patient Position: Sitting)   Pulse 72   Temp 97.6 °F (36.4 °C)   Resp 17   Ht 5' 3" (1.6 m)   Wt 84 kg (185 lb 3 oz)   SpO2 (!) 92%   BMI 32.80 kg/m²     ECOG Performance Status: 1    Physical Exam  Constitutional:       Appearance: Normal appearance.   HENT:      Head: Normocephalic and atraumatic.   Eyes:      General: No scleral icterus.     Extraocular Movements: Extraocular movements intact.   Pulmonary:      Effort: Pulmonary effort is normal. No accessory muscle usage or respiratory distress.   Musculoskeletal:      Cervical back: Normal range of motion.   Neurological:      Mental Status: She is alert and oriented to person, place, and time.   Psychiatric:         Mood and Affect: Mood and affect normal.         Judgment: Judgment normal.          Labs:    Imaging: No new imaging    Pathology: I have personally reviewed the patient's available pathology reports and summarized pertinent findings above in HPI.                     "

## 2024-04-17 ENCOUNTER — OFFICE VISIT (OUTPATIENT)
Dept: RADIATION ONCOLOGY | Facility: CLINIC | Age: 79
End: 2024-04-17
Payer: MEDICARE

## 2024-04-17 ENCOUNTER — TELEPHONE (OUTPATIENT)
Dept: HEMATOLOGY/ONCOLOGY | Facility: CLINIC | Age: 79
End: 2024-04-17
Payer: MEDICARE

## 2024-04-17 VITALS
TEMPERATURE: 98 F | DIASTOLIC BLOOD PRESSURE: 85 MMHG | SYSTOLIC BLOOD PRESSURE: 133 MMHG | BODY MASS INDEX: 32.81 KG/M2 | RESPIRATION RATE: 17 BRPM | OXYGEN SATURATION: 92 % | HEIGHT: 63 IN | HEART RATE: 72 BPM | WEIGHT: 185.19 LBS

## 2024-04-17 DIAGNOSIS — C34.91 ADENOCARCINOMA OF RIGHT LUNG: Primary | ICD-10-CM

## 2024-04-17 DIAGNOSIS — C34.31 PRIMARY ADENOCARCINOMA OF LOWER LOBE OF RIGHT LUNG: Primary | ICD-10-CM

## 2024-04-17 PROCEDURE — 3288F FALL RISK ASSESSMENT DOCD: CPT | Mod: CPTII,S$GLB,, | Performed by: RADIOLOGY

## 2024-04-17 PROCEDURE — 1159F MED LIST DOCD IN RCRD: CPT | Mod: CPTII,S$GLB,, | Performed by: RADIOLOGY

## 2024-04-17 PROCEDURE — 99213 OFFICE O/P EST LOW 20 MIN: CPT | Mod: S$GLB,,, | Performed by: RADIOLOGY

## 2024-04-17 PROCEDURE — 1101F PT FALLS ASSESS-DOCD LE1/YR: CPT | Mod: CPTII,S$GLB,, | Performed by: RADIOLOGY

## 2024-04-17 PROCEDURE — 1111F DSCHRG MED/CURRENT MED MERGE: CPT | Mod: CPTII,S$GLB,, | Performed by: RADIOLOGY

## 2024-04-17 PROCEDURE — 1126F AMNT PAIN NOTED NONE PRSNT: CPT | Mod: CPTII,S$GLB,, | Performed by: RADIOLOGY

## 2024-04-17 PROCEDURE — 3079F DIAST BP 80-89 MM HG: CPT | Mod: CPTII,S$GLB,, | Performed by: RADIOLOGY

## 2024-04-17 PROCEDURE — 99999 PR PBB SHADOW E&M-EST. PATIENT-LVL V: CPT | Mod: PBBFAC,,, | Performed by: RADIOLOGY

## 2024-04-17 PROCEDURE — 3075F SYST BP GE 130 - 139MM HG: CPT | Mod: CPTII,S$GLB,, | Performed by: RADIOLOGY

## 2024-04-17 NOTE — NURSING
Received message from Dr. Kirby Sibley asking to set patient up with  Dr. Osborn per referral for recurrent lung adenocarcinoma.   Reached out to patient, and she accepted our next available appt of Thursday, 4/25 at 1300 with Dr. Osborn.  Date, time, and location confirmed.     Oncology Navigation   Intake  Date of Diagnosis: 04/02/24  Cancer Type: Thoracic  Type of Referral: Internal  Date of Referral: 04/17/24  Initial Nurse Navigator Contact: 04/17/24  Referral to Initial Contact Timeline (days): 0  Date Worked: 04/17/24  First Appointment Available: 04/25/24  Appointment Date: 04/25/24  First Available Date vs. Scheduled Date (days): 0     Treatment  Current Status: Active  Date Presented to Tumor Board: 04/17/24       Medical Oncologist: Dr. Osborn  Consult Date: 04/25/24    Radiation Oncologist: Dr. Kirby Sibley    Procedures: Bronchoscopy; CT; Biopsy  Biopsy Schedule Date: 03/26/24  Bronchoscopy Schedule Date: 03/26/24  CT Schedule Date: 03/25/24          Radiation Oncologist: Dr. Kirby Sibley    Support Systems: Spouse/significant other     Acuity      Follow Up  No follow-ups on file.

## 2024-04-23 LAB
DNA RANGE(S) EXAMINED NAR: NORMAL
GENE DIS ANL INTERP-IMP: NORMAL
GENE DIS ASSESSED: NORMAL
GENE MUT TESTED BLD/T: 1.1 M/MB
MSI CA SPEC-IMP: NORMAL
PD-L1 BY 22C3 TISS IMSTN DOC: NEGATIVE
REASON FOR STUDY: NORMAL
TEMPUS FUSIONADDENDUM: NORMAL
TEMPUS LCA: NORMAL
TEMPUS PD-L1 (22C3) COMBINED POSITIVE SCORE: <1
TEMPUS PD-L1 (22C3) TUMOR PROPORTION SCORE: <1 %
TEMPUS PERTINENTNEGATIVES: NORMAL
TEMPUS PORTAL: NORMAL

## 2024-04-24 NOTE — PROGRESS NOTES
Subjective     Patient ID: Nicole Medina is a 78 y.o. female.    Chief Complaint: lung adenocarcinoma  Referring physician:  Dr. Kirby Sibley MD Radiation Oncology    HPI 78 y.o. female with Stage IA2 (cT1b cN0 M0) DOC NSCLC (adeno) diagnosed on biopsy 12/20/19. History significant for sarcoidosis. CT Chest 1/14/20 demonstrated 1.7 cm DOC primary and multiple other stable sub-centimeter nodules with mild hilar and mediastinal adenopathy. She underwent attempted VATS resection by Dr. Mccall 1/16/20, which was aborted due to poor toleration of single lung ventilation. She completed definitive SBRT 55 Gy in 5 fx on 2/13/20.      CT Chest 2/16/24 concerning for enlarging adenopathy. Bronch w/ EBUS by Dr. Garcia 3/26/24. 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 She returns for consideration of treatment options.     Her case was reviewed at the Thoracic Multidisciplinary Conference on 04/17/2024 with recommendation for consideration of systemic therapy options. Since there is not a clear target for radiation on her imaging (ILD changes indistinguishable from lepidic adeno), she does not have a local therapy option at this time. I discussed with the patient and her  that lepidic adenocarcinoma tends to be very slow growing and often present for years, but there is always a risk that it can develop metastatic potential if untreated.     [facility] NGS tissue with no targets, PDL1 is less than 1%  She comes in to discuss further management.  She notes cough mostly dry. she gets short of breath on minimal exertion, she is here with her .  ECOG PS is about 1 mainly due to shortness of breath limiting exertion    Review of Systems   Constitutional:  Negative for appetite change, fatigue and unexpected weight change.   HENT:  Negative for mouth sores.    Eyes:  Negative for  visual disturbance.   Respiratory:  Positive for shortness of breath. Negative for cough.    Cardiovascular:  Negative for chest pain.   Gastrointestinal:  Negative for abdominal pain and diarrhea.   Genitourinary:  Negative for frequency.   Musculoskeletal:  Negative for back pain.   Integumentary:  Negative for rash.   Neurological:  Negative for headaches.   Hematological:  Negative for adenopathy.   Psychiatric/Behavioral:  The patient is not nervous/anxious.    All other systems reviewed and are negative.       Allergies:  Review of patient's allergies indicates:   Allergen Reactions    Latex Itching and Swelling     Itching and swelling to site (local reaction)       Medications:  Current Outpatient Medications   Medication Sig Dispense Refill    albuterol (PROVENTIL) 2.5 mg /3 mL (0.083 %) nebulizer solution Take 3 mLs (2.5 mg total) by nebulization every 6 (six) hours as needed for Wheezing or Shortness of Breath. Rescue 270 mL 5    albuterol (PROVENTIL/VENTOLIN HFA) 90 mcg/actuation inhaler Inhale 1 puff into the lungs once daily. Rescue 3 g 5    apixaban (ELIQUIS) 5 mg Tab Take 1 tablet (5 mg total) by mouth 2 (two) times daily. 90 tablet 3    aspirin (ECOTRIN) 81 MG EC tablet One tablet p.o. q.day with food 90 tablet 3    cholecalciferol, vitamin D3, 125 mcg (5,000 unit) Tab Take 5,000 Units by mouth once daily.      cloNIDine (CATAPRES) 0.1 MG tablet Take 1 tablet (0.1 mg total) by mouth 3 (three) times daily as needed (PRN SBP > 165 mmHg). 90 tablet 6    clopidogreL (PLAVIX) 75 mg tablet Take 1 tablet (75 mg total) by mouth once daily. 90 tablet 3    dronedarone (MULTAQ) 400 mg Tab Take 1 tablet (400 mg total) by mouth 2 (two) times daily with meals. 180 tablet 3    ezetimibe (ZETIA) 10 mg tablet Take 1 tablet (10 mg total) by mouth once daily. 90 tablet 3    ferrous sulfate (FEOSOL) 325 mg (65 mg iron) Tab tablet Take 325 mg by mouth once daily.      fluticasone propion-salmeterol 115-21 mcg/dose  (ADVAIR HFA) 115-21 mcg/actuation HFAA inhaler Inhale 2 puffs into the lungs every 12 (twelve) hours. Controller 3 g 5    fluticasone propionate (FLONASE) 50 mcg/actuation nasal spray 2 sprays (100 mcg total) by Each Nostril route once daily. 48 g 3    furosemide (LASIX) 20 MG tablet Take 0.5 tablets (10 mg total) by mouth every morning. 90 tablet 3    ibandronate (BONIVA) 150 mg tablet Take 1 tablet (150 mg total) by mouth every 30 days. 12 tablet 3    levocetirizine (XYZAL) 5 MG tablet Take 1 tablet (5 mg total) by mouth nightly as needed for Allergies. 30 tablet 2    metoprolol succinate (TOPROL XL) 50 MG 24 hr tablet Take 1 tablet (50 mg total) by mouth every 12 (twelve) hours. 180 tablet 4    omeprazole (PRILOSEC) 40 MG capsule Take 1 capsule (40 mg total) by mouth once daily. 90 capsule 3    sacubitriL-valsartan (ENTRESTO) 24-26 mg per tablet Take 0.5 tablet by mouth twice daily 180 tablet 3    spironolactone (ALDACTONE) 25 MG tablet Take 0.5 tablets (12.5 mg total) by mouth every morning. 90 tablet 3     No current facility-administered medications for this visit.     Facility-Administered Medications Ordered in Other Visits   Medication Dose Route Frequency Provider Last Rate Last Admin    HYDROmorphone injection 0.5 mg  0.5 mg Intravenous Q5 Min PRN Selina Shea MD        ondansetron injection 4 mg  4 mg Intravenous Daily PRN Selina Shea MD        oxyCODONE-acetaminophen 5-325 mg per tablet 1 tablet  1 tablet Oral Q3H PRN Selina Shea MD           PMH:  Past Medical History:   Diagnosis Date    Acute ischemic right MCA stroke 06/02/2017    Anticoagulant long-term use     Arthritis     Atrial fibrillation     Cancer 02/2020    lung cancer, small cell     Cancer 06/2020    skin    Cataract     done ou    Colon polyp 11/22/2010    Coronary artery disease     loop recorder watchman    Encounter for blood transfusion     Essential hypertension 08/21/2017    GERD (gastroesophageal reflux  disease)     Hiatal hernia     History of radiation therapy     History of seasonal allergies     Iron deficiency anemia due to chronic blood loss 05/18/2022    On home oxygen therapy     while sleeping    Polio     as a child    Presbyopia     PSVT (paroxysmal supraventricular tachycardia)     Pulmonary fibrosis     Dr. Miramontes    Sarcoidosis 2013    Sciatica     TIA (transient ischemic attack) 06/02/2017    [place]    Vertigo     Wound of left leg        PSH:  Past Surgical History:   Procedure Laterality Date    ABLATION  4/1/2024    Procedure: Ablation A-Fib;  Surgeon: Randy Rothman III, MD;  Location: [facility];  Service: Cardiology;;    CATARACT EXTRACTION W/  INTRAOCULAR LENS IMPLANT Left 03/27/2018    Dr Higginbotham    CATARACT EXTRACTION W/  INTRAOCULAR LENS IMPLANT Right 05/08/2018    Dr Higginbotham    CHOLECYSTECTOMY      CLOSURE OF LEFT ATRIAL APPENDAGE USING DEVICE Left 10/10/2023    Procedure: Watchman;  Surgeon: Randy Rothman III, MD;  Location: [facility] CATH;  Service: Cardiology;  Laterality: Left;    ECHOCARDIOGRAM,TRANSESOPHAGEAL N/A 10/10/2023    Procedure: (JEB) intra-procedure;  Surgeon: Mason Lyn MD;  Location: [facility] CATH;  Service: Cardiology;  Laterality: N/A;    ECHOCARDIOGRAM,TRANSESOPHAGEAL N/A 11/22/2023    Procedure: Transesophageal echo (JEB) intra-procedure log documentation;  Surgeon: Heath Johnson MD;  Location: [facility];  Service: Cardiology;  Laterality: N/A;  6 week post-implant JEB for Watchman    ENDOBRONCHIAL ULTRASOUND Bilateral 3/26/2024    Procedure: ENDOBRONCHIAL ULTRASOUND (EBUS);  Surgeon: Du Garcia Jr., DO;  Location: [facility] OR;  Service: Pulmonary;  Laterality: Bilateral;  fluoroscopy please    ESOPHAGEAL DILATION N/A 11/22/2018    Procedure: DILATION, ESOPHAGUS;  Surgeon: Robb Fitzgerald Jr., MD;  Location: [facility] ENDO;  Service: Endoscopy;  Laterality: N/A;    ESOPHAGEAL DILATION N/A 7/5/2023    Procedure: DILATION, ESOPHAGUS;  Surgeon: Yaakov  MD Elda;  Location: [facility];  Service: Endoscopy;  Laterality: N/A;    ESOPHAGOGASTRODUODENOSCOPY N/A 11/22/2018    Procedure: ESOPHAGOGASTRODUODENOSCOPY (EGD);  Surgeon: Robb Fitzgerald Jr., MD;  Location: [facility];  Service: Endoscopy;  Laterality: N/A;    ESOPHAGOGASTRODUODENOSCOPY N/A 2/19/2021    Procedure: EGD (ESOPHAGOGASTRODUODENOSCOPY);  Surgeon: Robb Fitzgerald Jr., MD;  Location: [facility];  Service: Endoscopy;  Laterality: N/A;    ESOPHAGOGASTRODUODENOSCOPY N/A 7/5/2023    Procedure: EGD (ESOPHAGOGASTRODUODENOSCOPY);  Surgeon: Yaakov Schroeder MD;  Location: [facility];  Service: Endoscopy;  Laterality: N/A;    EYE SURGERY      HAND SURGERY Right     trauma repair    INSERTION OF IMPLANTABLE LOOP RECORDER Left 4/19/2022    Procedure: Insertion, Implantable Loop Recorder;  Surgeon: Thanh Fuentes MD;  Location: [facility];  Service: Cardiology;  Laterality: Left;    LEFT HEART CATHETERIZATION N/A 1/4/2021    Procedure: Left heart cath;  Surgeon: Laron Falcon MD;  Location: [facility];  Service: Cardiology;  Laterality: N/A;    LEFT HEART CATHETERIZATION  8/22/2023    Procedure: Left heart cath;  Surgeon: Heath Johnson MD;  Location: [facility];  Service: Cardiology;;    RIGHT HEART CATHETERIZATION  8/22/2023    Procedure: Right heart cath;  Surgeon: Heath Johnson MD;  Location: [facility];  Service: Cardiology;;    TONSILLECTOMY      TOTAL ABDOMINAL HYSTERECTOMY      TRANSESOPHAGEAL ECHOCARDIOGRAM WITH POSSIBLE CARDIOVERSION (JEB W/ POSS CARDIOVERSION) N/A 2/20/2024    Procedure: TRANSESOPHAGEAL ECHOCARDIOGRAM WITH POSSIBLE CARDIOVERSION (JEB W/ POSS CARDIOVERSION);  Surgeon: Heath Johnson MD;  Location: [facility];  Service: Cardiology;  Laterality: N/A;    VARICOSE VEIN SURGERY Bilateral     bilateral legs    VIDEO-ASSISTED THORACOSCOPIC SURGERY (VATS)  1/16/2020    Procedure: VATS (VIDEO-ASSISTED THORACOSCOPIC SURGERY) - exploratory;  Surgeon: Ritesh Mccall MD;  Location:  [facility] OR 2ND FLR;  Service: Thoracic;;       FamHx:  Family History   Problem Relation Name Age of Onset    Cataracts Mother      Macular degeneration Mother      Cancer Mother          lymphoma    Diabetes Father      Hypertension Father      Macular degeneration Sister      Cancer Sister          breast    Breast cancer Maternal Grandmother      Thyroid disease Daughter      Cancer Daughter          thyroid    Breast cancer Other 1/2 sister     Amblyopia Neg Hx      Blindness Neg Hx      Glaucoma Neg Hx      Retinal detachment Neg Hx      Strabismus Neg Hx      Stroke Neg Hx         SocHx:  Social History     Socioeconomic History    Marital status:    Tobacco Use    Smoking status: Former    Smokeless tobacco: Never    Tobacco comments:     as teenager   Substance and Sexual Activity    Alcohol use: No    Drug use: No     Social Determinants of Health     Food Insecurity: No Food Insecurity (4/2/2024)    Hunger Vital Sign     Worried About Running Out of Food in the Last Year: Never true     Ran Out of Food in the Last Year: Never true   Transportation Needs: No Transportation Needs (4/2/2024)    PRAPARE - Transportation     Lack of Transportation (Medical): No     Lack of Transportation (Non-Medical): No   Housing Stability: Low Risk  (4/2/2024)    Housing Stability Vital Sign     Unable to Pay for Housing in the Last Year: No     Number of Places Lived in the Last Year: 1     Unstable Housing in the Last Year: No       Objective     Physical Exam  Vitals reviewed.   Constitutional:       Appearance: She is well-developed.   HENT:      Mouth/Throat:      Pharynx: No oropharyngeal exudate.   Cardiovascular:      Rate and Rhythm: Normal rate.      Heart sounds: Normal heart sounds.   Pulmonary:      Effort: Pulmonary effort is normal.      Breath sounds: Normal breath sounds. No wheezing.   Abdominal:      General: Bowel sounds are normal.      Palpations: Abdomen is soft.      Tenderness: There is no  abdominal tenderness.   Musculoskeletal:         General: No tenderness.   Lymphadenopathy:      Cervical: No cervical adenopathy.   Skin:     General: Skin is warm and dry.      Findings: No rash.   Neurological:      Mental Status: She is alert and oriented to person, place, and time.      Coordination: Coordination normal.   Psychiatric:         Thought Content: Thought content normal.         Judgment: Judgment normal.              LABS:  WBC   Date Value Ref Range Status   04/02/2024 8.24 3.90 - 12.70 K/uL Final     Hemoglobin   Date Value Ref Range Status   04/02/2024 11.0 (L) 12.0 - 16.0 g/dL Final     POC Hematocrit   Date Value Ref Range Status   04/01/2024 36.0 (L) 37.0 - 48.5 %PCV Final     Hematocrit   Date Value Ref Range Status   04/02/2024 34.3 (L) 37.0 - 48.5 % Final     Platelets   Date Value Ref Range Status   04/02/2024 143 (L) 150 - 450 K/uL Final     Gran # (ANC)   Date Value Ref Range Status   04/02/2024 6.7 1.8 - 7.7 K/uL Final     Gran %   Date Value Ref Range Status   04/02/2024 81.0 (H) 38.0 - 73.0 % Final       Chemistry        Component Value Date/Time     04/02/2024 0333    K 4.5 04/02/2024 0333     04/02/2024 0333    CO2 27 04/02/2024 0333    BUN 16 04/02/2024 0333    CREATININE 0.74 04/02/2024 0333     (H) 04/02/2024 0333        Component Value Date/Time    CALCIUM 8.5 04/02/2024 0333    ALKPHOS 61 04/02/2024 0333    AST 52 (H) 04/02/2024 0333    ALT 23 04/02/2024 0333    BILITOT 0.8 04/02/2024 0333    ESTGFRAFRICA >60 05/10/2022 0640    EGFRNONAA >60 05/10/2022 0640          Assessment and Plan     1. Adenocarcinoma of right lung  -     Ambulatory referral/consult to Hematology / Oncology  -     CBC w/ DIFF; Future; Expected date: 04/25/2024  -     CMP; Future; Expected date: 04/25/2024  -     CT Chest Abdomen Pelvis With IV Contrast (XPD) Routine Oral Contrast; Future; Expected date: 04/25/2024  -     MRI Brain W WO Contrast; Future; Expected date: 04/25/2024  -      diazePAM (VALIUM) 5 MG tablet; Take 1 tablet (5 mg total) by mouth every 6 (six) hours as needed for Anxiety.  Dispense: 2 tablet; Refill: 0  -     dexAMETHasone (DECADRON) 6 MG tablet; Take 2 tablets the day prior and 2 tablets for 3 days after chemo starting the day after chemo. Take with breakfast  Dispense: 40 tablet; Refill: 0  -     folic acid (FOLVITE) 1 MG tablet; Take 1 tablet (1 mg total) by mouth once daily.  Dispense: 90 tablet; Refill: 5  -     ondansetron (ZOFRAN) 8 MG tablet; Take 1 tablet (8 mg total) by mouth every 6 (six) hours as needed.  Dispense: 90 tablet; Refill: 2  -     Ambulatory referral/consult to Chemo School; Future; Expected date: 05/02/2024  -     Care Companion Enrollment Chemotherapy; [facility]    2. Sarcoidosis of lung  Overview:  Stable sarcoidosis      3. Interstitial lung disease    4. Implantable loop recorder present  Overview:  4/22 ILR      5. Primary adenocarcinoma of lower lobe of right lung    6. Primary malignant neoplasm of left upper lobe of lung        Route Chart for Scheduling    Med Onc Chart Routing      Follow up with physician . Schedule CT chest, abdomen/pelvis, MRI brain next avaialble. Schedule chemo school. Once approved and chemo school completed, schedule CBC, CMP, Mag and see me prior to starting Carboplatin, ALimta and Neulasta OBI   Follow up with STANISLAW    Infusion scheduling note    Injection scheduling note    Labs    Imaging    Pharmacy appointment    Other referrals            Treatment Plan Information   OP NSCLC PEMETREXED + CARBOPLATIN (AUC) Q3W   Yanci Osborn MD   Upcoming Treatment Dates - OP NSCLC PEMETREXED + CARBOPLATIN (AUC) Q3W    4/30/2024       Chemotherapy       PEMEtrexed disodium (ALIMTA) 925 mg in sodium chloride 0.9% SolP 100 mL chemo infusion       CARBOplatin (PARAPLATIN) in sodium chloride 0.9% 250 mL chemo infusion       Supportive Care       cyanocobalamin injection 1,000 mcg       Antiemetics       aprepitant (CINVANTI)  injection 130 mg       palonosetron 0.25mg/dexAMETHasone 12mg in NS IVPB 0.25 mg 50 mL  5/21/2024       Chemotherapy       PEMEtrexed disodium (ALIMTA) 925 mg in sodium chloride 0.9% SolP 100 mL chemo infusion       CARBOplatin (PARAPLATIN) in sodium chloride 0.9% 250 mL chemo infusion       Antiemetics       aprepitant (CINVANTI) injection 130 mg       palonosetron 0.25mg/dexAMETHasone 12mg in NS IVPB 0.25 mg 50 mL  6/11/2024       Chemotherapy       PEMEtrexed disodium (ALIMTA) 925 mg in sodium chloride 0.9% SolP 100 mL chemo infusion       CARBOplatin (PARAPLATIN) in sodium chloride 0.9% 250 mL chemo infusion       Antiemetics       aprepitant (CINVANTI) injection 130 mg       palonosetron 0.25mg/dexAMETHasone 12mg in NS IVPB 0.25 mg 50 mL  7/2/2024       Chemotherapy       PEMEtrexed disodium (ALIMTA) 925 mg in sodium chloride 0.9% SolP 100 mL chemo infusion       CARBOplatin (PARAPLATIN) in sodium chloride 0.9% 250 mL chemo infusion       Supportive Care       cyanocobalamin injection 1,000 mcg       Antiemetics       aprepitant (CINVANTI) injection 130 mg       palonosetron 0.25mg/dexAMETHasone 12mg in NS IVPB 0.25 mg 50 mL    Therapy Plan Information  Flushes  sodium chloride 0.9% 250 mL flush bag  Intravenous, 1 time a week  sodium chloride 0.9% flush 10 mL  10 mL, Intravenous, 1 time a week  heparin, porcine (PF) 100 unit/mL injection flush 500 Units  500 Units, Intravenous, 1 time a week  alteplase injection 2 mg  2 mg, Intra-Catheter, 1 time a week         Mrs. Medina comes in today for discussion of her lung cancer.  She had stage I lung cancer involving the left upper lobe which was treated with SBRT in February 2020.  She now has a new lesion in the right lower lobe.  EBUS negative for mediastinal lymph node involvement.  She also has interstitial lung disease and sarcoidosis which complicates her respiratory status.  Case reviewed in thoracic tumor board to see if the right lower lobe could be safely  radiated, however the lesion is not clearly visible between the interstitial lung disease findings so it was felt that it was not safe to radiate.  Since she has not a candidate for local treatment options she has been referred for systemic management.  She was also never a candidate for surgery even when she had stage I left upper lobe malignancy as she could not withstand single lung ventilation.  In fact her surgery was aborted in February 2020    Her pathology reveals adenocarcinoma lepidic type, but she has no targetable mutations and PDL1 is less than 1.    Her scans are also over 2-month-old so we will start with restaging CT scans and MRI brain.  Avoiding PET scan secondary to sarcoidosis interstitial lung disease findings which may give false positive results on the scans and adenocarcinoma lepidic type may not be very clearly visible on PET scan.  Discussed with her that once we have the staging confirmed, in her case also reviewed that adenocarcinoma tends to be bilateral lung involvement, that being said we will try to assess the final staging for her current diagnosis and decide on treatment plan.  We will plan on getting authorization for chemotherapy in the interim  Reviewed with her that since local treatment options are not a possibility, we discussed systemic chemotherapy with carboplatin and Alimta.  Would not give her immunotherapy secondary to her interstitial lung disease as well as sarcoidosis but mainly because of the interstitial lung disease.  Also unable to decide number of treatment cycles secondary to not having complete staging, but discussed that chemo will depend on response as well as her quality of life    I will plan on seeing her once scans are completed and chemotherapy has been approved.  We will also schedule her for chemo school.  Orders for chemo care companion were placed as well    E scribed dexamethasone, folic acid and Zofran in anticipation of starting treatment    Visit  today included increased complexity associated with the care of the episodic problem chemotherapy addressed and managing the longitudinal care of the patient due to the serious and/or complex managed problem(s)  lung cancer    Above care plan reviewed with the patient and her accompanying  and all questions answered to their satisfaction

## 2024-04-25 ENCOUNTER — OFFICE VISIT (OUTPATIENT)
Dept: HEMATOLOGY/ONCOLOGY | Facility: CLINIC | Age: 79
End: 2024-04-25
Payer: MEDICARE

## 2024-04-25 ENCOUNTER — PATIENT MESSAGE (OUTPATIENT)
Dept: ADMINISTRATIVE | Facility: OTHER | Age: 79
End: 2024-04-25
Payer: MEDICARE

## 2024-04-25 VITALS
HEIGHT: 63 IN | TEMPERATURE: 97 F | DIASTOLIC BLOOD PRESSURE: 72 MMHG | HEART RATE: 78 BPM | WEIGHT: 173.75 LBS | BODY MASS INDEX: 30.79 KG/M2 | RESPIRATION RATE: 19 BRPM | OXYGEN SATURATION: 97 % | SYSTOLIC BLOOD PRESSURE: 122 MMHG

## 2024-04-25 DIAGNOSIS — C34.91 ADENOCARCINOMA OF RIGHT LUNG: Primary | ICD-10-CM

## 2024-04-25 DIAGNOSIS — J84.9 INTERSTITIAL LUNG DISEASE: ICD-10-CM

## 2024-04-25 DIAGNOSIS — C34.12 PRIMARY MALIGNANT NEOPLASM OF LEFT UPPER LOBE OF LUNG: ICD-10-CM

## 2024-04-25 DIAGNOSIS — C34.31 PRIMARY ADENOCARCINOMA OF LOWER LOBE OF RIGHT LUNG: ICD-10-CM

## 2024-04-25 DIAGNOSIS — Z95.818 IMPLANTABLE LOOP RECORDER PRESENT: ICD-10-CM

## 2024-04-25 DIAGNOSIS — D86.0 SARCOIDOSIS OF LUNG: ICD-10-CM

## 2024-04-25 PROCEDURE — 3074F SYST BP LT 130 MM HG: CPT | Mod: HCNC,CPTII,S$GLB, | Performed by: INTERNAL MEDICINE

## 2024-04-25 PROCEDURE — 3288F FALL RISK ASSESSMENT DOCD: CPT | Mod: HCNC,CPTII,S$GLB, | Performed by: INTERNAL MEDICINE

## 2024-04-25 PROCEDURE — 1101F PT FALLS ASSESS-DOCD LE1/YR: CPT | Mod: HCNC,CPTII,S$GLB, | Performed by: INTERNAL MEDICINE

## 2024-04-25 PROCEDURE — 1126F AMNT PAIN NOTED NONE PRSNT: CPT | Mod: HCNC,CPTII,S$GLB, | Performed by: INTERNAL MEDICINE

## 2024-04-25 PROCEDURE — 1111F DSCHRG MED/CURRENT MED MERGE: CPT | Mod: HCNC,CPTII,S$GLB, | Performed by: INTERNAL MEDICINE

## 2024-04-25 PROCEDURE — 99999 PR PBB SHADOW E&M-EST. PATIENT-LVL V: CPT | Mod: PBBFAC,,, | Performed by: INTERNAL MEDICINE

## 2024-04-25 PROCEDURE — 99215 OFFICE O/P EST HI 40 MIN: CPT | Mod: HCNC,S$GLB,, | Performed by: INTERNAL MEDICINE

## 2024-04-25 PROCEDURE — 3078F DIAST BP <80 MM HG: CPT | Mod: HCNC,CPTII,S$GLB, | Performed by: INTERNAL MEDICINE

## 2024-04-25 PROCEDURE — G2211 COMPLEX E/M VISIT ADD ON: HCPCS | Mod: HCNC,S$GLB,, | Performed by: INTERNAL MEDICINE

## 2024-04-25 RX ORDER — DIAZEPAM 5 MG/1
5 TABLET ORAL EVERY 6 HOURS PRN
Qty: 2 TABLET | Refills: 0 | Status: SHIPPED | OUTPATIENT
Start: 2024-04-25 | End: 2024-05-03

## 2024-04-25 RX ORDER — DEXAMETHASONE 6 MG/1
TABLET ORAL
Qty: 40 TABLET | Refills: 0 | Status: SHIPPED | OUTPATIENT
Start: 2024-04-25 | End: 2024-05-03

## 2024-04-25 RX ORDER — ONDANSETRON HYDROCHLORIDE 8 MG/1
8 TABLET, FILM COATED ORAL EVERY 6 HOURS PRN
Qty: 90 TABLET | Refills: 2 | Status: SHIPPED | OUTPATIENT
Start: 2024-04-25 | End: 2025-04-25

## 2024-04-25 RX ORDER — FOLIC ACID 1 MG/1
1 TABLET ORAL DAILY
Qty: 90 TABLET | Refills: 5 | Status: SHIPPED | OUTPATIENT
Start: 2024-04-25 | End: 2024-05-03

## 2024-04-25 NOTE — PLAN OF CARE
START OFF PATHWAY REGIMEN - Non-Small Cell Lung            Pemetrexed (Alimta, Pemfexy)       Carboplatin           Additional Orders: Note: Patient to receive the following prior to the   initiation of therapy:  1) Dexamethasone 4 mg orally twice daily x 6 doses.    First dose 24 hours before chemotherapy.  2) Folic acid greater than or equal to   400 mcg orally daily.  First dose at least 5 days prior to the first dose of   pemetrexed.  3) Vitamin B12 1,000 mcg intramuscularly every 9 weeks.  First dose   at least 5 days prior to the first dose of pemetrexed.    All AUC calculations   intended to be used in Cecil Formula    **Always confirm dose/schedule in your pharmacy ordering system**    Patient Characteristics:  Local Recurrence  Therapeutic Status: Local Recurrence  Intent of Therapy:  Non-Curative / Palliative Intent, Discussed with Patient

## 2024-04-26 ENCOUNTER — PATIENT MESSAGE (OUTPATIENT)
Dept: HEMATOLOGY/ONCOLOGY | Facility: CLINIC | Age: 79
End: 2024-04-26
Payer: MEDICARE

## 2024-04-26 ENCOUNTER — PATIENT MESSAGE (OUTPATIENT)
Dept: ADMINISTRATIVE | Facility: OTHER | Age: 79
End: 2024-04-26
Payer: MEDICARE

## 2024-04-27 ENCOUNTER — PATIENT MESSAGE (OUTPATIENT)
Dept: ADMINISTRATIVE | Facility: OTHER | Age: 79
End: 2024-04-27
Payer: MEDICARE

## 2024-04-28 ENCOUNTER — PATIENT MESSAGE (OUTPATIENT)
Dept: ADMINISTRATIVE | Facility: OTHER | Age: 79
End: 2024-04-28
Payer: MEDICARE

## 2024-04-29 ENCOUNTER — HOSPITAL ENCOUNTER (OUTPATIENT)
Dept: RADIOLOGY | Facility: HOSPITAL | Age: 79
Discharge: HOME | End: 2024-04-29
Attending: INTERNAL MEDICINE
Payer: MEDICARE

## 2024-04-29 ENCOUNTER — PATIENT MESSAGE (OUTPATIENT)
Dept: ADMINISTRATIVE | Facility: OTHER | Age: 79
End: 2024-04-29
Payer: MEDICARE

## 2024-04-29 DIAGNOSIS — C34.91 ADENOCARCINOMA OF RIGHT LUNG: ICD-10-CM

## 2024-04-29 PROCEDURE — A9698 NON-RAD CONTRAST MATERIALNOC: HCPCS | Mod: HCNC | Performed by: INTERNAL MEDICINE

## 2024-04-29 PROCEDURE — 25500020 PHARM REV CODE 255: Mod: HCNC | Performed by: INTERNAL MEDICINE

## 2024-04-29 PROCEDURE — 74177 CT ABD & PELVIS W/CONTRAST: CPT | Mod: TC,HCNC

## 2024-04-29 PROCEDURE — 71260 CT THORAX DX C+: CPT | Mod: 26,HCNC,, | Performed by: RADIOLOGY

## 2024-04-29 PROCEDURE — 74177 CT ABD & PELVIS W/CONTRAST: CPT | Mod: 26,HCNC,, | Performed by: RADIOLOGY

## 2024-04-29 RX ADMIN — BARIUM SULFATE 450 ML: 20 SUSPENSION ORAL at 07:04

## 2024-04-29 RX ADMIN — IOHEXOL 100 ML: 350 INJECTION, SOLUTION INTRAVENOUS at 07:04

## 2024-04-30 ENCOUNTER — PATIENT MESSAGE (OUTPATIENT)
Dept: ADMINISTRATIVE | Facility: OTHER | Age: 79
End: 2024-04-30
Payer: MEDICARE

## 2024-05-01 ENCOUNTER — HOSPITAL ENCOUNTER (OUTPATIENT)
Dept: RADIOLOGY | Facility: HOSPITAL | Age: 79
Discharge: HOME | End: 2024-05-01
Attending: INTERNAL MEDICINE
Payer: MEDICARE

## 2024-05-01 ENCOUNTER — PATIENT MESSAGE (OUTPATIENT)
Dept: ADMINISTRATIVE | Facility: OTHER | Age: 79
End: 2024-05-01
Payer: MEDICARE

## 2024-05-01 DIAGNOSIS — C34.91 ADENOCARCINOMA OF RIGHT LUNG: ICD-10-CM

## 2024-05-01 PROCEDURE — 25500020 PHARM REV CODE 255: Mod: HCNC | Performed by: INTERNAL MEDICINE

## 2024-05-01 PROCEDURE — A9585 GADOBUTROL INJECTION: HCPCS | Mod: HCNC | Performed by: INTERNAL MEDICINE

## 2024-05-01 PROCEDURE — 70553 MRI BRAIN STEM W/O & W/DYE: CPT | Mod: 26,HCNC,, | Performed by: RADIOLOGY

## 2024-05-01 PROCEDURE — 70553 MRI BRAIN STEM W/O & W/DYE: CPT | Mod: TC,HCNC

## 2024-05-01 RX ORDER — GADOBUTROL 604.72 MG/ML
9 INJECTION INTRAVENOUS
Status: COMPLETED | OUTPATIENT
Start: 2024-05-01 | End: 2024-05-01

## 2024-05-01 RX ADMIN — GADOBUTROL 9 ML: 604.72 INJECTION INTRAVENOUS at 01:05

## 2024-05-02 ENCOUNTER — PATIENT MESSAGE (OUTPATIENT)
Dept: ADMINISTRATIVE | Facility: OTHER | Age: 79
End: 2024-05-02
Payer: MEDICARE

## 2024-05-02 ENCOUNTER — TELEPHONE (OUTPATIENT)
Dept: HEMATOLOGY/ONCOLOGY | Facility: CLINIC | Age: 79
End: 2024-05-02
Payer: MEDICARE

## 2024-05-02 DIAGNOSIS — C34.91 ADENOCARCINOMA OF RIGHT LUNG: Primary | ICD-10-CM

## 2024-05-02 NOTE — TELEPHONE ENCOUNTER
----- Message from Carla Stanford sent at 5/2/2024  9:17 AM CDT -----  Contact: self  Type:  Needs Medical Advice  Who Called: Pt  Would the patient rather a call back or a response via [facility]? call  Best Call Back Number:  [phone]  Additional Information:  calling re: Chemo treatment...   Please call pt to advise... Thank you...

## 2024-05-02 NOTE — TELEPHONE ENCOUNTER
Spoke with patient.  Reviewed how things will go from this point on.  She understands she is waiting to hear about chemo class----then nurse will get her coordinated with dr diana and to start chemo.    Nurse will ask zoila to be in touch soon.    Message routed to zoila velásquez

## 2024-05-02 NOTE — NURSING
Reached out to patient and spoke with her and her  to coordinate chemo school and start of treatment. Chemo school scheduled for Tuesday, 5/7, at 10:00 with Giacomo Perera NP.  Dr. Osborn clearance visit scheduled for Thursday, 5/9, at 10:20 am.   Currently waiting from infusion on start date and time.

## 2024-05-03 ENCOUNTER — PATIENT MESSAGE (OUTPATIENT)
Dept: HEMATOLOGY/ONCOLOGY | Facility: CLINIC | Age: 79
End: 2024-05-03
Payer: MEDICARE

## 2024-05-03 ENCOUNTER — PATIENT MESSAGE (OUTPATIENT)
Dept: ADMINISTRATIVE | Facility: OTHER | Age: 79
End: 2024-05-03
Payer: MEDICARE

## 2024-05-04 ENCOUNTER — PATIENT MESSAGE (OUTPATIENT)
Dept: ADMINISTRATIVE | Facility: OTHER | Age: 79
End: 2024-05-04
Payer: MEDICARE

## 2024-05-05 ENCOUNTER — PATIENT MESSAGE (OUTPATIENT)
Dept: ADMINISTRATIVE | Facility: OTHER | Age: 79
End: 2024-05-05
Payer: MEDICARE

## 2024-05-06 ENCOUNTER — PATIENT MESSAGE (OUTPATIENT)
Dept: ADMINISTRATIVE | Facility: OTHER | Age: 79
End: 2024-05-06
Payer: MEDICARE

## 2024-05-06 NOTE — TELEPHONE ENCOUNTER
Please advise: pt has had three episodes where would from skin cancer removal and failed skin graft has rebled, causing two trips to emergency room. Pt is asking if she can cut back on her Eliquis until this is resolved.    She is also asking if the Eliquis or flecainide could be causing spasms (like when you jerk as you are falling asleep)   Pt has been tolerating continuous feeds over the past 24 hours, however has had wt loss of 70g over the past 24 hours. Current feeds of Elecare 24 kcals/oz @ 28 ml/hr meets ~64% of pt's energy needs. Continue to monitor pt's tolerance.     Once appropriate recommend increasing feeds of Elecare 24 kcals/oz by 5 ml q6 hours until goal rate of Elecare 24 kcals/oz @ 51 ml/hr. Provides 984 kcals (98 kcals/kg), 30g PRO (3g/kg), and total volume of 1230 ml. Continue to monitor pt's tolerance of feeds. Recommend obtaining updated length, and continue with daily weights.

## 2024-05-07 ENCOUNTER — LAB VISIT (OUTPATIENT)
Dept: LAB | Facility: HOSPITAL | Age: 79
End: 2024-05-07
Attending: INTERNAL MEDICINE
Payer: MEDICARE

## 2024-05-07 ENCOUNTER — DOCUMENTATION ONLY (OUTPATIENT)
Dept: INFUSION THERAPY | Facility: HOSPITAL | Age: 79
End: 2024-05-07
Payer: MEDICARE

## 2024-05-07 ENCOUNTER — CLINICAL SUPPORT (OUTPATIENT)
Dept: HEMATOLOGY/ONCOLOGY | Facility: CLINIC | Age: 79
End: 2024-05-07
Payer: MEDICARE

## 2024-05-07 ENCOUNTER — PATIENT MESSAGE (OUTPATIENT)
Dept: ADMINISTRATIVE | Facility: OTHER | Age: 79
End: 2024-05-07
Payer: MEDICARE

## 2024-05-07 VITALS
RESPIRATION RATE: 16 BRPM | BODY MASS INDEX: 31.21 KG/M2 | HEART RATE: 67 BPM | WEIGHT: 176.13 LBS | TEMPERATURE: 97 F | HEIGHT: 63 IN | OXYGEN SATURATION: 93 %

## 2024-05-07 DIAGNOSIS — I48.91 UNSPECIFIED ATRIAL FIBRILLATION: ICD-10-CM

## 2024-05-07 DIAGNOSIS — C34.91 ADENOCARCINOMA OF RIGHT LUNG: Primary | ICD-10-CM

## 2024-05-07 DIAGNOSIS — C34.91 ADENOCARCINOMA OF RIGHT LUNG: ICD-10-CM

## 2024-05-07 DIAGNOSIS — I49.8 OTHER SPECIFIED CARDIAC ARRHYTHMIAS: ICD-10-CM

## 2024-05-07 LAB
ALBUMIN SERPL BCP-MCNC: 3.5 G/DL (ref 3.5–5.2)
ALP SERPL-CCNC: 61 U/L (ref 55–135)
ALT SERPL W/O P-5'-P-CCNC: 18 U/L (ref 10–44)
ANION GAP SERPL CALC-SCNC: 11 MMOL/L (ref 8–16)
AST SERPL-CCNC: 20 U/L (ref 10–40)
BASOPHILS # BLD AUTO: 0.05 K/UL (ref 0–0.2)
BASOPHILS NFR BLD: 0.7 % (ref 0–1.9)
BILIRUB SERPL-MCNC: 0.5 MG/DL (ref 0.1–1)
BUN SERPL-MCNC: 14 MG/DL (ref 8–23)
CALCIUM SERPL-MCNC: 9.6 MG/DL (ref 8.7–10.5)
CHLORIDE SERPL-SCNC: 105 MMOL/L (ref 95–110)
CO2 SERPL-SCNC: 25 MMOL/L (ref 23–29)
CREAT SERPL-MCNC: 0.9 MG/DL (ref 0.5–1.4)
DIFFERENTIAL METHOD BLD: ABNORMAL
EOSINOPHIL # BLD AUTO: 0.6 K/UL (ref 0–0.5)
EOSINOPHIL NFR BLD: 8.3 % (ref 0–8)
ERYTHROCYTE [DISTWIDTH] IN BLOOD BY AUTOMATED COUNT: 13.7 % (ref 11.5–14.5)
EST. GFR  (NO RACE VARIABLE): >60 ML/MIN/1.73 M^2
GLUCOSE SERPL-MCNC: 85 MG/DL (ref 70–110)
HCT VFR BLD AUTO: 41.5 % (ref 37–48.5)
HGB BLD-MCNC: 13.2 G/DL (ref 12–16)
IMM GRANULOCYTES # BLD AUTO: 0.01 K/UL (ref 0–0.04)
IMM GRANULOCYTES NFR BLD AUTO: 0.1 % (ref 0–0.5)
LYMPHOCYTES # BLD AUTO: 1.8 K/UL (ref 1–4.8)
LYMPHOCYTES NFR BLD: 23.7 % (ref 18–48)
MCH RBC QN AUTO: 28.5 PG (ref 27–31)
MCHC RBC AUTO-ENTMCNC: 31.8 G/DL (ref 32–36)
MCV RBC AUTO: 90 FL (ref 82–98)
MONOCYTES # BLD AUTO: 0.7 K/UL (ref 0.3–1)
MONOCYTES NFR BLD: 9 % (ref 4–15)
NEUTROPHILS # BLD AUTO: 4.4 K/UL (ref 1.8–7.7)
NEUTROPHILS NFR BLD: 58.2 % (ref 38–73)
NRBC BLD-RTO: 0 /100 WBC
PLATELET # BLD AUTO: 208 K/UL (ref 150–450)
PMV BLD AUTO: 10.2 FL (ref 9.2–12.9)
POTASSIUM SERPL-SCNC: 4.6 MMOL/L (ref 3.5–5.1)
PROT SERPL-MCNC: 7.4 G/DL (ref 6–8.4)
RBC # BLD AUTO: 4.63 M/UL (ref 4–5.4)
SODIUM SERPL-SCNC: 141 MMOL/L (ref 136–145)
WBC # BLD AUTO: 7.56 K/UL (ref 3.9–12.7)

## 2024-05-07 PROCEDURE — 36415 COLL VENOUS BLD VENIPUNCTURE: CPT | Mod: HCNC,PN | Performed by: INTERNAL MEDICINE

## 2024-05-07 PROCEDURE — 99999 PR PBB SHADOW E&M-EST. PATIENT-LVL IV: CPT | Mod: PBBFAC,HCNC,,

## 2024-05-07 PROCEDURE — 80053 COMPREHEN METABOLIC PANEL: CPT | Mod: HCNC,PN | Performed by: INTERNAL MEDICINE

## 2024-05-07 PROCEDURE — 99215 OFFICE O/P EST HI 40 MIN: CPT | Mod: HCNC,S$GLB,,

## 2024-05-07 PROCEDURE — 85025 COMPLETE CBC W/AUTO DIFF WBC: CPT | Mod: HCNC,PN | Performed by: INTERNAL MEDICINE

## 2024-05-07 RX ORDER — FOLIC ACID 1 MG/1
1 TABLET ORAL DAILY
Qty: 360 TABLET | Refills: 0 | Status: SHIPPED | OUTPATIENT
Start: 2024-05-07 | End: 2025-05-07

## 2024-05-07 RX ORDER — DEXAMETHASONE 6 MG/1
6 TABLET ORAL 2 TIMES DAILY WITH MEALS
Qty: 32 TABLET | Refills: 0 | Status: SHIPPED | OUTPATIENT
Start: 2024-05-07 | End: 2024-05-23

## 2024-05-07 NOTE — PROGRESS NOTES
Oncology   Chemotherapy School Education  Nicole Medina   1945    Social Service Education   This is a 78 y.o.female with a medical diagnosis of lung cancer.     Current Support System:  and adult children    Met with pt and  for new pt education. Reviewed role of  during cancer treatment. Educated on role of SW on care team and resources available at the cancer center.   Despite having numerous health problems within the last few years, the patient was upbeat and talkative. She and her  expressed that they have been very blessed with health insurance to cover their needs. 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   Patient provided with social work office contact number and advised to call with questions or make future appointment if further intervention is needed. SW to follow throughout tx.    Shante Mendoza LCSW  05/07/2024  12:24 PM

## 2024-05-07 NOTE — PROGRESS NOTES
Oncology Nutrition   New Patient Education  Nicole Medina   1945    Nutrition Education   This is a 78 y.o.female with a medical diagnosis of lung cancer.     Met w/ pt and  to discuss current nutritional status and nutrition as it relates to cancer and cancer treatment. Pt currently mild nutrition risk. Pt states she actually has been working to lose weight and has 20# more to go before hitting her goal weight. Pt states she has mostly been watching her portion sizes and cutting down on refined sugar. Pt states she has been a little more anxious recently, understandable r/t to cancer dx and tx, which has cut her appetite a bit more. Pt aware that it is important to eat adequately throughout tx and she is going to not worry as much about her weight loss while in chemotherapy. RD reinforced this idea.     Wt Readings from Last 10 Encounters:   05/07/24 79.9 kg (176 lb 2.4 oz)   05/03/24 80.3 kg (177 lb)   04/29/24 80.3 kg (177 lb)   04/25/24 78.8 kg (173 lb 11.6 oz)   04/17/24 84 kg (185 lb 3 oz)   04/13/24 81.2 kg (179 lb)   04/10/24 81.2 kg (179 lb 0.2 oz)   04/03/24 83.7 kg (184 lb 8.4 oz)   03/19/24 82.2 kg (181 lb 3.5 oz)   03/04/24 82.2 kg (181 lb 4.8 oz)      [x] PMHx reviewed  [x] Labs reviewed    Educated on food safety and common nutrition impact symptoms associated with chemotherapy treatment. Reinforced the importance of good hydration. Handouts provided.    Answered all nutrition related questions.     Patient provided with dietitian contact number and advised to call with questions or make future appointment if further intervention is needed. RD to follow throughout tx PRN.    Pattie Otero, MS, RD, LDN  05/07/2024  1:30 PM

## 2024-05-07 NOTE — PROGRESS NOTES
PATIENT: Nicole Medina  MRN: [id number]  DATE: 5/7/2024      Diagnosis:   1. Adenocarcinoma of right lung        Chief Complaint: Education          Subjective:    Interval History: Ms. Medina is a 78 y.o. female who who presents for Patient Education (Pemetrexed + Carboplatin (AUC) Q3W)     Oncologic History:   Oncology History   Primary malignant neoplasm of left upper lobe of lung   1/27/2020 Initial Diagnosis    Primary malignant neoplasm of left upper lobe of lung     1/27/2020 Cancer Staged    Staging form: Lung, AJCC 8th Edition  - Clinical stage from 1/27/2020: Stage IA2 (cT1b, cN0, cM0)     2/7/2020 - 2/13/2020 Radiation Therapy    Treating physician: Kirby Forde  Technique  Energy  Dose/Fx (Gy)  #Fx  Total Dose (Gy)    DOC Lung  SBRT  6X  11  5 / 5  55           Primary adenocarcinoma of lower lobe of right lung   3/26/2024 Cancer Staged    Staging form: Lung, AJCC 8th Edition  - Clinical stage from 3/26/2024: Stage IA1 (cT1mi, cN0, cM0)     4/10/2024 Initial Diagnosis    Primary adenocarcinoma of lower lobe of right lung     Adenocarcinoma of right lung   4/25/2024 Initial Diagnosis    Adenocarcinoma of right lung     4/30/2024 -  Chemotherapy    Treatment Summary   Plan Name: OP NSCLC PEMETREXED + CARBOPLATIN (AUC) Q3W  Treatment Goal: Palliative  Status: Active  Start Date: 4/30/2024 (Planned)  End Date: 7/2/2024 (Planned)  Provider: Yanci Osborn MD  Chemotherapy: CARBOplatin (PARAPLATIN) in sodium chloride 0.9% 250 mL chemo infusion, , Intravenous, Clinic/HOD 1 time, 0 of 4 cycles  PEMEtrexed disodium (ALIMTA) 925 mg in sodium chloride 0.9% SolP 100 mL chemo infusion, 500 mg/m2 = 925 mg, Intravenous, Clinic/HOD 1 time, 0 of 4 cycles         Past Medical History:   Past Medical History:   Diagnosis Date    Acute ischemic right MCA stroke 06/02/2017    Anticoagulant long-term use     Arthritis     Atrial fibrillation     Cancer 02/2020    lung cancer, small cell     Cancer 06/2020    skin     Cataract     done ou    Colon polyp 11/22/2010    Coronary artery disease     loop recorder watchman    Encounter for blood transfusion     Essential hypertension 08/21/2017    GERD (gastroesophageal reflux disease)     Hiatal hernia     History of radiation therapy     History of seasonal allergies     Iron deficiency anemia due to chronic blood loss 05/18/2022    On home oxygen therapy     while sleeping    Polio     as a child    Presbyopia     PSVT (paroxysmal supraventricular tachycardia)     Pulmonary fibrosis     Dr. Miramontes    Sarcoidosis 2013    Sciatica     TIA (transient ischemic attack) 06/02/2017    [facility]//    Vertigo     Wound of left leg        Past Surgical HIstory:   Past Surgical History:   Procedure Laterality Date    ABLATION  4/1/2024    Procedure: Ablation A-Fib;  Surgeon: Randy Rothman III, MD;  Location: [facility] CATH;  Service: Cardiology;;    CATARACT EXTRACTION W/  INTRAOCULAR LENS IMPLANT Left 03/27/2018    Dr Higginbotham    CATARACT EXTRACTION W/  INTRAOCULAR LENS IMPLANT Right 05/08/2018    Dr Higginbotham    CHOLECYSTECTOMY      CLOSURE OF LEFT ATRIAL APPENDAGE USING DEVICE Left 10/10/2023    Procedure: Watchman;  Surgeon: Randy Rothman III, MD;  Location: [facility] CATH;  Service: Cardiology;  Laterality: Left;    ECHOCARDIOGRAM,TRANSESOPHAGEAL N/A 10/10/2023    Procedure: (JEB) intra-procedure;  Surgeon: Mason Lyn MD;  Location: [facility] CATH;  Service: Cardiology;  Laterality: N/A;    ECHOCARDIOGRAM,TRANSESOPHAGEAL N/A 11/22/2023    Procedure: Transesophageal echo (JEB) intra-procedure log documentation;  Surgeon: Heath Johnson MD;  Location: [facility];  Service: Cardiology;  Laterality: N/A;  6 week post-implant JEB for Watchman    ENDOBRONCHIAL ULTRASOUND Bilateral 3/26/2024    Procedure: ENDOBRONCHIAL ULTRASOUND (EBUS);  Surgeon: Du Garcia Jr., DO;  Location: [facility] OR;  Service: Pulmonary;  Laterality: Bilateral;  fluoroscopy please    ESOPHAGEAL DILATION N/A  11/22/2018    Procedure: DILATION, ESOPHAGUS;  Surgeon: Robb Fitzgerald Jr., MD;  Location: [facility] ENDO;  Service: Endoscopy;  Laterality: N/A;    ESOPHAGEAL DILATION N/A 7/5/2023    Procedure: DILATION, ESOPHAGUS;  Surgeon: Yaakov Schroeder MD;  Location: [facility] ENDO;  Service: Endoscopy;  Laterality: N/A;    ESOPHAGOGASTRODUODENOSCOPY N/A 11/22/2018    Procedure: ESOPHAGOGASTRODUODENOSCOPY (EGD);  Surgeon: Robb Fitzgerald Jr., MD;  Location: [facility];  Service: Endoscopy;  Laterality: N/A;    ESOPHAGOGASTRODUODENOSCOPY N/A 2/19/2021    Procedure: EGD (ESOPHAGOGASTRODUODENOSCOPY);  Surgeon: Robb Fitzgerald Jr., MD;  Location: [facility];  Service: Endoscopy;  Laterality: N/A;    ESOPHAGOGASTRODUODENOSCOPY N/A 7/5/2023    Procedure: EGD (ESOPHAGOGASTRODUODENOSCOPY);  Surgeon: Yaakov Schroeder MD;  Location: [facility];  Service: Endoscopy;  Laterality: N/A;    EYE SURGERY      HAND SURGERY Right     trauma repair    INSERTION OF IMPLANTABLE LOOP RECORDER Left 4/19/2022    Procedure: Insertion, Implantable Loop Recorder;  Surgeon: Thanh Fuentes MD;  Location: [facility] CATH;  Service: Cardiology;  Laterality: Left;    LEFT HEART CATHETERIZATION N/A 1/4/2021    Procedure: Left heart cath;  Surgeon: Laron Falcon MD;  Location: [facility] CATH;  Service: Cardiology;  Laterality: N/A;    LEFT HEART CATHETERIZATION  8/22/2023    Procedure: Left heart cath;  Surgeon: Heath Johnson MD;  Location: [facility] CATH;  Service: Cardiology;;    RIGHT HEART CATHETERIZATION  8/22/2023    Procedure: Right heart cath;  Surgeon: Heath Johnson MD;  Location: [facility] CATH;  Service: Cardiology;;    TONSILLECTOMY      TOTAL ABDOMINAL HYSTERECTOMY      TRANSESOPHAGEAL ECHOCARDIOGRAM WITH POSSIBLE CARDIOVERSION (JEB W/ POSS CARDIOVERSION) N/A 2/20/2024    Procedure: TRANSESOPHAGEAL ECHOCARDIOGRAM WITH POSSIBLE CARDIOVERSION (JEB W/ POSS CARDIOVERSION);  Surgeon: Heath Johnson MD;  Location: [facility];  Service: Cardiology;  Laterality:  N/A;    VARICOSE VEIN SURGERY Bilateral     bilateral legs    VIDEO-ASSISTED THORACOSCOPIC SURGERY (VATS)  1/16/2020    Procedure: VATS (VIDEO-ASSISTED THORACOSCOPIC SURGERY) - exploratory;  Surgeon: Ritesh Mccall MD;  Location: [facility] OR [address] [place] [address];  Service: Thoracic;;       Family History:   Family History   Problem Relation Name Age of Onset    Cataracts Mother      Macular degeneration Mother      Cancer Mother          lymphoma    Diabetes Father      Hypertension Father      Macular degeneration Sister      Cancer Sister          breast    Breast cancer Maternal Grandmother      Thyroid disease Daughter      Cancer Daughter          thyroid    Breast cancer Other 1/2 sister     Amblyopia Neg Hx      Blindness Neg Hx      Glaucoma Neg Hx      Retinal detachment Neg Hx      Strabismus Neg Hx      Stroke Neg Hx         Social History:  reports that she has quit smoking. She has never used smokeless tobacco. She reports that she does not drink alcohol and does not use drugs.    Allergies:  Review of patient's allergies indicates:   Allergen Reactions    Latex Itching and Swelling     Itching and swelling to site (local reaction)       Medications:  Current Outpatient Medications   Medication Sig Dispense Refill    albuterol (PROVENTIL) 2.5 mg /3 mL (0.083 %) nebulizer solution Take 3 mLs (2.5 mg total) by nebulization every 6 (six) hours as needed for Wheezing or Shortness of Breath. Rescue 270 mL 5    albuterol (PROVENTIL/VENTOLIN HFA) 90 mcg/actuation inhaler Inhale 1 puff into the lungs once daily. Rescue 3 g 5    apixaban (ELIQUIS) 5 mg Tab Take 1 tablet (5 mg total) by mouth 2 (two) times daily. 90 tablet 3    cholecalciferol, vitamin D3, 125 mcg (5,000 unit) Tab Take 5,000 Units by mouth once daily.      cloNIDine (CATAPRES) 0.1 MG tablet Take 1 tablet (0.1 mg total) by mouth 3 (three) times daily as needed (PRN SBP > 165 mmHg). 90 tablet 6    dexAMETHasone (DECADRON) 6 MG tablet Take 1 tablet (6 mg total)  by mouth 2 (two) times daily with meals. Take 2 tablets the day prior and 2 tablets for 3 days after chemo starting the day after chemo. Take with breakfast for 32 doses 32 tablet 0    dronedarone (MULTAQ) 400 mg Tab Take 1 tablet (400 mg total) by mouth 2 (two) times daily with meals. 180 tablet 3    ezetimibe (ZETIA) 10 mg tablet Take 1 tablet (10 mg total) by mouth once daily. 90 tablet 3    ferrous sulfate (FEOSOL) 325 mg (65 mg iron) Tab tablet Take 325 mg by mouth once daily.      fluticasone propion-salmeterol 115-21 mcg/dose (ADVAIR HFA) 115-21 mcg/actuation HFAA inhaler Inhale 2 puffs into the lungs every 12 (twelve) hours. Controller 3 g 5    fluticasone propionate (FLONASE) 50 mcg/actuation nasal spray 2 sprays (100 mcg total) by Each Nostril route once daily. 48 g 3    folic acid (FOLVITE) 1 MG tablet Take 1 tablet (1 mg total) by mouth once daily. 360 tablet 0    furosemide (LASIX) 20 MG tablet Take 0.5 tablets (10 mg total) by mouth every morning. 90 tablet 3    ibandronate (BONIVA) 150 mg tablet Take 1 tablet (150 mg total) by mouth every 30 days. 12 tablet 3    levocetirizine (XYZAL) 5 MG tablet Take 1 tablet (5 mg total) by mouth nightly as needed for Allergies. 30 tablet 2    metoprolol succinate (TOPROL XL) 50 MG 24 hr tablet Take 1 tablet (50 mg total) by mouth every 12 (twelve) hours. 180 tablet 4    ondansetron (ZOFRAN) 8 MG tablet Take 1 tablet (8 mg total) by mouth every 6 (six) hours as needed. 90 tablet 2    sacubitriL-valsartan (ENTRESTO) 24-26 mg per tablet Take 0.5 tablet by mouth twice daily 180 tablet 3    spironolactone (ALDACTONE) 25 MG tablet Take 0.5 tablets (12.5 mg total) by mouth every morning. 90 tablet 3     No current facility-administered medications for this visit.       Review of Systems    ECOG Performance Status:   ECOG SCORE             Objective:      Vitals:   Vitals:    05/07/24 0938   Pulse: 67   Resp: 16   Temp: 96.9 °F (36.1 °C)   TempSrc: Temporal   SpO2: (!) 93%  "  Weight: 79.9 kg (176 lb 2.4 oz)   Height: 5' 3" (1.6 m)     BMI: Body mass index is 31.2 kg/m².        Imaging: MRI BRAIN W WO CONTRAST     CLINICAL HISTORY:  Non-small cell lung cancer (NSCLC), metastatic, assess treatment response;.  Malignant neoplasm of unspecified part of right bronchus or lung     TECHNIQUE:  Multiplanar multisequence MR imaging of the brain was performed before and after the administration of 9 mL Gadavist  intravenous contrast.     COMPARISON:  CT head 07/15/2022, MRI brain 08/14/2019     FINDINGS:  Intracranial compartment:     Ventricles and sulci are stable in size, without evidence of hydrocephalus.     No extra-axial blood or fluid collections.     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  Tubular enhancement in the right cerebellar peduncle and cerebellar hemisphere compatible with a developmental venous anomaly. In the hilda there are two foci of subtle susceptibility with faint brush like enhancement in the central hilda, in keeping with capillary telangiectasia. Posterior pontomedullary small focus of susceptibility artifact (series 11, image 24) likely reflecting a superimposed cavernous malformation.  Overall appearance very similar to the prior examination with no edema or mass effect to suggest new or ongoing hemorrhage at this site.     No new enhancing lesions elsewhere in the brain.     Modest chronic small vessel ischemic change.  No recent or remote major vascular distribution infarct.  No new intracranial mass effect or midline shift.     Empty sella configuration.     Normal vascular flow voids are preserved.     Skull/extracranial contents (limited evaluation):     Bone marrow signal intensity is normal.     Impression:     No evidence of intracranial metastatic disease.     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     Electronically signed by resident: Henok Rangel  Date:                                         "    05/01/2024  _________________________________________________________________  CT CHEST ABDOMEN PELVIS WITH IV CONTRAST (XPD)     CLINICAL HISTORY:  Non-small cell lung cancer (NSCLC), metastatic, assess treatment response; Malignant neoplasm of unspecified part of right bronchus or lung     TECHNIQUE:  Axial images of the chest, abdomen, and pelvis were acquired  after the use of 100 cc Bgio410 IV contrast. 450 mL of barium sulfate was administered orally.  Coronal and sagittal reconstructions were also obtained     COMPARISON:  CT chest 02/16/2024.     CTA runoff 11/13/2021.     FINDINGS:  Thoracic soft tissues: Normal thyroid.     Aorta: Thoracic aorta is normal in caliber and contour with moderate calcific atherosclerosis.     Heart: Heart is borderline enlarged.  No pericardial effusion.  Left atrial appendage closure device.  Calcification of the aortic valvular annulus.  Scattered calcific coronary atherosclerosis.     Soumya/Mediastinum: Stable mediastinal lymphadenopathy.  10 mm prevascular lymph node, previously 10 mm.  14 mm right lower paratracheal lymph node, previously 15 mm.  15 mm subcarinal lymph node, previously 15 mm.  18 mm right lower hilar node series 2, image 70.  Additional scattered smaller hilar nodes noted.     Lungs: Central airways are patent.  There is widespread traction bronchiectasis. Grossly stable background of interstitial lung disease in this patient with reported history of sarcoidosis, which limits evaluation for lepidic spread of disease in this patient with biopsy-proven left upper lobe adenocarcinoma.  [id number] increased size of a left apical pulmonary mass measuring 31 x 21 mm (axial series 6, image 128), previously 27 x 21 mm.  Bilateral pulmonary micronodules appear grossly unchanged.     Liver: Normal in size and contour.  1 cm heterogeneous hypodense lesion the right hepatic lobe (axial series 3, image 49), unchanged since November 2021.  Few calcified  granulomas.     Gallbladder: Surgically absent.     Bile Ducts: No evidence of dilated ducts.     Pancreas: No mass or peripancreatic fat stranding.     Spleen: Normal in size.  Calcified granuloma in the spleen.     Stomach and duodenum: Unremarkable.     Adrenals: 2.1 cm left adrenal nodule, unchanged since November 2021.     Kidneys/ Ureters: Normal in size and location. Normal enhancement. Large extrarenal pelvis on the right.  Hydronephrosis felt less likely given stability dating back to November 2021. No ureteral dilatation.     Bladder: No evidence of wall thickening.     Reproductive organs: Uterus is surgically absent.  No adnexal lesion.     Bowel/Mesentery: Small bowel is normal in caliber with no evidence of obstruction.  No evidence of inflammation or wall thickening.  Colon demonstrates no focal wall thickening.     Lymph nodes: No pathologically enlarged lymph nodes by short axis CT size criteria.     Abdominal wall:  Unremarkable.     Vasculature: No aneurysm. Moderate calcific atherosclerosis.     Bones: No acute fracture. No aggressive osseous lesions.     Impression:     [id number] increased size of a left apical pulmonary mass status post radiation.     Grossly stable background of interstitial lung disease in this patient with reported history of sarcoidosis, which limits evaluation for lepidic spread of disease in this patient with biopsy-proven left upper lobe adenocarcinoma.     Unchanged bilateral pulmonary micro-nodules.     Stable mediastinal and hilar lymphadenopathy.  Index lymph nodes as above.     2.1 cm left adrenal nodule, unchanged since November 2021.     Large extrarenal pelvis on the right with hydronephrosis felt less likely given stability dating back to November 2021.     Additional findings in the body of the report.     Electronically signed by resident: Prasanth Campbell  Date:                                            04/30/2024       Assessment:       1.  Adenocarcinoma of right lung           Plan:   - Ambulatory referral/consult to Chemo School    1. Treatment plan of Pemetrexed + Carboplatin (AUC) Q3W every 21 days x 4 cycles discussed with patient.  2. Handouts provided on chemotherapy agents. Premeds, supportive meds explained.  3. Discussed the mechanism of action, potential side effects of this treatment as well as ways to manage them at home.   4. Dietary modifications discussed. Detail instructions to be provided by dietician.   5. Chemotherapy portfolio supplied with contact information.   6. Discussed follow-up with the physician for toxicity monitoring throughout treatment.    7. Scripts were escribed prior and explained for home use.   8. Consented the patient to the treatment plan and the patient was educated on the planned duration of the treatment and schedule of the treatment administration.  9. Cycle 1, Day 1 scheduled for 5/9/2024; patient has home prescriptions.  10. Encouraged to call office - phone number provided - to assist with any concerns.       Plan was discussed with the patient at length, and she verbalized understanding. Nicole was given an opportunity to ask questions that were answered to her satisfaction, and she was advised to call in the interval if any problems or questions arise.    Assessment/Plan reviewed and approved by Dr Osborn    60 minutes were spent in coordination of patient's care, record review and counseling.    ELIECER Joy, FNP-C  Hematology & Oncology

## 2024-05-08 ENCOUNTER — TELEPHONE (OUTPATIENT)
Dept: HEMATOLOGY/ONCOLOGY | Facility: CLINIC | Age: 79
End: 2024-05-08
Payer: MEDICARE

## 2024-05-08 ENCOUNTER — PATIENT MESSAGE (OUTPATIENT)
Dept: HEMATOLOGY/ONCOLOGY | Facility: CLINIC | Age: 79
End: 2024-05-08
Payer: MEDICARE

## 2024-05-08 ENCOUNTER — PATIENT MESSAGE (OUTPATIENT)
Dept: ADMINISTRATIVE | Facility: OTHER | Age: 79
End: 2024-05-08
Payer: MEDICARE

## 2024-05-08 NOTE — TELEPHONE ENCOUNTER
I spoke with the patient and her  about getting an IO appt scheduled per referral. I explained in detail what we offer and listed some symptoms/side effects that we can help address using our support services. They verbalized understanding and accepted a date/time. Location was reviewed and a message was sent to the portal if they had any follow up questions.

## 2024-05-08 NOTE — TELEPHONE ENCOUNTER
Lmovm for the patient about getting an IO appt scheduled per referral. I explained in detail what we offer and listed some symptoms/side effects that we can help address using our support services. I informed the patient that I would also send a portal message in regards to the appointment.

## 2024-05-09 ENCOUNTER — OFFICE VISIT (OUTPATIENT)
Dept: HEMATOLOGY/ONCOLOGY | Facility: CLINIC | Age: 79
End: 2024-05-09
Payer: MEDICARE

## 2024-05-09 ENCOUNTER — PATIENT MESSAGE (OUTPATIENT)
Dept: ADMINISTRATIVE | Facility: OTHER | Age: 79
End: 2024-05-09
Payer: MEDICARE

## 2024-05-09 ENCOUNTER — INFUSION (OUTPATIENT)
Dept: INFUSION THERAPY | Facility: HOSPITAL | Age: 79
End: 2024-05-09
Attending: INTERNAL MEDICINE
Payer: MEDICARE

## 2024-05-09 VITALS
HEIGHT: 63 IN | RESPIRATION RATE: 16 BRPM | OXYGEN SATURATION: 97 % | HEART RATE: 55 BPM | WEIGHT: 178.81 LBS | SYSTOLIC BLOOD PRESSURE: 99 MMHG | BODY MASS INDEX: 31.68 KG/M2 | DIASTOLIC BLOOD PRESSURE: 62 MMHG

## 2024-05-09 VITALS
WEIGHT: 178.81 LBS | HEIGHT: 63 IN | HEART RATE: 69 BPM | OXYGEN SATURATION: 97 % | DIASTOLIC BLOOD PRESSURE: 50 MMHG | SYSTOLIC BLOOD PRESSURE: 110 MMHG | BODY MASS INDEX: 31.68 KG/M2 | RESPIRATION RATE: 16 BRPM

## 2024-05-09 DIAGNOSIS — K21.9 GASTROESOPHAGEAL REFLUX DISEASE, UNSPECIFIED WHETHER ESOPHAGITIS PRESENT: ICD-10-CM

## 2024-05-09 DIAGNOSIS — T45.1X5A IMMUNODEFICIENCY DUE TO CHEMOTHERAPY: ICD-10-CM

## 2024-05-09 DIAGNOSIS — C34.91 ADENOCARCINOMA OF RIGHT LUNG: Primary | ICD-10-CM

## 2024-05-09 DIAGNOSIS — C34.12 PRIMARY MALIGNANT NEOPLASM OF LEFT UPPER LOBE OF LUNG: ICD-10-CM

## 2024-05-09 DIAGNOSIS — D84.821 IMMUNODEFICIENCY DUE TO CHEMOTHERAPY: ICD-10-CM

## 2024-05-09 DIAGNOSIS — Z79.899 IMMUNODEFICIENCY DUE TO CHEMOTHERAPY: ICD-10-CM

## 2024-05-09 PROBLEM — Z79.69 IMMUNODEFICIENCY DUE TO CHEMOTHERAPY: Status: ACTIVE | Noted: 2024-05-09

## 2024-05-09 PROCEDURE — 96413 CHEMO IV INFUSION 1 HR: CPT | Mod: HCNC,PN

## 2024-05-09 PROCEDURE — 99999 PR PBB SHADOW E&M-EST. PATIENT-LVL V: CPT | Mod: PBBFAC,HCNC,, | Performed by: INTERNAL MEDICINE

## 2024-05-09 PROCEDURE — 3074F SYST BP LT 130 MM HG: CPT | Mod: HCNC,CPTII,S$GLB, | Performed by: INTERNAL MEDICINE

## 2024-05-09 PROCEDURE — 3078F DIAST BP <80 MM HG: CPT | Mod: HCNC,CPTII,S$GLB, | Performed by: INTERNAL MEDICINE

## 2024-05-09 PROCEDURE — G2211 COMPLEX E/M VISIT ADD ON: HCPCS | Mod: HCNC,S$GLB,, | Performed by: INTERNAL MEDICINE

## 2024-05-09 PROCEDURE — 63600175 PHARM REV CODE 636 W HCPCS: Mod: JZ,JG,HCNC,PN | Performed by: INTERNAL MEDICINE

## 2024-05-09 PROCEDURE — 1126F AMNT PAIN NOTED NONE PRSNT: CPT | Mod: HCNC,CPTII,S$GLB, | Performed by: INTERNAL MEDICINE

## 2024-05-09 PROCEDURE — 96372 THER/PROPH/DIAG INJ SC/IM: CPT | Mod: HCNC,59,PN

## 2024-05-09 PROCEDURE — 25000003 PHARM REV CODE 250: Mod: HCNC,PN | Performed by: INTERNAL MEDICINE

## 2024-05-09 PROCEDURE — 1101F PT FALLS ASSESS-DOCD LE1/YR: CPT | Mod: HCNC,CPTII,S$GLB, | Performed by: INTERNAL MEDICINE

## 2024-05-09 PROCEDURE — 3288F FALL RISK ASSESSMENT DOCD: CPT | Mod: HCNC,CPTII,S$GLB, | Performed by: INTERNAL MEDICINE

## 2024-05-09 PROCEDURE — 96367 TX/PROPH/DG ADDL SEQ IV INF: CPT | Mod: HCNC,PN

## 2024-05-09 PROCEDURE — 99215 OFFICE O/P EST HI 40 MIN: CPT | Mod: HCNC,S$GLB,, | Performed by: INTERNAL MEDICINE

## 2024-05-09 PROCEDURE — 96375 TX/PRO/DX INJ NEW DRUG ADDON: CPT | Mod: HCNC,PN

## 2024-05-09 PROCEDURE — 96411 CHEMO IV PUSH ADDL DRUG: CPT | Mod: HCNC,PN

## 2024-05-09 RX ORDER — PROCHLORPERAZINE EDISYLATE 5 MG/ML
5 INJECTION INTRAMUSCULAR; INTRAVENOUS ONCE AS NEEDED
Status: DISCONTINUED | OUTPATIENT
Start: 2024-05-09 | End: 2024-05-09 | Stop reason: HOSPADM

## 2024-05-09 RX ORDER — SODIUM CHLORIDE 0.9 % (FLUSH) 0.9 %
10 SYRINGE (ML) INJECTION
Status: CANCELLED | OUTPATIENT
Start: 2024-05-09

## 2024-05-09 RX ORDER — SODIUM CHLORIDE 0.9 % (FLUSH) 0.9 %
10 SYRINGE (ML) INJECTION
Status: DISCONTINUED | OUTPATIENT
Start: 2024-05-09 | End: 2024-05-09 | Stop reason: HOSPADM

## 2024-05-09 RX ORDER — HEPARIN 100 UNIT/ML
500 SYRINGE INTRAVENOUS
Status: CANCELLED | OUTPATIENT
Start: 2024-05-09

## 2024-05-09 RX ORDER — EPINEPHRINE 0.3 MG/.3ML
0.3 INJECTION SUBCUTANEOUS ONCE AS NEEDED
Status: CANCELLED | OUTPATIENT
Start: 2024-05-09

## 2024-05-09 RX ORDER — HEPARIN 100 UNIT/ML
500 SYRINGE INTRAVENOUS
Status: DISCONTINUED | OUTPATIENT
Start: 2024-05-09 | End: 2024-05-09 | Stop reason: HOSPADM

## 2024-05-09 RX ORDER — FAMOTIDINE 40 MG/1
40 TABLET, FILM COATED ORAL DAILY
Qty: 60 TABLET | Refills: 11 | Status: SHIPPED | OUTPATIENT
Start: 2024-05-09 | End: 2025-05-09

## 2024-05-09 RX ORDER — CYANOCOBALAMIN 1000 UG/ML
1000 INJECTION, SOLUTION INTRAMUSCULAR; SUBCUTANEOUS
Status: COMPLETED | OUTPATIENT
Start: 2024-05-09 | End: 2024-05-09

## 2024-05-09 RX ORDER — DIPHENHYDRAMINE HYDROCHLORIDE 50 MG/ML
50 INJECTION INTRAMUSCULAR; INTRAVENOUS ONCE AS NEEDED
Status: CANCELLED | OUTPATIENT
Start: 2024-05-09

## 2024-05-09 RX ORDER — EPINEPHRINE 0.3 MG/.3ML
0.3 INJECTION SUBCUTANEOUS ONCE AS NEEDED
Status: DISCONTINUED | OUTPATIENT
Start: 2024-05-09 | End: 2024-05-09 | Stop reason: HOSPADM

## 2024-05-09 RX ORDER — DIPHENHYDRAMINE HYDROCHLORIDE 50 MG/ML
50 INJECTION INTRAMUSCULAR; INTRAVENOUS ONCE AS NEEDED
Status: DISCONTINUED | OUTPATIENT
Start: 2024-05-09 | End: 2024-05-09 | Stop reason: HOSPADM

## 2024-05-09 RX ORDER — CYANOCOBALAMIN 1000 UG/ML
1000 INJECTION, SOLUTION INTRAMUSCULAR; SUBCUTANEOUS
Status: CANCELLED | OUTPATIENT
Start: 2024-05-09

## 2024-05-09 RX ORDER — PROCHLORPERAZINE EDISYLATE 5 MG/ML
5 INJECTION INTRAMUSCULAR; INTRAVENOUS ONCE AS NEEDED
Status: CANCELLED
Start: 2024-05-09

## 2024-05-09 RX ADMIN — PEMETREXED DISODIUM 900 MG: 500 INJECTION, POWDER, LYOPHILIZED, FOR SOLUTION INTRAVENOUS at 12:05

## 2024-05-09 RX ADMIN — DEXAMETHASONE SODIUM PHOSPHATE 0.25 MG: 10 INJECTION, SOLUTION INTRAMUSCULAR; INTRAVENOUS at 11:05

## 2024-05-09 RX ADMIN — CYANOCOBALAMIN 1000 MCG: 1000 INJECTION, SOLUTION INTRAMUSCULAR at 11:05

## 2024-05-09 RX ADMIN — CARBOPLATIN 455 MG: 10 INJECTION INTRAVENOUS at 12:05

## 2024-05-09 RX ADMIN — APREPITANT 130 MG: 130 INJECTION, EMULSION INTRAVENOUS at 11:05

## 2024-05-09 RX ADMIN — SODIUM CHLORIDE: 9 INJECTION, SOLUTION INTRAVENOUS at 11:05

## 2024-05-09 NOTE — PLAN OF CARE
Problem: Adult Inpatient Plan of Care  Goal: Plan of Care Review  Outcome: Progressing  Flowsheets (Taken 5/9/2024 1325)  Plan of Care Reviewed With:   patient   spouse     Patient tolerated C1D1 Alimta/Carbo without incident. IV flushed with blood return, saline locked, and removed. AVS provided per [facility] portal; schedule reviewed. Ambulates per self; accompanied by spouse upon discharge.   No acute distress noted.  Next appointment 5/10/24 for Fulphila.    Patient provided AVS.

## 2024-05-09 NOTE — PROGRESS NOTES
"Subjective     Patient ID: Nicole Medina is a 78 y.o. female.    Chief Complaint: Adenocarcinoma of right lung  78 y.o. female with Stage IA2 (cT1b cN0 M0) DOC NSCLC (adeno) diagnosed on biopsy 12/20/19. History significant for sarcoidosis. CT Chest 1/14/20 demonstrated 1.7 cm DOC primary and multiple other stable sub-centimeter nodules with mild hilar and mediastinal adenopathy. She underwent attempted VATS resection by Dr. Mccall 1/16/20, which was aborted due to poor toleration of single lung ventilation. She completed definitive SBRT 55 Gy in 5 fx on 2/13/20.      CT Chest 2/16/24 concerning for enlarging adenopathy. Bronch w/ EBUS by Dr. Garcia 3/26/24. 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 She returns for consideration of treatment options.     Her case was reviewed at the Thoracic Multidisciplinary Conference on 04/17/2024 with recommendation for consideration of systemic therapy options. Since there is not a clear target for radiation on her imaging (ILD changes indistinguishable from lepidic adeno), she does not have a local therapy option at this time. I discussed with the patient and her  that lepidic adenocarcinoma tends to be very slow growing and often present for years, but there is always a risk that it can develop metastatic potential if untreated.      [employer] NGS tissue with no targets, PDL1 is less than 1%    HPI she comes in to review her restaging scans and begin treatment     CT scans of chest abdomen pelvis  from 04/29/2024 reveal "Ouoocxw-dy-opemzxwg increased size of a left apical pulmonary mass status post radiation. Grossly stable background of interstitial lung disease in this patient with reported history of sarcoidosis, which limits evaluation for lepidic spread of disease in this patient with biopsy-proven left upper lobe adenocarcinoma. " "Unchanged bilateral pulmonary micro-nodules. Stable mediastinal and hilar lymphadenopathy.  Index lymph nodes as above. 2.1 cm left adrenal nodule, unchanged since November 2021. Large extrarenal pelvis on the right with hydronephrosis felt less likely given stability dating back to November 2021.     MRI brain from 05/03/2024  reveals "No evidence of intracranial metastatic disease. 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      She comes in to start carboplatin and Alimta  She denies any nausea, vomiting, diarrhea, constipation, abdominal pain, weight loss or loss of appetite, chest pain, shortness of breath, leg swelling, fatigue, pain, headache, dizziness, or mood changes. Her ECOG PS is zero. She is here with her      Review of Systems   Constitutional:  Negative for appetite change, fatigue and unexpected weight change.   HENT:  Negative for mouth sores.    Eyes:  Negative for visual disturbance.   Respiratory:  Negative for cough and shortness of breath.    Cardiovascular:  Negative for chest pain.   Gastrointestinal:  Negative for abdominal pain and diarrhea.   Genitourinary:  Negative for frequency.   Musculoskeletal:  Negative for back pain.   Integumentary:  Negative for rash.   Neurological:  Negative for headaches.   Hematological:  Negative for adenopathy.   Psychiatric/Behavioral:  The patient is not nervous/anxious.    All other systems reviewed and are negative.         Objective     Physical Exam         LABS:  WBC   Date Value Ref Range Status   05/07/2024 7.56 3.90 - 12.70 K/uL Final     Hemoglobin   Date Value Ref Range Status   05/07/2024 13.2 12.0 - 16.0 g/dL Final     POC Hematocrit   Date Value Ref Range Status   04/01/2024 36.0 (L) 37.0 - 48.5 %PCV Final     Hematocrit   Date Value Ref Range Status   05/07/2024 41.5 37.0 - 48.5 % Final     Platelets   Date Value Ref Range Status   05/07/2024 208 150 - 450 K/uL Final     Gran # (ANC)   Date Value Ref Range Status "   05/07/2024 4.4 1.8 - 7.7 K/uL Final     Gran %   Date Value Ref Range Status   05/07/2024 58.2 38.0 - 73.0 % Final       Chemistry        Component Value Date/Time     05/07/2024 0933    K 4.6 05/07/2024 0933     05/07/2024 0933    CO2 25 05/07/2024 0933    BUN 14 05/07/2024 0933    CREATININE 0.9 05/07/2024 0933    GLU 85 05/07/2024 0933        Component Value Date/Time    CALCIUM 9.6 05/07/2024 0933    ALKPHOS 61 05/07/2024 0933    AST 20 05/07/2024 0933    ALT 18 05/07/2024 0933    BILITOT 0.5 05/07/2024 0933    ESTGFRAFRICA >60 05/10/2022 0640    EGFRNONAA >60 05/10/2022 0640          Assessment and Plan     1. Adenocarcinoma of right lung  -     CBC w/ DIFF; Future; Expected date: 05/09/2024  -     CMP; Future; Expected date: 05/09/2024  -     Magnesium; Future; Expected date: 05/09/2024  -     CT CHEST WITHOUT CONTRAST; Future; Expected date: 05/09/2024  -     CBC w/ DIFF; Future; Expected date: 05/09/2024  -     CMP; Future; Expected date: 05/09/2024  -     Magnesium; Future; Expected date: 05/09/2024    2. Gastroesophageal reflux disease, unspecified whether esophagitis present  -     famotidine (PEPCID) 40 MG tablet; Take 1 tablet (40 mg total) by mouth once daily.  Dispense: 60 tablet; Refill: 11    3. Primary malignant neoplasm of left upper lobe of lung    4. Immunodeficiency due to chemotherapy    Other orders  -     cyanocobalamin injection 1,000 mcg  -     aprepitant (CINVANTI) injection 130 mg  -     palonosetron 0.25mg/dexAMETHasone 12mg in NS IVPB 0.25 mg 50 mL  -     PEMEtrexed disodium (ALIMTA) 925 mg in sodium chloride 0.9% SolP 100 mL chemo infusion  -     CARBOplatin (PARAPLATIN) 455 mg in sodium chloride 0.9% 295.5 mL chemo infusion  -     pegfilgrastim (NEULASTA (ON BODY INJECTOR)) injection 6 mg  -     prochlorperazine injection Soln 5 mg  -     EPINEPHrine (EPIPEN) 0.3 mg/0.3 mL pen injection 0.3 mg  -     diphenhydrAMINE injection 50 mg  -     hydrocortisone sodium succinate  injection 100 mg  -     sodium chloride 0.9% 250 mL flush bag  -     sodium chloride 0.9% flush 10 mL  -     heparin, porcine (PF) 100 unit/mL injection flush 500 Units  -     alteplase injection 2 mg        Route Chart for Scheduling    Med Onc Chart Routing      Follow up with physician . In 6 weeks schedule CBC, CMP, Mag and CT chest and see me and for Carboplatin and ALimta.Fulphila on day 2.  Labs and scans day or 2 prior   Follow up with STANISLAW . In 3 weeks schedule CBC, CMP, mag and see STANISLAW and fro Carboplatin, Alimta. Fulphila on day 2. Labs day prior   Infusion scheduling note    Injection scheduling note    Labs    Imaging    Pharmacy appointment    Other referrals            Treatment Plan Information   OP NSCLC PEMETREXED + CARBOPLATIN (AUC) Q3W   Yanci Osborn MD   Upcoming Treatment Dates - OP NSCLC PEMETREXED + CARBOPLATIN (AUC) Q3W    5/9/2024       Chemotherapy       PEMEtrexed disodium (ALIMTA) 925 mg in sodium chloride 0.9% SolP 100 mL chemo infusion       CARBOplatin (PARAPLATIN) 455 mg in sodium chloride 0.9% 295.5 mL chemo infusion       Supportive Care       cyanocobalamin injection 1,000 mcg       Antiemetics       aprepitant (CINVANTI) injection 130 mg       palonosetron 0.25mg/dexAMETHasone 12mg in NS IVPB 0.25 mg 50 mL       Growth Factor       pegfilgrastim (NEULASTA (ON BODY INJECTOR)) injection 6 mg  5/30/2024       Chemotherapy       PEMEtrexed disodium (ALIMTA) 925 mg in sodium chloride 0.9% SolP 100 mL chemo infusion       CARBOplatin (PARAPLATIN) in sodium chloride 0.9% 250 mL chemo infusion       Antiemetics       aprepitant (CINVANTI) injection 130 mg       palonosetron 0.25mg/dexAMETHasone 12mg in NS IVPB 0.25 mg 50 mL       Growth Factor       pegfilgrastim (NEULASTA (ON BODY INJECTOR)) injection 6 mg  6/20/2024       Chemotherapy       PEMEtrexed disodium (ALIMTA) 925 mg in sodium chloride 0.9% SolP 100 mL chemo infusion       CARBOplatin (PARAPLATIN) in sodium chloride 0.9% 250  mL chemo infusion       Antiemetics       aprepitant (CINVANTI) injection 130 mg       palonosetron 0.25mg/dexAMETHasone 12mg in NS IVPB 0.25 mg 50 mL       Growth Factor       pegfilgrastim (NEULASTA (ON BODY INJECTOR)) injection 6 mg  7/11/2024       Chemotherapy       PEMEtrexed disodium (ALIMTA) 925 mg in sodium chloride 0.9% SolP 100 mL chemo infusion       CARBOplatin (PARAPLATIN) in sodium chloride 0.9% 250 mL chemo infusion       Supportive Care       cyanocobalamin injection 1,000 mcg       Antiemetics       aprepitant (CINVANTI) injection 130 mg       palonosetron 0.25mg/dexAMETHasone 12mg in NS IVPB 0.25 mg 50 mL       Growth Factor       pegfilgrastim (NEULASTA (ON BODY INJECTOR)) injection 6 mg    Therapy Plan Information  Flushes  sodium chloride 0.9% 250 mL flush bag  Intravenous, 1 time a week  sodium chloride 0.9% flush 10 mL  10 mL, Intravenous, 1 time a week  heparin, porcine (PF) 100 unit/mL injection flush 500 Units  500 Units, Intravenous, 1 time a week  alteplase injection 2 mg  2 mg, Intra-Catheter, 1 time a week         Mrs. Medina will proceed with cycle 1 of Carboplatin and Alimta and will return in 3 weeks for next chemo.  Discussed the slow growing nature of her current malignancy and reviewed that treatment continuation will depend on her response and how she tolerates the treatment but the goal we will always be on maintaining quality of life.  Rediscussed side effects of chemotherapy and appropriate use of antiemetics  While taking dexamethasone for days 2 and 3 after chemotherapy advised her to take Pepcid rather than omeprazole because of interaction with Alimta.  E scribed Pepcid prescription    I will see her back in 3 weeks for next chemo    Visit today included increased complexity associated with the care of the episodic problem chemotherapy addressed and managing the longitudinal care of the patient due to the serious and/or complex managed problem(s)  lung cancer    Above plan  reviewed with the patient and her accompanying  and all questions were answered to their satisfaction

## 2024-05-09 NOTE — PLAN OF CARE
Problem: Adult Inpatient Plan of Care  Goal: Patient-Specific Goal (Individualized)  Outcome: Progressing  Flowsheets (Taken 5/9/2024 1158)  Individualized Care Needs:   recliner, blanket, pillow x2   oriented to unit   reviewed treatment   reviewed symptoms   reviewed BERE meds  Anxieties, Fears or Concerns: first chemotherapy treatment     Problem: Oncology Care  Goal: Effective Coping  Intervention: Support and Enhance Coping Strategies  Flowsheets (Taken 5/9/2024 1158)  Supportive Measures:   active listening utilized   relaxation techniques promoted

## 2024-05-09 NOTE — DISCHARGE INSTRUCTIONS
For the Fulphila injection (for your immune system): Remember to take your claritin or zyrtec to help prevent bone pain. You can also take a pain reliever as well (tylenol, ibuprofen, or prescribed pain medication) if you experience bone pain. Begin tonight and continue to take it nightly for the next 4-5 nights.     Things to report to your provider:  Diarrhea (4 or more watery stools in a 24 hour period)  Fever greater than 100.4 degrees  Sores in your mouth  Shortness of breath  Vomiting     If you need us before your next appointment, you can reach the [facility] at [phone]. Thank you for choosing us for your care!

## 2024-05-10 ENCOUNTER — PATIENT MESSAGE (OUTPATIENT)
Dept: ADMINISTRATIVE | Facility: OTHER | Age: 79
End: 2024-05-10
Payer: MEDICARE

## 2024-05-10 ENCOUNTER — INFUSION (OUTPATIENT)
Dept: INFUSION THERAPY | Facility: HOSPITAL | Age: 79
End: 2024-05-10
Attending: INTERNAL MEDICINE
Payer: MEDICARE

## 2024-05-10 DIAGNOSIS — C34.91 ADENOCARCINOMA OF RIGHT LUNG: Primary | ICD-10-CM

## 2024-05-10 DIAGNOSIS — C34.91 ADENOCARCINOMA OF RIGHT LUNG: ICD-10-CM

## 2024-05-10 PROCEDURE — 63600175 PHARM REV CODE 636 W HCPCS: Mod: JZ,JG,HCNC,PN | Performed by: INTERNAL MEDICINE

## 2024-05-10 RX ADMIN — PEGFILGRASTIM-JMDB 6 MG: 6 INJECTION SUBCUTANEOUS at 12:05

## 2024-05-11 ENCOUNTER — PATIENT MESSAGE (OUTPATIENT)
Dept: ADMINISTRATIVE | Facility: OTHER | Age: 79
End: 2024-05-11
Payer: MEDICARE

## 2024-05-12 ENCOUNTER — PATIENT MESSAGE (OUTPATIENT)
Dept: ADMINISTRATIVE | Facility: OTHER | Age: 79
End: 2024-05-12
Payer: MEDICARE

## 2024-05-13 ENCOUNTER — TELEPHONE (OUTPATIENT)
Dept: HEMATOLOGY/ONCOLOGY | Facility: CLINIC | Age: 79
End: 2024-05-13
Payer: MEDICARE

## 2024-05-13 ENCOUNTER — PATIENT MESSAGE (OUTPATIENT)
Dept: ADMINISTRATIVE | Facility: OTHER | Age: 79
End: 2024-05-13
Payer: MEDICARE

## 2024-05-13 NOTE — TELEPHONE ENCOUNTER
I called patient to review [employer] testing and their financial assistance. LVM and sent [employer] message with additional information.

## 2024-05-13 NOTE — TELEPHONE ENCOUNTER
Patient's  Jenaro returned missed call. Reviewed [employer] testing and their financial assistance. Completed the financial assistance -approved for full coverage. Email sent for their record.

## 2024-05-14 ENCOUNTER — TELEPHONE (OUTPATIENT)
Dept: HEMATOLOGY/ONCOLOGY | Facility: CLINIC | Age: 79
End: 2024-05-14
Payer: MEDICARE

## 2024-05-14 ENCOUNTER — PATIENT MESSAGE (OUTPATIENT)
Dept: ADMINISTRATIVE | Facility: OTHER | Age: 79
End: 2024-05-14
Payer: MEDICARE

## 2024-05-14 NOTE — TELEPHONE ENCOUNTER
Her scans look good as far as the abdomen is concerned.   Maybe she can restart Prilosec back on and see if that helps

## 2024-05-14 NOTE — TELEPHONE ENCOUNTER
Spoke with patient. Advised her to start back with prilosec---and she noted dr diana specifically told her to stop this as it interacted with a chemo drug.  Nurse reviewed chart----it is noted alimta and prilosec interact.    Nurse will speak with dr diana and get back to patient.        Message routed to dr diana

## 2024-05-14 NOTE — TELEPHONE ENCOUNTER
----- Message from Jackie Johnson sent at 5/14/2024  8:56 AM CDT -----  Type: Needs Medical Advice  Who Called:  Patient   Symptoms (please be specific):    How long has patient had these symptoms:    Pharmacy name and phone #:    Best Call Back Number: [phone]  Additional Information: Patient is requesting a call back from Ledy regarding some stomach pain she is having.

## 2024-05-14 NOTE — TELEPHONE ENCOUNTER
Spoke with patient  She notes area immediately above her navel (pit of her stomach) she describes as a dull ache.  Nothing exacerbates the pain or makes it better.   She was told to stop prilosec---and to start pepcid a couple weeks ago.  She has a hiatal hernia.   She takes ondansetron for the nausea she experiences---no fever, no vomiting. Abdomen is soft. She is having regular BMs for her.      Has recent CT on file.    Nurse will speak with dr diana and be back in touch with her.    Patient states from a chemo standpoint---she is doing amazing and tolerating treatment well.        Message routed to dr diana

## 2024-05-15 ENCOUNTER — PATIENT MESSAGE (OUTPATIENT)
Dept: ADMINISTRATIVE | Facility: OTHER | Age: 79
End: 2024-05-15
Payer: MEDICARE

## 2024-05-16 ENCOUNTER — PATIENT MESSAGE (OUTPATIENT)
Dept: ADMINISTRATIVE | Facility: OTHER | Age: 79
End: 2024-05-16
Payer: MEDICARE

## 2024-05-17 ENCOUNTER — PATIENT MESSAGE (OUTPATIENT)
Dept: ADMINISTRATIVE | Facility: OTHER | Age: 79
End: 2024-05-17
Payer: MEDICARE

## 2024-05-18 ENCOUNTER — PATIENT MESSAGE (OUTPATIENT)
Dept: ADMINISTRATIVE | Facility: OTHER | Age: 79
End: 2024-05-18
Payer: MEDICARE

## 2024-05-19 ENCOUNTER — PATIENT MESSAGE (OUTPATIENT)
Dept: ADMINISTRATIVE | Facility: OTHER | Age: 79
End: 2024-05-19
Payer: MEDICARE

## 2024-05-20 ENCOUNTER — PATIENT MESSAGE (OUTPATIENT)
Dept: ADMINISTRATIVE | Facility: OTHER | Age: 79
End: 2024-05-20
Payer: MEDICARE

## 2024-05-21 ENCOUNTER — PATIENT MESSAGE (OUTPATIENT)
Dept: ADMINISTRATIVE | Facility: OTHER | Age: 79
End: 2024-05-21
Payer: MEDICARE

## 2024-05-22 ENCOUNTER — PATIENT MESSAGE (OUTPATIENT)
Dept: ADMINISTRATIVE | Facility: OTHER | Age: 79
End: 2024-05-22
Payer: MEDICARE

## 2024-05-23 ENCOUNTER — PATIENT MESSAGE (OUTPATIENT)
Dept: ADMINISTRATIVE | Facility: OTHER | Age: 79
End: 2024-05-23
Payer: MEDICARE

## 2024-05-24 ENCOUNTER — PATIENT MESSAGE (OUTPATIENT)
Dept: ADMINISTRATIVE | Facility: OTHER | Age: 79
End: 2024-05-24
Payer: MEDICARE

## 2024-05-24 DIAGNOSIS — C34.91 ADENOCARCINOMA OF RIGHT LUNG: Primary | ICD-10-CM

## 2024-05-25 ENCOUNTER — PATIENT MESSAGE (OUTPATIENT)
Dept: ADMINISTRATIVE | Facility: OTHER | Age: 79
End: 2024-05-25
Payer: MEDICARE

## 2024-05-26 ENCOUNTER — PATIENT MESSAGE (OUTPATIENT)
Dept: ADMINISTRATIVE | Facility: OTHER | Age: 79
End: 2024-05-26
Payer: MEDICARE

## 2024-05-27 ENCOUNTER — PATIENT MESSAGE (OUTPATIENT)
Dept: ADMINISTRATIVE | Facility: OTHER | Age: 79
End: 2024-05-27
Payer: MEDICARE

## 2024-05-28 ENCOUNTER — PATIENT MESSAGE (OUTPATIENT)
Dept: ADMINISTRATIVE | Facility: OTHER | Age: 79
End: 2024-05-28
Payer: MEDICARE

## 2024-05-28 NOTE — PROGRESS NOTES
PATIENT: Nicole Medina  MRN: [id number]  DATE: 5/29/2024      Diagnosis:   1. Adenocarcinoma of right lung    2. Gastroesophageal reflux disease, unspecified whether esophagitis present    3. Primary malignant neoplasm of left upper lobe of lung    4. Immunodeficiency due to chemotherapy        Chief Complaint:  Adenocarcinoma of right lung           Subjective:    Interval History: Ms. Medina is a 78 y.o. female who returns for follow up. Denies fever, chills, sob, cp, palpitations, swelling, fatigue, numbness, tingling, n/v, diarrhea, constipation, abdominal pain, new bumps, lumps, bleeding, bruising.     Oncologic History:   78 y.o. female with Stage IA2 (cT1b cN0 M0) DOC NSCLC (adeno) diagnosed on biopsy 12/20/19. History significant for sarcoidosis. CT Chest 1/14/20 demonstrated 1.7 cm DOC primary and multiple other stable sub-centimeter nodules with mild hilar and mediastinal adenopathy. She underwent attempted VATS resection by Dr. Mccall 1/16/20, which was aborted due to poor toleration of single lung ventilation. She completed definitive SBRT 55 Gy in 5 fx on 2/13/20.      CT Chest 2/16/24 concerning for enlarging adenopathy. Bronch w/ EBUS by Dr. Garcia 3/26/24. 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 She returns for consideration of treatment options.     Her case was reviewed at the Thoracic Multidisciplinary Conference on 04/17/2024 with recommendation for consideration of systemic therapy options. Since there is not a clear target for radiation on her imaging (ILD changes indistinguishable from lepidic adeno), she does not have a local therapy option at this time. I discussed with the patient and her  that lepidic adenocarcinoma tends to be very slow growing and often present for years, but there is always a risk that it can develop metastatic potential if  "untreated.      Tempus NGS tissue with no targets, PDL1 is less than 1%      CT scans of chest abdomen pelvis  from 04/29/2024 reveal "[id number] increased size of a left apical pulmonary mass status post radiation. Grossly stable background of interstitial lung disease in this patient with reported history of sarcoidosis, which limits evaluation for lepidic spread of disease in this patient with biopsy-proven left upper lobe adenocarcinoma. Unchanged bilateral pulmonary micro-nodules. Stable mediastinal and hilar lymphadenopathy.  Index lymph nodes as above. 2.1 cm left adrenal nodule, unchanged since November 2021. Large extrarenal pelvis on the right with hydronephrosis felt less likely given stability dating back to November 2021.      MRI brain from 05/03/2024  reveals "No evidence of intracranial metastatic disease. 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    Oncology History   Primary malignant neoplasm of left upper lobe of lung   1/27/2020 Initial Diagnosis    Primary malignant neoplasm of left upper lobe of lung     1/27/2020 Cancer Staged    Staging form: Lung, AJCC 8th Edition  - Clinical stage from 1/27/2020: Stage IA2 (cT1b, cN0, cM0)     2/7/2020 - 2/13/2020 Radiation Therapy    Treating physician: Kirby Sibley     Site  Technique  Energy  Dose/Fx (Gy)  #Fx  Total Dose (Gy)    DOC Lung  SBRT  6X  11  5 / 5  55           Primary adenocarcinoma of lower lobe of right lung   3/26/2024 Cancer Staged    Staging form: Lung, AJCC 8th Edition  - Clinical stage from 3/26/2024: Stage IA1 (cT1mi, cN0, cM0)     4/10/2024 Initial Diagnosis    Primary adenocarcinoma of lower lobe of right lung     Adenocarcinoma of right lung   4/25/2024 Initial Diagnosis    Adenocarcinoma of right lung     5/9/2024 -  Chemotherapy    Treatment Summary   Plan Name: OP NSCLC PEMETREXED + CARBOPLATIN (AUC) Q3W  Treatment Goal: Palliative  Status: Active  Start Date: 5/9/2024  End Date: 7/12/2024 " (Planned)  Provider: Yanci Osborn MD  Chemotherapy: CARBOplatin (PARAPLATIN) 455 mg in sodium chloride 0.9% 330.5 mL chemo infusion, 455 mg (100 % of original dose 456 mg), Intravenous, Clinic/HOD 1 time, 1 of 4 cycles  Dose modification:   (original dose 456 mg, Cycle 1)  Administration: 455 mg (5/9/2024)  PEMEtrexed disodium (ALIMTA) 900 mg in sodium chloride 0.9% SolP 100 mL chemo infusion, 925 mg, Intravenous, Clinic/HOD 1 time, 1 of 4 cycles  Administration: 900 mg (5/9/2024)         Past Medical History:   Past Medical History:   Diagnosis Date    Acute ischemic right MCA stroke 06/02/2017    Anticoagulant long-term use     Arthritis     Atrial fibrillation     Cancer 02/2020    lung cancer, small cell     Cancer 06/2020    skin    Cataract     done ou    Colon polyp 11/22/2010    Coronary artery disease     loop recorder watchman    Encounter for blood transfusion     Essential hypertension 08/21/2017    GERD (gastroesophageal reflux disease)     Hiatal hernia     History of radiation therapy     History of seasonal allergies     Iron deficiency anemia due to chronic blood loss 05/18/2022    On home oxygen therapy     while sleeping    Polio     as a child    Presbyopia     PSVT (paroxysmal supraventricular tachycardia)     Pulmonary fibrosis     Dr. Miramontes    Sarcoidosis 2013    Sciatica     TIA (transient ischemic attack) 06/02/2017    [facility]//    Vertigo     Wound of left leg        Past Surgical HIstory:   Past Surgical History:   Procedure Laterality Date    ABLATION  4/1/2024    Procedure: Ablation A-Fib;  Surgeon: Randy Rothman III, MD;  Location: [facility];  Service: Cardiology;;    CATARACT EXTRACTION W/  INTRAOCULAR LENS IMPLANT Left 03/27/2018    Dr Higginbotham    CATARACT EXTRACTION W/  INTRAOCULAR LENS IMPLANT Right 05/08/2018    Dr Higginbotham    CHOLECYSTECTOMY      CLOSURE OF LEFT ATRIAL APPENDAGE USING DEVICE Left 10/10/2023    Procedure: Watchman;  Surgeon: Randy Rothman III, MD;   Location: [facility] CATH;  Service: Cardiology;  Laterality: Left;    ECHOCARDIOGRAM,TRANSESOPHAGEAL N/A 10/10/2023    Procedure: (JEB) intra-procedure;  Surgeon: Mason Lyn MD;  Location: [facility] CATH;  Service: Cardiology;  Laterality: N/A;    ECHOCARDIOGRAM,TRANSESOPHAGEAL N/A 11/22/2023    Procedure: Transesophageal echo (JEB) intra-procedure log documentation;  Surgeon: Heath Johnson MD;  Location: [facility];  Service: Cardiology;  Laterality: N/A;  6 week post-implant JEB for Watchman    ENDOBRONCHIAL ULTRASOUND Bilateral 3/26/2024    Procedure: ENDOBRONCHIAL ULTRASOUND (EBUS);  Surgeon: Du Garcia Jr., DO;  Location: [facility] OR;  Service: Pulmonary;  Laterality: Bilateral;  fluoroscopy please    ESOPHAGEAL DILATION N/A 11/22/2018    Procedure: DILATION, ESOPHAGUS;  Surgeon: Robb Fitzgerald Jr., MD;  Location: [facility];  Service: Endoscopy;  Laterality: N/A;    ESOPHAGEAL DILATION N/A 7/5/2023    Procedure: DILATION, ESOPHAGUS;  Surgeon: Yaakov Schroeder MD;  Location: [facility];  Service: Endoscopy;  Laterality: N/A;    ESOPHAGOGASTRODUODENOSCOPY N/A 11/22/2018    Procedure: ESOPHAGOGASTRODUODENOSCOPY (EGD);  Surgeon: Robb Fitzgerald Jr., MD;  Location: [facility];  Service: Endoscopy;  Laterality: N/A;    ESOPHAGOGASTRODUODENOSCOPY N/A 2/19/2021    Procedure: EGD (ESOPHAGOGASTRODUODENOSCOPY);  Surgeon: Robb Fitzgerald Jr., MD;  Location: [facility] ENDO;  Service: Endoscopy;  Laterality: N/A;    ESOPHAGOGASTRODUODENOSCOPY N/A 7/5/2023    Procedure: EGD (ESOPHAGOGASTRODUODENOSCOPY);  Surgeon: Yaakov Schroeder MD;  Location: [facility];  Service: Endoscopy;  Laterality: N/A;    EYE SURGERY      HAND SURGERY Right     trauma repair    INSERTION OF IMPLANTABLE LOOP RECORDER Left 4/19/2022    Procedure: Insertion, Implantable Loop Recorder;  Surgeon: Thanh Fuentes MD;  Location: [facility] CATH;  Service: Cardiology;  Laterality: Left;    LEFT HEART CATHETERIZATION N/A 1/4/2021    Procedure: Left heart cath;   Surgeon: Laron Falcon MD;  Location: [facility] CATH;  Service: Cardiology;  Laterality: N/A;    LEFT HEART CATHETERIZATION  8/22/2023    Procedure: Left heart cath;  Surgeon: Heath Johnson MD;  Location: [facility] CATH;  Service: Cardiology;;    RIGHT HEART CATHETERIZATION  8/22/2023    Procedure: Right heart cath;  Surgeon: Heath Johnson MD;  Location: [facility] CATH;  Service: Cardiology;;    TONSILLECTOMY      TOTAL ABDOMINAL HYSTERECTOMY      TRANSESOPHAGEAL ECHOCARDIOGRAM WITH POSSIBLE CARDIOVERSION (JEB W/ POSS CARDIOVERSION) N/A 2/20/2024    Procedure: TRANSESOPHAGEAL ECHOCARDIOGRAM WITH POSSIBLE CARDIOVERSION (JEB W/ POSS CARDIOVERSION);  Surgeon: Heath Johnson MD;  Location: [facility];  Service: Cardiology;  Laterality: N/A;    VARICOSE VEIN SURGERY Bilateral     bilateral legs    VIDEO-ASSISTED THORACOSCOPIC SURGERY (VATS)  1/16/2020    Procedure: VATS (VIDEO-ASSISTED THORACOSCOPIC SURGERY) - exploratory;  Surgeon: Ritesh Mccall MD;  Location: [address];  Service: Thoracic;;       Family History:   Family History   Problem Relation Name Age of Onset    Cataracts Mother      Macular degeneration Mother      Cancer Mother          lymphoma    Diabetes Father      Hypertension Father      Macular degeneration Sister      Cancer Sister          breast    Breast cancer Maternal Grandmother      Thyroid disease Daughter      Cancer Daughter          thyroid    Breast cancer Other 1/2 sister     Amblyopia Neg Hx      Blindness Neg Hx      Glaucoma Neg Hx      Retinal detachment Neg Hx      Strabismus Neg Hx      Stroke Neg Hx         Social History:  reports that she has quit smoking. She has never been exposed to tobacco smoke. She has never used smokeless tobacco. She reports that she does not drink alcohol and does not use drugs.    Allergies:  Review of patient's allergies indicates:   Allergen Reactions    Latex Itching and Swelling     Itching and swelling to site (local reaction)        Medications:  Current Outpatient Medications   Medication Sig Dispense Refill    albuterol (PROVENTIL) 2.5 mg /3 mL (0.083 %) nebulizer solution Take 3 mLs (2.5 mg total) by nebulization every 6 (six) hours as needed for Wheezing or Shortness of Breath. Rescue 270 mL 5    albuterol (PROVENTIL/VENTOLIN HFA) 90 mcg/actuation inhaler Inhale 1 puff into the lungs once daily. Rescue 3 g 5    apixaban (ELIQUIS) 5 mg Tab Take 1 tablet (5 mg total) by mouth 2 (two) times daily. 90 tablet 3    cholecalciferol, vitamin D3, 125 mcg (5,000 unit) Tab Take 5,000 Units by mouth once daily.      cloNIDine (CATAPRES) 0.1 MG tablet Take 1 tablet (0.1 mg total) by mouth 3 (three) times daily as needed (PRN SBP > 165 mmHg). 90 tablet 6    dronedarone (MULTAQ) 400 mg Tab Take 1 tablet (400 mg total) by mouth 2 (two) times daily with meals. 180 tablet 3    ezetimibe (ZETIA) 10 mg tablet Take 1 tablet (10 mg total) by mouth once daily. 90 tablet 3    famotidine (PEPCID) 40 MG tablet Take 1 tablet (40 mg total) by mouth once daily. 60 tablet 11    ferrous sulfate (FEOSOL) 325 mg (65 mg iron) Tab tablet Take 325 mg by mouth once daily.      fluticasone propion-salmeterol 115-21 mcg/dose (ADVAIR HFA) 115-21 mcg/actuation HFAA inhaler Inhale 2 puffs into the lungs every 12 (twelve) hours. Controller 3 g 5    fluticasone propionate (FLONASE) 50 mcg/actuation nasal spray 2 sprays (100 mcg total) by Each Nostril route once daily. 48 g 3    folic acid (FOLVITE) 1 MG tablet Take 1 tablet (1 mg total) by mouth once daily. 360 tablet 0    furosemide (LASIX) 20 MG tablet Take 0.5 tablets (10 mg total) by mouth every morning. 90 tablet 3    ibandronate (BONIVA) 150 mg tablet Take 1 tablet (150 mg total) by mouth every 30 days. 12 tablet 3    levocetirizine (XYZAL) 5 MG tablet Take 1 tablet (5 mg total) by mouth nightly as needed for Allergies. 30 tablet 2    metoprolol succinate (TOPROL XL) 50 MG 24 hr tablet Take 1 tablet (50 mg total) by mouth  "every 12 (twelve) hours. 180 tablet 4    ondansetron (ZOFRAN) 8 MG tablet Take 1 tablet (8 mg total) by mouth every 6 (six) hours as needed. 90 tablet 2    sacubitriL-valsartan (ENTRESTO) 24-26 mg per tablet Take 0.5 tablet by mouth twice daily 180 tablet 3    spironolactone (ALDACTONE) 25 MG tablet Take 0.5 tablets (12.5 mg total) by mouth every morning. 90 tablet 3     No current facility-administered medications for this visit.       Review of Systems   Constitutional:  Negative for chills, fatigue and fever.   HENT: Negative.     Respiratory:  Negative for chest tightness and shortness of breath.    Cardiovascular:  Negative for chest pain, palpitations and leg swelling.   Gastrointestinal:  Negative for abdominal pain, constipation, diarrhea and nausea.   Genitourinary: Negative.    Musculoskeletal: Negative.    Skin: Negative.    Neurological: Negative.    Hematological:  Negative for adenopathy. Does not bruise/bleed easily.   Psychiatric/Behavioral: Negative.         ECOG Performance Status:   ECOG SCORE             Objective:      Vitals:   Vitals:    05/29/24 0856   BP: 115/71   BP Location: Right arm   Patient Position: Sitting   BP Method: Medium (Automatic)   Pulse: 74   Resp: 16   Temp: 96.2 °F (35.7 °C)   TempSrc: Temporal   SpO2: 98%   Weight: 79.1 kg (174 lb 6.1 oz)   Height: 5' 3" (1.6 m)     BMI: Body mass index is 30.89 kg/m².    Physical Exam  Constitutional:       Appearance: She is normal weight.   HENT:      Head: Normocephalic.      Nose: Nose normal.      Mouth/Throat:      Mouth: Mucous membranes are moist.      Pharynx: Oropharynx is clear.   Eyes:      Pupils: Pupils are equal, round, and reactive to light.   Cardiovascular:      Rate and Rhythm: Normal rate and regular rhythm.      Heart sounds: Normal heart sounds.   Pulmonary:      Effort: Pulmonary effort is normal.      Breath sounds: Normal breath sounds.   Abdominal:      General: Bowel sounds are normal.   Musculoskeletal:        "  General: Normal range of motion.      Cervical back: Normal range of motion.   Skin:     General: Skin is warm and dry.   Neurological:      Mental Status: She is alert and oriented to person, place, and time.   Psychiatric:         Mood and Affect: Mood normal.         Behavior: Behavior normal.         Laboratory Data:  Recent Results (from the past 24 hour(s))   CBC w/ DIFF    Collection Time: 05/29/24  8:31 AM   Result Value Ref Range    WBC 7.84 3.90 - 12.70 K/uL    RBC 4.33 4.00 - 5.40 M/uL    Hemoglobin 12.4 12.0 - 16.0 g/dL    Hematocrit 38.1 37.0 - 48.5 %    MCV 88 82 - 98 fL    MCH 28.6 27.0 - 31.0 pg    MCHC 32.5 32.0 - 36.0 g/dL    RDW 13.7 11.5 - 14.5 %    Platelets 284 150 - 450 K/uL    MPV 9.7 9.2 - 12.9 fL    Immature Granulocytes 0.4 0.0 - 0.5 %    Gran # (ANC) 5.0 1.8 - 7.7 K/uL    Immature Grans (Abs) 0.03 0.00 - 0.04 K/uL    Lymph # 1.9 1.0 - 4.8 K/uL    Mono # 0.7 0.3 - 1.0 K/uL    Eos # 0.2 0.0 - 0.5 K/uL    Baso # 0.05 0.00 - 0.20 K/uL    nRBC 0 0 /100 WBC    Gran % 63.5 38.0 - 73.0 %    Lymph % 24.2 18.0 - 48.0 %    Mono % 8.7 4.0 - 15.0 %    Eosinophil % 2.6 0.0 - 8.0 %    Basophil % 0.6 0.0 - 1.9 %    Differential Method Automated    CMP    Collection Time: 05/29/24  8:31 AM   Result Value Ref Range    Sodium 142 136 - 145 mmol/L    Potassium 5.0 3.5 - 5.1 mmol/L    Chloride 104 95 - 110 mmol/L    CO2 26 23 - 29 mmol/L    Glucose 110 70 - 110 mg/dL    BUN 15 8 - 23 mg/dL    Creatinine 0.9 0.5 - 1.4 mg/dL    Calcium 9.3 8.7 - 10.5 mg/dL    Total Protein 7.3 6.0 - 8.4 g/dL    Albumin 3.4 (L) 3.5 - 5.2 g/dL    Total Bilirubin 0.3 0.1 - 1.0 mg/dL    Alkaline Phosphatase 74 55 - 135 U/L    AST 28 10 - 40 U/L    ALT 31 10 - 44 U/L    eGFR >60.0 >60 mL/min/1.73 m^2    Anion Gap 12 8 - 16 mmol/L   Magnesium    Collection Time: 05/29/24  8:31 AM   Result Value Ref Range    Magnesium 1.8 1.6 - 2.6 mg/dL          Imaging: CT CHEST ABDOMEN PELVIS WITH IV CONTRAST (XPD)     CLINICAL HISTORY:  Non-small  cell lung cancer (NSCLC), metastatic, assess treatment response; Malignant neoplasm of unspecified part of right bronchus or lung     TECHNIQUE:  Axial images of the chest, abdomen, and pelvis were acquired  after the use of 100 cc [id number] IV contrast. 450 mL of barium sulfate was administered orally.  Coronal and sagittal reconstructions were also obtained     COMPARISON:  CT chest 02/16/2024.     CTA runoff 11/13/2021.     FINDINGS:  Thoracic soft tissues: Normal thyroid.     Aorta: Thoracic aorta is normal in caliber and contour with moderate calcific atherosclerosis.     Heart: Heart is borderline enlarged.  No pericardial effusion.  Left atrial appendage closure device.  Calcification of the aortic valvular annulus.  Scattered calcific coronary atherosclerosis.     Soumya/Mediastinum: Stable mediastinal lymphadenopathy.  10 mm prevascular lymph node, previously 10 mm.  14 mm right lower paratracheal lymph node, previously 15 mm.  15 mm subcarinal lymph node, previously 15 mm.  18 mm right lower hilar node series 2, image 70.  Additional scattered smaller hilar nodes noted.     Lungs: Central airways are patent.  There is widespread traction bronchiectasis. Grossly stable background of interstitial lung disease in this patient with reported history of sarcoidosis, which limits evaluation for lepidic spread of disease in this patient with biopsy-proven left upper lobe adenocarcinoma.  Ajgojjl-so-xnewaxka increased size of a left apical pulmonary mass measuring 31 x 21 mm (axial series 6, image 128), previously 27 x 21 mm.  Bilateral pulmonary micronodules appear grossly unchanged.     Liver: Normal in size and contour.  1 cm heterogeneous hypodense lesion the right hepatic lobe (axial series 3, image 49), unchanged since November 2021.  Few calcified granulomas.     Gallbladder: Surgically absent.     Bile Ducts: No evidence of dilated ducts.     Pancreas: No mass or peripancreatic fat stranding.     Spleen: Normal  in size.  Calcified granuloma in the spleen.     Stomach and duodenum: Unremarkable.     Adrenals: 2.1 cm left adrenal nodule, unchanged since November 2021.     Kidneys/ Ureters: Normal in size and location. Normal enhancement. Large extrarenal pelvis on the right.  Hydronephrosis felt less likely given stability dating back to November 2021. No ureteral dilatation.     Bladder: No evidence of wall thickening.     Reproductive organs: Uterus is surgically absent.  No adnexal lesion.     Bowel/Mesentery: Small bowel is normal in caliber with no evidence of obstruction.  No evidence of inflammation or wall thickening.  Colon demonstrates no focal wall thickening.     Lymph nodes: No pathologically enlarged lymph nodes by short axis CT size criteria.     Abdominal wall:  Unremarkable.     Vasculature: No aneurysm. Moderate calcific atherosclerosis.     Bones: No acute fracture. No aggressive osseous lesions.     Impression:     Optinhk-tz-gjtgaobe increased size of a left apical pulmonary mass status post radiation.     Grossly stable background of interstitial lung disease in this patient with reported history of sarcoidosis, which limits evaluation for lepidic spread of disease in this patient with biopsy-proven left upper lobe adenocarcinoma.     Unchanged bilateral pulmonary micro-nodules.     Stable mediastinal and hilar lymphadenopathy.  Index lymph nodes as above.     2.1 cm left adrenal nodule, unchanged since November 2021.     Large extrarenal pelvis on the right with hydronephrosis felt less likely given stability dating back to November 2021.     Additional findings in the body of the report.     Electronically signed by resident: Prasanth Campbell  Date:                                            04/30/2024  Time:                                           08:16     Electronically signed by:Justus He MD  Date:                                             04/30/2024  _________________________________________________________________  MRI BRAIN W WO CONTRAST     CLINICAL HISTORY:  Non-small cell lung cancer (NSCLC), metastatic, assess treatment response;.  Malignant neoplasm of unspecified part of right bronchus or lung     TECHNIQUE:  Multiplanar multisequence MR imaging of the brain was performed before and after the administration of 9 mL Gadavist  intravenous contrast.     COMPARISON:  CT head 07/15/2022, MRI brain 08/14/2019     FINDINGS:  Intracranial compartment:     Ventricles and sulci are stable in size, without evidence of hydrocephalus.     No extra-axial blood or fluid collections.     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  Tubular enhancement in the right cerebellar peduncle and cerebellar hemisphere compatible with a developmental venous anomaly. In the hilda there are two foci of subtle susceptibility with faint brush like enhancement in the central hilda, in keeping with capillary telangiectasia. Posterior pontomedullary small focus of susceptibility artifact (series 11, image 24) likely reflecting a superimposed cavernous malformation.  Overall appearance very similar to the prior examination with no edema or mass effect to suggest new or ongoing hemorrhage at this site.     No new enhancing lesions elsewhere in the brain.     Modest chronic small vessel ischemic change.  No recent or remote major vascular distribution infarct.  No new intracranial mass effect or midline shift.     Empty sella configuration.     Normal vascular flow voids are preserved.     Skull/extracranial contents (limited evaluation):     Bone marrow signal intensity is normal.     Impression:     No evidence of intracranial metastatic disease.     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     Electronically signed by resident: Henok Rangel  Date:                                            05/01/2024       Assessment:        1. Adenocarcinoma of right lung    2. Gastroesophageal reflux disease, unspecified whether esophagitis present    3. Primary malignant neoplasm of left upper lobe of lung    4. Immunodeficiency due to chemotherapy           Plan:   Adenocarcinoma of right lung  -proceed with cycle 2 of Carboplatin and Alimta and will return in 3 weeks for next chemo.     Gerd  -Pepcid with dexamethasone    Immunodeficiency  - WBC 7.84 today  -no signs infection  -will monitor    Visit today included increased complexity associated with the care of the episodic problem  addressed and managing the longitudinal care of the patient due to the serious and/or complex managed problem(s) Lung cancer.       Med Onc Chart Routing      Follow up with physician 3 weeks. As scheduled with Dr Osborn with labs prior   Follow up with STANISLAW    Infusion scheduling note   ok to proceed with c2 5/30/2024   Injection scheduling note    Labs    Imaging    Pharmacy appointment    Other referrals                  Plan was discussed with the patient at length, and she verbalized understanding. Nicole was given an opportunity to ask questions that were answered to her satisfaction, and she was advised to call in the interval if any problems or questions arise.    Assessment/Plan reviewed and approved by Dr Osborn    30 minutes were spent in coordination of patient's care, record review and counseling.    ELIECER Joy, FNP-C  Hematology & Oncology

## 2024-05-29 ENCOUNTER — LAB VISIT (OUTPATIENT)
Dept: LAB | Facility: HOSPITAL | Age: 79
End: 2024-05-29
Attending: INTERNAL MEDICINE
Payer: MEDICARE

## 2024-05-29 ENCOUNTER — OFFICE VISIT (OUTPATIENT)
Dept: HEMATOLOGY/ONCOLOGY | Facility: CLINIC | Age: 79
End: 2024-05-29
Payer: MEDICARE

## 2024-05-29 VITALS
HEART RATE: 74 BPM | TEMPERATURE: 96 F | OXYGEN SATURATION: 98 % | HEIGHT: 63 IN | RESPIRATION RATE: 16 BRPM | DIASTOLIC BLOOD PRESSURE: 71 MMHG | SYSTOLIC BLOOD PRESSURE: 115 MMHG | WEIGHT: 174.38 LBS | BODY MASS INDEX: 30.9 KG/M2

## 2024-05-29 DIAGNOSIS — K21.9 GASTROESOPHAGEAL REFLUX DISEASE, UNSPECIFIED WHETHER ESOPHAGITIS PRESENT: ICD-10-CM

## 2024-05-29 DIAGNOSIS — C34.91 ADENOCARCINOMA OF RIGHT LUNG: Primary | ICD-10-CM

## 2024-05-29 DIAGNOSIS — D84.821 IMMUNODEFICIENCY DUE TO CHEMOTHERAPY: ICD-10-CM

## 2024-05-29 DIAGNOSIS — C34.91 ADENOCARCINOMA OF RIGHT LUNG: ICD-10-CM

## 2024-05-29 DIAGNOSIS — Z79.899 IMMUNODEFICIENCY DUE TO CHEMOTHERAPY: ICD-10-CM

## 2024-05-29 DIAGNOSIS — C34.12 PRIMARY MALIGNANT NEOPLASM OF LEFT UPPER LOBE OF LUNG: ICD-10-CM

## 2024-05-29 DIAGNOSIS — T45.1X5A IMMUNODEFICIENCY DUE TO CHEMOTHERAPY: ICD-10-CM

## 2024-05-29 LAB
ALBUMIN SERPL BCP-MCNC: 3.4 G/DL (ref 3.5–5.2)
ALP SERPL-CCNC: 74 U/L (ref 55–135)
ALT SERPL W/O P-5'-P-CCNC: 31 U/L (ref 10–44)
ANION GAP SERPL CALC-SCNC: 12 MMOL/L (ref 8–16)
AST SERPL-CCNC: 28 U/L (ref 10–40)
BASOPHILS # BLD AUTO: 0.05 K/UL (ref 0–0.2)
BASOPHILS NFR BLD: 0.6 % (ref 0–1.9)
BILIRUB SERPL-MCNC: 0.3 MG/DL (ref 0.1–1)
BUN SERPL-MCNC: 15 MG/DL (ref 8–23)
CALCIUM SERPL-MCNC: 9.3 MG/DL (ref 8.7–10.5)
CHLORIDE SERPL-SCNC: 104 MMOL/L (ref 95–110)
CO2 SERPL-SCNC: 26 MMOL/L (ref 23–29)
CREAT SERPL-MCNC: 0.9 MG/DL (ref 0.5–1.4)
DIFFERENTIAL METHOD BLD: NORMAL
EOSINOPHIL # BLD AUTO: 0.2 K/UL (ref 0–0.5)
EOSINOPHIL NFR BLD: 2.6 % (ref 0–8)
ERYTHROCYTE [DISTWIDTH] IN BLOOD BY AUTOMATED COUNT: 13.7 % (ref 11.5–14.5)
EST. GFR  (NO RACE VARIABLE): >60 ML/MIN/1.73 M^2
GLUCOSE SERPL-MCNC: 110 MG/DL (ref 70–110)
HCT VFR BLD AUTO: 38.1 % (ref 37–48.5)
HGB BLD-MCNC: 12.4 G/DL (ref 12–16)
IMM GRANULOCYTES # BLD AUTO: 0.03 K/UL (ref 0–0.04)
IMM GRANULOCYTES NFR BLD AUTO: 0.4 % (ref 0–0.5)
LYMPHOCYTES # BLD AUTO: 1.9 K/UL (ref 1–4.8)
LYMPHOCYTES NFR BLD: 24.2 % (ref 18–48)
MAGNESIUM SERPL-MCNC: 1.8 MG/DL (ref 1.6–2.6)
MCH RBC QN AUTO: 28.6 PG (ref 27–31)
MCHC RBC AUTO-ENTMCNC: 32.5 G/DL (ref 32–36)
MCV RBC AUTO: 88 FL (ref 82–98)
MONOCYTES # BLD AUTO: 0.7 K/UL (ref 0.3–1)
MONOCYTES NFR BLD: 8.7 % (ref 4–15)
NEUTROPHILS # BLD AUTO: 5 K/UL (ref 1.8–7.7)
NEUTROPHILS NFR BLD: 63.5 % (ref 38–73)
NRBC BLD-RTO: 0 /100 WBC
PLATELET # BLD AUTO: 284 K/UL (ref 150–450)
PMV BLD AUTO: 9.7 FL (ref 9.2–12.9)
POTASSIUM SERPL-SCNC: 5 MMOL/L (ref 3.5–5.1)
PROT SERPL-MCNC: 7.3 G/DL (ref 6–8.4)
RBC # BLD AUTO: 4.33 M/UL (ref 4–5.4)
SODIUM SERPL-SCNC: 142 MMOL/L (ref 136–145)
WBC # BLD AUTO: 7.84 K/UL (ref 3.9–12.7)

## 2024-05-29 PROCEDURE — 36415 COLL VENOUS BLD VENIPUNCTURE: CPT | Mod: HCNC,PN | Performed by: INTERNAL MEDICINE

## 2024-05-29 PROCEDURE — 1126F AMNT PAIN NOTED NONE PRSNT: CPT | Mod: HCNC,CPTII,S$GLB,

## 2024-05-29 PROCEDURE — 99999 PR PBB SHADOW E&M-EST. PATIENT-LVL V: CPT | Mod: PBBFAC,HCNC,,

## 2024-05-29 PROCEDURE — 83735 ASSAY OF MAGNESIUM: CPT | Mod: HCNC,PN | Performed by: INTERNAL MEDICINE

## 2024-05-29 PROCEDURE — 3074F SYST BP LT 130 MM HG: CPT | Mod: HCNC,CPTII,S$GLB,

## 2024-05-29 PROCEDURE — 85025 COMPLETE CBC W/AUTO DIFF WBC: CPT | Mod: HCNC,PN | Performed by: INTERNAL MEDICINE

## 2024-05-29 PROCEDURE — 1160F RVW MEDS BY RX/DR IN RCRD: CPT | Mod: HCNC,CPTII,S$GLB,

## 2024-05-29 PROCEDURE — 80053 COMPREHEN METABOLIC PANEL: CPT | Mod: HCNC,PN | Performed by: INTERNAL MEDICINE

## 2024-05-29 PROCEDURE — G2211 COMPLEX E/M VISIT ADD ON: HCPCS | Mod: HCNC,S$GLB,,

## 2024-05-29 PROCEDURE — 1159F MED LIST DOCD IN RCRD: CPT | Mod: HCNC,CPTII,S$GLB,

## 2024-05-29 PROCEDURE — 3078F DIAST BP <80 MM HG: CPT | Mod: HCNC,CPTII,S$GLB,

## 2024-05-29 PROCEDURE — 99214 OFFICE O/P EST MOD 30 MIN: CPT | Mod: HCNC,S$GLB,,

## 2024-05-29 RX ORDER — EPINEPHRINE 0.3 MG/.3ML
0.3 INJECTION SUBCUTANEOUS ONCE AS NEEDED
Status: CANCELLED | OUTPATIENT
Start: 2024-05-30

## 2024-05-29 RX ORDER — HEPARIN 100 UNIT/ML
500 SYRINGE INTRAVENOUS
Status: CANCELLED | OUTPATIENT
Start: 2024-05-30

## 2024-05-29 RX ORDER — SODIUM CHLORIDE 0.9 % (FLUSH) 0.9 %
10 SYRINGE (ML) INJECTION
Status: CANCELLED | OUTPATIENT
Start: 2024-05-30

## 2024-05-29 RX ORDER — PROCHLORPERAZINE EDISYLATE 5 MG/ML
5 INJECTION INTRAMUSCULAR; INTRAVENOUS ONCE AS NEEDED
Status: CANCELLED
Start: 2024-05-30

## 2024-05-29 RX ORDER — DIPHENHYDRAMINE HYDROCHLORIDE 50 MG/ML
50 INJECTION INTRAMUSCULAR; INTRAVENOUS ONCE AS NEEDED
Status: CANCELLED | OUTPATIENT
Start: 2024-05-30

## 2024-05-30 ENCOUNTER — PATIENT MESSAGE (OUTPATIENT)
Dept: ADMINISTRATIVE | Facility: OTHER | Age: 79
End: 2024-05-30
Payer: MEDICARE

## 2024-05-30 ENCOUNTER — OFFICE VISIT (OUTPATIENT)
Dept: HEMATOLOGY/ONCOLOGY | Facility: CLINIC | Age: 79
End: 2024-05-30
Payer: MEDICARE

## 2024-05-30 ENCOUNTER — DOCUMENTATION ONLY (OUTPATIENT)
Dept: INFUSION THERAPY | Facility: HOSPITAL | Age: 79
End: 2024-05-30
Payer: MEDICARE

## 2024-05-30 ENCOUNTER — INFUSION (OUTPATIENT)
Dept: INFUSION THERAPY | Facility: HOSPITAL | Age: 79
End: 2024-05-30
Attending: INTERNAL MEDICINE
Payer: MEDICARE

## 2024-05-30 VITALS
DIASTOLIC BLOOD PRESSURE: 57 MMHG | WEIGHT: 174.38 LBS | OXYGEN SATURATION: 97 % | HEART RATE: 68 BPM | TEMPERATURE: 98 F | RESPIRATION RATE: 16 BRPM | SYSTOLIC BLOOD PRESSURE: 105 MMHG | BODY MASS INDEX: 30.9 KG/M2 | HEIGHT: 63 IN

## 2024-05-30 DIAGNOSIS — C34.91 ADENOCARCINOMA OF RIGHT LUNG: ICD-10-CM

## 2024-05-30 DIAGNOSIS — C34.91 ADENOCARCINOMA OF RIGHT LUNG: Primary | ICD-10-CM

## 2024-05-30 DIAGNOSIS — R53.83 FATIGUE, UNSPECIFIED TYPE: Primary | ICD-10-CM

## 2024-05-30 DIAGNOSIS — C34.12 PRIMARY MALIGNANT NEOPLASM OF LEFT UPPER LOBE OF LUNG: ICD-10-CM

## 2024-05-30 PROCEDURE — 1160F RVW MEDS BY RX/DR IN RCRD: CPT | Mod: HCNC,CPTII,S$GLB, | Performed by: NURSE PRACTITIONER

## 2024-05-30 PROCEDURE — 96375 TX/PRO/DX INJ NEW DRUG ADDON: CPT | Mod: HCNC,PN

## 2024-05-30 PROCEDURE — 99215 OFFICE O/P EST HI 40 MIN: CPT | Mod: HCNC,S$GLB,, | Performed by: NURSE PRACTITIONER

## 2024-05-30 PROCEDURE — 63600175 PHARM REV CODE 636 W HCPCS: Mod: HCNC,PN

## 2024-05-30 PROCEDURE — 96413 CHEMO IV INFUSION 1 HR: CPT | Mod: HCNC,PN

## 2024-05-30 PROCEDURE — 96411 CHEMO IV PUSH ADDL DRUG: CPT | Mod: HCNC,PN

## 2024-05-30 PROCEDURE — 99999 PR PBB SHADOW E&M-EST. PATIENT-LVL IV: CPT | Mod: PBBFAC,HCNC,, | Performed by: NURSE PRACTITIONER

## 2024-05-30 PROCEDURE — 1159F MED LIST DOCD IN RCRD: CPT | Mod: HCNC,CPTII,S$GLB, | Performed by: NURSE PRACTITIONER

## 2024-05-30 PROCEDURE — 96367 TX/PROPH/DG ADDL SEQ IV INF: CPT | Mod: HCNC,PN

## 2024-05-30 PROCEDURE — 25000003 PHARM REV CODE 250: Mod: HCNC,PN

## 2024-05-30 RX ORDER — DIPHENHYDRAMINE HYDROCHLORIDE 50 MG/ML
50 INJECTION INTRAMUSCULAR; INTRAVENOUS ONCE AS NEEDED
Status: DISCONTINUED | OUTPATIENT
Start: 2024-05-30 | End: 2024-05-30 | Stop reason: HOSPADM

## 2024-05-30 RX ORDER — PROCHLORPERAZINE EDISYLATE 5 MG/ML
5 INJECTION INTRAMUSCULAR; INTRAVENOUS ONCE AS NEEDED
Status: DISCONTINUED | OUTPATIENT
Start: 2024-05-30 | End: 2024-05-30 | Stop reason: HOSPADM

## 2024-05-30 RX ORDER — HEPARIN 100 UNIT/ML
500 SYRINGE INTRAVENOUS
Status: DISCONTINUED | OUTPATIENT
Start: 2024-05-30 | End: 2024-05-30 | Stop reason: HOSPADM

## 2024-05-30 RX ORDER — EPINEPHRINE 0.3 MG/.3ML
0.3 INJECTION SUBCUTANEOUS ONCE AS NEEDED
Status: DISCONTINUED | OUTPATIENT
Start: 2024-05-30 | End: 2024-05-30 | Stop reason: HOSPADM

## 2024-05-30 RX ORDER — SODIUM CHLORIDE 0.9 % (FLUSH) 0.9 %
10 SYRINGE (ML) INJECTION
Status: DISCONTINUED | OUTPATIENT
Start: 2024-05-30 | End: 2024-05-30 | Stop reason: HOSPADM

## 2024-05-30 RX ADMIN — CARBOPLATIN 455 MG: 10 INJECTION, SOLUTION INTRAVENOUS at 01:05

## 2024-05-30 RX ADMIN — SODIUM CHLORIDE: 9 INJECTION, SOLUTION INTRAVENOUS at 12:05

## 2024-05-30 RX ADMIN — DEXAMETHASONE SODIUM PHOSPHATE 0.25 MG: 10 INJECTION, SOLUTION INTRAMUSCULAR; INTRAVENOUS at 12:05

## 2024-05-30 RX ADMIN — PEMETREXED DISODIUM 900 MG: 500 INJECTION, POWDER, LYOPHILIZED, FOR SOLUTION INTRAVENOUS at 01:05

## 2024-05-30 RX ADMIN — APREPITANT 130 MG: 130 INJECTION, EMULSION INTRAVENOUS at 12:05

## 2024-05-30 NOTE — PLAN OF CARE
Problem: Adult Inpatient Plan of Care  Goal: Plan of Care Review  Outcome: Progressing  Flowsheets (Taken 5/30/2024 1200)  Plan of Care Reviewed With:   patient   spouse  Goal: Patient-Specific Goal (Individualized)  Outcome: Progressing  Flowsheets (Taken 5/30/2024 1200)  Individualized Care Needs: Recliner, warm blanket, 2 pillows,  at chairside, snacks, conversation  Anxieties, Fears or Concerns: B12 due?  Patient/Family-Specific Goals (Include Timeframe): Free of S/S of reaction with infusions.     Problem: Oncology Care  Goal: Improved Activity Tolerance  Outcome: Progressing  Intervention: Promote Improved Energy  Flowsheets (Taken 5/30/2024 1200)  Fatigue Management:   frequent rest breaks encouraged   paced activity encouraged  Sleep/Rest Enhancement: regular sleep/rest pattern promoted  Activity Management: Ambulated -L4  Environmental Support: rest periods encouraged    Patient to Infusion for Alimta and Carbo, accompanied by her . Treatment plan reviewed with patient. VSS. Tolerated infusion. Provided with copy of upcoming appointment schedule. Escorted to the front lobby in no distress for discharge to home.

## 2024-05-30 NOTE — PROGRESS NOTES
Oncology Nutrition   Chemotherapy Infusion Visit    Nutrition Follow Up   RD met with patient at chairside during infusion tx. Pt reports continues to do well nutritionally- eating without difficulty, maintaining weight, and denies nutrition related side effects.     Wt Readings from Last 10 Encounters:   05/30/24 79.1 kg (174 lb 6.1 oz)   05/29/24 79.1 kg (174 lb 6.1 oz)   05/09/24 81.1 kg (178 lb 12.7 oz)   05/09/24 81.1 kg (178 lb 12.7 oz)   05/07/24 79.9 kg (176 lb 2.4 oz)   05/03/24 80.3 kg (177 lb)   04/29/24 80.3 kg (177 lb)   04/25/24 78.8 kg (173 lb 11.6 oz)   04/17/24 84 kg (185 lb 3 oz)   04/13/24 81.2 kg (179 lb)       All other nutrition questions/concerns addressed as appropriate. Will continue to follow and monitor throughout treatment PRN.     Pattie Otero, MS, RD, LDN  05/30/2024  1:36 PM           nausea, vomiting, diarrhea

## 2024-05-30 NOTE — PROGRESS NOTES
"Nicole Medina  78 y.o. is here to seek an integrative approach to discuss side effects related to lung cancer treatment. Nicole Medina  was referred by Dr. Osborn     HPI  Mrs. Medina is here today getting treatment for lung cancer with her , Jenaro, at the chairside. She reports low stress and anxiety. Her  reports he has all the stress and anxiety and states, "I am wearing out my rosary beads." She reports she sleeps well. She does have fatigue the second week after treatment. She has a good appetite and she eats a healthy diet. She is active throughout the day. She enjoys gardening. She also walks for exercise.   They have been  for 56 years and they have 3 children who live locally. She is retired from [employer].     Pillars Assessment    Sleep  How many hours of sleep per night? 7 hours  Do you have trouble falling asleep, staying asleep or waking up earlier than you need to? no  Do you have daytime fatigue? yes  Do you need medication for sleep? no  Do you use any supplements or other interventions for sleep? no    Resilience  Rate your current level of stress- low    Purpose  Do you feel you have a vision or a life purpose? Yes    Spirituality-  Congregational    Nutrition   Food allergies or sensitivities: no  Do you adhere to a particular type of diet? no  Do you have any concerns with your eating habits? no    Exercise  How would you describe your physical activity level? moderate    Past Medical History  Past Medical History:   Diagnosis Date    Acute ischemic right MCA stroke 06/02/2017    Anticoagulant long-term use     Arthritis     Atrial fibrillation     Cancer 02/2020    lung cancer, small cell     Cancer 06/2020    skin    Cataract     done ou    Colon polyp 11/22/2010    Coronary artery disease     loop recorder watchman    Encounter for blood transfusion     Essential hypertension 08/21/2017    GERD (gastroesophageal reflux disease)     Hiatal " hernia     History of radiation therapy     History of seasonal allergies     Iron deficiency anemia due to chronic blood loss 05/18/2022    On home oxygen therapy     while sleeping    Polio     as a child    Presbyopia     PSVT (paroxysmal supraventricular tachycardia)     Pulmonary fibrosis     Dr. Miramontes    Sarcoidosis 2013    Sciatica     TIA (transient ischemic attack) 06/02/2017    [facility]//    Vertigo     Wound of left leg       Past Surgical History   Past Surgical History:   Procedure Laterality Date    ABLATION  4/1/2024    Procedure: Ablation A-Fib;  Surgeon: Randy Rothman III, MD;  Location: [facility] CATH;  Service: Cardiology;;    CATARACT EXTRACTION W/  INTRAOCULAR LENS IMPLANT Left 03/27/2018    Dr Higginbotham    CATARACT EXTRACTION W/  INTRAOCULAR LENS IMPLANT Right 05/08/2018    Dr Higginbotham    CHOLECYSTECTOMY      CLOSURE OF LEFT ATRIAL APPENDAGE USING DEVICE Left 10/10/2023    Procedure: Watchman;  Surgeon: Randy Rothman III, MD;  Location: [facility] CATH;  Service: Cardiology;  Laterality: Left;    ECHOCARDIOGRAM,TRANSESOPHAGEAL N/A 10/10/2023    Procedure: (JEB) intra-procedure;  Surgeon: Mason Lyn MD;  Location: [facility] CATH;  Service: Cardiology;  Laterality: N/A;    ECHOCARDIOGRAM,TRANSESOPHAGEAL N/A 11/22/2023    Procedure: Transesophageal echo (JEB) intra-procedure log documentation;  Surgeon: Heath Johnson MD;  Location: [facility];  Service: Cardiology;  Laterality: N/A;  6 week post-implant JEB for Watchman    ENDOBRONCHIAL ULTRASOUND Bilateral 3/26/2024    Procedure: ENDOBRONCHIAL ULTRASOUND (EBUS);  Surgeon: Du Garcia Jr., DO;  Location: [facility] OR;  Service: Pulmonary;  Laterality: Bilateral;  fluoroscopy please    ESOPHAGEAL DILATION N/A 11/22/2018    Procedure: DILATION, ESOPHAGUS;  Surgeon: Robb Fitzgerald Jr., MD;  Location: [facility] ENDO;  Service: Endoscopy;  Laterality: N/A;    ESOPHAGEAL DILATION N/A 7/5/2023    Procedure: DILATION, ESOPHAGUS;  Surgeon: Yaakov  MD Edla;  Location: [facility];  Service: Endoscopy;  Laterality: N/A;    ESOPHAGOGASTRODUODENOSCOPY N/A 11/22/2018    Procedure: ESOPHAGOGASTRODUODENOSCOPY (EGD);  Surgeon: Robb Fitzgerald Jr., MD;  Location: [facility];  Service: Endoscopy;  Laterality: N/A;    ESOPHAGOGASTRODUODENOSCOPY N/A 2/19/2021    Procedure: EGD (ESOPHAGOGASTRODUODENOSCOPY);  Surgeon: Robb Fitzgerald Jr., MD;  Location: [facility];  Service: Endoscopy;  Laterality: N/A;    ESOPHAGOGASTRODUODENOSCOPY N/A 7/5/2023    Procedure: EGD (ESOPHAGOGASTRODUODENOSCOPY);  Surgeon: Yaakov Schroeder MD;  Location: [facility];  Service: Endoscopy;  Laterality: N/A;    EYE SURGERY      HAND SURGERY Right     trauma repair    INSERTION OF IMPLANTABLE LOOP RECORDER Left 4/19/2022    Procedure: Insertion, Implantable Loop Recorder;  Surgeon: Thanh Fuentes MD;  Location[facility];  Service: Cardiology;  Laterality: Left;    LEFT HEART CATHETERIZATION N/A 1/4/2021    Procedure: Left heart cath;  Surgeon: Laron Falcon MD;  Location: [facility];  Service: Cardiology;  Laterality: N/A;    LEFT HEART CATHETERIZATION  8/22/2023    Procedure: Left heart cath;  Surgeon: Heath Johnson MD;  Location: [facility];  Service: Cardiology;;    RIGHT HEART CATHETERIZATION  8/22/2023    Procedure: Right heart cath;  Surgeon: Heath Johnson MD;  Location: [facility];  Service: Cardiology;;    TONSILLECTOMY      TOTAL ABDOMINAL HYSTERECTOMY      TRANSESOPHAGEAL ECHOCARDIOGRAM WITH POSSIBLE CARDIOVERSION (JEB W/ POSS CARDIOVERSION) N/A 2/20/2024    Procedure: TRANSESOPHAGEAL ECHOCARDIOGRAM WITH POSSIBLE CARDIOVERSION (JEB W/ POSS CARDIOVERSION);  Surgeon: Heath Johnson MD;  Location: [facility];  Service: Cardiology;  Laterality: N/A;    VARICOSE VEIN SURGERY Bilateral     bilateral legs    VIDEO-ASSISTED THORACOSCOPIC SURGERY (VATS)  1/16/2020    Procedure: VATS (VIDEO-ASSISTED THORACOSCOPIC SURGERY) - exploratory;  Surgeon: Ritesh Mccall MD;  Location:  [facility] OR 2ND FLR;  Service: Thoracic;;      Family History   Family History   Problem Relation Name Age of Onset    Cataracts Mother      Macular degeneration Mother      Cancer Mother          lymphoma    Diabetes Father      Hypertension Father      Macular degeneration Sister      Cancer Sister          breast    Breast cancer Maternal Grandmother      Thyroid disease Daughter      Cancer Daughter          thyroid    Breast cancer Other 1/2 sister     Amblyopia Neg Hx      Blindness Neg Hx      Glaucoma Neg Hx      Retinal detachment Neg Hx      Strabismus Neg Hx      Stroke Neg Hx        Allergies  Review of patient's allergies indicates:   Allergen Reactions    Latex Itching and Swelling     Itching and swelling to site (local reaction)      Current Medications:    Current Outpatient Medications:     albuterol (PROVENTIL) 2.5 mg /3 mL (0.083 %) nebulizer solution, Take 3 mLs (2.5 mg total) by nebulization every 6 (six) hours as needed for Wheezing or Shortness of Breath. Rescue, Disp: 270 mL, Rfl: 5    albuterol (PROVENTIL/VENTOLIN HFA) 90 mcg/actuation inhaler, Inhale 1 puff into the lungs once daily. Rescue, Disp: 3 g, Rfl: 5    apixaban (ELIQUIS) 5 mg Tab, Take 1 tablet (5 mg total) by mouth 2 (two) times daily., Disp: 90 tablet, Rfl: 3    cholecalciferol, vitamin D3, 125 mcg (5,000 unit) Tab, Take 5,000 Units by mouth once daily., Disp: , Rfl:     cloNIDine (CATAPRES) 0.1 MG tablet, Take 1 tablet (0.1 mg total) by mouth 3 (three) times daily as needed (PRN SBP > 165 mmHg)., Disp: 90 tablet, Rfl: 6    dronedarone (MULTAQ) 400 mg Tab, Take 1 tablet (400 mg total) by mouth 2 (two) times daily with meals., Disp: 180 tablet, Rfl: 3    ezetimibe (ZETIA) 10 mg tablet, Take 1 tablet (10 mg total) by mouth once daily., Disp: 90 tablet, Rfl: 3    famotidine (PEPCID) 40 MG tablet, Take 1 tablet (40 mg total) by mouth once daily., Disp: 60 tablet, Rfl: 11    ferrous sulfate (FEOSOL) 325 mg (65 mg iron) Tab tablet, Take  325 mg by mouth once daily., Disp: , Rfl:     fluticasone propion-salmeterol 115-21 mcg/dose (ADVAIR HFA) 115-21 mcg/actuation HFAA inhaler, Inhale 2 puffs into the lungs every 12 (twelve) hours. Controller, Disp: 3 g, Rfl: 5    fluticasone propionate (FLONASE) 50 mcg/actuation nasal spray, 2 sprays (100 mcg total) by Each Nostril route once daily., Disp: 48 g, Rfl: 3    folic acid (FOLVITE) 1 MG tablet, Take 1 tablet (1 mg total) by mouth once daily., Disp: 360 tablet, Rfl: 0    furosemide (LASIX) 20 MG tablet, Take 0.5 tablets (10 mg total) by mouth every morning., Disp: 90 tablet, Rfl: 3    ibandronate (BONIVA) 150 mg tablet, Take 1 tablet (150 mg total) by mouth every 30 days., Disp: 12 tablet, Rfl: 3    levocetirizine (XYZAL) 5 MG tablet, Take 1 tablet (5 mg total) by mouth nightly as needed for Allergies., Disp: 30 tablet, Rfl: 2    metoprolol succinate (TOPROL XL) 50 MG 24 hr tablet, Take 1 tablet (50 mg total) by mouth every 12 (twelve) hours., Disp: 180 tablet, Rfl: 4    ondansetron (ZOFRAN) 8 MG tablet, Take 1 tablet (8 mg total) by mouth every 6 (six) hours as needed., Disp: 90 tablet, Rfl: 2    sacubitriL-valsartan (ENTRESTO) 24-26 mg per tablet, Take 0.5 tablet by mouth twice daily, Disp: 180 tablet, Rfl: 3    spironolactone (ALDACTONE) 25 MG tablet, Take 0.5 tablets (12.5 mg total) by mouth every morning., Disp: 90 tablet, Rfl: 3  No current facility-administered medications for this visit.    Facility-Administered Medications Ordered in Other Visits:     alteplase injection 2 mg, 2 mg, Intra-Catheter, PRN, Giacomo Perera NP    CARBOplatin (PARAPLATIN) 455 mg in sodium chloride 0.9% 330.5 mL chemo infusion, 455 mg, Intravenous, 1 time in Clinic/HOD, Dilma, Giacomo M, NP, Last Rate: 661 mL/hr at 05/30/24 1320, 455 mg at 05/30/24 1320    diphenhydrAMINE injection 50 mg, 50 mg, Intravenous, Once PRN, Giacomo Perera, NP    EPINEPHrine (EPIPEN) 0.3 mg/0.3 mL pen injection 0.3 mg, 0.3 mg, Intramuscular, Once  PRN, Giacomo Perera, ELROY    heparin, porcine (PF) 100 unit/mL injection flush 500 Units, 500 Units, Intravenous, PRN, Giacomo Perera, NP    hydrocortisone sodium succinate injection 100 mg, 100 mg, Intravenous, Once PRN, Giacomo Perera, ELROY    prochlorperazine injection Soln 5 mg, 5 mg, Intravenous, Once PRN, Giacomo Perera, ELROY    sodium chloride 0.9% flush 10 mL, 10 mL, Intravenous, PRN, Giacomo Perera, ELROY     Review of Systems  Review of Systems   Constitutional:  Positive for malaise/fatigue.   HENT: Negative.     Eyes: Negative.    Respiratory: Negative.     Cardiovascular: Negative.    Gastrointestinal: Negative.    Genitourinary: Negative.    Musculoskeletal: Negative.    Skin: Negative.    Neurological: Negative.    Endo/Heme/Allergies: Negative.    Psychiatric/Behavioral: Negative.        Physical Exam      /57  HR 68   RR 16  Temp  98.1  Physical Exam  Vitals reviewed.   Constitutional:       Appearance: Normal appearance.   Neurological:      Mental Status: She is alert.   Psychiatric:         Mood and Affect: Mood normal.         Behavior: Behavior normal.        ASSESSMENT :  1. Fatigue, unspecified type    2. Adenocarcinoma of right lung    3. Primary malignant neoplasm of left upper lobe of lung       PLAN:  Reviewed all information discussed at today's visit and all questions were answered.    Counseled on healthy lifestyle and behavior modifications   See Nutrition during treatment as needed    I discussed and recommended the following support services:  Joaquin Chi and Yoga I suggested Joaquin Chi and/or Yoga as these practices reduce stress, increases flexibility and muscle strength, improves balance and promotes serenity in the power of movement to help fight disease and boost your immune system.   Music and relaxation therapy and Meditation which can decrease stress by lowering blood pressure, slowing breathing, and helping you be more present in the moment. It improves sleep by relaxing the body and mind  at the end of the day.Meditation also trains you how to focus on one thing at a time, improving concentration. It also promotes emotional well-being by decreasing depression and anxiety, and helping create a more positive outlook on life.    Follow up with Integrative Services as needed    I spent a total of 46 minutes on the day of the visit.This includes face to face time and non-face to face time preparing to see the patient (eg, review of tests), obtaining and/or reviewing separately obtained history, documenting clinical information in the electronic or other health record, independently interpreting results and communicating results to the patient/family/caregiver, or care coordinator.

## 2024-05-31 ENCOUNTER — PATIENT MESSAGE (OUTPATIENT)
Dept: ADMINISTRATIVE | Facility: OTHER | Age: 79
End: 2024-05-31
Payer: MEDICARE

## 2024-05-31 ENCOUNTER — INFUSION (OUTPATIENT)
Dept: INFUSION THERAPY | Facility: HOSPITAL | Age: 79
End: 2024-05-31
Attending: INTERNAL MEDICINE
Payer: MEDICARE

## 2024-05-31 VITALS
HEART RATE: 73 BPM | DIASTOLIC BLOOD PRESSURE: 58 MMHG | WEIGHT: 174.38 LBS | HEIGHT: 63 IN | OXYGEN SATURATION: 94 % | RESPIRATION RATE: 18 BRPM | TEMPERATURE: 98 F | BODY MASS INDEX: 30.9 KG/M2 | SYSTOLIC BLOOD PRESSURE: 134 MMHG

## 2024-05-31 DIAGNOSIS — C34.91 ADENOCARCINOMA OF RIGHT LUNG: Primary | ICD-10-CM

## 2024-05-31 PROBLEM — R53.83 FATIGUE: Status: ACTIVE | Noted: 2024-05-31

## 2024-05-31 PROCEDURE — 63600175 PHARM REV CODE 636 W HCPCS: Mod: JZ,JG,HCNC,PN

## 2024-05-31 PROCEDURE — 96372 THER/PROPH/DIAG INJ SC/IM: CPT | Mod: HCNC,PN

## 2024-05-31 RX ADMIN — PEGFILGRASTIM-JMDB 6 MG: 6 INJECTION SUBCUTANEOUS at 10:05

## 2024-05-31 NOTE — PLAN OF CARE
Pt arrived to clinic today for Fulphila injection and tolerated well. No changes throughout therapy. Pt aware of follow up appointments and side effects of drugs. Pt voiced that she knows to take Claritin or Zyrtec to help with bone pain. Discharged to home. NAD.

## 2024-06-01 ENCOUNTER — PATIENT MESSAGE (OUTPATIENT)
Dept: ADMINISTRATIVE | Facility: OTHER | Age: 79
End: 2024-06-01
Payer: MEDICARE

## 2024-06-02 ENCOUNTER — PATIENT MESSAGE (OUTPATIENT)
Dept: ADMINISTRATIVE | Facility: OTHER | Age: 79
End: 2024-06-02
Payer: MEDICARE

## 2024-06-03 ENCOUNTER — PATIENT MESSAGE (OUTPATIENT)
Dept: ADMINISTRATIVE | Facility: OTHER | Age: 79
End: 2024-06-03
Payer: MEDICARE

## 2024-06-04 ENCOUNTER — PATIENT MESSAGE (OUTPATIENT)
Dept: HEMATOLOGY/ONCOLOGY | Facility: CLINIC | Age: 79
End: 2024-06-04
Payer: MEDICARE

## 2024-06-04 ENCOUNTER — PATIENT MESSAGE (OUTPATIENT)
Dept: ADMINISTRATIVE | Facility: OTHER | Age: 79
End: 2024-06-04
Payer: MEDICARE

## 2024-06-04 DIAGNOSIS — C34.91 ADENOCARCINOMA OF RIGHT LUNG: Primary | ICD-10-CM

## 2024-06-05 ENCOUNTER — PATIENT MESSAGE (OUTPATIENT)
Dept: ADMINISTRATIVE | Facility: OTHER | Age: 79
End: 2024-06-05
Payer: MEDICARE

## 2024-06-06 ENCOUNTER — PATIENT MESSAGE (OUTPATIENT)
Dept: ADMINISTRATIVE | Facility: OTHER | Age: 79
End: 2024-06-06
Payer: MEDICARE

## 2024-06-06 ENCOUNTER — DOCUMENTATION ONLY (OUTPATIENT)
Dept: HEMATOLOGY/ONCOLOGY | Facility: CLINIC | Age: 79
End: 2024-06-06
Payer: MEDICARE

## 2024-06-06 NOTE — NURSING
Chart reviewed for oncology navigational needs. Patient remains on treatment and tolerating well per chart review.  Future imaging and appts already scheduled.  Will continue to monitor throughout treatment for any navigational needs.

## 2024-06-07 ENCOUNTER — OFFICE VISIT (OUTPATIENT)
Dept: FAMILY MEDICINE | Facility: CLINIC | Age: 79
End: 2024-06-07
Payer: MEDICARE

## 2024-06-07 ENCOUNTER — PATIENT MESSAGE (OUTPATIENT)
Dept: ADMINISTRATIVE | Facility: OTHER | Age: 79
End: 2024-06-07
Payer: MEDICARE

## 2024-06-07 VITALS
BODY MASS INDEX: 29.84 KG/M2 | WEIGHT: 168.44 LBS | HEART RATE: 82 BPM | DIASTOLIC BLOOD PRESSURE: 58 MMHG | OXYGEN SATURATION: 96 % | HEIGHT: 63 IN | SYSTOLIC BLOOD PRESSURE: 104 MMHG

## 2024-06-07 DIAGNOSIS — I10 ESSENTIAL HYPERTENSION: Primary | ICD-10-CM

## 2024-06-07 DIAGNOSIS — C34.31 PRIMARY ADENOCARCINOMA OF LOWER LOBE OF RIGHT LUNG: ICD-10-CM

## 2024-06-07 DIAGNOSIS — G47.33 OSA (OBSTRUCTIVE SLEEP APNEA): ICD-10-CM

## 2024-06-07 DIAGNOSIS — J84.9 INTERSTITIAL LUNG DISEASE: ICD-10-CM

## 2024-06-07 DIAGNOSIS — M85.89 OSTEOPENIA OF MULTIPLE SITES: ICD-10-CM

## 2024-06-07 DIAGNOSIS — I48.0 PAROXYSMAL ATRIAL FIBRILLATION: ICD-10-CM

## 2024-06-07 DIAGNOSIS — I50.32 CHRONIC DIASTOLIC CONGESTIVE HEART FAILURE: ICD-10-CM

## 2024-06-07 DIAGNOSIS — K21.9 GASTROESOPHAGEAL REFLUX DISEASE WITHOUT ESOPHAGITIS: ICD-10-CM

## 2024-06-07 DIAGNOSIS — R11.0 NAUSEA: ICD-10-CM

## 2024-06-07 PROBLEM — D62 ACUTE BLOOD LOSS ANEMIA: Status: RESOLVED | Noted: 2022-05-08 | Resolved: 2024-06-07

## 2024-06-07 PROBLEM — J96.01 ACUTE RESPIRATORY FAILURE WITH HYPOXIA: Status: RESOLVED | Noted: 2024-04-02 | Resolved: 2024-06-07

## 2024-06-07 PROBLEM — E66.09 CLASS 1 OBESITY DUE TO EXCESS CALORIES WITH SERIOUS COMORBIDITY AND BODY MASS INDEX (BMI) OF 32.0 TO 32.9 IN ADULT: Status: RESOLVED | Noted: 2023-10-05 | Resolved: 2024-06-07

## 2024-06-07 PROBLEM — E66.811 CLASS 1 OBESITY DUE TO EXCESS CALORIES WITH SERIOUS COMORBIDITY AND BODY MASS INDEX (BMI) OF 32.0 TO 32.9 IN ADULT: Status: RESOLVED | Noted: 2023-10-05 | Resolved: 2024-06-07

## 2024-06-07 PROBLEM — R04.2 HEMOPTYSIS: Status: RESOLVED | Noted: 2024-04-02 | Resolved: 2024-06-07

## 2024-06-07 PROCEDURE — 1159F MED LIST DOCD IN RCRD: CPT | Mod: HCNC,CPTII,S$GLB, | Performed by: INTERNAL MEDICINE

## 2024-06-07 PROCEDURE — 99214 OFFICE O/P EST MOD 30 MIN: CPT | Mod: HCNC,S$GLB,, | Performed by: INTERNAL MEDICINE

## 2024-06-07 PROCEDURE — G2211 COMPLEX E/M VISIT ADD ON: HCPCS | Mod: HCNC,S$GLB,, | Performed by: INTERNAL MEDICINE

## 2024-06-07 PROCEDURE — 1126F AMNT PAIN NOTED NONE PRSNT: CPT | Mod: HCNC,CPTII,S$GLB, | Performed by: INTERNAL MEDICINE

## 2024-06-07 PROCEDURE — 3288F FALL RISK ASSESSMENT DOCD: CPT | Mod: HCNC,CPTII,S$GLB, | Performed by: INTERNAL MEDICINE

## 2024-06-07 PROCEDURE — 1160F RVW MEDS BY RX/DR IN RCRD: CPT | Mod: HCNC,CPTII,S$GLB, | Performed by: INTERNAL MEDICINE

## 2024-06-07 PROCEDURE — 99999 PR PBB SHADOW E&M-EST. PATIENT-LVL IV: CPT | Mod: PBBFAC,HCNC,, | Performed by: INTERNAL MEDICINE

## 2024-06-07 PROCEDURE — 1101F PT FALLS ASSESS-DOCD LE1/YR: CPT | Mod: HCNC,CPTII,S$GLB, | Performed by: INTERNAL MEDICINE

## 2024-06-07 PROCEDURE — 3078F DIAST BP <80 MM HG: CPT | Mod: HCNC,CPTII,S$GLB, | Performed by: INTERNAL MEDICINE

## 2024-06-07 PROCEDURE — 3074F SYST BP LT 130 MM HG: CPT | Mod: HCNC,CPTII,S$GLB, | Performed by: INTERNAL MEDICINE

## 2024-06-07 RX ORDER — OMEPRAZOLE 40 MG/1
40 CAPSULE, DELAYED RELEASE ORAL DAILY
Qty: 90 CAPSULE | Refills: 3 | Status: SHIPPED | OUTPATIENT
Start: 2024-06-07 | End: 2025-06-07

## 2024-06-07 RX ORDER — SACUBITRIL AND VALSARTAN 24; 26 MG/1; MG/1
1 TABLET, FILM COATED ORAL DAILY
Qty: 90 TABLET | Refills: 3 | Status: SHIPPED | OUTPATIENT
Start: 2024-06-07 | End: 2025-06-07

## 2024-06-07 RX ORDER — IBANDRONATE SODIUM 150 MG/1
150 TABLET, FILM COATED ORAL
Qty: 3 TABLET | Refills: 3 | Status: SHIPPED | OUTPATIENT
Start: 2024-06-07 | End: 2025-06-07

## 2024-06-07 NOTE — PROGRESS NOTES
"[facility]  Primary Care   [address]       Patient ID: Nicole Medina     Chief Complaint:   Chief Complaint   Patient presents with    Annual Exam     General wellness exam        HPI:  Routine follow-up and since our last office visit, she is started chemotherapy for recurrence of her lung cancer.  Overall I think he is doing pretty good.  She does note fatigue and nausea that kicks in a week after she gets her chemotherapy, but she is managing that with Zofran and rest.  She is keeping hydrated.  I do recommend she get some over-the-counter protein drinks to sip on during the day to maintain her nutrition status.  She gets various amounts of constipation , but she is managing well with over-the-counter Senokot.  Vital signs today show that her blood pressure is on the low end of normal but thankfully she feels well.  It is most likely due to the Entresto, but she only takes 1/2 of a pill twice daily.  She seems to be euvolemic with regards to her congestive heart failure.  Thankfully it looks like the ablation has worked to control her AFib and she does have a follow-up with the Cardiology in the near future and hopefully that will be able to get off the Eliquis as it is a lot of money.  She will probably do an aspirin at that time once they get the echocardiogram to confirm of the Watchman is epithelialized.  She needs a few medication refills which I will provide and we will see each other in a few months.    Review of Systems       Nausea     Objective:      Physical Exam   Physical Exam       Sounding good     Vitals:   Vitals:    06/07/24 0812   BP: (!) 104/58   Pulse: 82   SpO2: 96%   Weight: 76.4 kg (168 lb 6.9 oz)   Height: 5' 3" (1.6 m)        Assessment:           Plan:       Nicole Medina  was seen today for follow-up and may need lab work.    Diagnoses and all orders for this visit:    Nicole Carroll" was seen today for annual exam.    Diagnoses and all orders for this " visit:    Essential hypertension  -     sacubitriL-valsartan (ENTRESTO) 24-26 mg per tablet; Take 1 tablet by mouth once daily. Take 0.5 tablet by mouth twice daily  Monitor Blood Pressure on Entresto   May need Midodrine or less diuretics     Paroxysmal atrial fibrillation  Heart rate Controlled and may stop Eliquis since having Watchman     Primary adenocarcinoma of lower lobe of right lung  Chemo per Dr. Silvestre     [place] (obstructive sleep apnea)  Controlled     Chronic diastolic congestive heart failure  -     sacubitriL-valsartan (ENTRESTO) 24-26 mg per tablet; Take 1 tablet by mouth once daily. Take 0.5 tablet by mouth twice daily  Seems euvolemic today   Continue diuretics     Nausea  Controlled with Zofran     Gastroesophageal reflux disease without esophagitis  -     omeprazole (PRILOSEC) 40 MG capsule; Take 1 capsule (40 mg total) by mouth once daily.  Controlled with Omeprazole and Famotidine     Osteopenia of multiple sites  -     ibandronate (BONIVA) 150 mg tablet; Take 1 tablet (150 mg total) by mouth every 30 days.  Continue Boniva     Interstitial lung disease  Using oxygen     Visit today included increased complexity associated with the care of the episodic problem hypertension Congestive Heart Failure A-fib addressed and managing the longitudinal care of the patient due to the serious and/or complex managed problem(s) .           Henok Medina MD

## 2024-06-08 ENCOUNTER — PATIENT MESSAGE (OUTPATIENT)
Dept: ADMINISTRATIVE | Facility: OTHER | Age: 79
End: 2024-06-08
Payer: MEDICARE

## 2024-06-09 ENCOUNTER — PATIENT MESSAGE (OUTPATIENT)
Dept: ADMINISTRATIVE | Facility: OTHER | Age: 79
End: 2024-06-09
Payer: MEDICARE

## 2024-06-10 ENCOUNTER — PATIENT MESSAGE (OUTPATIENT)
Dept: ADMINISTRATIVE | Facility: OTHER | Age: 79
End: 2024-06-10
Payer: MEDICARE

## 2024-06-11 ENCOUNTER — PATIENT MESSAGE (OUTPATIENT)
Dept: ADMINISTRATIVE | Facility: OTHER | Age: 79
End: 2024-06-11
Payer: MEDICARE

## 2024-06-13 ENCOUNTER — PATIENT MESSAGE (OUTPATIENT)
Dept: ADMINISTRATIVE | Facility: OTHER | Age: 79
End: 2024-06-13
Payer: MEDICARE

## 2024-06-14 ENCOUNTER — PATIENT MESSAGE (OUTPATIENT)
Dept: ADMINISTRATIVE | Facility: OTHER | Age: 79
End: 2024-06-14
Payer: MEDICARE

## 2024-06-17 ENCOUNTER — HOSPITAL ENCOUNTER (OUTPATIENT)
Dept: RADIOLOGY | Facility: HOSPITAL | Age: 79
Discharge: HOME | End: 2024-06-17
Attending: INTERNAL MEDICINE
Payer: MEDICARE

## 2024-06-17 DIAGNOSIS — C34.91 ADENOCARCINOMA OF RIGHT LUNG: ICD-10-CM

## 2024-06-17 PROCEDURE — 71250 CT THORAX DX C-: CPT | Mod: TC,HCNC,PO

## 2024-06-17 PROCEDURE — 71250 CT THORAX DX C-: CPT | Mod: 26,HCNC,, | Performed by: RADIOLOGY

## 2024-06-19 ENCOUNTER — PATIENT MESSAGE (OUTPATIENT)
Dept: ADMINISTRATIVE | Facility: OTHER | Age: 79
End: 2024-06-19
Payer: MEDICARE

## 2024-06-20 ENCOUNTER — INFUSION (OUTPATIENT)
Dept: INFUSION THERAPY | Facility: HOSPITAL | Age: 79
End: 2024-06-20
Attending: INTERNAL MEDICINE
Payer: MEDICARE

## 2024-06-20 ENCOUNTER — OFFICE VISIT (OUTPATIENT)
Dept: HEMATOLOGY/ONCOLOGY | Facility: CLINIC | Age: 79
End: 2024-06-20
Payer: MEDICARE

## 2024-06-20 ENCOUNTER — DOCUMENTATION ONLY (OUTPATIENT)
Dept: INFUSION THERAPY | Facility: HOSPITAL | Age: 79
End: 2024-06-20
Payer: MEDICARE

## 2024-06-20 VITALS
DIASTOLIC BLOOD PRESSURE: 51 MMHG | HEART RATE: 71 BPM | TEMPERATURE: 97 F | WEIGHT: 176.38 LBS | BODY MASS INDEX: 31.25 KG/M2 | SYSTOLIC BLOOD PRESSURE: 100 MMHG | OXYGEN SATURATION: 99 % | RESPIRATION RATE: 16 BRPM | HEIGHT: 63 IN

## 2024-06-20 VITALS
WEIGHT: 176.38 LBS | TEMPERATURE: 97 F | OXYGEN SATURATION: 99 % | SYSTOLIC BLOOD PRESSURE: 122 MMHG | BODY MASS INDEX: 31.25 KG/M2 | RESPIRATION RATE: 16 BRPM | DIASTOLIC BLOOD PRESSURE: 60 MMHG | HEART RATE: 79 BPM | HEIGHT: 63 IN

## 2024-06-20 DIAGNOSIS — C34.31 PRIMARY ADENOCARCINOMA OF LOWER LOBE OF RIGHT LUNG: Primary | ICD-10-CM

## 2024-06-20 DIAGNOSIS — K21.9 GASTROESOPHAGEAL REFLUX DISEASE WITHOUT ESOPHAGITIS: ICD-10-CM

## 2024-06-20 DIAGNOSIS — C34.91 ADENOCARCINOMA OF RIGHT LUNG: Primary | ICD-10-CM

## 2024-06-20 PROCEDURE — 96375 TX/PRO/DX INJ NEW DRUG ADDON: CPT | Mod: HCNC,PN

## 2024-06-20 PROCEDURE — 99999 PR PBB SHADOW E&M-EST. PATIENT-LVL IV: CPT | Mod: PBBFAC,HCNC,, | Performed by: INTERNAL MEDICINE

## 2024-06-20 PROCEDURE — 96367 TX/PROPH/DG ADDL SEQ IV INF: CPT | Mod: HCNC,PN

## 2024-06-20 PROCEDURE — 63600175 PHARM REV CODE 636 W HCPCS: Mod: JZ,JG,HCNC,PN | Performed by: INTERNAL MEDICINE

## 2024-06-20 PROCEDURE — 25000003 PHARM REV CODE 250: Mod: HCNC,PN | Performed by: INTERNAL MEDICINE

## 2024-06-20 PROCEDURE — 96413 CHEMO IV INFUSION 1 HR: CPT | Mod: HCNC,PN

## 2024-06-20 PROCEDURE — 96411 CHEMO IV PUSH ADDL DRUG: CPT | Mod: HCNC,PN

## 2024-06-20 RX ORDER — DIPHENHYDRAMINE HYDROCHLORIDE 50 MG/ML
50 INJECTION INTRAMUSCULAR; INTRAVENOUS ONCE AS NEEDED
Status: DISCONTINUED | OUTPATIENT
Start: 2024-06-20 | End: 2024-06-20 | Stop reason: HOSPADM

## 2024-06-20 RX ORDER — PROCHLORPERAZINE EDISYLATE 5 MG/ML
5 INJECTION INTRAMUSCULAR; INTRAVENOUS ONCE AS NEEDED
Status: DISCONTINUED | OUTPATIENT
Start: 2024-06-20 | End: 2024-06-20 | Stop reason: HOSPADM

## 2024-06-20 RX ORDER — EPINEPHRINE 0.3 MG/.3ML
0.3 INJECTION SUBCUTANEOUS ONCE AS NEEDED
Status: CANCELLED | OUTPATIENT
Start: 2024-06-20

## 2024-06-20 RX ORDER — SODIUM CHLORIDE 0.9 % (FLUSH) 0.9 %
10 SYRINGE (ML) INJECTION
Status: CANCELLED | OUTPATIENT
Start: 2024-06-20

## 2024-06-20 RX ORDER — PROCHLORPERAZINE EDISYLATE 5 MG/ML
5 INJECTION INTRAMUSCULAR; INTRAVENOUS ONCE AS NEEDED
Status: CANCELLED
Start: 2024-06-20

## 2024-06-20 RX ORDER — DIPHENHYDRAMINE HYDROCHLORIDE 50 MG/ML
50 INJECTION INTRAMUSCULAR; INTRAVENOUS ONCE AS NEEDED
Status: CANCELLED | OUTPATIENT
Start: 2024-06-20

## 2024-06-20 RX ORDER — SODIUM CHLORIDE 0.9 % (FLUSH) 0.9 %
10 SYRINGE (ML) INJECTION
Status: DISCONTINUED | OUTPATIENT
Start: 2024-06-20 | End: 2024-06-20 | Stop reason: HOSPADM

## 2024-06-20 RX ORDER — HEPARIN 100 UNIT/ML
500 SYRINGE INTRAVENOUS
Status: DISCONTINUED | OUTPATIENT
Start: 2024-06-20 | End: 2024-06-20 | Stop reason: HOSPADM

## 2024-06-20 RX ORDER — EPINEPHRINE 0.3 MG/.3ML
0.3 INJECTION SUBCUTANEOUS ONCE AS NEEDED
Status: DISCONTINUED | OUTPATIENT
Start: 2024-06-20 | End: 2024-06-20 | Stop reason: HOSPADM

## 2024-06-20 RX ORDER — HEPARIN 100 UNIT/ML
500 SYRINGE INTRAVENOUS
Status: CANCELLED | OUTPATIENT
Start: 2024-06-20

## 2024-06-20 RX ADMIN — CARBOPLATIN 495 MG: 10 INJECTION, SOLUTION INTRAVENOUS at 12:06

## 2024-06-20 RX ADMIN — SODIUM CHLORIDE: 9 INJECTION, SOLUTION INTRAVENOUS at 11:06

## 2024-06-20 RX ADMIN — DEXAMETHASONE SODIUM PHOSPHATE 0.25 MG: 10 INJECTION, SOLUTION INTRAMUSCULAR; INTRAVENOUS at 11:06

## 2024-06-20 RX ADMIN — APREPITANT 130 MG: 130 INJECTION, EMULSION INTRAVENOUS at 11:06

## 2024-06-20 RX ADMIN — PEMETREXED DISODIUM 900 MG: 500 INJECTION, POWDER, LYOPHILIZED, FOR SOLUTION INTRAVENOUS at 12:06

## 2024-06-20 NOTE — PROGRESS NOTES
"Subjective     Patient ID: Nicole Medina is a 78 y.o. female.    Chief Complaint: Follow-up (Primary malignant neoplasm of left upper lobe of lung/)  78 y.o. female with Stage IA2 (cT1b cN0 M0) DOC NSCLC (adeno) diagnosed on biopsy 12/20/19. History significant for sarcoidosis. CT Chest 1/14/20 demonstrated 1.7 cm DOC primary and multiple other stable sub-centimeter nodules with mild hilar and mediastinal adenopathy. She underwent attempted VATS resection by Dr. Mccall 1/16/20, which was aborted due to poor toleration of single lung ventilation. She completed definitive SBRT 55 Gy in 5 fx on 2/13/20.      CT Chest 2/16/24 concerning for enlarging adenopathy. Bronch w/ EBUS by Dr. Garcia 3/26/24. 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 She returns for consideration of treatment options.     Her case was reviewed at the Thoracic Multidisciplinary Conference on 04/17/2024 with recommendation for consideration of systemic therapy options. Since there is not a clear target for radiation on her imaging (ILD changes indistinguishable from lepidic adeno), she does not have a local therapy option at this time. I discussed with the patient and her  that lepidic adenocarcinoma tends to be very slow growing and often present for years, but there is always a risk that it can develop metastatic potential if untreated.      [facility] NGS tissue with no targets, PDL1 is less than 1%         CT scans of chest abdomen pelvis  from 04/29/2024 reveal "Kkeisto-wa-xwsohsan increased size of a left apical pulmonary mass status post radiation. Grossly stable background of interstitial lung disease in this patient with reported history of sarcoidosis, which limits evaluation for lepidic spread of disease in this patient with biopsy-proven left upper lobe adenocarcinoma. Unchanged bilateral " "pulmonary micro-nodules. Stable mediastinal and hilar lymphadenopathy.  Index lymph nodes as above. 2.1 cm left adrenal nodule, unchanged since November 2021. Large extrarenal pelvis on the right with hydronephrosis felt less likely given stability dating back to November 2021.      MRI brain from 05/03/2024  reveals "No evidence of intracranial metastatic disease. 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     She started Carboplatin and Alimta on 5/9/2024     HPIShe comes in for cycle 3 of Carboplatin and Alimta, She notes fatigue during the second week of chemo and then feels better   CT chest from 6/17/2024 reveals "Stable appearance of the lungs with known background of interstitial lung disease/pulmonary sarcoidosis.  Unchanged left apical soft tissue mass status post radiation. Stable mediastinal lymph nodes. Stable left adrenal nodule"    She now denies any nausea, vomiting, diarrhea, constipation, abdominal pain, weight loss or loss of appetite, chest pain, shortness of breath, leg swelling, fatigue, pain, headache, dizziness, or mood changes. Her ECOG PS is zero. She is here with her      She notes acid reflux and is taking prilosec in the morning. Pepcid did not help   Review of Systems   Constitutional:  Negative for appetite change, fatigue and unexpected weight change.   HENT:  Negative for mouth sores.    Eyes:  Negative for visual disturbance.   Respiratory:  Negative for cough and shortness of breath.    Cardiovascular:  Negative for chest pain.   Gastrointestinal:  Negative for abdominal pain and diarrhea.   Genitourinary:  Negative for frequency.   Musculoskeletal:  Negative for back pain.   Integumentary:  Negative for rash.   Neurological:  Negative for headaches.   Hematological:  Negative for adenopathy.   Psychiatric/Behavioral:  The patient is not nervous/anxious.    All other systems reviewed and are negative.         Objective     Physical Exam  Vitals reviewed. "   Constitutional:       Appearance: She is well-developed.   HENT:      Mouth/Throat:      Pharynx: No oropharyngeal exudate.   Cardiovascular:      Rate and Rhythm: Normal rate.      Heart sounds: Normal heart sounds.   Pulmonary:      Effort: Pulmonary effort is normal.      Breath sounds: Normal breath sounds. No wheezing.   Abdominal:      General: Bowel sounds are normal.      Palpations: Abdomen is soft.      Tenderness: There is no abdominal tenderness.   Musculoskeletal:         General: No tenderness.   Lymphadenopathy:      Cervical: No cervical adenopathy.   Skin:     General: Skin is warm and dry.      Findings: No rash.   Neurological:      Mental Status: She is alert and oriented to person, place, and time.      Coordination: Coordination normal.   Psychiatric:         Thought Content: Thought content normal.         Judgment: Judgment normal.              LABS:  WBC   Date Value Ref Range Status   06/17/2024 9.20 3.90 - 12.70 K/uL Final     Hemoglobin   Date Value Ref Range Status   06/17/2024 11.2 (L) 12.0 - 16.0 g/dL Final     POC Hematocrit   Date Value Ref Range Status   04/01/2024 36.0 (L) 37.0 - 48.5 %PCV Final     Hematocrit   Date Value Ref Range Status   06/17/2024 34.2 (L) 37.0 - 48.5 % Final     Platelets   Date Value Ref Range Status   06/17/2024 209 150 - 450 K/uL Final     Gran # (ANC)   Date Value Ref Range Status   06/17/2024 5.9 1.8 - 7.7 K/uL Final     Gran %   Date Value Ref Range Status   06/17/2024 64.4 38.0 - 73.0 % Final       Chemistry        Component Value Date/Time     06/17/2024 0846    K 4.6 06/17/2024 0846     06/17/2024 0846    CO2 27 06/17/2024 0846    BUN 17 06/17/2024 0846    CREATININE 0.8 06/17/2024 0846    GLU 95 06/17/2024 0846        Component Value Date/Time    CALCIUM 9.7 06/17/2024 0846    ALKPHOS 71 06/17/2024 0846    AST 25 06/17/2024 0846    ALT 26 06/17/2024 0846    BILITOT 0.2 06/17/2024 0846    ESTGFRAFRICA >60 05/10/2022 0640    EGFRNONAA  >60 05/10/2022 0640        Ma.8    Assessment and Plan     1. Primary adenocarcinoma of lower lobe of right lung  -     CBC w/ DIFF; Future; Expected date: 2024  -     CMP; Future; Expected date: 2024  -     Magnesium; Future; Expected date: 2024    2. Gastroesophageal reflux disease without esophagitis    Other orders  -     aprepitant (CINVANTI) injection 130 mg  -     palonosetron 0.25mg/dexAMETHasone 12mg in NS IVPB 0.25 mg 50 mL  -     PEMEtrexed disodium (ALIMTA) 925 mg in sodium chloride 0.9% SolP 100 mL chemo infusion  -     CARBOplatin (PARAPLATIN) 495 mg in sodium chloride 0.9% 299.5 mL chemo infusion  -     prochlorperazine injection Soln 5 mg  -     EPINEPHrine (EPIPEN) 0.3 mg/0.3 mL pen injection 0.3 mg  -     diphenhydrAMINE injection 50 mg  -     hydrocortisone sodium succinate injection 100 mg  -     sodium chloride 0.9% 250 mL flush bag  -     sodium chloride 0.9% flush 10 mL  -     heparin, porcine (PF) 100 unit/mL injection flush 500 Units  -     alteplase injection 2 mg  -     pegfilgrastim-jmdb (FULPHILA) injection 6 mg        Route Chart for Scheduling    Med Onc Chart Routing      Follow up with physician . As scheduled   Follow up with STANISLAW    Infusion scheduling note    Injection scheduling note    Labs    Imaging    Pharmacy appointment    Other referrals                  Treatment Plan Information   OP NSCLC PEMETREXED + CARBOPLATIN (AUC) Q3W   Yanci Osborn MD   Upcoming Treatment Dates - OP NSCLC PEMETREXED + CARBOPLATIN (AUC) Q3W    2024       Chemotherapy       PEMEtrexed disodium (ALIMTA) 925 mg in sodium chloride 0.9% SolP 100 mL chemo infusion       CARBOplatin (PARAPLATIN) in sodium chloride 0.9% 250 mL chemo infusion       Antiemetics       aprepitant (CINVANTI) injection 130 mg       palonosetron 0.25mg/dexAMETHasone 12mg in NS IVPB 0.25 mg 50 mL  2024       Chemotherapy       PEMEtrexed disodium (ALIMTA) 925 mg in sodium chloride 0.9% SolP 100 mL  chemo infusion       CARBOplatin (PARAPLATIN) in sodium chloride 0.9% 250 mL chemo infusion       Supportive Care       cyanocobalamin injection 1,000 mcg       Antiemetics       aprepitant (CINVANTI) injection 130 mg       palonosetron 0.25mg/dexAMETHasone 12mg in NS IVPB 0.25 mg 50 mL    Therapy Plan Information  Flushes  sodium chloride 0.9% 250 mL flush bag  Intravenous, 1 time a week  sodium chloride 0.9% flush 10 mL  10 mL, Intravenous, 1 time a week  heparin, porcine (PF) 100 unit/mL injection flush 500 Units  500 Units, Intravenous, 1 time a week  alteplase injection 2 mg  2 mg, Intra-Catheter, 1 time a week       Mrs. Medina comes in to review her CT scans after soon cycles of carboplatin and Alimta which revealed stable findings, he will proceed with cycle 3 as scheduled and will return in 3 weeks for cycle 4 of chemotherapy.  We will plan on restaging scans after cycle 4 and if no progression of disease we will begin chemo break and monitor with CT scans every 3 months.  Rationale for this was discussed with the patient and her accompanying   Acid reflux:  She has been taking Prilosec which interferes with Alimta advised her to try Pepcid twice a day with Tums as needed however if that does not control her symptoms then she can return back to Prilosec once a day      Visit today included increased complexity associated with the care of the episodic problem chemotherapy addressed and managing the longitudinal care of the patient due to the serious and/or complex managed problem(s) lung cancer    Above care plan was discussed with patient and accompanying  and all questions were addressed to their satisfaction

## 2024-06-20 NOTE — PROGRESS NOTES
LCSW met with patient at the chairside during infusion. Patient denied any emotional or practical needs at this time. Patient shared that she feels fine. She stated that about longterm through her last two treatments, she did feel bad but it passed. She was very eager to talk about her children and grandchildren and their accomplishments. Her  shared that they have been  56 years, and they both have birthdays coming up next week. SW encouraged them to reach out if any needs arise.

## 2024-06-21 ENCOUNTER — INFUSION (OUTPATIENT)
Dept: INFUSION THERAPY | Facility: HOSPITAL | Age: 79
End: 2024-06-21
Attending: INTERNAL MEDICINE
Payer: MEDICARE

## 2024-06-21 VITALS
DIASTOLIC BLOOD PRESSURE: 51 MMHG | SYSTOLIC BLOOD PRESSURE: 100 MMHG | TEMPERATURE: 98 F | HEART RATE: 71 BPM | OXYGEN SATURATION: 99 % | RESPIRATION RATE: 18 BRPM

## 2024-06-21 DIAGNOSIS — C34.91 ADENOCARCINOMA OF RIGHT LUNG: Primary | ICD-10-CM

## 2024-06-21 PROCEDURE — 63600175 PHARM REV CODE 636 W HCPCS: Mod: JZ,JG,HCNC,PN | Performed by: INTERNAL MEDICINE

## 2024-06-21 PROCEDURE — 96372 THER/PROPH/DIAG INJ SC/IM: CPT | Mod: HCNC,PN

## 2024-06-21 RX ADMIN — PEGFILGRASTIM-JMDB 6 MG: 6 INJECTION SUBCUTANEOUS at 10:06

## 2024-06-22 ENCOUNTER — PATIENT MESSAGE (OUTPATIENT)
Dept: ADMINISTRATIVE | Facility: OTHER | Age: 79
End: 2024-06-22
Payer: MEDICARE

## 2024-06-23 ENCOUNTER — PATIENT MESSAGE (OUTPATIENT)
Dept: ADMINISTRATIVE | Facility: OTHER | Age: 79
End: 2024-06-23
Payer: MEDICARE

## 2024-06-25 ENCOUNTER — PATIENT MESSAGE (OUTPATIENT)
Dept: ADMINISTRATIVE | Facility: OTHER | Age: 79
End: 2024-06-25
Payer: MEDICARE

## 2024-06-26 ENCOUNTER — PATIENT MESSAGE (OUTPATIENT)
Dept: ADMINISTRATIVE | Facility: OTHER | Age: 79
End: 2024-06-26
Payer: MEDICARE

## 2024-06-27 ENCOUNTER — PATIENT MESSAGE (OUTPATIENT)
Dept: ADMINISTRATIVE | Facility: OTHER | Age: 79
End: 2024-06-27
Payer: MEDICARE

## 2024-06-27 ENCOUNTER — TELEPHONE (OUTPATIENT)
Dept: HEMATOLOGY/ONCOLOGY | Facility: CLINIC | Age: 79
End: 2024-06-27
Payer: MEDICARE

## 2024-06-27 NOTE — TELEPHONE ENCOUNTER
Spoke with patient.  She notes having low pressure today. She feels wobbly on her feet.   Denies dizziness/lightheadedness.    She is calling to speak with cards to discuss how she needs to adjust her bp meds, if at all.      Message routed to dr anderson's team

## 2024-06-27 NOTE — TELEPHONE ENCOUNTER
----- Message from Risa Mario sent at 6/27/2024 10:22 AM CDT -----  Regarding: advice  Contact: patient  Type: Needs Medical Advice  Who Called:  patient  Symptoms (please be specific):    How long has patient had these symptoms:    Pharmacy name and phone #:    Best Call Back Number: [phone]    Additional Information: Please call patient concerning her chemo readings.  Thanks!

## 2024-06-28 ENCOUNTER — PATIENT MESSAGE (OUTPATIENT)
Dept: ADMINISTRATIVE | Facility: OTHER | Age: 79
End: 2024-06-28
Payer: MEDICARE

## 2024-06-29 ENCOUNTER — PATIENT MESSAGE (OUTPATIENT)
Dept: ADMINISTRATIVE | Facility: OTHER | Age: 79
End: 2024-06-29
Payer: MEDICARE

## 2024-06-30 ENCOUNTER — PATIENT MESSAGE (OUTPATIENT)
Dept: ADMINISTRATIVE | Facility: OTHER | Age: 79
End: 2024-06-30
Payer: MEDICARE

## 2024-07-01 ENCOUNTER — PATIENT MESSAGE (OUTPATIENT)
Dept: ADMINISTRATIVE | Facility: OTHER | Age: 79
End: 2024-07-01
Payer: MEDICARE

## 2024-07-01 ENCOUNTER — PATIENT MESSAGE (OUTPATIENT)
Dept: HEMATOLOGY/ONCOLOGY | Facility: CLINIC | Age: 79
End: 2024-07-01
Payer: MEDICARE

## 2024-07-02 ENCOUNTER — PATIENT MESSAGE (OUTPATIENT)
Dept: ADMINISTRATIVE | Facility: OTHER | Age: 79
End: 2024-07-02
Payer: MEDICARE

## 2024-07-03 ENCOUNTER — PATIENT MESSAGE (OUTPATIENT)
Dept: ADMINISTRATIVE | Facility: OTHER | Age: 79
End: 2024-07-03
Payer: MEDICARE

## 2024-07-04 ENCOUNTER — PATIENT MESSAGE (OUTPATIENT)
Dept: ADMINISTRATIVE | Facility: OTHER | Age: 79
End: 2024-07-04
Payer: MEDICARE

## 2024-07-05 ENCOUNTER — PATIENT MESSAGE (OUTPATIENT)
Dept: ADMINISTRATIVE | Facility: OTHER | Age: 79
End: 2024-07-05
Payer: MEDICARE

## 2024-07-06 ENCOUNTER — PATIENT MESSAGE (OUTPATIENT)
Dept: ADMINISTRATIVE | Facility: OTHER | Age: 79
End: 2024-07-06
Payer: MEDICARE

## 2024-07-07 ENCOUNTER — PATIENT MESSAGE (OUTPATIENT)
Dept: ADMINISTRATIVE | Facility: OTHER | Age: 79
End: 2024-07-07
Payer: MEDICARE

## 2024-07-08 ENCOUNTER — PATIENT MESSAGE (OUTPATIENT)
Dept: ADMINISTRATIVE | Facility: OTHER | Age: 79
End: 2024-07-08
Payer: MEDICARE

## 2024-07-10 ENCOUNTER — PATIENT MESSAGE (OUTPATIENT)
Dept: ADMINISTRATIVE | Facility: OTHER | Age: 79
End: 2024-07-10
Payer: MEDICARE

## 2024-07-10 ENCOUNTER — LAB VISIT (OUTPATIENT)
Dept: LAB | Facility: HOSPITAL | Age: 79
End: 2024-07-10
Attending: INTERNAL MEDICINE
Payer: MEDICARE

## 2024-07-10 DIAGNOSIS — C34.91 ADENOCARCINOMA OF RIGHT LUNG: ICD-10-CM

## 2024-07-10 LAB
ALBUMIN SERPL BCP-MCNC: 3.7 G/DL (ref 3.5–5.2)
ALP SERPL-CCNC: 77 U/L (ref 55–135)
ALT SERPL W/O P-5'-P-CCNC: 16 U/L (ref 10–44)
ANION GAP SERPL CALC-SCNC: 12 MMOL/L (ref 8–16)
AST SERPL-CCNC: 23 U/L (ref 10–40)
BILIRUB SERPL-MCNC: 0.3 MG/DL (ref 0.1–1)
BUN SERPL-MCNC: 15 MG/DL (ref 8–23)
CALCIUM SERPL-MCNC: 9.5 MG/DL (ref 8.7–10.5)
CHLORIDE SERPL-SCNC: 105 MMOL/L (ref 95–110)
CO2 SERPL-SCNC: 23 MMOL/L (ref 23–29)
CREAT SERPL-MCNC: 0.8 MG/DL (ref 0.5–1.4)
ERYTHROCYTE [DISTWIDTH] IN BLOOD BY AUTOMATED COUNT: 18 % (ref 11.5–14.5)
EST. GFR  (NO RACE VARIABLE): >60 ML/MIN/1.73 M^2
GLUCOSE SERPL-MCNC: 103 MG/DL (ref 70–110)
HCT VFR BLD AUTO: 30.5 % (ref 37–48.5)
HGB BLD-MCNC: 10 G/DL (ref 12–16)
IMM GRANULOCYTES # BLD AUTO: 0.14 K/UL (ref 0–0.04)
MAGNESIUM SERPL-MCNC: 1.7 MG/DL (ref 1.6–2.6)
MCH RBC QN AUTO: 30.5 PG (ref 27–31)
MCHC RBC AUTO-ENTMCNC: 32.8 G/DL (ref 32–36)
MCV RBC AUTO: 93 FL (ref 82–98)
NEUTROPHILS # BLD AUTO: 12.6 K/UL (ref 1.8–7.7)
PLATELET # BLD AUTO: 240 K/UL (ref 150–450)
PMV BLD AUTO: 9.6 FL (ref 9.2–12.9)
POTASSIUM SERPL-SCNC: 4.8 MMOL/L (ref 3.5–5.1)
PROT SERPL-MCNC: 7.2 G/DL (ref 6–8.4)
RBC # BLD AUTO: 3.28 M/UL (ref 4–5.4)
SODIUM SERPL-SCNC: 140 MMOL/L (ref 136–145)
WBC # BLD AUTO: 14.37 K/UL (ref 3.9–12.7)

## 2024-07-10 PROCEDURE — 80053 COMPREHEN METABOLIC PANEL: CPT | Mod: HCNC,PN | Performed by: INTERNAL MEDICINE

## 2024-07-10 PROCEDURE — 36415 COLL VENOUS BLD VENIPUNCTURE: CPT | Mod: HCNC,PN | Performed by: INTERNAL MEDICINE

## 2024-07-10 PROCEDURE — 85027 COMPLETE CBC AUTOMATED: CPT | Mod: HCNC,PN | Performed by: INTERNAL MEDICINE

## 2024-07-10 PROCEDURE — 83735 ASSAY OF MAGNESIUM: CPT | Mod: HCNC,PN | Performed by: INTERNAL MEDICINE

## 2024-07-11 ENCOUNTER — OFFICE VISIT (OUTPATIENT)
Dept: HEMATOLOGY/ONCOLOGY | Facility: CLINIC | Age: 79
End: 2024-07-11
Payer: MEDICARE

## 2024-07-11 ENCOUNTER — PATIENT MESSAGE (OUTPATIENT)
Dept: ADMINISTRATIVE | Facility: OTHER | Age: 79
End: 2024-07-11
Payer: MEDICARE

## 2024-07-11 ENCOUNTER — INFUSION (OUTPATIENT)
Dept: INFUSION THERAPY | Facility: HOSPITAL | Age: 79
End: 2024-07-11
Attending: INTERNAL MEDICINE
Payer: MEDICARE

## 2024-07-11 ENCOUNTER — DOCUMENTATION ONLY (OUTPATIENT)
Dept: HEMATOLOGY/ONCOLOGY | Facility: CLINIC | Age: 79
End: 2024-07-11

## 2024-07-11 ENCOUNTER — DOCUMENTATION ONLY (OUTPATIENT)
Dept: INFUSION THERAPY | Facility: HOSPITAL | Age: 79
End: 2024-07-11
Payer: MEDICARE

## 2024-07-11 VITALS
OXYGEN SATURATION: 90 % | HEART RATE: 84 BPM | SYSTOLIC BLOOD PRESSURE: 117 MMHG | RESPIRATION RATE: 17 BRPM | BODY MASS INDEX: 31.68 KG/M2 | WEIGHT: 178.81 LBS | HEIGHT: 63 IN | TEMPERATURE: 97 F | DIASTOLIC BLOOD PRESSURE: 48 MMHG

## 2024-07-11 VITALS
SYSTOLIC BLOOD PRESSURE: 97 MMHG | OXYGEN SATURATION: 90 % | WEIGHT: 178.81 LBS | HEIGHT: 63 IN | TEMPERATURE: 97 F | BODY MASS INDEX: 31.68 KG/M2 | DIASTOLIC BLOOD PRESSURE: 61 MMHG | RESPIRATION RATE: 17 BRPM | HEART RATE: 76 BPM

## 2024-07-11 DIAGNOSIS — C34.91 ADENOCARCINOMA OF RIGHT LUNG: Primary | ICD-10-CM

## 2024-07-11 DIAGNOSIS — C34.31 PRIMARY ADENOCARCINOMA OF LOWER LOBE OF RIGHT LUNG: ICD-10-CM

## 2024-07-11 DIAGNOSIS — C34.12 PRIMARY MALIGNANT NEOPLASM OF LEFT UPPER LOBE OF LUNG: ICD-10-CM

## 2024-07-11 PROCEDURE — 96413 CHEMO IV INFUSION 1 HR: CPT | Mod: HCNC,PN

## 2024-07-11 PROCEDURE — 96411 CHEMO IV PUSH ADDL DRUG: CPT | Mod: HCNC,PN

## 2024-07-11 PROCEDURE — 25000003 PHARM REV CODE 250: Mod: HCNC,PN | Performed by: INTERNAL MEDICINE

## 2024-07-11 PROCEDURE — 96372 THER/PROPH/DIAG INJ SC/IM: CPT | Mod: HCNC,59,PN

## 2024-07-11 PROCEDURE — 96375 TX/PRO/DX INJ NEW DRUG ADDON: CPT | Mod: HCNC,PN

## 2024-07-11 PROCEDURE — 96367 TX/PROPH/DG ADDL SEQ IV INF: CPT | Mod: HCNC,PN

## 2024-07-11 PROCEDURE — 99999 PR PBB SHADOW E&M-EST. PATIENT-LVL IV: CPT | Mod: PBBFAC,HCNC,, | Performed by: INTERNAL MEDICINE

## 2024-07-11 PROCEDURE — 63600175 PHARM REV CODE 636 W HCPCS: Mod: JZ,JG,HCNC,PN | Performed by: INTERNAL MEDICINE

## 2024-07-11 RX ORDER — EPINEPHRINE 0.3 MG/.3ML
0.3 INJECTION SUBCUTANEOUS ONCE AS NEEDED
Status: CANCELLED | OUTPATIENT
Start: 2024-07-11

## 2024-07-11 RX ORDER — SODIUM CHLORIDE 0.9 % (FLUSH) 0.9 %
10 SYRINGE (ML) INJECTION
Status: DISCONTINUED | OUTPATIENT
Start: 2024-07-11 | End: 2024-07-11 | Stop reason: HOSPADM

## 2024-07-11 RX ORDER — CYANOCOBALAMIN 1000 UG/ML
1000 INJECTION, SOLUTION INTRAMUSCULAR; SUBCUTANEOUS
Status: CANCELLED | OUTPATIENT
Start: 2024-07-11

## 2024-07-11 RX ORDER — PROCHLORPERAZINE EDISYLATE 5 MG/ML
5 INJECTION INTRAMUSCULAR; INTRAVENOUS ONCE AS NEEDED
Status: CANCELLED
Start: 2024-07-11

## 2024-07-11 RX ORDER — HEPARIN 100 UNIT/ML
500 SYRINGE INTRAVENOUS
Status: CANCELLED | OUTPATIENT
Start: 2024-07-11

## 2024-07-11 RX ORDER — PROCHLORPERAZINE EDISYLATE 5 MG/ML
5 INJECTION INTRAMUSCULAR; INTRAVENOUS ONCE AS NEEDED
Status: DISCONTINUED | OUTPATIENT
Start: 2024-07-11 | End: 2024-07-11 | Stop reason: HOSPADM

## 2024-07-11 RX ORDER — EPINEPHRINE 0.3 MG/.3ML
0.3 INJECTION SUBCUTANEOUS ONCE AS NEEDED
Status: DISCONTINUED | OUTPATIENT
Start: 2024-07-11 | End: 2024-07-11 | Stop reason: HOSPADM

## 2024-07-11 RX ORDER — DIPHENHYDRAMINE HYDROCHLORIDE 50 MG/ML
50 INJECTION INTRAMUSCULAR; INTRAVENOUS ONCE AS NEEDED
Status: CANCELLED | OUTPATIENT
Start: 2024-07-11

## 2024-07-11 RX ORDER — SODIUM CHLORIDE 0.9 % (FLUSH) 0.9 %
10 SYRINGE (ML) INJECTION
Status: CANCELLED | OUTPATIENT
Start: 2024-07-11

## 2024-07-11 RX ORDER — CYANOCOBALAMIN 1000 UG/ML
1000 INJECTION, SOLUTION INTRAMUSCULAR; SUBCUTANEOUS
Status: COMPLETED | OUTPATIENT
Start: 2024-07-11 | End: 2024-07-11

## 2024-07-11 RX ORDER — HEPARIN 100 UNIT/ML
500 SYRINGE INTRAVENOUS
Status: DISCONTINUED | OUTPATIENT
Start: 2024-07-11 | End: 2024-07-11 | Stop reason: HOSPADM

## 2024-07-11 RX ORDER — DIPHENHYDRAMINE HYDROCHLORIDE 50 MG/ML
50 INJECTION INTRAMUSCULAR; INTRAVENOUS ONCE AS NEEDED
Status: DISCONTINUED | OUTPATIENT
Start: 2024-07-11 | End: 2024-07-11 | Stop reason: HOSPADM

## 2024-07-11 RX ADMIN — PEMETREXED DISODIUM 900 MG: 500 INJECTION, POWDER, LYOPHILIZED, FOR SOLUTION INTRAVENOUS at 10:07

## 2024-07-11 RX ADMIN — CARBOPLATIN 490 MG: 10 INJECTION, SOLUTION INTRAVENOUS at 11:07

## 2024-07-11 RX ADMIN — DEXAMETHASONE SODIUM PHOSPHATE 0.25 MG: 10 INJECTION, SOLUTION INTRAMUSCULAR; INTRAVENOUS at 10:07

## 2024-07-11 RX ADMIN — APREPITANT 130 MG: 130 INJECTION, EMULSION INTRAVENOUS at 10:07

## 2024-07-11 RX ADMIN — CYANOCOBALAMIN 1000 MCG: 1000 INJECTION, SOLUTION INTRAMUSCULAR at 11:07

## 2024-07-11 RX ADMIN — SODIUM CHLORIDE: 9 INJECTION, SOLUTION INTRAVENOUS at 10:07

## 2024-07-11 NOTE — PLAN OF CARE
Problem: Adult Inpatient Plan of Care  Goal: Patient-Specific Goal (Individualized)  Outcome: Progressing  Flowsheets (Taken 7/11/2024 1100)  Individualized Care Needs: Recliner, warm blanket, pillow, dtr at chairside, snacks, conversation  Anxieties, Fears or Concerns: IV start  Patient/Family-Specific Goals (Include Timeframe): Free of S/S of reaction with infusion.     Problem: Fatigue  Goal: Improved Activity Tolerance  Outcome: Progressing    Patient to Infusion for Mariella and Carbo, accompanied by her dtr. Treatment plan reviewed with patient. VSS. Tolerated infusion. Provided with copy of upcoming appointment schedule. Escorted to the front lobby in no distress for discharge to home.

## 2024-07-11 NOTE — PROGRESS NOTES
Oncology Nutrition       Weight Check   Chart reviewed. Weight check completed on pt.     CBW:178#  Weight at initiation of tx:176#    Wt Readings from Last 10 Encounters:   07/11/24 81.1 kg (178 lb 12.7 oz)   07/11/24 81.1 kg (178 lb 12.7 oz)   07/10/24 81.2 kg (179 lb)   06/21/24 81.6 kg (180 lb)   06/20/24 80 kg (176 lb 5.9 oz)   06/20/24 80 kg (176 lb 5.9 oz)   06/07/24 76.4 kg (168 lb 6.9 oz)   05/31/24 79.1 kg (174 lb 6.1 oz)   05/30/24 79.1 kg (174 lb 6.1 oz)   05/29/24 79.1 kg (174 lb 6.1 oz)      RD plan of care: Weight is currently 178#. No significant change at this time. Per nursing nutrition risk report, pt denies any nutritional concerns or challenges at this time. RD to continue to monitor; no nutritional interventions are needed at this time.     Pattie Otero, MS, RD, LDN  07/11/2024  9:41 AM

## 2024-07-11 NOTE — PROGRESS NOTES
"Subjective     Patient ID: Nicole Medina is a 79 y.o. female.    Chief Complaint: rimary adenocarcinoma of lower lobe of right lung (6 wks follow with labs tx cl/tx ct scan/)  79 y.o. female with Stage IA2 (cT1b cN0 M0) DOC NSCLC (adeno) diagnosed on biopsy 12/20/19. History significant for sarcoidosis. CT Chest 1/14/20 demonstrated 1.7 cm DOC primary and multiple other stable sub-centimeter nodules with mild hilar and mediastinal adenopathy. She underwent attempted VATS resection by Dr. Mccall 1/16/20, which was aborted due to poor toleration of single lung ventilation. She completed definitive SBRT 55 Gy in 5 fx on 2/13/20.      CT Chest 2/16/24 concerning for enlarging adenopathy. Bronch w/ EBUS by Dr. Garcia 3/26/24. 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    Her case was reviewed at the Thoracic Multidisciplinary Conference on 04/17/2024 with recommendation for consideration of systemic therapy options. Since there is not a clear target for radiation on her imaging (ILD changes indistinguishable from lepidic adeno), she does not have a local therapy option at this time. I discussed with the patient and her  that lepidic adenocarcinoma tends to be very slow growing and often present for years, but there is always a risk that it can develop metastatic potential if untreated.      [facility] NGS tissue with no targets, PDL1 is less than 1%        CT scans of chest abdomen pelvis  from 04/29/2024 reveal "Cgevltx-th-[id number] increased size of a left apical pulmonary mass status post radiation. Grossly stable background of interstitial lung disease in this patient with reported history of sarcoidosis, which limits evaluation for lepidic spread of disease in this patient with biopsy-proven left upper lobe adenocarcinoma. Unchanged bilateral pulmonary micro-nodules. Stable " "mediastinal and hilar lymphadenopathy.  Index lymph nodes as above. 2.1 cm left adrenal nodule, unchanged since November 2021. Large extrarenal pelvis on the right with hydronephrosis felt less likely given stability dating back to November 2021.     MRI brain from 05/03/2024  reveals "No evidence of intracranial metastatic disease. 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     She started Carboplatin and Alimta on 5/9/2024.     CT chest from 6/17/2024 (after 2 cycles of Carboplatin and Alimta) revealed "Stable appearance of the lungs with known background of interstitial lung disease/pulmonary sarcoidosis.  Unchanged left apical soft tissue mass status post radiation. Stable mediastinal lymph nodes. Stable left adrenal nodule"      Mara comes in today for cycle 4 of Carboplatin and Alimta  She noted fatigue for 1 week the week after chemo  She now denies any nausea, vomiting, diarrhea, constipation, abdominal pain, weight loss or loss of appetite, chest pain, shortness of breath, leg swelling, fatigue, pain, headache, dizziness, or mood changes. Her ECOG PS is zero. She is accompanied by her  and daughter.    Review of Systems   Constitutional:  Negative for appetite change, fatigue and unexpected weight change.   HENT:  Negative for mouth sores.    Eyes:  Negative for visual disturbance.   Respiratory:  Negative for cough and shortness of breath.    Cardiovascular:  Negative for chest pain.   Gastrointestinal:  Negative for abdominal pain and diarrhea.   Genitourinary:  Negative for frequency.   Musculoskeletal:  Negative for back pain.   Integumentary:  Negative for rash.   Neurological:  Negative for headaches.   Hematological:  Negative for adenopathy.   Psychiatric/Behavioral:  The patient is not nervous/anxious.    All other systems reviewed and are negative.         Objective     Physical Exam  Vitals reviewed.   Constitutional:       Appearance: She is well-developed.   HENT:      " Mouth/Throat:      Pharynx: No oropharyngeal exudate.   Cardiovascular:      Rate and Rhythm: Normal rate.      Heart sounds: Normal heart sounds.   Pulmonary:      Effort: Pulmonary effort is normal.      Breath sounds: Normal breath sounds. No wheezing.   Abdominal:      General: Bowel sounds are normal.      Palpations: Abdomen is soft.      Tenderness: There is no abdominal tenderness.   Musculoskeletal:         General: No tenderness.   Lymphadenopathy:      Cervical: No cervical adenopathy.   Skin:     General: Skin is warm and dry.      Findings: No rash.   Neurological:      Mental Status: She is alert and oriented to person, place, and time.      Coordination: Coordination normal.   Psychiatric:         Thought Content: Thought content normal.         Judgment: Judgment normal.         LABS:  WBC   Date Value Ref Range Status   07/10/2024 14.37 (H) 3.90 - 12.70 K/uL Final     Hemoglobin   Date Value Ref Range Status   07/10/2024 10.0 (L) 12.0 - 16.0 g/dL Final     POC Hematocrit   Date Value Ref Range Status   04/01/2024 36.0 (L) 37.0 - 48.5 %PCV Final     Hematocrit   Date Value Ref Range Status   07/10/2024 30.5 (L) 37.0 - 48.5 % Final     Platelets   Date Value Ref Range Status   07/10/2024 240 150 - 450 K/uL Final     Gran # (ANC)   Date Value Ref Range Status   07/10/2024 12.6 (H) 1.8 - 7.7 K/uL Final     Comment:     The ANC is based on a white cell differential from an   automated cell counter. It has not been microscopically   reviewed for the presence of abnormal cells. Clinical   correlation is required.         Chemistry        Component Value Date/Time     07/10/2024 1126    K 4.8 07/10/2024 1126     07/10/2024 1126    CO2 23 07/10/2024 1126    BUN 15 07/10/2024 1126    CREATININE 0.8 07/10/2024 1126     07/10/2024 1126        Component Value Date/Time    CALCIUM 9.5 07/10/2024 1126    ALKPHOS 77 07/10/2024 1126    AST 23 07/10/2024 1126    ALT 16 07/10/2024 1126    BILITOT  0.3 07/10/2024 1126    ESTGFRAFRICA >60 05/10/2022 0640    EGFRNONAA >60 05/10/2022 0640        Ma.7       Assessment and Plan     1. Adenocarcinoma of right lung  -     CT Chest Abdomen Pelvis With IV Contrast (XPD) Routine Oral Contrast; Future; Expected date: 2024  -     CBC w/ DIFF; Future; Expected date: 2024  -     CMP; Future; Expected date: 2024    2. Primary adenocarcinoma of lower lobe of right lung    3. Primary malignant neoplasm of left upper lobe of lung        Route Chart for Scheduling    Med Onc Chart Routing      Follow up with physician . Keep appointments with scans labs and me 2024.  Cancel all appointments after that for chemo, STANISLAW and labs   Follow up with STANISLAW    Infusion scheduling note    Injection scheduling note    Labs    Imaging    Pharmacy appointment    Other referrals            Treatment Plan Information   OP NSCLC PEMETREXED + CARBOPLATIN (AUC) Q3W   Yanci Osborn MD   Upcoming Treatment Dates - OP NSCLC PEMETREXED + CARBOPLATIN (AUC) Q3W    2024       Chemotherapy       PEMEtrexed disodium (ALIMTA) 925 mg in sodium chloride 0.9% SolP 100 mL chemo infusion       CARBOplatin (PARAPLATIN) in sodium chloride 0.9% 250 mL chemo infusion       Supportive Care       cyanocobalamin injection 1,000 mcg       Antiemetics       aprepitant (CINVANTI) injection 130 mg       palonosetron 0.25mg/dexAMETHasone 12mg in NS IVPB 0.25 mg 50 mL    Therapy Plan Information  Flushes  sodium chloride 0.9% 250 mL flush bag  Intravenous, 1 time a week  sodium chloride 0.9% flush 10 mL  10 mL, Intravenous, 1 time a week  heparin, porcine (PF) 100 unit/mL injection flush 500 Units  500 Units, Intravenous, 1 time a week  alteplase injection 2 mg  2 mg, Intra-Catheter, 1 time a week         Mrs. Medina we will proceed with cycle 4 of chemotherapy with carboplatin and Alimta, she will return in 3 weeks for restaging scans at which point if she has no new evidence of disease we  will plan on a chemo break.  She does understand that if there is new disease or disease still present he might have to at least consider maintenance Alimta  If she goes on a chemo break she will be followed closely with restaging CT scans every 3 months    Visit today included increased complexity associated with the care of the episodic problem chemotherapy addressed and managing the longitudinal care of the patient due to the serious and/or complex managed problem(s) non small cell lung cancer    Above care plan was discussed with patient and accompanying  and daughter and all questions were addressed to their satisfaction

## 2024-07-12 ENCOUNTER — INFUSION (OUTPATIENT)
Dept: INFUSION THERAPY | Facility: HOSPITAL | Age: 79
End: 2024-07-12
Attending: INTERNAL MEDICINE
Payer: MEDICARE

## 2024-07-12 VITALS
HEART RATE: 86 BPM | RESPIRATION RATE: 16 BRPM | HEIGHT: 63 IN | SYSTOLIC BLOOD PRESSURE: 135 MMHG | WEIGHT: 178.81 LBS | TEMPERATURE: 98 F | DIASTOLIC BLOOD PRESSURE: 82 MMHG | BODY MASS INDEX: 31.68 KG/M2

## 2024-07-12 DIAGNOSIS — C34.91 ADENOCARCINOMA OF RIGHT LUNG: Primary | ICD-10-CM

## 2024-07-12 PROCEDURE — 63600175 PHARM REV CODE 636 W HCPCS: Mod: JZ,JG,HCNC,PN | Performed by: INTERNAL MEDICINE

## 2024-07-12 PROCEDURE — 96372 THER/PROPH/DIAG INJ SC/IM: CPT | Mod: HCNC,PN

## 2024-07-12 RX ADMIN — PEGFILGRASTIM-JMDB 6 MG: 6 INJECTION SUBCUTANEOUS at 01:07

## 2024-07-12 NOTE — PLAN OF CARE
Problem: Adult Inpatient Plan of Care  Goal: Plan of Care Review  Outcome: Progressing  Flowsheets (Taken 7/12/2024 1346)  Plan of Care Reviewed With: patient  Goal: Patient-Specific Goal (Individualized)  Outcome: Progressing  Flowsheets (Taken 7/12/2024 1346)  Individualized Care Needs: recliner  Anxieties, Fears or Concerns: none today  Patient/Family-Specific Goals (Include Timeframe): no s/s of reaction of inj     Problem: Fatigue  Goal: Improved Activity Tolerance  Outcome: Progressing  Intervention: Promote Improved Energy  Flowsheets (Taken 7/12/2024 1346)  Activity Management: Up in chair - L3   Patient tolerated Fulfilia injection well, d/c in no acute distress.

## 2024-07-13 ENCOUNTER — PATIENT MESSAGE (OUTPATIENT)
Dept: ADMINISTRATIVE | Facility: OTHER | Age: 79
End: 2024-07-13
Payer: MEDICARE

## 2024-07-14 ENCOUNTER — PATIENT MESSAGE (OUTPATIENT)
Dept: ADMINISTRATIVE | Facility: OTHER | Age: 79
End: 2024-07-14
Payer: MEDICARE

## 2024-07-15 ENCOUNTER — PATIENT MESSAGE (OUTPATIENT)
Dept: ADMINISTRATIVE | Facility: OTHER | Age: 79
End: 2024-07-15
Payer: MEDICARE

## 2024-07-16 ENCOUNTER — PATIENT MESSAGE (OUTPATIENT)
Dept: ADMINISTRATIVE | Facility: OTHER | Age: 79
End: 2024-07-16
Payer: MEDICARE

## 2024-07-17 ENCOUNTER — PATIENT MESSAGE (OUTPATIENT)
Dept: ADMINISTRATIVE | Facility: OTHER | Age: 79
End: 2024-07-17
Payer: MEDICARE

## 2024-07-19 ENCOUNTER — PATIENT MESSAGE (OUTPATIENT)
Dept: ADMINISTRATIVE | Facility: OTHER | Age: 79
End: 2024-07-19
Payer: MEDICARE

## 2024-07-19 ENCOUNTER — PATIENT MESSAGE (OUTPATIENT)
Dept: HEMATOLOGY/ONCOLOGY | Facility: CLINIC | Age: 79
End: 2024-07-19
Payer: MEDICARE

## 2024-07-20 ENCOUNTER — PATIENT MESSAGE (OUTPATIENT)
Dept: ADMINISTRATIVE | Facility: OTHER | Age: 79
End: 2024-07-20
Payer: MEDICARE

## 2024-07-21 ENCOUNTER — PATIENT MESSAGE (OUTPATIENT)
Dept: ADMINISTRATIVE | Facility: OTHER | Age: 79
End: 2024-07-21
Payer: MEDICARE

## 2024-07-22 ENCOUNTER — TELEPHONE (OUTPATIENT)
Dept: HEMATOLOGY/ONCOLOGY | Facility: CLINIC | Age: 79
End: 2024-07-22
Payer: MEDICARE

## 2024-07-22 ENCOUNTER — PATIENT MESSAGE (OUTPATIENT)
Dept: HEMATOLOGY/ONCOLOGY | Facility: CLINIC | Age: 79
End: 2024-07-22
Payer: MEDICARE

## 2024-07-22 ENCOUNTER — PATIENT MESSAGE (OUTPATIENT)
Dept: ADMINISTRATIVE | Facility: OTHER | Age: 79
End: 2024-07-22
Payer: MEDICARE

## 2024-07-22 NOTE — TELEPHONE ENCOUNTER
Spoke with patient.   She is feeling better today then days prior.  She is eating every meal and her bp is good today.  She knows to contact nurse with any issues.

## 2024-07-22 NOTE — TELEPHONE ENCOUNTER
----- Message from Erlinda Veloz, Patient Care Assistant sent at 7/22/2024 11:29 AM CDT -----  Type: Needs Medical Advice  Who Called:  danielle   [facility]ain Call Back Number: [phone]    Additional Information: danielle states she needs to speak to nurse , please call to further discuss,thank you ,

## 2024-07-23 ENCOUNTER — PATIENT MESSAGE (OUTPATIENT)
Dept: ADMINISTRATIVE | Facility: OTHER | Age: 79
End: 2024-07-23
Payer: MEDICARE

## 2024-07-23 ENCOUNTER — TELEPHONE (OUTPATIENT)
Dept: HEMATOLOGY/ONCOLOGY | Facility: CLINIC | Age: 79
End: 2024-07-23
Payer: MEDICARE

## 2024-07-24 ENCOUNTER — PATIENT MESSAGE (OUTPATIENT)
Dept: ADMINISTRATIVE | Facility: OTHER | Age: 79
End: 2024-07-24
Payer: MEDICARE

## 2024-07-25 ENCOUNTER — PATIENT MESSAGE (OUTPATIENT)
Dept: ADMINISTRATIVE | Facility: OTHER | Age: 79
End: 2024-07-25
Payer: MEDICARE

## 2024-07-26 ENCOUNTER — PATIENT MESSAGE (OUTPATIENT)
Dept: ADMINISTRATIVE | Facility: OTHER | Age: 79
End: 2024-07-26
Payer: MEDICARE

## 2024-07-27 ENCOUNTER — PATIENT MESSAGE (OUTPATIENT)
Dept: ADMINISTRATIVE | Facility: OTHER | Age: 79
End: 2024-07-27
Payer: MEDICARE

## 2024-07-29 ENCOUNTER — HOSPITAL ENCOUNTER (OUTPATIENT)
Dept: RADIOLOGY | Facility: HOSPITAL | Age: 79
Discharge: HOME | End: 2024-07-29
Attending: INTERNAL MEDICINE
Payer: MEDICARE

## 2024-07-29 DIAGNOSIS — C34.91 ADENOCARCINOMA OF RIGHT LUNG: ICD-10-CM

## 2024-07-29 PROCEDURE — 71260 CT THORAX DX C+: CPT | Mod: 26,HCNC,, | Performed by: RADIOLOGY

## 2024-07-29 PROCEDURE — 74177 CT ABD & PELVIS W/CONTRAST: CPT | Mod: 26,HCNC,, | Performed by: RADIOLOGY

## 2024-07-29 PROCEDURE — A9698 NON-RAD CONTRAST MATERIALNOC: HCPCS | Mod: HCNC,PO | Performed by: INTERNAL MEDICINE

## 2024-07-29 PROCEDURE — 74177 CT ABD & PELVIS W/CONTRAST: CPT | Mod: TC,HCNC,PO

## 2024-07-29 PROCEDURE — 25500020 PHARM REV CODE 255: Mod: HCNC,PO | Performed by: INTERNAL MEDICINE

## 2024-07-29 PROCEDURE — 71260 CT THORAX DX C+: CPT | Mod: TC,HCNC,PO

## 2024-07-29 RX ADMIN — IOHEXOL 75 ML: 350 INJECTION, SOLUTION INTRAVENOUS at 03:07

## 2024-07-29 RX ADMIN — IOHEXOL 1000 ML: 9 SOLUTION ORAL at 03:07

## 2024-07-30 ENCOUNTER — PATIENT MESSAGE (OUTPATIENT)
Dept: ADMINISTRATIVE | Facility: OTHER | Age: 79
End: 2024-07-30
Payer: MEDICARE

## 2024-07-30 ENCOUNTER — OFFICE VISIT (OUTPATIENT)
Dept: HEMATOLOGY/ONCOLOGY | Facility: CLINIC | Age: 79
End: 2024-07-30
Payer: MEDICARE

## 2024-07-30 ENCOUNTER — TUMOR BOARD CONFERENCE (OUTPATIENT)
Dept: HEMATOLOGY/ONCOLOGY | Facility: CLINIC | Age: 79
End: 2024-07-30

## 2024-07-30 VITALS
SYSTOLIC BLOOD PRESSURE: 137 MMHG | HEART RATE: 97 BPM | WEIGHT: 175.06 LBS | RESPIRATION RATE: 16 BRPM | HEIGHT: 63 IN | DIASTOLIC BLOOD PRESSURE: 70 MMHG | TEMPERATURE: 97 F | BODY MASS INDEX: 31.02 KG/M2 | OXYGEN SATURATION: 92 %

## 2024-07-30 DIAGNOSIS — C34.91 ADENOCARCINOMA OF RIGHT LUNG: Primary | ICD-10-CM

## 2024-07-30 DIAGNOSIS — C34.31 PRIMARY ADENOCARCINOMA OF LOWER LOBE OF RIGHT LUNG: ICD-10-CM

## 2024-07-30 PROCEDURE — 99999 PR PBB SHADOW E&M-EST. PATIENT-LVL IV: CPT | Mod: PBBFAC,HCNC,, | Performed by: INTERNAL MEDICINE

## 2024-07-30 NOTE — PROGRESS NOTES
[facility]      THORACIC MULTIDISCIPLINARY TUMOR BOARD  PATIENT REVIEW FORM  ___________________________________________________________________    CLINIC #: [id number]  DATE: 07/30/2024    TUMOR SITE:   Cancer Type: Lung cancer (Adenocarcinoma, lepidic type)     Cancer Site: lower lobe     Tumor Laterality: Right     Cancer Staging Complete: Yes       Presenting Hospital / Clinic: [facility]     Virtual Tumor Board Conference: In person       PRESENTER:   Presenter: Dr. Osborn     Specialties Present: Medical Oncology; Radiation Oncology; Hematology; Surgical Oncology; Pathology; Navigation; Research; Integrative Oncology; Radiology; Pulmonology       PATIENT SUMMARY:   79 y.o. female with Stage IA2 (cT1b cN0 M0) DOC NSCLC (adeno) diagnosed on biopsy 12/20/19. History significant for sarcoidosis. CT Chest 1/14/20 demonstrated 1.7 cm DOC primary and multiple other stable sub-centimeter nodules with mild hilar and mediastinal adenopathy. She underwent attempted VATS resection by Dr. Mccall 1/16/20, which was aborted due to poor toleration of single lung ventilation. She completed definitive SBRT 55 Gy in 5 fx on 2/13/20.      CT Chest 2/16/24 concerning for enlarging adenopathy. Bronch w/ EBUS by Dr. Garcia 3/26/24. 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    Her case was reviewed at the Thoracic Multidisciplinary Conference on 04/17/2024 with recommendation for consideration of systemic therapy options. Since there is not a clear target for radiation on her imaging (ILD changes indistinguishable from lepidic adeno), she does not have a local therapy option at this time. I discussed with the patient and her  that lepidic adenocarcinoma tends to be very slow growing and often  "present for years, but there is always a risk that it can develop metastatic potential if untreated.      Tempus NGS tissue with no targets, PDL1 is less than 1%     CT scans of chest abdomen pelvis  from 04/29/2024 reveal "Lqljokj-yf-klzvzxyb increased size of a left apical pulmonary mass status post radiation. Grossly stable background of interstitial lung disease in this patient with reported history of sarcoidosis, which limits evaluation for lepidic spread of disease in this patient with biopsy-proven left upper lobe adenocarcinoma. Unchanged bilateral pulmonary micro-nodules. Stable mediastinal and hilar lymphadenopathy.  Index lymph nodes as above. 2.1 cm left adrenal nodule, unchanged since November 2021. Large extrarenal pelvis on the right with hydronephrosis felt less likely given stability dating back to November 2021.     MRI brain from 05/03/2024  reveals "No evidence of intracranial metastatic disease. 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     She started Carboplatin and Alimta on 5/9/2024.     CT chest from 6/17/2024 (after 2 cycles of Carboplatin and Alimta) revealed "Stable appearance of the lungs with known background of interstitial lung disease/pulmonary sarcoidosis.  Unchanged left apical soft tissue mass status post radiation. Stable mediastinal lymph nodes. Stable left adrenal nodule"  ECOG PS is zero.    CT Chest/Abdomene/pelvis 7/29/24  Impression:  1. Pulmonary mass at the left lung apex and mediastinal lymphadenopathy stable given inter study variance since 04/29/2024.  2. Prominent renal pelvis on the right slightly more so than noticed on the prior, this most likely relates to an extrarenal pelvis and it is always been slightly prominent.  Mild hydronephrosis is difficult to exclude.  3. Distended bladder, please correlate for postobstructive change or neurogenic bladder.  This may be physiologic.  4. Stable adrenal mass since 08/26/2022 suggestive of an adrenal " adenoma  5. Left-sided thyroid nodule.  Thyroid ultrasound of use for better evaluation and follow-up if necessary this is likely stable since at least 02/24/2023 by CT    Background Information  Patient Status: a current patient  Reason for Consultation: Follow-Up from Prior Tumor Board  Biopsy Date: 03/26/24  Biopsy Results: Cancer  Treatment to Date: Adjuvant Chemotherapy; Neoadjuvant Radiation       BOARD RECOMMENDATIONS:   Recommended Plan (General)  Recommended Plan: Additional observation  Recommended Plan Note: short term follow up in 8 weeks with Chest CT.       CONSULT NEEDED:     [] Surgery    [] Hem/Onc    [] Rad/Onc    [] Dietary                 [] Social Service    [] Psychology       [] Pulmonology    Cancer Staging   Primary adenocarcinoma of lower lobe of right lung  Staging form: Lung, AJCC 8th Edition  - Clinical stage from 3/26/2024: Stage IA1 (cT1mi, cN0, cM0) - Signed by Kirby Sibley MD on 4/17/2024    Primary malignant neoplasm of left upper lobe of lung  Staging form: Lung, AJCC 8th Edition  - Clinical stage from 1/27/2020: Stage IA2 (cT1b, cN0, cM0) - Signed by Kirby Sibley MD on 1/27/2020          [x] Kristy'l Treatment Guidelines reviewed and care planned is consistent with guidelines.         (i.e., NCCN, NCI, PD, ACO, AUA, etc.)    PRESENTATION AT CANCER CONFERENCE:   Presentation at Cancer Conference: Prospective       CLINICAL TRIAL ELIGIBILITY:   Clinical Trial Eligibility  Clinical Trial Eligibility: Not discussed         Holly Larkin

## 2024-07-30 NOTE — PROGRESS NOTES
"Subjective     Patient ID: Nicole Medina is a 79 y.o. female.    Chief Complaint: Follow-up (Primary malignant neoplasm of left upper lobe of lung)  79 y.o. female with Stage IA2 (cT1b cN0 M0) DOC NSCLC (adeno) diagnosed on biopsy 12/20/19. History significant for sarcoidosis. CT Chest 1/14/20 demonstrated 1.7 cm DOC primary and multiple other stable sub-centimeter nodules with mild hilar and mediastinal adenopathy. She underwent attempted VATS resection by Dr. Mccall 1/16/20, which was aborted due to poor toleration of single lung ventilation. She completed definitive SBRT 55 Gy in 5 fx on 2/13/20.      CT Chest 2/16/24 concerning for enlarging adenopathy. Bronch w/ EBUS by Dr. Garcia 3/26/24. 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    Her case was reviewed at the Thoracic Multidisciplinary Conference on 04/17/2024 with recommendation for consideration of systemic therapy options. Since there is not a clear target for radiation on her imaging (ILD changes indistinguishable from lepidic adeno), she does not have a local therapy option at this time. I discussed with the patient and her  that lepidic adenocarcinoma tends to be very slow growing and often present for years, but there is always a risk that it can develop metastatic potential if untreated.      Tempus NGS tissue with no targets, PDL1 is less than 1%        CT scans of chest abdomen pelvis  from 04/29/2024 reveal "Ongdyuj-ed-soagxttz increased size of a left apical pulmonary mass status post radiation. Grossly stable background of interstitial lung disease in this patient with reported history of sarcoidosis, which limits evaluation for lepidic spread of disease in this patient with biopsy-proven left upper lobe adenocarcinoma. Unchanged bilateral pulmonary micro-nodules. Stable mediastinal and hilar " Physical Therapy Treatment    Patient Name:  Rachel Zazueta   MRN:  [id number]    Recommendations:     Discharge Recommendations:  (to be determined)  Discharge Equipment Recommendations:  (TBD)  Barriers to discharge:  pain and decresed fxnl mobility    Assessment:     Rachel Zazueta is a 44 y.o. female admitted with a medical diagnosis of Severe sepsis.  She presents with the following impairments/functional limitations: weakness, impaired endurance, impaired functional mobility, impaired balance, decreased coordination, decreased lower extremity function, pain, decreased ROM. Pt was having a lot of back pain and was complaining she had incresed weakness on her left leg.session was limited due to her pain    Rehab Prognosis: Fair; patient would benefit from acute skilled PT services to address these deficits and reach maximum level of function.    Recent Surgery: * No surgery found *      Plan:     During this hospitalization, patient to be seen 5 x/week to address the identified rehab impairments via gait training, therapeutic activities, therapeutic exercises and progress toward the following goals:    Plan of Care Expires:  01/09/23    Subjective     Chief Complaint: back pain  Patient/Family Comments/goals:  Pain/Comfort:         Objective:     Communicated with nurse prior to session.  Patient found supine with   upon PT entry to room.     General Precautions: Standard, fall  Orthopedic Precautions:  (history of back surgery)  Braces: N/A  Respiratory Status: Room air     Functional Mobility:  Bed Mobility:     Supine to Sit: moderate assistance  Sit to Supine: moderate assistance  Transfers:     Sit to Stand:  moderate assistance with no AD  Bed to Chair: moderate assistance with  no AD  using  Stand Pivot  Gait: no gait today do to back pain and increased left knee buckling      AM-PAC 6 CLICK MOBILITY          Treatment & Education:  Did not use a rw because she was weaker today in left leg    Patient  "lymphadenopathy.  Index lymph nodes as above. 2.1 cm left adrenal nodule, unchanged since November 2021. Large extrarenal pelvis on the right with hydronephrosis felt less likely given stability dating back to November 2021.     MRI brain from 05/03/2024  reveals "No evidence of intracranial metastatic disease. 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     She started Carboplatin and Alimta on 5/9/2024.     CT chest from 6/17/2024 (after 2 cycles of Carboplatin and Alimta) revealed "Stable appearance of the lungs with known background of interstitial lung disease/pulmonary sarcoidosis.  Unchanged left apical soft tissue mass status post radiation. Stable mediastinal lymph nodes. Stable left adrenal nodule"          Mara received cycle 4 of Carboplatin and Alimta on 7/11/2024     CT scans on 7/26/2024 "Pulmonary mass at the left lung apex and mediastinal lymphadenopathy stable given inter study variance since 04/29/2024.  2. Prominent renal pelvis on the right slightly more so than noticed on the prior, this most likely relates to an extrarenal pelvis and it is always been slightly prominent.  Mild hydronephrosis is difficult to exclude.  3. Distended bladder, please correlate for postobstructive change or neurogenic bladder.  This may be physiologic.  4. Stable adrenal mass since 08/26/2022 suggestive of an adrenal adenoma  5. Left-sided thyroid nodule.  Thyroid ultrasound of use for better evaluation and follow-up if necessary this is likely stable since at least 02/24/2023 by CT"  She comes in to review MDT recs     Review of Systems   Constitutional:  Negative for appetite change, fatigue and unexpected weight change.   HENT:  Negative for mouth sores.    Eyes:  Negative for visual disturbance.   Respiratory:  Negative for cough and shortness of breath.    Cardiovascular:  Negative for chest pain.   Gastrointestinal:  Negative for abdominal pain and diarrhea.   Genitourinary:  Negative for " left supine with call button in reach..    GOALS:   Multidisciplinary Problems       Physical Therapy Goals          Problem: Physical Therapy    Goal Priority Disciplines Outcome Goal Variances Interventions   Physical Therapy Goal     PT, PT/OT      Description: Goals to be met by: 2023     Patient will increase functional independence with mobility by performin. Supine to sit with Modified [place]  2. Sit to supine with Modified [place]  3. Sit to stand transfer with Stand-by Assistance  4. Bed to chair transfer with Stand-by Assistance using Rolling Walker  5. Gait  x 50 feet with Contact Guard Assistance using Rolling Walker.   6. Sitting at edge of bed x10 minutes with [place] to perform (B) LE therex                           Time Tracking:     PT Received On:    PT Start Time:       PT Stop Time:    PT Total Time (min):       Billable Minutes: Therapeutic Activity 2022   frequency.   Musculoskeletal:  Negative for back pain.   Integumentary:  Negative for rash.   Neurological:  Negative for headaches.   Hematological:  Negative for adenopathy.   Psychiatric/Behavioral:  The patient is not nervous/anxious.    All other systems reviewed and are negative.         Objective     Physical Exam  Vitals reviewed.   Constitutional:       Appearance: She is well-developed.   HENT:      Mouth/Throat:      Pharynx: No oropharyngeal exudate.   Cardiovascular:      Rate and Rhythm: Normal rate.      Heart sounds: Normal heart sounds.   Pulmonary:      Effort: Pulmonary effort is normal.      Breath sounds: Normal breath sounds. No wheezing.   Abdominal:      General: Bowel sounds are normal.      Palpations: Abdomen is soft.      Tenderness: There is no abdominal tenderness.   Musculoskeletal:         General: No tenderness.   Lymphadenopathy:      Cervical: No cervical adenopathy.   Skin:     General: Skin is warm and dry.      Findings: No rash.   Neurological:      Mental Status: She is alert and oriented to person, place, and time.      Coordination: Coordination normal.   Psychiatric:         Thought Content: Thought content normal.         Judgment: Judgment normal.            Assessment and Plan     1. Adenocarcinoma of right lung  -     CT CHEST WITHOUT CONTRAST; Future; Expected date: 07/30/2024    2. Primary adenocarcinoma of lower lobe of right lung      Route Chart for Scheduling    Med Onc Chart Routing      Follow up with physician . Cancel all labs, Giacomo me and chemo for now. in 2 months from now schedule CT chest without contrast and see me   Follow up with STANISLAW    Infusion scheduling note    Injection scheduling note    Labs    Imaging    Pharmacy appointment    Other referrals                Mrs. Medina has completed 4 cycles of chemotherapy with carboplatin and Alimta and comes in to review her CT scans.  CT scans are overall stable  Case reviewed in thoracic multi D tumor board.   It is difficult to tell if her findings in the lung are related to malignancy on her chronic lung findings.  There are no mass lesions noted.  Reviewed option of chemo break versus continuing with maintenance chemo.  Since no obvious tumors noted on the CT scan we elected to proceed with a break with a close follow-up so she will return in 2 months with a repeat CT  She understands that if there is any evidence of disease progression she will have to get back on chemo    Visit today included increased complexity associated with the care of the episodic problem scans addressed and managing the longitudinal care of the patient due to the serious and/or complex managed problem(s) non small lung cancer      Above care plan was discussed with patient and accompanying  and all questions were addressed to their satisfaction

## 2024-07-31 ENCOUNTER — TELEPHONE (OUTPATIENT)
Dept: HEMATOLOGY/ONCOLOGY | Facility: CLINIC | Age: 79
End: 2024-07-31
Payer: MEDICARE

## 2024-07-31 NOTE — TELEPHONE ENCOUNTER
----- Message from Jackie Johnson sent at 7/31/2024 11:41 AM CDT -----  Type: Needs Medical Advice  Who Called:  Patient   Symptoms (please be specific):    How long has patient had these symptoms:    Pharmacy name and phone #:    Best Call Back Number: [phone]  Additional Information: Patient requesting a call back from Ledy SAHA

## 2024-07-31 NOTE — TELEPHONE ENCOUNTER
Spoke with patient.  She is off treatment right now on break----  She is wondering how to disenroll from [facility] and if she can continue to use the equipment as needed.    Nurse spoke with ailyn. Patient needs to be disenrolled from MD standpoint in [employer]---and yes, equipment can still be used.

## 2024-08-15 ENCOUNTER — OFFICE VISIT (OUTPATIENT)
Dept: OPTOMETRY | Facility: CLINIC | Age: 79
End: 2024-08-15
Payer: MEDICARE

## 2024-08-15 DIAGNOSIS — Z83.518 FAMILY HISTORY OF MACULAR DEGENERATION: ICD-10-CM

## 2024-08-15 DIAGNOSIS — D86.0 SARCOIDOSIS OF LUNG: Primary | ICD-10-CM

## 2024-08-15 DIAGNOSIS — H04.123 BILATERAL DRY EYES: ICD-10-CM

## 2024-08-15 DIAGNOSIS — Z96.1 PSEUDOPHAKIA OF BOTH EYES: ICD-10-CM

## 2024-08-15 PROCEDURE — 92014 COMPRE OPH EXAM EST PT 1/>: CPT | Mod: HCNC,S$GLB,, | Performed by: OPTOMETRIST

## 2024-08-15 PROCEDURE — 3288F FALL RISK ASSESSMENT DOCD: CPT | Mod: HCNC,CPTII,S$GLB, | Performed by: OPTOMETRIST

## 2024-08-15 PROCEDURE — 99999 PR PBB SHADOW E&M-EST. PATIENT-LVL III: CPT | Mod: PBBFAC,HCNC,, | Performed by: OPTOMETRIST

## 2024-08-15 PROCEDURE — 1126F AMNT PAIN NOTED NONE PRSNT: CPT | Mod: HCNC,CPTII,S$GLB, | Performed by: OPTOMETRIST

## 2024-08-15 PROCEDURE — 1160F RVW MEDS BY RX/DR IN RCRD: CPT | Mod: HCNC,CPTII,S$GLB, | Performed by: OPTOMETRIST

## 2024-08-15 PROCEDURE — 1101F PT FALLS ASSESS-DOCD LE1/YR: CPT | Mod: HCNC,CPTII,S$GLB, | Performed by: OPTOMETRIST

## 2024-08-15 PROCEDURE — 1159F MED LIST DOCD IN RCRD: CPT | Mod: HCNC,CPTII,S$GLB, | Performed by: OPTOMETRIST

## 2024-08-15 NOTE — PROGRESS NOTES
HPI    Pt here for annual AMD eye exam DLS- 08/14/23    Pt denies vision changes, using specs for reading.   BP is stable on meds.   Denies F/F.  Using eye wash PRN.   Last edited by Sangita Jenkins on 8/15/2024  9:20 AM.            Assessment /Plan     For exam results, see Encounter Report.    Sarcoidosis of lung    Bilateral dry eyes    Pseudophakia of both eyes    Family history of macular degeneration      No ocular signs of inflammation, Monitor condition. Patient to report any changes. RTC 1 year recheck.  2. Recommend artificial tears. 1 drop 2x per day. Chronicity of disease and treatment discussed.  3. Monitor condition. Patient to report any changes. RTC 1 year recheck.     4. Monitor condition. Patient to report any changes. RTC 1 year recheck.

## 2024-08-30 ENCOUNTER — LAB VISIT (OUTPATIENT)
Dept: LAB | Facility: HOSPITAL | Age: 79
End: 2024-08-30
Attending: INTERNAL MEDICINE
Payer: MEDICARE

## 2024-08-30 ENCOUNTER — OFFICE VISIT (OUTPATIENT)
Dept: FAMILY MEDICINE | Facility: CLINIC | Age: 79
End: 2024-08-30
Payer: MEDICARE

## 2024-08-30 VITALS
WEIGHT: 175.94 LBS | HEIGHT: 63 IN | HEART RATE: 83 BPM | SYSTOLIC BLOOD PRESSURE: 124 MMHG | BODY MASS INDEX: 31.17 KG/M2 | DIASTOLIC BLOOD PRESSURE: 72 MMHG | OXYGEN SATURATION: 95 %

## 2024-08-30 DIAGNOSIS — I48.0 PAROXYSMAL ATRIAL FIBRILLATION: ICD-10-CM

## 2024-08-30 DIAGNOSIS — I50.32 CHRONIC DIASTOLIC CONGESTIVE HEART FAILURE: ICD-10-CM

## 2024-08-30 DIAGNOSIS — C34.31 PRIMARY ADENOCARCINOMA OF LOWER LOBE OF RIGHT LUNG: ICD-10-CM

## 2024-08-30 DIAGNOSIS — E66.09 CLASS 1 OBESITY DUE TO EXCESS CALORIES WITH SERIOUS COMORBIDITY AND BODY MASS INDEX (BMI) OF 31.0 TO 31.9 IN ADULT: ICD-10-CM

## 2024-08-30 DIAGNOSIS — D64.9 ANEMIA, UNSPECIFIED TYPE: Primary | ICD-10-CM

## 2024-08-30 DIAGNOSIS — D64.9 ANEMIA, UNSPECIFIED TYPE: ICD-10-CM

## 2024-08-30 DIAGNOSIS — I10 ESSENTIAL HYPERTENSION: ICD-10-CM

## 2024-08-30 LAB
BASOPHILS # BLD AUTO: 0.08 K/UL (ref 0–0.2)
BASOPHILS NFR BLD: 1.1 % (ref 0–1.9)
DIFFERENTIAL METHOD BLD: ABNORMAL
EOSINOPHIL # BLD AUTO: 0.8 K/UL (ref 0–0.5)
EOSINOPHIL NFR BLD: 10.8 % (ref 0–8)
ERYTHROCYTE [DISTWIDTH] IN BLOOD BY AUTOMATED COUNT: 15.2 % (ref 11.5–14.5)
FERRITIN SERPL-MCNC: 436 NG/ML (ref 20–300)
HCT VFR BLD AUTO: 33.2 % (ref 37–48.5)
HGB BLD-MCNC: 10.7 G/DL (ref 12–16)
IMM GRANULOCYTES # BLD AUTO: 0.01 K/UL (ref 0–0.04)
IMM GRANULOCYTES NFR BLD AUTO: 0.1 % (ref 0–0.5)
IRON SERPL-MCNC: 32 UG/DL (ref 30–160)
LYMPHOCYTES # BLD AUTO: 1.6 K/UL (ref 1–4.8)
LYMPHOCYTES NFR BLD: 20.7 % (ref 18–48)
MCH RBC QN AUTO: 34 PG (ref 27–31)
MCHC RBC AUTO-ENTMCNC: 32.2 G/DL (ref 32–36)
MCV RBC AUTO: 105 FL (ref 82–98)
MONOCYTES # BLD AUTO: 0.8 K/UL (ref 0.3–1)
MONOCYTES NFR BLD: 11 % (ref 4–15)
NEUTROPHILS # BLD AUTO: 4.3 K/UL (ref 1.8–7.7)
NEUTROPHILS NFR BLD: 56.3 % (ref 38–73)
NRBC BLD-RTO: 0 /100 WBC
PLATELET # BLD AUTO: 142 K/UL (ref 150–450)
PMV BLD AUTO: 13.9 FL (ref 9.2–12.9)
RBC # BLD AUTO: 3.15 M/UL (ref 4–5.4)
SATURATED IRON: 8 % (ref 20–50)
TOTAL IRON BINDING CAPACITY: 385 UG/DL (ref 250–450)
TRANSFERRIN SERPL-MCNC: 260 MG/DL (ref 200–375)
WBC # BLD AUTO: 7.57 K/UL (ref 3.9–12.7)

## 2024-08-30 PROCEDURE — 82728 ASSAY OF FERRITIN: CPT | Mod: HCNC | Performed by: INTERNAL MEDICINE

## 2024-08-30 PROCEDURE — 85025 COMPLETE CBC W/AUTO DIFF WBC: CPT | Mod: HCNC | Performed by: INTERNAL MEDICINE

## 2024-08-30 PROCEDURE — 36415 COLL VENOUS BLD VENIPUNCTURE: CPT | Mod: HCNC,PO | Performed by: INTERNAL MEDICINE

## 2024-08-30 PROCEDURE — 99999 PR PBB SHADOW E&M-EST. PATIENT-LVL IV: CPT | Mod: PBBFAC,HCNC,, | Performed by: INTERNAL MEDICINE

## 2024-08-30 PROCEDURE — 83540 ASSAY OF IRON: CPT | Mod: HCNC | Performed by: INTERNAL MEDICINE

## 2024-08-30 NOTE — PROGRESS NOTES
"[facility]  Primary [facility]   [address]       Patient ID: Nicole Medina     Chief Complaint:   Chief Complaint   Patient presents with    Annual Exam     General wellness exam        HPI:  Routine follow-up and I am glad to see she is doing well.  She finished 1 cycle of chemotherapy for her lung cancer in the currently pausing it and I going to repeat a CT scan in about a month to see if they need more chemo or not.  She does have labs upcoming right before then but I would like to check some today because last month she was a bit anemic and if it is an iron issue I want to know.  Her vital signs look very good.  She did have prescriptions for clonidine, furosemide, spironolactone, but I do not think she needs those right now.  I would like her to keep some furosemide and spironolactone on hand in case she starts to develop edema but today she looks very euvolemic.  I have a feeling the Entresto is handling most of her heart failure symptoms.  We discussed the utility of the newest COVID vaccine I am going to allow her to get it if she likes.  She is interested in the flu shot and that should be available next month.  She did follow-up with Cardiology and since she has not had any AFib in the past 3 months and the Watchman has been inside her for awhile, they stopped the Eliquis and Multaq and she is maintained on a low-dose aspirin.  Physical exam sounds good.  She does note that she needs to take a nap some afternoons but I think that is normal.    Review of Systems       Negative     Objective:      Physical Exam   Physical Exam       Obese    Vitals:   Vitals:    08/30/24 0812   BP: 124/72   Pulse: 83   SpO2: 95%   Weight: 79.8 kg (175 lb 14.8 oz)   Height: 5' 3" (1.6 m)        Assessment:           Plan:       Nicole Medina  was seen today for follow-up and may need lab work.    Diagnoses and all orders for this visit:    Nicole Carroll" was seen today for annual exam.    Diagnoses " and all orders for this visit:    Anemia, unspecified type  -     CBC Auto Differential; Future  -     Iron and TIBC; Future  -     Ferritin; Future  Labs today     Chronic diastolic congestive heart failure  Euvolemic with entresto.     Essential hypertension  Controlled with Valsartan in Entresto     Paroxysmal atrial fibrillation  Controlled with ablation   Continue Aspirin 81 mg     Primary adenocarcinoma of lower lobe of right lung  On a chemo break right now     Class 1 obesity due to excess calories with serious comorbidity and body mass index (BMI) of 31.0 to 31.9 in adult  Monitor     Visit today included increased complexity associated with the care of the episodic problem hypertension Anemia Congestive Heart Failure A-fib Lung cancer addressed and managing the longitudinal care of the patient due to the serious and/or complex managed problem(s) .           Henok Medina MD

## 2024-09-07 DIAGNOSIS — D50.0 IRON DEFICIENCY ANEMIA DUE TO CHRONIC BLOOD LOSS: Primary | ICD-10-CM

## 2024-09-07 RX ORDER — HEPARIN 100 UNIT/ML
500 SYRINGE INTRAVENOUS
Status: CANCELLED | OUTPATIENT
Start: 2024-09-09

## 2024-09-07 RX ORDER — EPINEPHRINE 0.3 MG/.3ML
0.3 INJECTION SUBCUTANEOUS ONCE AS NEEDED
Status: CANCELLED | OUTPATIENT
Start: 2024-09-09

## 2024-09-07 RX ORDER — DIPHENHYDRAMINE HYDROCHLORIDE 50 MG/ML
50 INJECTION INTRAMUSCULAR; INTRAVENOUS ONCE AS NEEDED
Status: CANCELLED | OUTPATIENT
Start: 2024-09-09

## 2024-09-07 RX ORDER — SODIUM CHLORIDE 0.9 % (FLUSH) 0.9 %
10 SYRINGE (ML) INJECTION
Status: CANCELLED | OUTPATIENT
Start: 2024-09-09

## 2024-09-09 ENCOUNTER — TELEPHONE (OUTPATIENT)
Dept: FAMILY MEDICINE | Facility: CLINIC | Age: 79
End: 2024-09-09
Payer: MEDICARE

## 2024-09-09 ENCOUNTER — PATIENT MESSAGE (OUTPATIENT)
Dept: INFUSION THERAPY | Facility: HOSPITAL | Age: 79
End: 2024-09-09
Payer: MEDICARE

## 2024-09-09 RX ORDER — EPINEPHRINE 0.3 MG/.3ML
0.3 INJECTION SUBCUTANEOUS ONCE AS NEEDED
OUTPATIENT
Start: 2024-09-10

## 2024-09-09 RX ORDER — HEPARIN 100 UNIT/ML
500 SYRINGE INTRAVENOUS
OUTPATIENT
Start: 2024-09-10

## 2024-09-09 RX ORDER — SODIUM CHLORIDE 0.9 % (FLUSH) 0.9 %
10 SYRINGE (ML) INJECTION
OUTPATIENT
Start: 2024-09-10

## 2024-09-09 RX ORDER — DIPHENHYDRAMINE HYDROCHLORIDE 50 MG/ML
50 INJECTION INTRAMUSCULAR; INTRAVENOUS ONCE AS NEEDED
OUTPATIENT
Start: 2024-09-10

## 2024-09-26 ENCOUNTER — INFUSION (OUTPATIENT)
Dept: INFUSION THERAPY | Facility: HOSPITAL | Age: 79
End: 2024-09-26
Attending: INTERNAL MEDICINE
Payer: MEDICARE

## 2024-09-26 ENCOUNTER — HOSPITAL ENCOUNTER (OUTPATIENT)
Dept: RADIOLOGY | Facility: HOSPITAL | Age: 79
Discharge: HOME | End: 2024-09-26
Attending: INTERNAL MEDICINE
Payer: MEDICARE

## 2024-09-26 ENCOUNTER — DOCUMENTATION ONLY (OUTPATIENT)
Dept: INFUSION THERAPY | Facility: HOSPITAL | Age: 79
End: 2024-09-26
Payer: MEDICARE

## 2024-09-26 VITALS
HEIGHT: 63 IN | RESPIRATION RATE: 16 BRPM | TEMPERATURE: 98 F | DIASTOLIC BLOOD PRESSURE: 68 MMHG | BODY MASS INDEX: 31.17 KG/M2 | HEART RATE: 74 BPM | WEIGHT: 175.94 LBS | SYSTOLIC BLOOD PRESSURE: 128 MMHG | OXYGEN SATURATION: 96 %

## 2024-09-26 DIAGNOSIS — C34.91 ADENOCARCINOMA OF RIGHT LUNG: ICD-10-CM

## 2024-09-26 DIAGNOSIS — D50.0 IRON DEFICIENCY ANEMIA DUE TO CHRONIC BLOOD LOSS: Primary | ICD-10-CM

## 2024-09-26 PROCEDURE — 96365 THER/PROPH/DIAG IV INF INIT: CPT | Mod: HCNC,PN

## 2024-09-26 PROCEDURE — 63600175 PHARM REV CODE 636 W HCPCS: Mod: HCNC,PN | Performed by: INTERNAL MEDICINE

## 2024-09-26 PROCEDURE — 71250 CT THORAX DX C-: CPT | Mod: 26,HCNC,, | Performed by: RADIOLOGY

## 2024-09-26 PROCEDURE — 71250 CT THORAX DX C-: CPT | Mod: TC,HCNC,PO

## 2024-09-26 PROCEDURE — 25000003 PHARM REV CODE 250: Mod: HCNC,PN | Performed by: INTERNAL MEDICINE

## 2024-09-26 RX ORDER — SODIUM CHLORIDE 0.9 % (FLUSH) 0.9 %
10 SYRINGE (ML) INJECTION
Status: DISCONTINUED | OUTPATIENT
Start: 2024-09-26 | End: 2024-09-26 | Stop reason: HOSPADM

## 2024-09-26 RX ORDER — EPINEPHRINE 0.3 MG/.3ML
0.3 INJECTION SUBCUTANEOUS ONCE AS NEEDED
Status: DISCONTINUED | OUTPATIENT
Start: 2024-09-26 | End: 2024-09-26 | Stop reason: HOSPADM

## 2024-09-26 RX ORDER — HEPARIN 100 UNIT/ML
500 SYRINGE INTRAVENOUS
OUTPATIENT
Start: 2024-10-03

## 2024-09-26 RX ORDER — DIPHENHYDRAMINE HYDROCHLORIDE 50 MG/ML
50 INJECTION INTRAMUSCULAR; INTRAVENOUS ONCE AS NEEDED
Status: DISCONTINUED | OUTPATIENT
Start: 2024-09-26 | End: 2024-09-26 | Stop reason: HOSPADM

## 2024-09-26 RX ORDER — SODIUM CHLORIDE 0.9 % (FLUSH) 0.9 %
10 SYRINGE (ML) INJECTION
OUTPATIENT
Start: 2024-10-03

## 2024-09-26 RX ORDER — EPINEPHRINE 0.3 MG/.3ML
0.3 INJECTION SUBCUTANEOUS ONCE AS NEEDED
OUTPATIENT
Start: 2024-10-03

## 2024-09-26 RX ORDER — DIPHENHYDRAMINE HYDROCHLORIDE 50 MG/ML
50 INJECTION INTRAMUSCULAR; INTRAVENOUS ONCE AS NEEDED
OUTPATIENT
Start: 2024-10-03

## 2024-09-26 RX ADMIN — SODIUM CHLORIDE 125 MG: 9 INJECTION, SOLUTION INTRAVENOUS at 01:09

## 2024-09-26 NOTE — PROGRESS NOTES
Oncology Nutrition   Chemotherapy Infusion Visit    Nutrition Follow Up   RD met with pt at chairside during infusion tx. Pt present for Ferrlecit. Pt continues to do well nutritionally - maintaining weight, chewing without difficulty and denies any nutrition related side effects.       Wt Readings from Last 10 Encounters:   09/26/24 79.8 kg (175 lb 14.8 oz)   08/30/24 79.8 kg (175 lb 14.8 oz)   08/06/24 79.4 kg (175 lb 0.7 oz)   07/30/24 79.4 kg (175 lb 0.7 oz)   07/12/24 81.1 kg (178 lb 12.7 oz)   07/11/24 81.1 kg (178 lb 12.7 oz)   07/11/24 81.1 kg (178 lb 12.7 oz)   07/10/24 81.2 kg (179 lb)   06/21/24 81.6 kg (180 lb)   06/20/24 80 kg (176 lb 5.9 oz)       All other nutrition questions/concerns addressed as appropriate. Will continue to monitor prn throughout treatment.     Anne-Marie Aceves, DARON, LDN  09/26/2024  4:00 PM

## 2024-09-26 NOTE — PLAN OF CARE
Problem: Adult Inpatient Plan of Care  Goal: Plan of Care Review  Outcome: Progressing  Flowsheets (Taken 9/26/2024 1400)  Plan of Care Reviewed With:   patient   spouse  Goal: Patient-Specific Goal (Individualized)  Outcome: Progressing  Flowsheets (Taken 9/26/2024 1400)  Individualized Care Needs: Recliner, warm blanket, education, conversation,  at chairside  Anxieties, Fears or Concerns: None  Patient/Family-Specific Goals (Include Timeframe): Free of S/S of reaction with infusion.     Problem: Fatigue  Goal: Improved Activity Tolerance  Outcome: Progressing  Intervention: Promote Improved Energy  Flowsheets (Taken 9/26/2024 1400)  Fatigue Management:   frequent rest breaks encouraged   paced activity encouraged  Sleep/Rest Enhancement: regular sleep/rest pattern promoted  Activity Management: Ambulated -L4  Environmental Support: rest periods encouraged    Patient to Infusion for Ferrlecit, accompanied by her . Treatment plan reviewed with patient. VSS. Tolerated infusion. Prefers using patient portal  upcoming appointment schedule. Escorted to the front lobby in no distress for discharge to home.

## 2024-10-01 ENCOUNTER — OFFICE VISIT (OUTPATIENT)
Dept: HEMATOLOGY/ONCOLOGY | Facility: CLINIC | Age: 79
End: 2024-10-01
Payer: MEDICARE

## 2024-10-01 VITALS
OXYGEN SATURATION: 94 % | WEIGHT: 175.5 LBS | TEMPERATURE: 97 F | HEIGHT: 63 IN | SYSTOLIC BLOOD PRESSURE: 143 MMHG | DIASTOLIC BLOOD PRESSURE: 63 MMHG | RESPIRATION RATE: 16 BRPM | BODY MASS INDEX: 31.1 KG/M2 | HEART RATE: 82 BPM

## 2024-10-01 DIAGNOSIS — C34.91 ADENOCARCINOMA OF RIGHT LUNG: Primary | ICD-10-CM

## 2024-10-01 PROCEDURE — 99999 PR PBB SHADOW E&M-EST. PATIENT-LVL IV: CPT | Mod: PBBFAC,HCNC,, | Performed by: INTERNAL MEDICINE

## 2024-10-01 RX ORDER — CLONIDINE HYDROCHLORIDE 0.1 MG/1
TABLET ORAL
COMMUNITY
Start: 2024-08-28

## 2024-10-01 NOTE — PROGRESS NOTES
"Subjective     Patient ID: Nicole Medina is a 79 y.o. female.    Chief Complaint: Follow-up  79 y.o. female with Stage IA2 (cT1b cN0 M0) DOC NSCLC (adeno) diagnosed on biopsy 12/20/19. History significant for sarcoidosis. CT Chest 1/14/20 demonstrated 1.7 cm DOC primary and multiple other stable sub-centimeter nodules with mild hilar and mediastinal adenopathy. She underwent attempted VATS resection by Dr. Mccall 1/16/20, which was aborted due to poor toleration of single lung ventilation. She completed definitive SBRT 55 Gy in 5 fx on 2/13/20.      CT Chest 2/16/24 concerning for enlarging adenopathy. Bronch w/ EBUS by Dr. Garcia 3/26/24. 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    Her case was reviewed at the Thoracic Multidisciplinary Conference on 04/17/2024 with recommendation for consideration of systemic therapy options. Since there is not a clear target for radiation on her imaging (ILD changes indistinguishable from lepidic adeno), she does not have a local therapy option at this time. I discussed with the patient and her  that lepidic adenocarcinoma tends to be very slow growing and often present for years, but there is always a risk that it can develop metastatic potential if untreated.      [facility]us NGS tissue with no targets, PDL1 is less than 1%        CT scans of chest abdomen pelvis  from 04/29/2024 reveal "[id number] increased size of a left apical pulmonary mass status post radiation. Grossly stable background of interstitial lung disease in this patient with reported history of sarcoidosis, which limits evaluation for lepidic spread of disease in this patient with biopsy-proven left upper lobe adenocarcinoma. Unchanged bilateral pulmonary micro-nodules. Stable mediastinal and hilar lymphadenopathy.  Index lymph nodes as above. 2.1 cm left adrenal " "nodule, unchanged since November 2021. Large extrarenal pelvis on the right with hydronephrosis felt less likely given stability dating back to November 2021.     MRI brain from 05/03/2024  reveals "No evidence of intracranial metastatic disease. 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     She started Carboplatin and Alimta on 5/9/2024.     CT chest from 6/17/2024 (after 2 cycles of Carboplatin and Alimta) revealed "Stable appearance of the lungs with known background of interstitial lung disease/pulmonary sarcoidosis.  Unchanged left apical soft tissue mass status post radiation. Stable mediastinal lymph nodes. Stable left adrenal nodule"           She received cycle 4 of Carboplatin and Alimta until 7/11/2024      CT scans on 7/26/2024 "Pulmonary mass at the left lung apex and mediastinal lymphadenopathy stable given inter study variance since 04/29/2024.  2. Prominent renal pelvis on the right slightly more so than noticed on the prior, this most likely relates to an extrarenal pelvis and it is always been slightly prominent.  Mild hydronephrosis is difficult to exclude.  3. Distended bladder, please correlate for postobstructive change or neurogenic bladder.  This may be physiologic.  4. Stable adrenal mass since 08/26/2022 suggestive of an adrenal adenoma  5. Left-sided thyroid nodule.  Thyroid ultrasound of use for better evaluation and follow-up if necessary this is likely stable since at least 02/24/2023 by CT"    MDT review on 7/30/2024: short term follow up in 8 weeks with Chest CT.       [facility] which reveals "stable appearance of the patient's interstitial lung disease/pulmonary sarcoidosis compared to what was seen on prior exam.  The left apical mass measures 3.5 by 1.5 cm on today's exam but appears unchanged from prior exam"    She feels well and denies any new issues.         Review of Systems   Constitutional:  Negative for appetite " change, fatigue and unexpected weight change.   HENT:  Negative for mouth sores.    Eyes:  Negative for visual disturbance.   Respiratory:  Negative for cough and shortness of breath.    Cardiovascular:  Negative for chest pain.   Gastrointestinal:  Negative for abdominal pain and diarrhea.   Genitourinary:  Negative for frequency.   Musculoskeletal:  Negative for back pain.   Integumentary:  Negative for rash.   Neurological:  Negative for headaches.   Hematological:  Negative for adenopathy.   Psychiatric/Behavioral:  The patient is not nervous/anxious.    All other systems reviewed and are negative.         Objective     Physical Exam      LABS:  WBC   Date Value Ref Range Status   08/30/2024 7.57 3.90 - 12.70 K/uL Final     Hemoglobin   Date Value Ref Range Status   08/30/2024 10.7 (L) 12.0 - 16.0 g/dL Final     POC Hematocrit   Date Value Ref Range Status   04/01/2024 36.0 (L) 37.0 - 48.5 %PCV Final     Hematocrit   Date Value Ref Range Status   08/30/2024 33.2 (L) 37.0 - 48.5 % Final     Platelets   Date Value Ref Range Status   08/30/2024 142 (L) 150 - 450 K/uL Final     Gran # (ANC)   Date Value Ref Range Status   08/30/2024 4.3 1.8 - 7.7 K/uL Final     Gran %   Date Value Ref Range Status   08/30/2024 56.3 38.0 - 73.0 % Final       Chemistry        Component Value Date/Time     07/10/2024 1126    K 4.8 07/10/2024 1126     07/10/2024 1126    CO2 23 07/10/2024 1126    BUN 15 07/10/2024 1126    CREATININE 0.8 07/10/2024 1126     07/10/2024 1126        Component Value Date/Time    CALCIUM 9.5 07/10/2024 1126    ALKPHOS 77 07/10/2024 1126    AST 23 07/10/2024 1126    ALT 16 07/10/2024 1126    BILITOT 0.3 07/10/2024 1126    ESTGFRAFRICA >60 05/10/2022 0640    EGFRNONAA >60 05/10/2022 0640             Assessment and Plan     1. Adenocarcinoma of right lung  -     CBC w/ DIFF; Future; Expected date: 10/01/2024  -     CMP; Future; Expected date: 10/01/2024  -     CT Chest Abdomen Pelvis With IV  Contrast (XPD) Routine Oral Contrast; Future; Expected date: 10/01/2024        Route Chart for Scheduling    Med Onc Chart Routing      Follow up with physician . In the second week of January 2024, schedule CBC, CMP, CT chest, abdomen/pelvis and see me   Follow up with STANISLAW    Infusion scheduling note    Injection scheduling note    Labs    Imaging    Pharmacy appointment    Other referrals              Mrs. Medina is doing well clinically, her CT scans are stable. Will continue to monitor off of treatment and will return in 3 months with labs and CT scans.      Above care plan was discussed with patient and accompanying  and all questions were addressed to their satisfaction

## 2024-10-02 ENCOUNTER — DOCUMENTATION ONLY (OUTPATIENT)
Dept: HEMATOLOGY/ONCOLOGY | Facility: CLINIC | Age: 79
End: 2024-10-02
Payer: MEDICARE

## 2024-10-02 NOTE — PROGRESS NOTES
Chart reviewed for oncology navigational needs. Patient remains on surveillance. Future imaging and follow up already scheduled.  No needs noted at this time. Will continue to monitor.

## 2024-10-03 ENCOUNTER — INFUSION (OUTPATIENT)
Dept: INFUSION THERAPY | Facility: HOSPITAL | Age: 79
End: 2024-10-03
Attending: INTERNAL MEDICINE
Payer: MEDICARE

## 2024-10-03 VITALS
WEIGHT: 174.63 LBS | SYSTOLIC BLOOD PRESSURE: 137 MMHG | RESPIRATION RATE: 16 BRPM | BODY MASS INDEX: 30.94 KG/M2 | DIASTOLIC BLOOD PRESSURE: 63 MMHG | HEIGHT: 63 IN | HEART RATE: 75 BPM | TEMPERATURE: 98 F | OXYGEN SATURATION: 97 %

## 2024-10-03 DIAGNOSIS — D50.0 IRON DEFICIENCY ANEMIA DUE TO CHRONIC BLOOD LOSS: Primary | ICD-10-CM

## 2024-10-03 PROCEDURE — 96365 THER/PROPH/DIAG IV INF INIT: CPT | Mod: HCNC,PN

## 2024-10-03 PROCEDURE — 63600175 PHARM REV CODE 636 W HCPCS: Mod: HCNC,PN | Performed by: INTERNAL MEDICINE

## 2024-10-03 PROCEDURE — 25000003 PHARM REV CODE 250: Mod: HCNC,PN | Performed by: INTERNAL MEDICINE

## 2024-10-03 RX ORDER — HEPARIN 100 UNIT/ML
500 SYRINGE INTRAVENOUS
OUTPATIENT
Start: 2024-10-10

## 2024-10-03 RX ORDER — DIPHENHYDRAMINE HYDROCHLORIDE 50 MG/ML
50 INJECTION INTRAMUSCULAR; INTRAVENOUS ONCE AS NEEDED
OUTPATIENT
Start: 2024-10-10

## 2024-10-03 RX ORDER — SODIUM CHLORIDE 0.9 % (FLUSH) 0.9 %
10 SYRINGE (ML) INJECTION
Status: DISCONTINUED | OUTPATIENT
Start: 2024-10-03 | End: 2024-10-03 | Stop reason: HOSPADM

## 2024-10-03 RX ORDER — EPINEPHRINE 0.3 MG/.3ML
0.3 INJECTION SUBCUTANEOUS ONCE AS NEEDED
OUTPATIENT
Start: 2024-10-10

## 2024-10-03 RX ORDER — SODIUM CHLORIDE 0.9 % (FLUSH) 0.9 %
10 SYRINGE (ML) INJECTION
OUTPATIENT
Start: 2024-10-10

## 2024-10-03 RX ADMIN — SODIUM CHLORIDE 125 MG: 9 INJECTION, SOLUTION INTRAVENOUS at 01:10

## 2024-10-03 RX ADMIN — SODIUM CHLORIDE: 9 INJECTION, SOLUTION INTRAVENOUS at 01:10

## 2024-10-03 NOTE — PLAN OF CARE
Problem: Adult Inpatient Plan of Care  Goal: Patient-Specific Goal (Individualized)  Outcome: Progressing  Flowsheets (Taken 10/3/2024 1515)  Individualized Care Needs: Recliner  Anxieties, Fears or Concerns: None     Problem: Fatigue  Goal: Improved Activity Tolerance  Intervention: Promote Improved Energy  Flowsheets (Taken 10/3/2024 1515)  Fatigue Management:   activity schedule adjusted   paced activity encouraged  Sleep/Rest Enhancement: relaxation techniques promoted  Activity Management: Ambulated -L4  Environmental Support:   calm environment promoted   environmental consistency promoted     Problem: Adult Inpatient Plan of Care  Goal: Plan of Care Review  Outcome: Progressing  Flowsheets (Taken 10/3/2024 1515)  Plan of Care Reviewed With: patient  Tolerated treatment with no known distress.  Ambulated from infusion center with steady gait.

## 2024-10-10 ENCOUNTER — INFUSION (OUTPATIENT)
Dept: INFUSION THERAPY | Facility: HOSPITAL | Age: 79
End: 2024-10-10
Attending: INTERNAL MEDICINE
Payer: MEDICARE

## 2024-10-10 VITALS
TEMPERATURE: 98 F | DIASTOLIC BLOOD PRESSURE: 71 MMHG | OXYGEN SATURATION: 97 % | RESPIRATION RATE: 16 BRPM | WEIGHT: 176.81 LBS | BODY MASS INDEX: 31.33 KG/M2 | SYSTOLIC BLOOD PRESSURE: 132 MMHG | HEART RATE: 74 BPM | HEIGHT: 63 IN

## 2024-10-10 DIAGNOSIS — D50.0 IRON DEFICIENCY ANEMIA DUE TO CHRONIC BLOOD LOSS: Primary | ICD-10-CM

## 2024-10-10 PROCEDURE — 63600175 PHARM REV CODE 636 W HCPCS: Mod: HCNC,PN | Performed by: INTERNAL MEDICINE

## 2024-10-10 PROCEDURE — 25000003 PHARM REV CODE 250: Mod: HCNC,PN | Performed by: INTERNAL MEDICINE

## 2024-10-10 PROCEDURE — 96365 THER/PROPH/DIAG IV INF INIT: CPT | Mod: HCNC,PN

## 2024-10-10 RX ORDER — SODIUM CHLORIDE 0.9 % (FLUSH) 0.9 %
10 SYRINGE (ML) INJECTION
OUTPATIENT
Start: 2024-10-17

## 2024-10-10 RX ORDER — SODIUM CHLORIDE 0.9 % (FLUSH) 0.9 %
10 SYRINGE (ML) INJECTION
Status: DISCONTINUED | OUTPATIENT
Start: 2024-10-10 | End: 2024-10-10 | Stop reason: HOSPADM

## 2024-10-10 RX ORDER — EPINEPHRINE 0.3 MG/.3ML
0.3 INJECTION SUBCUTANEOUS ONCE AS NEEDED
Status: DISCONTINUED | OUTPATIENT
Start: 2024-10-10 | End: 2024-10-10 | Stop reason: HOSPADM

## 2024-10-10 RX ORDER — DIPHENHYDRAMINE HYDROCHLORIDE 50 MG/ML
50 INJECTION INTRAMUSCULAR; INTRAVENOUS ONCE AS NEEDED
Status: DISCONTINUED | OUTPATIENT
Start: 2024-10-10 | End: 2024-10-10 | Stop reason: HOSPADM

## 2024-10-10 RX ORDER — EPINEPHRINE 0.3 MG/.3ML
0.3 INJECTION SUBCUTANEOUS ONCE AS NEEDED
OUTPATIENT
Start: 2024-10-17

## 2024-10-10 RX ORDER — DIPHENHYDRAMINE HYDROCHLORIDE 50 MG/ML
50 INJECTION INTRAMUSCULAR; INTRAVENOUS ONCE AS NEEDED
OUTPATIENT
Start: 2024-10-17

## 2024-10-10 RX ORDER — HEPARIN 100 UNIT/ML
500 SYRINGE INTRAVENOUS
OUTPATIENT
Start: 2024-10-17

## 2024-10-10 RX ADMIN — SODIUM CHLORIDE 125 MG: 9 INJECTION, SOLUTION INTRAVENOUS at 02:10

## 2024-10-10 NOTE — PLAN OF CARE
Problem: Adult Inpatient Plan of Care  Goal: Plan of Care Review  Outcome: Progressing  Flowsheets (Taken 10/10/2024 1400)  Plan of Care Reviewed With:   patient   spouse  Goal: Patient-Specific Goal (Individualized)  Outcome: Progressing  Flowsheets (Taken 10/10/2024 1400)  Individualized Care Needs: Recliner, warm blanket,  at chairside, education, conversation  Anxieties, Fears or Concerns: JEB yesterday, they blew all my veins.  Patient/Family-Specific Goals (Include Timeframe): Free of S/S of reaction with infusion.     Problem: Fatigue  Goal: Improved Activity Tolerance  Outcome: Progressing  Intervention: Promote Improved Energy  Flowsheets (Taken 10/10/2024 1400)  Fatigue Management:   frequent rest breaks encouraged   paced activity encouraged  Sleep/Rest Enhancement: regular sleep/rest pattern promoted  Activity Management: Ambulated -L4  Environmental Support: rest periods encouraged    Patient to Infusion for Ferrlecit, accompanied by her . Treatment plan reviewed with patient. VSS. Tolerated infusion. Provided with copy of upcoming appointment schedule. Escorted to the front lobby in no distress for discharge to home.

## 2024-10-17 ENCOUNTER — INFUSION (OUTPATIENT)
Dept: INFUSION THERAPY | Facility: HOSPITAL | Age: 79
End: 2024-10-17
Attending: INTERNAL MEDICINE
Payer: MEDICARE

## 2024-10-17 VITALS
SYSTOLIC BLOOD PRESSURE: 130 MMHG | HEIGHT: 63 IN | WEIGHT: 173.75 LBS | BODY MASS INDEX: 30.79 KG/M2 | TEMPERATURE: 98 F | OXYGEN SATURATION: 95 % | DIASTOLIC BLOOD PRESSURE: 76 MMHG | HEART RATE: 84 BPM | RESPIRATION RATE: 16 BRPM

## 2024-10-17 DIAGNOSIS — D50.0 IRON DEFICIENCY ANEMIA DUE TO CHRONIC BLOOD LOSS: Primary | ICD-10-CM

## 2024-10-17 PROCEDURE — 25000003 PHARM REV CODE 250: Mod: HCNC,PN | Performed by: INTERNAL MEDICINE

## 2024-10-17 PROCEDURE — 96365 THER/PROPH/DIAG IV INF INIT: CPT | Mod: HCNC,PN

## 2024-10-17 PROCEDURE — 63600175 PHARM REV CODE 636 W HCPCS: Mod: HCNC,PN | Performed by: INTERNAL MEDICINE

## 2024-10-17 RX ORDER — DIPHENHYDRAMINE HYDROCHLORIDE 50 MG/ML
50 INJECTION INTRAMUSCULAR; INTRAVENOUS ONCE AS NEEDED
OUTPATIENT
Start: 2024-10-24

## 2024-10-17 RX ORDER — SODIUM CHLORIDE 0.9 % (FLUSH) 0.9 %
10 SYRINGE (ML) INJECTION
Status: DISCONTINUED | OUTPATIENT
Start: 2024-10-17 | End: 2024-10-17 | Stop reason: HOSPADM

## 2024-10-17 RX ORDER — HEPARIN 100 UNIT/ML
500 SYRINGE INTRAVENOUS
OUTPATIENT
Start: 2024-10-24

## 2024-10-17 RX ORDER — EPINEPHRINE 0.3 MG/.3ML
0.3 INJECTION SUBCUTANEOUS ONCE AS NEEDED
OUTPATIENT
Start: 2024-10-24

## 2024-10-17 RX ORDER — SODIUM CHLORIDE 0.9 % (FLUSH) 0.9 %
10 SYRINGE (ML) INJECTION
OUTPATIENT
Start: 2024-10-24

## 2024-10-17 RX ADMIN — SODIUM CHLORIDE 125 MG: 9 INJECTION, SOLUTION INTRAVENOUS at 01:10

## 2024-10-17 NOTE — PLAN OF CARE
Problem: Adult Inpatient Plan of Care  Goal: Patient-Specific Goal (Individualized)  Outcome: Progressing  Flowsheets (Taken 10/17/2024 1516)  Individualized Care Needs: Recliner, blanket  Anxieties, Fears or Concerns: None     Problem: Fatigue  Goal: Improved Activity Tolerance  Intervention: Promote Improved Energy  Flowsheets (Taken 10/17/2024 1516)  Fatigue Management:   frequent rest breaks encouraged   activity schedule adjusted  Sleep/Rest Enhancement: regular sleep/rest pattern promoted  Activity Management:   Ambulated -L4   Ambulated in turcios - L4  Environmental Support:   calm environment promoted   environmental consistency promoted     Problem: Adult Inpatient Plan of Care  Goal: Plan of Care Review  Outcome: Progressing  Flowsheets (Taken 10/17/2024 1516)  Plan of Care Reviewed With: patient  Tolerated treatment with no known distress.  Ambulated from infusion center with steady gait.

## 2024-10-24 ENCOUNTER — INFUSION (OUTPATIENT)
Dept: INFUSION THERAPY | Facility: HOSPITAL | Age: 79
End: 2024-10-24
Attending: INTERNAL MEDICINE
Payer: MEDICARE

## 2024-10-24 VITALS
WEIGHT: 173.31 LBS | DIASTOLIC BLOOD PRESSURE: 68 MMHG | TEMPERATURE: 98 F | HEIGHT: 63 IN | HEART RATE: 83 BPM | RESPIRATION RATE: 16 BRPM | BODY MASS INDEX: 30.71 KG/M2 | SYSTOLIC BLOOD PRESSURE: 114 MMHG

## 2024-10-24 DIAGNOSIS — D50.0 IRON DEFICIENCY ANEMIA DUE TO CHRONIC BLOOD LOSS: Primary | ICD-10-CM

## 2024-10-24 PROCEDURE — 63600175 PHARM REV CODE 636 W HCPCS: Mod: HCNC,PN | Performed by: INTERNAL MEDICINE

## 2024-10-24 PROCEDURE — 96365 THER/PROPH/DIAG IV INF INIT: CPT | Mod: HCNC,PN

## 2024-10-24 PROCEDURE — 25000003 PHARM REV CODE 250: Mod: HCNC,PN | Performed by: INTERNAL MEDICINE

## 2024-10-24 RX ORDER — SODIUM CHLORIDE 0.9 % (FLUSH) 0.9 %
10 SYRINGE (ML) INJECTION
Status: DISCONTINUED | OUTPATIENT
Start: 2024-10-24 | End: 2024-10-24 | Stop reason: HOSPADM

## 2024-10-24 RX ORDER — HEPARIN 100 UNIT/ML
500 SYRINGE INTRAVENOUS
OUTPATIENT
Start: 2024-10-31

## 2024-10-24 RX ORDER — DIPHENHYDRAMINE HYDROCHLORIDE 50 MG/ML
50 INJECTION INTRAMUSCULAR; INTRAVENOUS ONCE AS NEEDED
OUTPATIENT
Start: 2024-10-31

## 2024-10-24 RX ORDER — EPINEPHRINE 0.3 MG/.3ML
0.3 INJECTION SUBCUTANEOUS ONCE AS NEEDED
OUTPATIENT
Start: 2024-10-31

## 2024-10-24 RX ORDER — SODIUM CHLORIDE 0.9 % (FLUSH) 0.9 %
10 SYRINGE (ML) INJECTION
OUTPATIENT
Start: 2024-10-31

## 2024-10-24 RX ADMIN — SODIUM CHLORIDE 125 MG: 9 INJECTION, SOLUTION INTRAVENOUS at 01:10

## 2024-10-24 NOTE — PLAN OF CARE
Problem: Adult Inpatient Plan of Care  Goal: Plan of Care Review  Outcome: Progressing  Flowsheets (Taken 10/24/2024 1400)  Plan of Care Reviewed With:   patient   spouse  Goal: Patient-Specific Goal (Individualized)  Outcome: Progressing  Flowsheets (Taken 10/24/2024 1400)  Individualized Care Needs: Recliner, warm blanket,  at chairside, education, conversation, tv, dimmed lights  Anxieties, Fears or Concerns: None  Patient/Family-Specific Goals (Include Timeframe): Free of S/S of reaction with infusion.     Problem: Fatigue  Goal: Improved Activity Tolerance  Outcome: Progressing  Intervention: Promote Improved Energy  Flowsheets (Taken 10/24/2024 1400)  Fatigue Management:   frequent rest breaks encouraged   paced activity encouraged  Sleep/Rest Enhancement: regular sleep/rest pattern promoted  Activity Management: Ambulated -L4  Environmental Support: rest periods encouraged    Patient to Infusion for Ferrlecit, accompanied by her . Treatment plan reviewed with patient. VSS. Tolerated infusion. Prefers using patient portal for upcoming appointment schedule. Escorted to the front lobby in no distress for discharge to home.

## 2024-10-31 ENCOUNTER — INFUSION (OUTPATIENT)
Dept: INFUSION THERAPY | Facility: HOSPITAL | Age: 79
End: 2024-10-31
Attending: INTERNAL MEDICINE
Payer: MEDICARE

## 2024-10-31 VITALS
DIASTOLIC BLOOD PRESSURE: 68 MMHG | TEMPERATURE: 98 F | SYSTOLIC BLOOD PRESSURE: 121 MMHG | HEIGHT: 63 IN | HEART RATE: 81 BPM | BODY MASS INDEX: 30.39 KG/M2 | WEIGHT: 171.5 LBS | RESPIRATION RATE: 16 BRPM

## 2024-10-31 DIAGNOSIS — D50.0 IRON DEFICIENCY ANEMIA DUE TO CHRONIC BLOOD LOSS: Primary | ICD-10-CM

## 2024-10-31 PROCEDURE — 25000003 PHARM REV CODE 250: Mod: HCNC,PN | Performed by: INTERNAL MEDICINE

## 2024-10-31 PROCEDURE — 63600175 PHARM REV CODE 636 W HCPCS: Mod: HCNC,PN | Performed by: INTERNAL MEDICINE

## 2024-10-31 RX ORDER — DIPHENHYDRAMINE HYDROCHLORIDE 50 MG/ML
50 INJECTION INTRAMUSCULAR; INTRAVENOUS ONCE AS NEEDED
Status: DISCONTINUED | OUTPATIENT
Start: 2024-10-31 | End: 2024-10-31 | Stop reason: HOSPADM

## 2024-10-31 RX ORDER — SODIUM CHLORIDE 0.9 % (FLUSH) 0.9 %
10 SYRINGE (ML) INJECTION
Status: DISCONTINUED | OUTPATIENT
Start: 2024-10-31 | End: 2024-10-31 | Stop reason: HOSPADM

## 2024-10-31 RX ORDER — EPINEPHRINE 0.3 MG/.3ML
0.3 INJECTION SUBCUTANEOUS ONCE AS NEEDED
Status: DISCONTINUED | OUTPATIENT
Start: 2024-10-31 | End: 2024-10-31 | Stop reason: HOSPADM

## 2024-10-31 RX ORDER — HEPARIN 100 UNIT/ML
500 SYRINGE INTRAVENOUS
OUTPATIENT
Start: 2024-11-07

## 2024-10-31 RX ORDER — SODIUM CHLORIDE 0.9 % (FLUSH) 0.9 %
10 SYRINGE (ML) INJECTION
OUTPATIENT
Start: 2024-11-07

## 2024-10-31 RX ORDER — DIPHENHYDRAMINE HYDROCHLORIDE 50 MG/ML
50 INJECTION INTRAMUSCULAR; INTRAVENOUS ONCE AS NEEDED
OUTPATIENT
Start: 2024-11-07

## 2024-10-31 RX ORDER — EPINEPHRINE 0.3 MG/.3ML
0.3 INJECTION SUBCUTANEOUS ONCE AS NEEDED
OUTPATIENT
Start: 2024-11-07

## 2024-10-31 RX ADMIN — SODIUM CHLORIDE 125 MG: 9 INJECTION, SOLUTION INTRAVENOUS at 01:10

## 2024-11-07 ENCOUNTER — INFUSION (OUTPATIENT)
Dept: INFUSION THERAPY | Facility: HOSPITAL | Age: 79
End: 2024-11-07
Attending: INTERNAL MEDICINE
Payer: MEDICARE

## 2024-11-07 VITALS
DIASTOLIC BLOOD PRESSURE: 72 MMHG | HEIGHT: 63 IN | TEMPERATURE: 98 F | WEIGHT: 171.31 LBS | HEART RATE: 87 BPM | RESPIRATION RATE: 16 BRPM | BODY MASS INDEX: 30.35 KG/M2 | SYSTOLIC BLOOD PRESSURE: 126 MMHG | OXYGEN SATURATION: 95 %

## 2024-11-07 DIAGNOSIS — D50.0 IRON DEFICIENCY ANEMIA DUE TO CHRONIC BLOOD LOSS: Primary | ICD-10-CM

## 2024-11-07 PROCEDURE — 25000003 PHARM REV CODE 250: Mod: HCNC,PN | Performed by: INTERNAL MEDICINE

## 2024-11-07 PROCEDURE — 96365 THER/PROPH/DIAG IV INF INIT: CPT | Mod: HCNC,PN

## 2024-11-07 PROCEDURE — 63600175 PHARM REV CODE 636 W HCPCS: Mod: HCNC,PN | Performed by: INTERNAL MEDICINE

## 2024-11-07 RX ORDER — DIPHENHYDRAMINE HYDROCHLORIDE 50 MG/ML
50 INJECTION INTRAMUSCULAR; INTRAVENOUS ONCE AS NEEDED
Status: DISCONTINUED | OUTPATIENT
Start: 2024-11-07 | End: 2024-11-07 | Stop reason: HOSPADM

## 2024-11-07 RX ORDER — SODIUM CHLORIDE 0.9 % (FLUSH) 0.9 %
10 SYRINGE (ML) INJECTION
OUTPATIENT
Start: 2024-11-14

## 2024-11-07 RX ORDER — EPINEPHRINE 0.3 MG/.3ML
0.3 INJECTION SUBCUTANEOUS ONCE AS NEEDED
Status: DISCONTINUED | OUTPATIENT
Start: 2024-11-07 | End: 2024-11-07 | Stop reason: HOSPADM

## 2024-11-07 RX ORDER — DIPHENHYDRAMINE HYDROCHLORIDE 50 MG/ML
50 INJECTION INTRAMUSCULAR; INTRAVENOUS ONCE AS NEEDED
OUTPATIENT
Start: 2024-11-14

## 2024-11-07 RX ORDER — SODIUM CHLORIDE 0.9 % (FLUSH) 0.9 %
10 SYRINGE (ML) INJECTION
Status: DISCONTINUED | OUTPATIENT
Start: 2024-11-07 | End: 2024-11-07 | Stop reason: HOSPADM

## 2024-11-07 RX ORDER — EPINEPHRINE 0.3 MG/.3ML
0.3 INJECTION SUBCUTANEOUS ONCE AS NEEDED
OUTPATIENT
Start: 2024-11-14

## 2024-11-07 RX ORDER — HEPARIN 100 UNIT/ML
500 SYRINGE INTRAVENOUS
OUTPATIENT
Start: 2024-11-14

## 2024-11-07 RX ADMIN — SODIUM CHLORIDE 125 MG: 9 INJECTION, SOLUTION INTRAVENOUS at 01:11

## 2024-11-07 RX ADMIN — SODIUM CHLORIDE: 9 INJECTION, SOLUTION INTRAVENOUS at 01:11

## 2024-11-07 NOTE — PLAN OF CARE
Problem: Adult Inpatient Plan of Care  Goal: Plan of Care Review  Outcome: Progressing  Flowsheets (Taken 11/7/2024 1345)  Plan of Care Reviewed With:   patient   spouse  Goal: Patient-Specific Goal (Individualized)  Outcome: Progressing  Flowsheets (Taken 11/7/2024 1345)  Individualized Care Needs: Recliner, blanket, pillow,  at chairside, tv, conversation  Anxieties, Fears or Concerns: IV stick  Patient/Family-Specific Goals (Include Timeframe): Free of S/S of reaction with infusion     Problem: Fatigue  Goal: Improved Activity Tolerance  Outcome: Progressing  Intervention: Promote Improved Energy  Flowsheets (Taken 11/7/2024 1345)  Fatigue Management:   frequent rest breaks encouraged   paced activity encouraged  Sleep/Rest Enhancement: regular sleep/rest pattern promoted  Activity Management: Ambulated -L4  Environmental Support: rest periods encouraged    Patient to Infusion for Ferrlecit, accompanied by her . Treatment plan reviewed with patient. VSS. Tolerated infusion. Prefers using patient portal for upcoming appointment schedule. Escorted to the front lobby in no distress for discharge to home.

## 2024-11-14 ENCOUNTER — INFUSION (OUTPATIENT)
Dept: INFUSION THERAPY | Facility: HOSPITAL | Age: 79
End: 2024-11-14
Attending: NURSE PRACTITIONER
Payer: MEDICARE

## 2024-11-14 ENCOUNTER — DOCUMENTATION ONLY (OUTPATIENT)
Dept: INFUSION THERAPY | Facility: HOSPITAL | Age: 79
End: 2024-11-14
Payer: MEDICARE

## 2024-11-14 VITALS
BODY MASS INDEX: 30.31 KG/M2 | HEART RATE: 78 BPM | DIASTOLIC BLOOD PRESSURE: 66 MMHG | HEIGHT: 63 IN | WEIGHT: 171.06 LBS | TEMPERATURE: 99 F | SYSTOLIC BLOOD PRESSURE: 119 MMHG | RESPIRATION RATE: 16 BRPM

## 2024-11-14 DIAGNOSIS — D50.0 IRON DEFICIENCY ANEMIA DUE TO CHRONIC BLOOD LOSS: Primary | ICD-10-CM

## 2024-11-14 PROCEDURE — 63600175 PHARM REV CODE 636 W HCPCS: Mod: HCNC,PN | Performed by: INTERNAL MEDICINE

## 2024-11-14 PROCEDURE — 25000003 PHARM REV CODE 250: Mod: HCNC,PN | Performed by: INTERNAL MEDICINE

## 2024-11-14 PROCEDURE — 96365 THER/PROPH/DIAG IV INF INIT: CPT | Mod: HCNC,PN

## 2024-11-14 RX ORDER — EPINEPHRINE 0.3 MG/.3ML
0.3 INJECTION SUBCUTANEOUS ONCE AS NEEDED
OUTPATIENT
Start: 2024-11-21

## 2024-11-14 RX ORDER — DIPHENHYDRAMINE HYDROCHLORIDE 50 MG/ML
50 INJECTION INTRAMUSCULAR; INTRAVENOUS ONCE AS NEEDED
OUTPATIENT
Start: 2024-11-21

## 2024-11-14 RX ORDER — SODIUM CHLORIDE 0.9 % (FLUSH) 0.9 %
10 SYRINGE (ML) INJECTION
Status: CANCELLED | OUTPATIENT
Start: 2024-11-21

## 2024-11-14 RX ORDER — SODIUM CHLORIDE 0.9 % (FLUSH) 0.9 %
10 SYRINGE (ML) INJECTION
Status: DISCONTINUED | OUTPATIENT
Start: 2024-11-14 | End: 2024-11-14 | Stop reason: HOSPADM

## 2024-11-14 RX ORDER — HEPARIN 100 UNIT/ML
500 SYRINGE INTRAVENOUS
OUTPATIENT
Start: 2024-11-21

## 2024-11-14 RX ADMIN — SODIUM CHLORIDE 125 MG: 9 INJECTION, SOLUTION INTRAVENOUS at 02:11

## 2024-11-14 RX ADMIN — SODIUM CHLORIDE: 9 INJECTION, SOLUTION INTRAVENOUS at 02:11

## 2024-11-14 NOTE — PLAN OF CARE
Problem: Adult Inpatient Plan of Care  Goal: Plan of Care Review  Outcome: Progressing  Flowsheets (Taken 11/14/2024 1400)  Plan of Care Reviewed With:   patient   spouse  Goal: Patient-Specific Goal (Individualized)  Outcome: Progressing  Flowsheets (Taken 11/14/2024 1400)  Individualized Care Needs: Recliner, blanket, pillows,  at chairside, tv, conversation  Anxieties, Fears or Concerns: IV stick  Patient/Family-Specific Goals (Include Timeframe): Free of S/S of reaction with infusion.     Problem: Fatigue  Goal: Improved Activity Tolerance  Outcome: Progressing  Intervention: Promote Improved Energy  Flowsheets (Taken 11/14/2024 1400)  Fatigue Management:   frequent rest breaks encouraged   paced activity encouraged  Sleep/Rest Enhancement: regular sleep/rest pattern promoted  Activity Management: Ambulated -L4  Environmental Support: rest periods encouraged    Patient to Infusion for Ferrlecit, accompanied by her . Treatment plan reviewed with patient. VSS. Tolerated infusion. Provided with copy of upcoming appointment schedule. Escorted to the front lobby in no distress for discharge to home.

## 2024-11-14 NOTE — PROGRESS NOTES
Oncology Nutrition       Weight Check   Chart reviewed. Weight check completed on pt.     CBW:171#  Weight at initiation of tx:176# (5/7/2024)    Wt Readings from Last 10 Encounters:   11/14/24 77.6 kg (171 lb 1.2 oz)   11/07/24 77.7 kg (171 lb 4.8 oz)   10/31/24 77.8 kg (171 lb 8.3 oz)   10/24/24 78.6 kg (173 lb 4.5 oz)   10/17/24 78.8 kg (173 lb 11.6 oz)   10/10/24 80.2 kg (176 lb 12.9 oz)   10/07/24 78.9 kg (174 lb)   10/03/24 79.2 kg (174 lb 9.7 oz)   10/01/24 79.6 kg (175 lb 7.8 oz)   09/26/24 79.8 kg (175 lb 14.8 oz)          RD plan of care: Weight is currently 171#. No significant change at this time. Per nursing nutrition risk report, pt denies any nutritional concerns or challenges at this time. RD to continue to monitor; no nutritional interventions are needed at this time.     Pattie Otero, MS, RD, LDN  11/14/2024  2:10 PM

## 2024-12-17 ENCOUNTER — LAB VISIT (OUTPATIENT)
Dept: LAB | Facility: HOSPITAL | Age: 79
End: 2024-12-17
Attending: INTERNAL MEDICINE
Payer: MEDICARE

## 2024-12-17 DIAGNOSIS — D50.0 IRON DEFICIENCY ANEMIA DUE TO CHRONIC BLOOD LOSS: ICD-10-CM

## 2024-12-17 LAB
BASOPHILS # BLD AUTO: 0.05 K/UL (ref 0–0.2)
BASOPHILS NFR BLD: 0.8 % (ref 0–1.9)
DIFFERENTIAL METHOD BLD: ABNORMAL
EOSINOPHIL # BLD AUTO: 0.6 K/UL (ref 0–0.5)
EOSINOPHIL NFR BLD: 8.6 % (ref 0–8)
ERYTHROCYTE [DISTWIDTH] IN BLOOD BY AUTOMATED COUNT: 15.9 % (ref 11.5–14.5)
FERRITIN SERPL-MCNC: 699 NG/ML (ref 20–300)
HCT VFR BLD AUTO: 43.6 % (ref 37–48.5)
HGB BLD-MCNC: 13.7 G/DL (ref 12–16)
IMM GRANULOCYTES # BLD AUTO: 0.01 K/UL (ref 0–0.04)
IMM GRANULOCYTES NFR BLD AUTO: 0.2 % (ref 0–0.5)
IRON SERPL-MCNC: 92 UG/DL (ref 30–160)
LYMPHOCYTES # BLD AUTO: 1.9 K/UL (ref 1–4.8)
LYMPHOCYTES NFR BLD: 29.4 % (ref 18–48)
MCH RBC QN AUTO: 29.8 PG (ref 27–31)
MCHC RBC AUTO-ENTMCNC: 31.4 G/DL (ref 32–36)
MCV RBC AUTO: 95 FL (ref 82–98)
MONOCYTES # BLD AUTO: 0.6 K/UL (ref 0.3–1)
MONOCYTES NFR BLD: 9 % (ref 4–15)
NEUTROPHILS # BLD AUTO: 3.4 K/UL (ref 1.8–7.7)
NEUTROPHILS NFR BLD: 52 % (ref 38–73)
NRBC BLD-RTO: 0 /100 WBC
PLATELET # BLD AUTO: 153 K/UL (ref 150–450)
PMV BLD AUTO: 13.5 FL (ref 9.2–12.9)
RBC # BLD AUTO: 4.6 M/UL (ref 4–5.4)
SATURATED IRON: 30 % (ref 20–50)
TOTAL IRON BINDING CAPACITY: 302 UG/DL (ref 250–450)
TRANSFERRIN SERPL-MCNC: 204 MG/DL (ref 200–375)
WBC # BLD AUTO: 6.43 K/UL (ref 3.9–12.7)

## 2024-12-17 PROCEDURE — 36415 COLL VENOUS BLD VENIPUNCTURE: CPT | Mod: HCNC,PO | Performed by: INTERNAL MEDICINE

## 2024-12-17 PROCEDURE — 83540 ASSAY OF IRON: CPT | Mod: HCNC | Performed by: INTERNAL MEDICINE

## 2024-12-17 PROCEDURE — 85025 COMPLETE CBC W/AUTO DIFF WBC: CPT | Mod: HCNC | Performed by: INTERNAL MEDICINE

## 2024-12-17 PROCEDURE — 82728 ASSAY OF FERRITIN: CPT | Mod: HCNC | Performed by: INTERNAL MEDICINE

## 2024-12-27 ENCOUNTER — OFFICE VISIT (OUTPATIENT)
Dept: FAMILY MEDICINE | Facility: CLINIC | Age: 79
End: 2024-12-27
Payer: MEDICARE

## 2024-12-27 VITALS
HEIGHT: 64 IN | BODY MASS INDEX: 29.55 KG/M2 | SYSTOLIC BLOOD PRESSURE: 122 MMHG | WEIGHT: 173.06 LBS | DIASTOLIC BLOOD PRESSURE: 60 MMHG

## 2024-12-27 DIAGNOSIS — I48.0 PAROXYSMAL ATRIAL FIBRILLATION: ICD-10-CM

## 2024-12-27 DIAGNOSIS — I47.10 PSVT (PAROXYSMAL SUPRAVENTRICULAR TACHYCARDIA): ICD-10-CM

## 2024-12-27 DIAGNOSIS — I70.0 AORTIC ATHEROSCLEROSIS: ICD-10-CM

## 2024-12-27 DIAGNOSIS — E78.49 OTHER HYPERLIPIDEMIA: ICD-10-CM

## 2024-12-27 DIAGNOSIS — M85.89 OSTEOPENIA OF MULTIPLE SITES: ICD-10-CM

## 2024-12-27 DIAGNOSIS — Z00.00 WELLNESS EXAMINATION: Primary | ICD-10-CM

## 2024-12-27 DIAGNOSIS — K21.9 GASTROESOPHAGEAL REFLUX DISEASE WITHOUT ESOPHAGITIS: ICD-10-CM

## 2024-12-27 DIAGNOSIS — D86.0 SARCOIDOSIS OF LUNG: ICD-10-CM

## 2024-12-27 DIAGNOSIS — I10 ESSENTIAL HYPERTENSION: ICD-10-CM

## 2024-12-27 DIAGNOSIS — D50.8 IRON DEFICIENCY ANEMIA SECONDARY TO INADEQUATE DIETARY IRON INTAKE: ICD-10-CM

## 2024-12-27 DIAGNOSIS — C34.91 ADENOCARCINOMA OF RIGHT LUNG: ICD-10-CM

## 2024-12-27 DIAGNOSIS — J84.9 INTERSTITIAL LUNG DISEASE: ICD-10-CM

## 2024-12-27 DIAGNOSIS — I50.32 CHRONIC DIASTOLIC CONGESTIVE HEART FAILURE: ICD-10-CM

## 2024-12-27 DIAGNOSIS — G47.33 OSA (OBSTRUCTIVE SLEEP APNEA): ICD-10-CM

## 2024-12-27 PROBLEM — E66.09 CLASS 1 OBESITY DUE TO EXCESS CALORIES WITH SERIOUS COMORBIDITY AND BODY MASS INDEX (BMI) OF 31.0 TO 31.9 IN ADULT: Status: RESOLVED | Noted: 2023-10-05 | Resolved: 2024-12-27

## 2024-12-27 PROBLEM — E66.811 CLASS 1 OBESITY DUE TO EXCESS CALORIES WITH SERIOUS COMORBIDITY AND BODY MASS INDEX (BMI) OF 31.0 TO 31.9 IN ADULT: Status: RESOLVED | Noted: 2023-10-05 | Resolved: 2024-12-27

## 2024-12-27 PROCEDURE — 99999 PR PBB SHADOW E&M-EST. PATIENT-LVL III: CPT | Mod: PBBFAC,,, | Performed by: INTERNAL MEDICINE

## 2024-12-27 RX ORDER — IBANDRONATE SODIUM 150 MG/1
150 TABLET, FILM COATED ORAL
Qty: 3 TABLET | Refills: 3 | Status: SHIPPED | OUTPATIENT
Start: 2024-12-27 | End: 2024-12-27

## 2024-12-27 RX ORDER — METOPROLOL SUCCINATE 25 MG/1
25 TABLET, EXTENDED RELEASE ORAL EVERY 12 HOURS
Qty: 180 TABLET | Refills: 3 | Status: SHIPPED | OUTPATIENT
Start: 2024-12-27 | End: 2024-12-27

## 2024-12-27 RX ORDER — EZETIMIBE 10 MG/1
10 TABLET ORAL DAILY
Qty: 90 TABLET | Refills: 3 | Status: SHIPPED | OUTPATIENT
Start: 2024-12-27 | End: 2025-12-27

## 2024-12-27 RX ORDER — IBANDRONATE SODIUM 150 MG/1
150 TABLET, FILM COATED ORAL
Qty: 3 TABLET | Refills: 3 | Status: SHIPPED | OUTPATIENT
Start: 2024-12-27 | End: 2025-12-27

## 2024-12-27 RX ORDER — OMEPRAZOLE 40 MG/1
40 CAPSULE, DELAYED RELEASE ORAL DAILY
Qty: 90 CAPSULE | Refills: 3 | Status: SHIPPED | OUTPATIENT
Start: 2024-12-27 | End: 2024-12-27

## 2024-12-27 RX ORDER — METOPROLOL SUCCINATE 25 MG/1
25 TABLET, EXTENDED RELEASE ORAL EVERY 12 HOURS
Qty: 180 TABLET | Refills: 3 | Status: SHIPPED | OUTPATIENT
Start: 2024-12-27 | End: 2025-12-27

## 2024-12-27 RX ORDER — SACUBITRIL AND VALSARTAN 24; 26 MG/1; MG/1
1 TABLET, FILM COATED ORAL DAILY
Qty: 90 TABLET | Refills: 3 | Status: SHIPPED | OUTPATIENT
Start: 2024-12-27 | End: 2025-12-27

## 2024-12-27 RX ORDER — SACUBITRIL AND VALSARTAN 24; 26 MG/1; MG/1
1 TABLET, FILM COATED ORAL DAILY
Qty: 90 TABLET | Refills: 3 | Status: SHIPPED | OUTPATIENT
Start: 2024-12-27 | End: 2024-12-27

## 2024-12-27 RX ORDER — EZETIMIBE 10 MG/1
10 TABLET ORAL DAILY
Qty: 90 TABLET | Refills: 3 | Status: SHIPPED | OUTPATIENT
Start: 2024-12-27 | End: 2024-12-27

## 2024-12-27 RX ORDER — OMEPRAZOLE 40 MG/1
40 CAPSULE, DELAYED RELEASE ORAL DAILY
Qty: 90 CAPSULE | Refills: 3 | Status: SHIPPED | OUTPATIENT
Start: 2024-12-27 | End: 2025-12-27

## 2024-12-27 NOTE — PROGRESS NOTES
"[facility]  Primary Care   [address]       Patient ID: Nicole Medina     Chief Complaint:   Chief Complaint   Patient presents with    Follow-up        HPI:  Annual exam and overall I think she is doing fairly well.  She does have an upcoming chest CT to monitor her lung cancer.  Thankfully the CT scan in the latter part of September showed that it was stable.  I did review the images than she does have significant interstitial lung disease which her main symptom is a lot of secretions.  We did talk about therapies to help her expectorate those secretions including using an albuterol inhaler just prior to using her Acapella valve and then I would like her to use some chest physiotherapy with a muscle massage gun on the back of her chest to help mobilize the secretions so she can cough them out.  Labs reviewed and her iron levels are very good in her anemia has resolved.  She can stop taking the iron pills.  She seems euvolemic with regards to her congestive heart failure and her vital signs look very good.  She does need a few medicines refilled so I have print those out for her.  Physical exam significant for some inspiratory squeaks in her right lower lobe that are known and stable.  She does have follow-up with oncology in the near future and I wish him the best.    Review of Systems       Cough     Objective:      Physical Exam   Physical Exam       Right Lower Lobe inspiratory squeaks     Vitals:   Vitals:    12/27/24 0837   BP: 122/60   Weight: 78.5 kg (173 lb 1 oz)   Height: 5' 4" (1.626 m)        Assessment:           Plan:       Nicole Medina  was seen today for follow-up and may need lab work.    Diagnoses and all orders for this visit:    Nicole Carroll" was seen today for follow-up.    Diagnoses and all orders for this visit:    Wellness examination    Osteopenia of multiple sites  -     ibandronate (BONIVA) 150 mg tablet; Take 1 tablet (150 mg total) by mouth every 30 " days.  Monitor on Boniva     PSVT (paroxysmal supraventricular tachycardia)  -     metoprolol succinate (TOPROL XL) 25 MG 24 hr tablet; Take 1 tablet (25 mg total) by mouth every 12 (twelve) hours.  Controlled with  Metoprolol     Paroxysmal atrial fibrillation  -     metoprolol succinate (TOPROL XL) 25 MG 24 hr tablet; Take 1 tablet (25 mg total) by mouth every 12 (twelve) hours.  Controlled with Metoprolol     Essential hypertension  -     metoprolol succinate (TOPROL XL) 25 MG 24 hr tablet; Take 1 tablet (25 mg total) by mouth every 12 (twelve) hours.  -     sacubitriL-valsartan (ENTRESTO) 24-26 mg per tablet; Take 1 tablet by mouth once daily. Take 1/2 tab twice daily  Controlled with metoprolol and Entresto    Aortic atherosclerosis  Monitor on Zetia    Gastroesophageal reflux disease without esophagitis  -     omeprazole (PRILOSEC) 40 MG capsule; Take 1 capsule (40 mg total) by mouth once daily.  Controlled with the omeprazole and famotidine as needed    Chronic diastolic congestive heart failure  -     sacubitriL-valsartan (ENTRESTO) 24-26 mg per tablet; Take 1 tablet by mouth once daily. Take 1/2 tab twice daily  Euvolemic with Entresto    Interstitial lung disease  Symptoms are stable and we discussed postural drainage.  Continue nocturnal oxygen.    ARGENTINA (obstructive sleep apnea)  Not on CPAP but monitor symptoms    Iron deficiency anemia secondary to inadequate dietary iron intake  Iron levels much improved after IV iron and we will monitor at a later date    Other hyperlipidemia  -     ezetimibe (ZETIA) 10 mg tablet; Take 1 tablet (10 mg total) by mouth once daily.  Monitor labs    Sarcoidosis of lung  Seems stable    Adenocarcinoma of right lung  To see Oncology in the near future    Visit today included increased complexity associated with the care of the episodic problem anemia client diastolic congestive heart failure GERD hyperlipidemia hypertension addressed and managing the longitudinal care of the  patient due to the serious and/or complex managed problem(s) .           Henok Medina MD

## 2025-01-13 ENCOUNTER — HOSPITAL ENCOUNTER (OUTPATIENT)
Dept: RADIOLOGY | Facility: HOSPITAL | Age: 80
Discharge: HOME | End: 2025-01-13
Attending: INTERNAL MEDICINE
Payer: MEDICARE

## 2025-01-13 DIAGNOSIS — C34.91 ADENOCARCINOMA OF RIGHT LUNG: ICD-10-CM

## 2025-01-13 PROCEDURE — 25500020 PHARM REV CODE 255: Mod: PO | Performed by: INTERNAL MEDICINE

## 2025-01-13 PROCEDURE — 74177 CT ABD & PELVIS W/CONTRAST: CPT | Mod: TC,PO

## 2025-01-13 PROCEDURE — A9698 NON-RAD CONTRAST MATERIALNOC: HCPCS | Mod: PO | Performed by: INTERNAL MEDICINE

## 2025-01-13 PROCEDURE — 71260 CT THORAX DX C+: CPT | Mod: 26,,, | Performed by: RADIOLOGY

## 2025-01-13 PROCEDURE — 74177 CT ABD & PELVIS W/CONTRAST: CPT | Mod: 26,,, | Performed by: RADIOLOGY

## 2025-01-13 RX ADMIN — IOHEXOL 75 ML: 350 INJECTION, SOLUTION INTRAVENOUS at 10:01

## 2025-01-13 RX ADMIN — BARIUM SULFATE 900 ML: 20 SUSPENSION ORAL at 10:01

## 2025-01-14 ENCOUNTER — TELEPHONE (OUTPATIENT)
Dept: HEMATOLOGY/ONCOLOGY | Facility: CLINIC | Age: 80
End: 2025-01-14

## 2025-01-14 ENCOUNTER — OFFICE VISIT (OUTPATIENT)
Dept: HEMATOLOGY/ONCOLOGY | Facility: CLINIC | Age: 80
End: 2025-01-14
Payer: MEDICARE

## 2025-01-14 VITALS
RESPIRATION RATE: 16 BRPM | TEMPERATURE: 98 F | DIASTOLIC BLOOD PRESSURE: 76 MMHG | SYSTOLIC BLOOD PRESSURE: 131 MMHG | WEIGHT: 171.5 LBS | HEART RATE: 80 BPM | HEIGHT: 64 IN | OXYGEN SATURATION: 90 % | BODY MASS INDEX: 29.28 KG/M2

## 2025-01-14 DIAGNOSIS — N13.5 UPJ (URETEROPELVIC JUNCTION) OBSTRUCTION: Primary | ICD-10-CM

## 2025-01-14 DIAGNOSIS — Z85.118 PERSONAL HISTORY OF LUNG CANCER: Primary | ICD-10-CM

## 2025-01-14 PROCEDURE — 3075F SYST BP GE 130 - 139MM HG: CPT | Mod: CPTII,S$GLB,, | Performed by: INTERNAL MEDICINE

## 2025-01-14 PROCEDURE — 3288F FALL RISK ASSESSMENT DOCD: CPT | Mod: CPTII,S$GLB,, | Performed by: INTERNAL MEDICINE

## 2025-01-14 PROCEDURE — 99215 OFFICE O/P EST HI 40 MIN: CPT | Mod: S$GLB,,, | Performed by: INTERNAL MEDICINE

## 2025-01-14 PROCEDURE — 1101F PT FALLS ASSESS-DOCD LE1/YR: CPT | Mod: CPTII,S$GLB,, | Performed by: INTERNAL MEDICINE

## 2025-01-14 PROCEDURE — 3078F DIAST BP <80 MM HG: CPT | Mod: CPTII,S$GLB,, | Performed by: INTERNAL MEDICINE

## 2025-01-14 PROCEDURE — 1126F AMNT PAIN NOTED NONE PRSNT: CPT | Mod: CPTII,S$GLB,, | Performed by: INTERNAL MEDICINE

## 2025-01-14 PROCEDURE — 99999 PR PBB SHADOW E&M-EST. PATIENT-LVL IV: CPT | Mod: PBBFAC,,, | Performed by: INTERNAL MEDICINE

## 2025-01-14 PROCEDURE — 1159F MED LIST DOCD IN RCRD: CPT | Mod: CPTII,S$GLB,, | Performed by: INTERNAL MEDICINE

## 2025-01-14 NOTE — TELEPHONE ENCOUNTER
Spoke with patient.  She understands dr donovan in urology wants to see her in clinic.  Referral placed.

## 2025-01-14 NOTE — Clinical Note
Can one of you please take a look at her CT scan please, there is an extra renal lesion on the right kidney larger than prior, appears to be a cyst,. Any cause for concern?

## 2025-01-14 NOTE — PROGRESS NOTES
"Subjective     Patient ID: Nicole Medina is a 79 y.o. female.    Chief Complaint: Follow-up (Adenocarcinoma of right lung)  79 y.o. female with Stage IA2 (cT1b cN0 M0) DOC NSCLC (adeno) diagnosed on biopsy 12/20/19. History significant for sarcoidosis. CT Chest 1/14/20 demonstrated 1.7 cm DOC primary and multiple other stable sub-centimeter nodules with mild hilar and mediastinal adenopathy. She underwent attempted VATS resection by Dr. Mccall 1/16/20, which was aborted due to poor toleration of single lung ventilation. She completed definitive SBRT 55 Gy in 5 fx on 2/13/20.      CT Chest 2/16/24 concerning for enlarging adenopathy. Bronch w/ EBUS by Dr. Garcia 3/26/24. 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    Her case was reviewed at the Thoracic Multidisciplinary Conference on 04/17/2024 with recommendation for consideration of systemic therapy options. Since there is not a clear target for radiation on her imaging (ILD changes indistinguishable from lepidic adeno), she does not have a local therapy option at this time. I discussed with the patient and her  that lepidic adenocarcinoma tends to be very slow growing and often present for years, but there is always a risk that it can develop metastatic potential if untreated.      [facility]us NGS tissue with no targets, PDL1 is less than 1%        CT scans of chest abdomen pelvis  from 04/29/2024 reveal "Tzykjzv-fg-dfdzkqiw increased size of a left apical pulmonary mass status post radiation. Grossly stable background of interstitial lung disease in this patient with reported history of sarcoidosis, which limits evaluation for lepidic spread of disease in this patient with biopsy-proven left upper lobe adenocarcinoma. Unchanged bilateral pulmonary micro-nodules. Stable mediastinal and hilar lymphadenopathy.  Index lymph " "nodes as above. 2.1 cm left adrenal nodule, unchanged since November 2021. Large extrarenal pelvis on the right with hydronephrosis felt less likely given stability dating back to November 2021.     MRI brain from 05/03/2024  reveals "No evidence of intracranial metastatic disease. 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     She started Carboplatin and Alimta on 5/9/2024.     CT chest from 6/17/2024 (after 2 cycles of Carboplatin and Alimta) revealed "Stable appearance of the lungs with known background of interstitial lung disease/pulmonary sarcoidosis.  Unchanged left apical soft tissue mass status post radiation. Stable mediastinal lymph nodes. Stable left adrenal nodule"           She received cycle 4 of Carboplatin and Alimta until 7/11/2024      CT scans on 7/26/2024 "Pulmonary mass at the left lung apex and mediastinal lymphadenopathy stable given inter study variance since 04/29/2024.  2. Prominent renal pelvis on the right slightly more so than noticed on the prior, this most likely relates to an extrarenal pelvis and it is always been slightly prominent.  Mild hydronephrosis is difficult to exclude.  3. Distended bladder, please correlate for postobstructive change or neurogenic bladder.  This may be physiologic.  4. Stable adrenal mass since 08/26/2022 suggestive of an adrenal adenoma  5. Left-sided thyroid nodule.  Thyroid ultrasound of use for better evaluation and follow-up if necessary this is likely stable since at least 02/24/2023 by CT"     MDT review on 7/30/2024: short term follow up in 8 weeks with Chest CT.         CT scans from 9/26/2024 which revealed "stable appearance of the patient's interstitial lung disease/pulmonary sarcoidosis compared to what was seen on prior exam.  The left apical mass measures 3.5 by 1.5 cm on today's exam but appears unchanged from prior exam"    HPIShe comes in for CT CAP from 1/13/2025 which reveals "Chest; findings not significantly changed " "including the masslike opacity left apex anteromedially, mediastinal and hilar adenopathy, extensive areas of ground-glass opacities, scattered pulmonary nodules. Abdomen and pelvis; left adrenal nodule and small hypodensity in the liver remaining not significantly changed.  What appears to be large right renal pelvis is extrarenal in location appearing slightly larger than on prior exam.  No new findings suggesting metastatic disease within the abdomen or pelvis.  Note is made of moderate distention of the urinary bladder at the time the exam similar in appearance to prior exam and recommend correlation clinically if clinical consideration for neurogenic bladder"       Review of Systems   Constitutional:  Negative for appetite change, fatigue and unexpected weight change.   HENT:  Negative for mouth sores.    Eyes:  Negative for visual disturbance.   Respiratory:  Negative for cough and shortness of breath.    Cardiovascular:  Negative for chest pain.   Gastrointestinal:  Negative for abdominal pain and diarrhea.   Genitourinary:  Negative for frequency.   Musculoskeletal:  Negative for back pain.   Integumentary:  Negative for rash.   Neurological:  Negative for headaches.   Hematological:  Negative for adenopathy.   Psychiatric/Behavioral:  The patient is not nervous/anxious.    All other systems reviewed and are negative.         Objective     Physical Exam      LABS:  WBC   Date Value Ref Range Status   01/13/2025 6.97 3.90 - 12.70 K/uL Final     Hemoglobin   Date Value Ref Range Status   01/13/2025 13.4 12.0 - 16.0 g/dL Final     POC Hematocrit   Date Value Ref Range Status   04/01/2024 36.0 (L) 37.0 - 48.5 %PCV Final     Hematocrit   Date Value Ref Range Status   01/13/2025 42.3 37.0 - 48.5 % Final     Platelets   Date Value Ref Range Status   01/13/2025 163 150 - 450 K/uL Final     Gran # (ANC)   Date Value Ref Range Status   01/13/2025 4.1 1.8 - 7.7 K/uL Final     Gran %   Date Value Ref Range Status "   01/13/2025 58.3 38.0 - 73.0 % Final       Chemistry        Component Value Date/Time     01/13/2025 0900    K 5.0 01/13/2025 0900     01/13/2025 0900    CO2 28 01/13/2025 0900    BUN 15 01/13/2025 0900    CREATININE 0.8 01/13/2025 0900    GLU 93 01/13/2025 0900        Component Value Date/Time    CALCIUM 9.7 01/13/2025 0900    ALKPHOS 62 01/13/2025 0900    AST 23 01/13/2025 0900    ALT 14 01/13/2025 0900    BILITOT 0.6 01/13/2025 0900    ESTGFRAFRICA >60 05/10/2022 0640    EGFRNONAA >60 05/10/2022 0640             Assessment and Plan     1. Personal history of lung cancer  -     CBC w/ DIFF; Future; Expected date: 01/14/2025  -     CMP; Future; Expected date: 01/14/2025  -     CT Chest Abdomen Pelvis With IV Contrast (XPD) Routine Oral Contrast; Future; Expected date: 01/14/2025        Route Chart for Scheduling    Med Onc Chart Routing      Follow up with physician 4 months. Schedule CBC, CMP, CT chest, abdomen/pelvis and see me   Follow up with STANISLAW    Infusion scheduling note    Injection scheduling note    Labs    Imaging    Pharmacy appointment    Other referrals                  Mrs. Medina comes in to review her CT scans which do not reveal any new disease however, she is noted to have extra renal lesion in her right kidney which requires further evaluation.   Discussed with Dr. George in Urology and he thinks that this most likely could represent a UPJ obstruction and will see her in clinic for further evaluation \  Iron-deficiency anemia:  Now improved after IV iron unclear about the etiology of the iron-deficiency anemia she has not had a recent colonoscopy, however the above extrarenal area/ suspicious UPJ obstruction in the right kidney may have contributed to this iron-deficiency anemia  Pending above she will return in 4 months with repeat labs and scans    Above care plan was discussed with patient and accompanying   and all questions were addressed to their satisfaction

## 2025-01-15 ENCOUNTER — TELEPHONE (OUTPATIENT)
Dept: UROLOGY | Facility: CLINIC | Age: 80
End: 2025-01-15
Payer: MEDICARE

## 2025-01-15 NOTE — TELEPHONE ENCOUNTER
----- Message from Jayden Blanca MD sent at 1/15/2025  6:48 AM CST -----  Please set this patient up to see me for UPJ obstruction  ----- Message -----  From: Yanci Osborn MD  Sent: 1/14/2025   4:32 PM CST  To: Ledy Go RN; SILVESTRE Yu MD; #    Yes please thank you  ----- Message -----  From: Jayden Blanca MD  Sent: 1/14/2025   3:52 PM CST  To: Yanci Osborn MD; SILVESTRE Yu MD    Looks like a UPJ obstruction to me. I can see her and discuss further management, etc. Let me know and I can get her scheduled.  ----- Message -----  From: Yanci Osborn MD  Sent: 1/14/2025   3:24 PM CST  To: SILVESTRE Yu MD; Jayden Blanca MD      Can one of you please take a look at her CT scan please, there is an extra renal lesion on the right kidney larger than prior, appears to be a cyst,. Any cause for concern?

## 2025-01-16 ENCOUNTER — DOCUMENTATION ONLY (OUTPATIENT)
Dept: HEMATOLOGY/ONCOLOGY | Facility: CLINIC | Age: 80
End: 2025-01-16
Payer: MEDICARE

## 2025-01-16 NOTE — PROGRESS NOTES
"[facility]   Clinic Note    Subjective:     Chief Complaint: UPJ Obstruction    History of Present Illness    Nicole presents for follow-up of an incidental finding of a dilated renal pelvis on the right kidney, discovered during surveillance imaging for previous lung cancer.    She has a history of lung cancer and referred after surveillance imaging showed an abnormality on the kidney. Recently, an incidental finding of a dilated renal pelvis on the right kidney was noted on imaging. Nicole reports no symptoms related to this finding, such as pain, urinary tract infections, or decreased kidney function. The dilation has been present since at least 2021, as noted in previous imaging studies.    Nicole denies any recent urinary symptoms. She also denies kidney pain, kidney stones, urinary tract infections, or any previous diagnosis of kidney abnormalities.           CT from 1/13/25 showing pelviectasis with transition at the UPJ without significant hydronephrosis. Imaging independently reviewed and interpreted. It was present to a lesser degree on previous imaging from 1/7/2021 called extra-renal pelvis.         Past medical, family, surgical and social history reviewed as documented in chart with pertinent positive medical, family, surgical and social history detailed in HPI.    A review systems was conducted with pertinent positive and negative findings documented in HPI.    Objective:     Estimated body mass index is 29.33 kg/m² as calculated from the following:    Height as of this encounter: 5' 4" (1.626 m).    Weight as of this encounter: 77.5 kg (170 lb 13.7 oz).    Vital Signs (Most Recent)       General: No acute distress, well developed.   Head: Normocephalic, atraumatic  CV: no cyanosis  Lungs: normal respiratory effort, no respiratory distress    Labs reviewed below:  Lab Results   Component Value Date    BUN 15 01/13/2025    CREATININE 0.8 01/13/2025    WBC 6.97 01/13/2025    HGB 13.4 " 01/13/2025    HCT 42.3 01/13/2025     01/13/2025    AST 23 01/13/2025    ALT 14 01/13/2025    ALKPHOS 62 01/13/2025    ALBUMIN 3.7 01/13/2025    HGBA1C 5.7 (H) 12/03/2022      Assessment:     1. Pelviectasis of kidney    2. UPJ (ureteropelvic junction) obstruction      Plan:     Assessment & Plan    - Noted dilation of right renal pelvis on imaging, likely extra renal pelvis rather than obstruction  - Kidney function appears normal, no signs of pain, infections, or decreased function  - Considering nuclear medicine renal scan to definitively rule out obstruction  - If obstruction confirmed, options include monitoring, surgical intervention, or stent placement    - Explained anatomy of kidney, renal pelvis, and ureter  - Discussed potential causes of renal pelvis dilation, including UPJ obstruction and extra renal pelvis  - Outlined symptoms of kidney obstruction: pain, urinary tract infections, decreased kidney function    - Nuclear medicine renal scan ordered to assess kidney drainage    - Follow up after renal scan results are available  - Contact the office if experiencing symptoms (pain, infections) or if renal scan results are abnormal          Portions of this note were generated by [employer] and [employer] Direct dictation services    Jayden Blanca MD

## 2025-01-16 NOTE — NURSING
Chart reviewed for oncology navigational needs. Patient remains on surveillance for her lung cancer. Future imaging and follow up already scheduled.  Patient is scheduled for follow up with urology due to ct finds of renal obstruction.  No needs noted at this time. Will continue to monitor.

## 2025-01-17 ENCOUNTER — OFFICE VISIT (OUTPATIENT)
Dept: UROLOGY | Facility: CLINIC | Age: 80
End: 2025-01-17
Payer: MEDICARE

## 2025-01-17 ENCOUNTER — PATIENT MESSAGE (OUTPATIENT)
Dept: UROLOGY | Facility: CLINIC | Age: 80
End: 2025-01-17

## 2025-01-17 VITALS — WEIGHT: 170.88 LBS | HEIGHT: 64 IN | BODY MASS INDEX: 29.17 KG/M2

## 2025-01-17 DIAGNOSIS — N13.5 UPJ (URETEROPELVIC JUNCTION) OBSTRUCTION: ICD-10-CM

## 2025-01-17 DIAGNOSIS — N28.89 PELVIECTASIS OF KIDNEY: Primary | ICD-10-CM

## 2025-01-17 LAB
BACTERIA #/AREA URNS HPF: ABNORMAL /HPF
BILIRUBIN, UA POC OHS: NEGATIVE
BLOOD, UA POC OHS: ABNORMAL
CLARITY, UA POC OHS: CLEAR
COLOR, UA POC OHS: YELLOW
GLUCOSE, UA POC OHS: NEGATIVE
KETONES, UA POC OHS: NEGATIVE
LEUKOCYTES, UA POC OHS: ABNORMAL
MICROSCOPIC COMMENT: ABNORMAL
NITRITE, UA POC OHS: NEGATIVE
PH, UA POC OHS: 6
PROTEIN, UA POC OHS: NEGATIVE
RBC #/AREA URNS HPF: 3 /HPF (ref 0–4)
SPECIFIC GRAVITY, UA POC OHS: 1.01
SQUAMOUS #/AREA URNS HPF: 2 /HPF
UROBILINOGEN, UA POC OHS: 0.2
WBC #/AREA URNS HPF: 5 /HPF (ref 0–5)

## 2025-01-17 PROCEDURE — 1126F AMNT PAIN NOTED NONE PRSNT: CPT | Mod: CPTII,S$GLB,, | Performed by: STUDENT IN AN ORGANIZED HEALTH CARE EDUCATION/TRAINING PROGRAM

## 2025-01-17 PROCEDURE — 81003 URINALYSIS AUTO W/O SCOPE: CPT | Mod: QW,S$GLB,, | Performed by: STUDENT IN AN ORGANIZED HEALTH CARE EDUCATION/TRAINING PROGRAM

## 2025-01-17 PROCEDURE — 1160F RVW MEDS BY RX/DR IN RCRD: CPT | Mod: CPTII,S$GLB,, | Performed by: STUDENT IN AN ORGANIZED HEALTH CARE EDUCATION/TRAINING PROGRAM

## 2025-01-17 PROCEDURE — 99204 OFFICE O/P NEW MOD 45 MIN: CPT | Mod: S$GLB,,, | Performed by: STUDENT IN AN ORGANIZED HEALTH CARE EDUCATION/TRAINING PROGRAM

## 2025-01-17 PROCEDURE — 1101F PT FALLS ASSESS-DOCD LE1/YR: CPT | Mod: CPTII,S$GLB,, | Performed by: STUDENT IN AN ORGANIZED HEALTH CARE EDUCATION/TRAINING PROGRAM

## 2025-01-17 PROCEDURE — 81000 URINALYSIS NONAUTO W/SCOPE: CPT | Mod: PO | Performed by: STUDENT IN AN ORGANIZED HEALTH CARE EDUCATION/TRAINING PROGRAM

## 2025-01-17 PROCEDURE — 1159F MED LIST DOCD IN RCRD: CPT | Mod: CPTII,S$GLB,, | Performed by: STUDENT IN AN ORGANIZED HEALTH CARE EDUCATION/TRAINING PROGRAM

## 2025-01-17 PROCEDURE — 99999 PR PBB SHADOW E&M-EST. PATIENT-LVL IV: CPT | Mod: PBBFAC,,, | Performed by: STUDENT IN AN ORGANIZED HEALTH CARE EDUCATION/TRAINING PROGRAM

## 2025-01-17 PROCEDURE — 3288F FALL RISK ASSESSMENT DOCD: CPT | Mod: CPTII,S$GLB,, | Performed by: STUDENT IN AN ORGANIZED HEALTH CARE EDUCATION/TRAINING PROGRAM

## 2025-01-28 ENCOUNTER — HOSPITAL ENCOUNTER (OUTPATIENT)
Dept: RADIOLOGY | Facility: HOSPITAL | Age: 80
Discharge: HOME | End: 2025-01-28
Attending: STUDENT IN AN ORGANIZED HEALTH CARE EDUCATION/TRAINING PROGRAM
Payer: MEDICARE

## 2025-01-28 DIAGNOSIS — N28.89 PELVIECTASIS OF KIDNEY: ICD-10-CM

## 2025-01-28 DIAGNOSIS — N13.5 UPJ (URETEROPELVIC JUNCTION) OBSTRUCTION: ICD-10-CM

## 2025-01-28 PROCEDURE — 78708 K FLOW/FUNCT IMAGE W/DRUG: CPT | Mod: 26,,, | Performed by: STUDENT IN AN ORGANIZED HEALTH CARE EDUCATION/TRAINING PROGRAM

## 2025-01-28 PROCEDURE — 78708 K FLOW/FUNCT IMAGE W/DRUG: CPT | Mod: TC,PO

## 2025-01-28 PROCEDURE — A9562 TC99M MERTIATIDE: HCPCS | Mod: PO | Performed by: STUDENT IN AN ORGANIZED HEALTH CARE EDUCATION/TRAINING PROGRAM

## 2025-01-28 RX ORDER — BETIATIDE 1 MG/1
5.5 INJECTION, POWDER, LYOPHILIZED, FOR SOLUTION INTRAVENOUS
Status: COMPLETED | OUTPATIENT
Start: 2025-01-28 | End: 2025-01-28

## 2025-01-28 RX ADMIN — TECHNESCAN TC 99M MERTIATIDE 5.5 MILLICURIE: 1 INJECTION, POWDER, LYOPHILIZED, FOR SOLUTION INTRAVENOUS at 01:01

## 2025-01-29 ENCOUNTER — TELEPHONE (OUTPATIENT)
Dept: UROLOGY | Facility: CLINIC | Age: 80
End: 2025-01-29
Payer: MEDICARE

## 2025-01-29 NOTE — TELEPHONE ENCOUNTER
----- Message from Orion Bai sent at 1/29/2025  9:18 AM CST -----    ----- Message -----  From: Jayden Blanca MD  Sent: 1/28/2025   4:02 PM CST  To: Evangelist [facility] Staff    Please schedule an appointment to discuss results.

## 2025-01-31 ENCOUNTER — OFFICE VISIT (OUTPATIENT)
Dept: UROLOGY | Facility: CLINIC | Age: 80
End: 2025-01-31
Payer: MEDICARE

## 2025-01-31 VITALS — WEIGHT: 171.75 LBS | BODY MASS INDEX: 29.32 KG/M2 | HEIGHT: 64 IN

## 2025-01-31 DIAGNOSIS — N13.5 UPJ (URETEROPELVIC JUNCTION) OBSTRUCTION: Primary | ICD-10-CM

## 2025-01-31 PROCEDURE — 1160F RVW MEDS BY RX/DR IN RCRD: CPT | Mod: CPTII,S$GLB,, | Performed by: STUDENT IN AN ORGANIZED HEALTH CARE EDUCATION/TRAINING PROGRAM

## 2025-01-31 PROCEDURE — 99999 PR PBB SHADOW E&M-EST. PATIENT-LVL III: CPT | Mod: PBBFAC,,, | Performed by: STUDENT IN AN ORGANIZED HEALTH CARE EDUCATION/TRAINING PROGRAM

## 2025-01-31 PROCEDURE — 3288F FALL RISK ASSESSMENT DOCD: CPT | Mod: CPTII,S$GLB,, | Performed by: STUDENT IN AN ORGANIZED HEALTH CARE EDUCATION/TRAINING PROGRAM

## 2025-01-31 PROCEDURE — 1159F MED LIST DOCD IN RCRD: CPT | Mod: CPTII,S$GLB,, | Performed by: STUDENT IN AN ORGANIZED HEALTH CARE EDUCATION/TRAINING PROGRAM

## 2025-01-31 PROCEDURE — 99213 OFFICE O/P EST LOW 20 MIN: CPT | Mod: S$GLB,,, | Performed by: STUDENT IN AN ORGANIZED HEALTH CARE EDUCATION/TRAINING PROGRAM

## 2025-01-31 PROCEDURE — 1101F PT FALLS ASSESS-DOCD LE1/YR: CPT | Mod: CPTII,S$GLB,, | Performed by: STUDENT IN AN ORGANIZED HEALTH CARE EDUCATION/TRAINING PROGRAM

## 2025-01-31 PROCEDURE — 1126F AMNT PAIN NOTED NONE PRSNT: CPT | Mod: CPTII,S$GLB,, | Performed by: STUDENT IN AN ORGANIZED HEALTH CARE EDUCATION/TRAINING PROGRAM

## 2025-01-31 NOTE — PROGRESS NOTES
"[facility]rology   Clinic Note    Subjective:     Chief Complaint: Follow-up    History of Present Illness    Nicole presents for follow-up of an incidental finding of a dilated renal pelvis on the right kidney, discovered during surveillance imaging for previous lung cancer.    She has a history of lung cancer and referred after surveillance imaging showed an abnormality on the kidney.  Findings showed a dilated renal pelvis suggestive of UPJ obstruction.  Nicole reports no symptoms related to this finding, such as pain, urinary tract infections, or decreased kidney function. The dilation has been present since at least 2021, as noted in previous imaging studies.  She underwent Mag 3 which showed differential function of 52% on the right and 48% on the left.  T 1/2 on the right kidney was 20 minutes consistent obstruction.  The T 1/2 on the left was normal. Imaging independently reviewed and interpreted     Nicole denies any recent urinary symptoms. She also denies kidney pain, kidney stones, urinary tract infections, or any previous diagnosis of kidney abnormalities.          CT from 1/13/25 showing pelviectasis with transition at the UPJ without significant hydronephrosis.         Past medical, family, surgical and social history reviewed as documented in chart with pertinent positive medical, family, surgical and social history detailed in HPI.    A review systems was conducted with pertinent positive and negative findings documented in HPI.    Objective:     Estimated body mass index is 29.48 kg/m² as calculated from the following:    Height as of this encounter: 5' 4" (1.626 m).    Weight as of this encounter: 77.9 kg (171 lb 11.8 oz).    Vital Signs (Most Recent)       General: No acute distress, well developed.   Head: Normocephalic, atraumatic  CV: no cyanosis  Lungs: normal respiratory effort, no respiratory distress    Labs reviewed below:  Lab Results   Component Value Date    BUN 15 01/13/2025    " CREATININE 0.8 01/13/2025    WBC 6.97 01/13/2025    HGB 13.4 01/13/2025    HCT 42.3 01/13/2025     01/13/2025    AST 23 01/13/2025    ALT 14 01/13/2025    ALKPHOS 62 01/13/2025    ALBUMIN 3.7 01/13/2025    HGBA1C 5.7 (H) 12/03/2022      Assessment:     1. UPJ (ureteropelvic junction) obstruction        Plan:     Assessment & Plan    - Kidney function appears normal, no signs of pain, infections, or decreased function  - Discussed options include monitoring, surgical intervention, or stent placement    - Explained anatomy of kidney, renal pelvis, and ureter  - Outlined symptoms of kidney obstruction: pain, urinary tract infections, decreased kidney function    - Likely longstanding - plan for observation  - follow up as needed with any worsening symptoms         Portions of this note were generated by [employer] and [employer] Direct dictation services    Jayden Blanca MD

## 2025-03-16 NOTE — TELEPHONE ENCOUNTER
Continue with hydrocortisone cream  Reports no bleeding of her hemorrhoids but mostly itchy anus  Continue outpatient follow-up with PCP   Received hem referral for dx of acute blood loss anemia.  Pt has hx of lung cancer and stated she was tx by Dr EMMA Sibley, [facility].    Requested benign hem slot for pt, confirmed date time and location.  Informed the pt she be called before appt for more labs.

## 2025-03-21 NOTE — ANESTHESIA PROCEDURE NOTES
"[facility] Pulmonary Follow up      Chief Complaint  Post CT (F/u)    Subjective          Kathy Taylor presents to [facility] for follow-up on her COPD, she is routinely followed here in the office by Dr. Thakur.    She has been doing pretty well.  She continues on DuoNebs as needed.  She had been on Trelegy in the past but her co-pay was quite high.    She denies any recent acute exacerbation or illness.    She is trying to quit smoking cigarettes but has started using a vape.  Her goal is to decrease the nicotine in the vape to stop smoking.  She does very much want to stop smoking.    She was in the hospital in February with pneumonia and acute on chronic respiratory failure.  She was sent home on 2 L of oxygen.  She has been using 2 L during the day as needed with activity and 2 L at night.  Currently she has the larger portable tanks and she finds some very cumbersome.  She is even tried using a backpack.  She would like to follow-up with her DME and get a POC if able.          Objective     Vital Signs:   /88 (BP Location: Left arm, Patient Position: Sitting, Cuff Size: Adult)   Pulse 105   Temp 97.8 °F (36.6 °C)   Ht 167.6 cm (65.98\")   Wt 75.8 kg (167 lb)   SpO2 98% Comment: room air at rest  BMI 26.97 kg/m²       Immunization History   Administered Date(s) Administered    COVID-19 (MODERNA) 1st,2nd,3rd Dose Monovalent 04/09/2021, 05/07/2021, 04/02/2022    Flu Vaccine Quad PF >36MO 01/11/2021    Fluzone  >6mos 10/13/2016    Fluzone (or Fluarix & Flulaval for VFC) >6mos 01/11/2021    Fluzone Quad >6mos (Multi-dose) 10/13/2016    Pneumococcal Polysaccharide (PPSV23) 10/13/2016, 01/11/2021       Physical Exam  Vitals reviewed.   Constitutional:       Appearance: She is well-developed.   HENT:      Head: Normocephalic and atraumatic.   Eyes:      Pupils: Pupils are equal, round, and reactive to light.   Cardiovascular:      Rate and Rhythm: Normal " Arterial    Diagnosis: lung mass    Patient location during procedure: done in OR  Procedure start time: 1/16/2020 8:17 AM  Timeout: 1/16/2020 7:15 AM  Procedure end time: 1/16/2020 8:19 AM    Staffing  Authorizing Provider: Ritesh Taveras MD  Performing Provider: Demetri Doty MD    Anesthesiologist was present at the time of the procedure.    Preanesthetic Checklist  Completed: patient identified, site marked, surgical consent, pre-op evaluation, timeout performed, IV checked, risks and benefits discussed, monitors and equipment checked and anesthesia consent givenArterial  Skin Prep: chlorhexidine gluconate  Orientation: left  Location: radial  Catheter Size: 20 G  Catheter placement by Anatomical landmarks. Heme positive aspiration all ports.Insertion Attempts: 1  Assessment  Dressing: secured with tape and tegaderm  Patient: Tolerated well             rate and regular rhythm.      Heart sounds: No murmur heard.  Pulmonary:      Effort: Pulmonary effort is normal. No respiratory distress.      Breath sounds: Normal breath sounds. No wheezing or rales.   Abdominal:      General: Bowel sounds are normal. There is no distension.      Palpations: Abdomen is soft.   Musculoskeletal:         General: Normal range of motion.      Cervical back: Normal range of motion and neck supple.   Skin:     General: Skin is warm and dry.      Findings: No erythema.   Neurological:      Mental Status: She is alert and oriented to person, place, and time.   Psychiatric:         Behavior: Behavior normal.          Result Review :       Data reviewed : Radiologic studies CT chest 2/19/2025      Findings:  Thyroid unremarkable. No supraclavicular adenopathy. Aortic atherosclerotic disease without aneurysm. Severe calcified coronary atherosclerotic disease. Normal caliber main pulmonary artery. Heart size normal. No pericardial effusion. Esophagus   unremarkable. No suspicious adenopathy.     Trachea patent. Mild emphysema. There are new infectious/inflammatory patchy groundglass airspace opacities in the lingula and left greater than right lower lobes. Scattered small discrete pulmonary nodules stable from recent low-dose chest CT screening.   Linear bandlike areas of minimal atelectasis versus scarring. No edema, effusion or pneumothorax.     No acute findings partially imaged upper abdomen. No acute chest wall findings. No axillary adenopathy. Degenerative changes throughout the spine without acute displaced fracture or aggressive lesion.     IMPRESSION:  Impression:  1. Mild/early multifocal pneumonia versus aspiration.  2. Mild emphysema with stable small pulmonary nodules since 2/12/2025. Recommend continued annual low-dose chest CT screening, due February 2026.  3. Aortic and severe coronary atherosclerotic disease.              PFTs done in the office today:  Moderate obstructive  airway disease with an FEV1 of 1.68, 60% predicted.  Evidence of air trapping with an elevated residual volume of normal adjusted DLCO                 Assessment and Plan    Problem List Items Addressed This Visit          Pulmonary and Pneumonias    Stage III (Severe) COPD    Relevant Medications    Fluticasone-Salmeterol (ADVAIR/WIXELA) 250-50 MCG/ACT DISKUS    albuterol sulfate  (90 Base) MCG/ACT inhaler     Mrs. Taylor has COPD with some chronic respiratory failure.  She has been on oxygen as needed since her recent hospitalization for pneumonia.  She does feel like she is back to her baseline without any cough or congestion.    We did discuss different inhalers to use, she did like Trelegy but it was very costly.  It does look like Advair or Wixela is better covered, we did discuss the difference between Trelegy being a triple therapy and this being an ICS/LABA only.  She would like to try it for a while and see if the cost difference is acceptable.    Routine follow-up in 6 months.  I would like for her to do a 6-minute walk test to see how she is doing with her oxygenation on her next follow-up.    Continue annual low-dose screening CT scans which would not be due till February of next year.    I did encourage her on her smoking cessation effort, but we did discuss the trapping's of starting on a vape.    Follow Up     Return in about 6 months (around 9/21/2025).  Patient was given instructions and counseling regarding her condition or for health maintenance advice. Please see specific information pulled into the AVS if appropriate.     I spent 35 minutes caring for Kathy on this date of service. This time includes time spent by me in the following activities:preparing for the visit, reviewing tests, obtaining and/or reviewing a separately obtained history, performing a medically appropriate examination and/or evaluation , counseling and educating the patient/family/caregiver, ordering medications,  tests, or procedures, referring and communicating with other health care professionals , documenting information in the medical record, and independently interpreting results and communicating that information with the patient/family/caregiver    Excluding time spent on other separate services such as performing procedures or test interpretation, if applicable    Moderate level of Medical Decision Making complexity based on 2 or more chronic stable illnesses and prescription drug management.    KALYAN Lomeli, ACNP  [facility] Pulmonary Critical Care Medicine  Electronically signed

## 2025-04-24 ENCOUNTER — OFFICE VISIT (OUTPATIENT)
Dept: OPTOMETRY | Facility: CLINIC | Age: 80
End: 2025-04-24
Payer: MEDICARE

## 2025-04-24 DIAGNOSIS — H43.812 POSTERIOR VITREOUS DETACHMENT OF LEFT EYE: Primary | ICD-10-CM

## 2025-04-24 PROCEDURE — 1126F AMNT PAIN NOTED NONE PRSNT: CPT | Mod: CPTII,S$GLB,, | Performed by: OPTOMETRIST

## 2025-04-24 PROCEDURE — 1159F MED LIST DOCD IN RCRD: CPT | Mod: CPTII,S$GLB,, | Performed by: OPTOMETRIST

## 2025-04-24 PROCEDURE — 1160F RVW MEDS BY RX/DR IN RCRD: CPT | Mod: CPTII,S$GLB,, | Performed by: OPTOMETRIST

## 2025-04-24 PROCEDURE — 99999 PR PBB SHADOW E&M-EST. PATIENT-LVL II: CPT | Mod: PBBFAC,,, | Performed by: OPTOMETRIST

## 2025-04-24 PROCEDURE — 92014 COMPRE OPH EXAM EST PT 1/>: CPT | Mod: S$GLB,,, | Performed by: OPTOMETRIST

## 2025-04-24 NOTE — PROGRESS NOTES
HPI     Flashes, Light            Comments: Ldle 08/15/2024          Comments    Pt states : started seeing flashes of light in OS since last Friday  Floaters came on this Monday they are seen when looking out of corner of   eye   Floaters are seen when looking straight , look likes specs have no defined   shape to   Denies pain            Last edited by Nick Armenta on 4/24/2025  9:51 AM.            Assessment /Plan     For exam results, see Encounter Report.    Posterior vitreous detachment of left eye      No evidence of holes, tears or detachment of retina. Negative Shafers sign in vitreous. 90 diopter lens exam negative in all quadrants. Patient educated to signs and symptoms of retinal detachment and return to clinic immediately if signs or symptoms arise. RTC 3 weeks follow up

## 2025-04-25 ENCOUNTER — TELEPHONE (OUTPATIENT)
Dept: FAMILY MEDICINE | Facility: CLINIC | Age: 80
End: 2025-04-25
Payer: MEDICARE

## 2025-04-25 NOTE — TELEPHONE ENCOUNTER
----- Message from Shana sent at 4/24/2025  3:46 PM CDT -----  Type:  Needs Medical AdviceWho Called: pt husbandWould the patient rather a call back or a response via [facility]? Please callBest Call Back Number: .[phone] Information: pt  says to please give him a call in regards to his wifes supplies  Please call back to advise. Thanks!

## 2025-04-25 NOTE — TELEPHONE ENCOUNTER
----- Message from Vee sent at 4/25/2025 10:36 AM CDT -----  Name of Who is Calling:CHAPARRO GEORGES [[id number]]  What is the request in detail:Pt been calling for a few days now and no one reach out to her yet. Pt requesting a call back today if possible regarding insurance PERIOD!!!  Can the clinic reply by [facility]:Call  What Number to Call Back if not in [facility]:Telephone Information:[employer]          [phone]

## 2025-04-29 DIAGNOSIS — I10 ESSENTIAL HYPERTENSION: ICD-10-CM

## 2025-04-29 DIAGNOSIS — I50.32 CHRONIC DIASTOLIC CONGESTIVE HEART FAILURE: ICD-10-CM

## 2025-04-29 RX ORDER — SACUBITRIL AND VALSARTAN 24; 26 MG/1; MG/1
1 TABLET, FILM COATED ORAL DAILY
Qty: 90 TABLET | Refills: 3 | Status: SHIPPED | OUTPATIENT
Start: 2025-04-29 | End: 2026-04-29

## 2025-05-01 ENCOUNTER — PATIENT MESSAGE (OUTPATIENT)
Dept: FAMILY MEDICINE | Facility: CLINIC | Age: 80
End: 2025-05-01
Payer: MEDICARE

## 2025-05-02 ENCOUNTER — PATIENT MESSAGE (OUTPATIENT)
Dept: FAMILY MEDICINE | Facility: CLINIC | Age: 80
End: 2025-05-02
Payer: MEDICARE

## 2025-05-02 ENCOUNTER — TELEPHONE (OUTPATIENT)
Dept: FAMILY MEDICINE | Facility: CLINIC | Age: 80
End: 2025-05-02
Payer: MEDICARE

## 2025-05-02 NOTE — TELEPHONE ENCOUNTER
----- Message from Shana sent at 5/2/2025  9:11 AM CDT -----  Contact: Spouse  Type:  Patient Returning CallWho Called:  Patients Spouse Kip Left Message for Patient:  Asking to speak to the officeDoes the patient know what this is regarding?:  yesBest Call Back Number:  [phone] Information:  Stated he called twice yesterday and never received a call back, can we please call back to assist. Thank You.

## 2025-05-13 DIAGNOSIS — Z85.118 PERSONAL HISTORY OF LUNG CANCER: Primary | ICD-10-CM

## 2025-05-15 ENCOUNTER — HOSPITAL ENCOUNTER (OUTPATIENT)
Dept: RADIOLOGY | Facility: HOSPITAL | Age: 80
Discharge: HOME | End: 2025-05-15
Attending: INTERNAL MEDICINE
Payer: MEDICARE

## 2025-05-15 DIAGNOSIS — Z85.118 PERSONAL HISTORY OF LUNG CANCER: ICD-10-CM

## 2025-05-15 PROCEDURE — 25500020 PHARM REV CODE 255: Mod: PO | Performed by: INTERNAL MEDICINE

## 2025-05-15 PROCEDURE — 71260 CT THORAX DX C+: CPT | Mod: TC,PO

## 2025-05-15 PROCEDURE — A9698 NON-RAD CONTRAST MATERIALNOC: HCPCS | Mod: PO | Performed by: INTERNAL MEDICINE

## 2025-05-15 RX ADMIN — IOHEXOL 75 ML: 350 INJECTION, SOLUTION INTRAVENOUS at 09:05

## 2025-05-15 RX ADMIN — IOHEXOL 1000 ML: 9 SOLUTION ORAL at 09:05

## 2025-05-16 ENCOUNTER — OFFICE VISIT (OUTPATIENT)
Dept: OPTOMETRY | Facility: CLINIC | Age: 80
End: 2025-05-16
Payer: MEDICARE

## 2025-05-16 DIAGNOSIS — H43.812 POSTERIOR VITREOUS DETACHMENT OF LEFT EYE: Primary | ICD-10-CM

## 2025-05-16 PROCEDURE — 99999 PR PBB SHADOW E&M-EST. PATIENT-LVL III: CPT | Mod: PBBFAC,,, | Performed by: OPTOMETRIST

## 2025-05-16 NOTE — PROGRESS NOTES
HPI     Follow-up            Comments: PVD OS 3WK F/U           Comments    Pt states : here for f/u still seeing flashes in OS not as much but still   there   Denies any pain or vision loss   Pt denies any flashes or floaters in OD *           Last edited by Nick Armenta on 5/16/2025  9:13 AM.            Assessment /Plan     For exam results, see Encounter Report.    Posterior vitreous detachment of left eye      No evidence of holes, tears or detachment of retina. Negative Shafers sign in vitreous. 90 diopter lens exam negative in all quadrants. Patient educated to signs and symptoms of retinal detachment and return to clinic immediately if signs or symptoms arise.RTC routine eye exam

## 2025-05-20 ENCOUNTER — OFFICE VISIT (OUTPATIENT)
Dept: HEMATOLOGY/ONCOLOGY | Facility: CLINIC | Age: 80
End: 2025-05-20
Attending: INTERNAL MEDICINE
Payer: MEDICARE

## 2025-05-20 VITALS
BODY MASS INDEX: 29.66 KG/M2 | TEMPERATURE: 98 F | HEART RATE: 71 BPM | OXYGEN SATURATION: 93 % | SYSTOLIC BLOOD PRESSURE: 121 MMHG | DIASTOLIC BLOOD PRESSURE: 72 MMHG | RESPIRATION RATE: 16 BRPM | HEIGHT: 64 IN | WEIGHT: 173.75 LBS

## 2025-05-20 DIAGNOSIS — Z85.118 PERSONAL HISTORY OF LUNG CANCER: ICD-10-CM

## 2025-05-20 PROCEDURE — 3288F FALL RISK ASSESSMENT DOCD: CPT | Mod: CPTII,S$GLB,, | Performed by: INTERNAL MEDICINE

## 2025-05-20 PROCEDURE — 1126F AMNT PAIN NOTED NONE PRSNT: CPT | Mod: CPTII,S$GLB,, | Performed by: INTERNAL MEDICINE

## 2025-05-20 PROCEDURE — 99999 PR PBB SHADOW E&M-EST. PATIENT-LVL IV: CPT | Mod: PBBFAC,,, | Performed by: INTERNAL MEDICINE

## 2025-05-20 PROCEDURE — 3074F SYST BP LT 130 MM HG: CPT | Mod: CPTII,S$GLB,, | Performed by: INTERNAL MEDICINE

## 2025-05-20 PROCEDURE — 1159F MED LIST DOCD IN RCRD: CPT | Mod: CPTII,S$GLB,, | Performed by: INTERNAL MEDICINE

## 2025-05-20 PROCEDURE — 3078F DIAST BP <80 MM HG: CPT | Mod: CPTII,S$GLB,, | Performed by: INTERNAL MEDICINE

## 2025-05-20 PROCEDURE — 1101F PT FALLS ASSESS-DOCD LE1/YR: CPT | Mod: CPTII,S$GLB,, | Performed by: INTERNAL MEDICINE

## 2025-05-20 PROCEDURE — 99213 OFFICE O/P EST LOW 20 MIN: CPT | Mod: S$GLB,,, | Performed by: INTERNAL MEDICINE

## 2025-05-20 NOTE — PROGRESS NOTES
"Subjective     Patient ID: Nicole Medina is a 79 y.o. female.    Chief Complaint: Follow-up ( 4 month f/u/Personal history of lung cancer)  79 y.o. female with Stage IA2 (cT1b cN0 M0) DOC NSCLC (adeno) diagnosed on biopsy 12/20/19. History significant for sarcoidosis. CT Chest 1/14/20 demonstrated 1.7 cm DOC primary and multiple other stable sub-centimeter nodules with mild hilar and mediastinal adenopathy. She underwent attempted VATS resection by Dr. Mccall 1/16/20, which was aborted due to poor toleration of single lung ventilation. She completed definitive SBRT 55 Gy in 5 fx on 2/13/20.      CT Chest 2/16/24 concerning for enlarging adenopathy. Bronch w/ EBUS by Dr. Garcia 3/26/24. 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    Her case was reviewed at the Thoracic Multidisciplinary Conference on 04/17/2024 with recommendation for consideration of systemic therapy options. Since there is not a clear target for radiation on her imaging (ILD changes indistinguishable from lepidic adeno), she does not have a local therapy option at this time. I discussed with the patient and her  that lepidic adenocarcinoma tends to be very slow growing and often present for years, but there is always a risk that it can develop metastatic potential if untreated.      Tempus NGS tissue with no targets, PDL1 is less than 1%        CT scans of chest abdomen pelvis  from 04/29/2024 reveal "[id number]-iivrvlmi increased size of a left apical pulmonary mass status post radiation. Grossly stable background of interstitial lung disease in this patient with reported history of sarcoidosis, which limits evaluation for lepidic spread of disease in this patient with biopsy-proven left upper lobe adenocarcinoma. Unchanged bilateral pulmonary micro-nodules. Stable mediastinal and hilar lymphadenopathy.  " "Index lymph nodes as above. 2.1 cm left adrenal nodule, unchanged since November 2021. Large extrarenal pelvis on the right with hydronephrosis felt less likely given stability dating back to November 2021.     MRI brain from 05/03/2024  reveals "No evidence of intracranial metastatic disease. 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     She started Carboplatin and Alimta on 5/9/2024.     CT chest from 6/17/2024 (after 2 cycles of Carboplatin and Alimta) revealed "Stable appearance of the lungs with known background of interstitial lung disease/pulmonary sarcoidosis.  Unchanged left apical soft tissue mass status post radiation. Stable mediastinal lymph nodes. Stable left adrenal nodule"           She received cycle 4 of Carboplatin and Alimta until 7/11/2024      CT scans on 7/26/2024 "Pulmonary mass at the left lung apex and mediastinal lymphadenopathy stable given inter study variance since 04/29/2024.  2. Prominent renal pelvis on the right slightly more so than noticed on the prior, this most likely relates to an extrarenal pelvis and it is always been slightly prominent.  Mild hydronephrosis is difficult to exclude.  3. Distended bladder, please correlate for postobstructive change or neurogenic bladder.  This may be physiologic.  4. Stable adrenal mass since 08/26/2022 suggestive of an adrenal adenoma  5. Left-sided thyroid nodule.  Thyroid ultrasound of use for better evaluation and follow-up if necessary this is likely stable since at least 02/24/2023 by CT"     MDT review on 7/30/2024: short term follow up in 8 weeks with Chest CT.         CT scans from 9/26/2024 which revealed "stable appearance of the patient's interstitial lung disease/pulmonary sarcoidosis compared to what was seen on prior exam.  The left apical mass measures 3.5 by 1.5 cm on today's exam but appears unchanged from prior exam"     CT CAP from 1/13/2025 revealed "Chest; findings not significantly changed including the " "masslike opacity left apex anteromedially, mediastinal and hilar adenopathy, extensive areas of ground-glass opacities, scattered pulmonary nodules. Abdomen and pelvis; left adrenal nodule and small hypodensity in the liver remaining not significantly changed.  What appears to be large right renal pelvis is extrarenal in location appearing slightly larger than on prior exam.  No new findings suggesting metastatic disease within the abdomen or pelvis.  Note is made of moderate distention of the urinary bladder at the time the exam similar in appearance to prior exam and recommend correlation clinically if clinical consideration for neurogenic bladder"      Mara comes in to review her CT CAP from 5/15/2025 "No significant interval change.  2. Stable masslike consolidation in the left lung apex.  3. Grossly stable fibrotic changes of the lungs.  4. Stable mediastinal hilar lymphadenopathy.  5. Stable moderate right and mild left hydronephrosis.  6. Stable left adrenal nodule likely representing an adenoma"    She feels well and denies any new issues    Review of Systems   Constitutional:  Negative for appetite change, fatigue and unexpected weight change.   HENT:  Negative for mouth sores.    Eyes:  Negative for visual disturbance.   Respiratory:  Negative for cough and shortness of breath.    Cardiovascular:  Negative for chest pain.   Gastrointestinal:  Negative for abdominal pain and diarrhea.   Genitourinary:  Negative for frequency.   Musculoskeletal:  Negative for back pain.   Integumentary:  Negative for rash.   Neurological:  Negative for headaches.   Hematological:  Negative for adenopathy.   Psychiatric/Behavioral:  The patient is not nervous/anxious.    All other systems reviewed and are negative.         Objective     Physical Exam  Vitals reviewed.   Constitutional:       Appearance: She is well-developed.   HENT:      Mouth/Throat:      Pharynx: No oropharyngeal exudate.   Cardiovascular:      Rate and " Rhythm: Normal rate.      Heart sounds: Normal heart sounds.   Pulmonary:      Effort: Pulmonary effort is normal.      Breath sounds: Wheezing present.   Abdominal:      General: Bowel sounds are normal.      Palpations: Abdomen is soft.      Tenderness: There is no abdominal tenderness.   Musculoskeletal:         General: No tenderness.   Lymphadenopathy:      Cervical: No cervical adenopathy.   Skin:     General: Skin is warm and dry.      Findings: No rash.   Neurological:      Mental Status: She is alert and oriented to person, place, and time.      Coordination: Coordination normal.   Psychiatric:         Thought Content: Thought content normal.         Judgment: Judgment normal.              LABS:    WBC   Date Value Ref Range Status   05/15/2025 6.54 3.90 - 12.70 K/uL Final     Hemoglobin   Date Value Ref Range Status   01/13/2025 13.4 12.0 - 16.0 g/dL Final     HGB   Date Value Ref Range Status   05/15/2025 13.5 12.0 - 16.0 gm/dL Final     POC Hematocrit   Date Value Ref Range Status   04/01/2024 36.0 (L) 37.0 - 48.5 %PCV Final     Hematocrit   Date Value Ref Range Status   01/13/2025 42.3 37.0 - 48.5 % Final     HCT   Date Value Ref Range Status   05/15/2025 40.6 37.0 - 48.5 % Final     Platelet Count   Date Value Ref Range Status   05/15/2025 164 150 - 450 K/uL Final     Platelets   Date Value Ref Range Status   01/13/2025 163 150 - 450 K/uL Final     Gran # (ANC)   Date Value Ref Range Status   01/13/2025 4.1 1.8 - 7.7 K/uL Final     Gran %   Date Value Ref Range Status   01/13/2025 58.3 38.0 - 73.0 % Final       Chemistry        Component Value Date/Time     05/15/2025 0820     01/13/2025 0900    K 4.3 05/15/2025 0820    K 5.0 01/13/2025 0900     05/15/2025 0820     01/13/2025 0900    CO2 27 05/15/2025 0820    CO2 28 01/13/2025 0900    BUN 20 05/15/2025 0820    CREATININE 0.8 05/15/2025 0820    CREATININE 0.8 05/15/2025 0820    GLU 94 05/15/2025 0820    GLU 93 01/13/2025 0900         Component Value Date/Time    CALCIUM 9.4 05/15/2025 0820    CALCIUM 9.7 01/13/2025 0900    ALKPHOS 64 05/15/2025 0820    ALKPHOS 62 01/13/2025 0900    AST 21 05/15/2025 0820    AST 23 01/13/2025 0900    ALT 15 05/15/2025 0820    ALT 14 01/13/2025 0900    BILITOT 0.5 05/15/2025 0820    BILITOT 0.6 01/13/2025 0900    ESTGFRAFRICA >60 05/10/2022 0640    EGFRNONAA >60 05/10/2022 0640          Assessment and Plan     1. Personal history of lung cancer  -     CT Chest Abdomen Pelvis With IV Contrast (XPD) Routine Oral Contrast; Future; Expected date: 05/20/2025  -     Creatinine, serum; Future; Expected date: 05/20/2025  -     CBC w/ DIFF; Future; Expected date: 05/20/2025  -     CMP; Future; Expected date: 05/20/2025      Route Chart for Scheduling    Med Onc Chart Routing      Follow up with physician 4 months. Schedule CBC, CMP, CT chest, abdomen/pelvis and see me   Follow up with STANISLAW    Infusion scheduling note    Injection scheduling note    Labs    Imaging    Pharmacy appointment    Other referrals                Mrs. Medina doing very well clinically with no evidence of recurrence on her scans and will return in 4 months with repeat labs and scans    Above care plan was discussed with patient and accompanying  and all questions were addressed to their satisfaction

## 2025-05-21 ENCOUNTER — DOCUMENTATION ONLY (OUTPATIENT)
Dept: HEMATOLOGY/ONCOLOGY | Facility: CLINIC | Age: 80
End: 2025-05-21
Payer: MEDICARE

## 2025-05-21 NOTE — PROGRESS NOTES
Chart reviewed for oncology navigational needs. Patient remains on surveillance for her lung cancer. Future imaging and follow up already scheduled.  No needs noted at this time. Will continue to monitor.

## 2025-06-27 ENCOUNTER — OFFICE VISIT (OUTPATIENT)
Dept: FAMILY MEDICINE | Facility: CLINIC | Age: 80
End: 2025-06-27
Payer: MEDICARE

## 2025-06-27 VITALS
DIASTOLIC BLOOD PRESSURE: 68 MMHG | HEART RATE: 80 BPM | BODY MASS INDEX: 30.07 KG/M2 | WEIGHT: 176.13 LBS | OXYGEN SATURATION: 96 % | HEIGHT: 64 IN | SYSTOLIC BLOOD PRESSURE: 122 MMHG

## 2025-06-27 DIAGNOSIS — I83.021 VARICOSE VEINS OF LEFT LOWER EXTREMITY WITH ULCER OF THIGH LIMITED TO BREAKDOWN OF SKIN: ICD-10-CM

## 2025-06-27 DIAGNOSIS — D86.0 SARCOIDOSIS OF LUNG: ICD-10-CM

## 2025-06-27 DIAGNOSIS — E66.2 MORBID (SEVERE) OBESITY WITH ALVEOLAR HYPOVENTILATION: ICD-10-CM

## 2025-06-27 DIAGNOSIS — I48.0 PAROXYSMAL ATRIAL FIBRILLATION: ICD-10-CM

## 2025-06-27 DIAGNOSIS — I50.32 CHRONIC DIASTOLIC CONGESTIVE HEART FAILURE: ICD-10-CM

## 2025-06-27 DIAGNOSIS — L97.121 VARICOSE VEINS OF LEFT LOWER EXTREMITY WITH ULCER OF THIGH LIMITED TO BREAKDOWN OF SKIN: ICD-10-CM

## 2025-06-27 DIAGNOSIS — J84.9 INTERSTITIAL LUNG DISEASE: ICD-10-CM

## 2025-06-27 DIAGNOSIS — C34.31 PRIMARY ADENOCARCINOMA OF LOWER LOBE OF RIGHT LUNG: ICD-10-CM

## 2025-06-27 DIAGNOSIS — Z78.0 MENOPAUSE: ICD-10-CM

## 2025-06-27 DIAGNOSIS — K21.9 GASTROESOPHAGEAL REFLUX DISEASE WITHOUT ESOPHAGITIS: ICD-10-CM

## 2025-06-27 DIAGNOSIS — I10 ESSENTIAL HYPERTENSION: Primary | ICD-10-CM

## 2025-06-27 PROCEDURE — 99999 PR PBB SHADOW E&M-EST. PATIENT-LVL IV: CPT | Mod: PBBFAC,,, | Performed by: INTERNAL MEDICINE

## 2025-06-27 RX ORDER — OMEPRAZOLE 40 MG/1
40 CAPSULE, DELAYED RELEASE ORAL DAILY
Qty: 90 CAPSULE | Refills: 3 | Status: SHIPPED | OUTPATIENT
Start: 2025-06-27 | End: 2026-06-27

## 2025-06-27 RX ORDER — METOPROLOL SUCCINATE 25 MG/1
25 TABLET, EXTENDED RELEASE ORAL EVERY 12 HOURS
Qty: 180 TABLET | Refills: 3 | Status: SHIPPED | OUTPATIENT
Start: 2025-06-27 | End: 2026-06-27

## 2025-06-27 NOTE — PROGRESS NOTES
"[facility]  Primary Care   [address]       Patient ID: Nicole Medina     Chief Complaint:   Chief Complaint   Patient presents with    Follow-up        HPI:  Routine office visit and overall I think she is still doing very well.  She did get a positive report from her oncologist and when I look at her reports and the CAT scans of her chest, the findings are the same.  She seems to be stable with regards to her lung cancer and sarcoidosis at this time.  Sosa is doing better for her sarcoidosis than the Advair was.  Echocardiogram done recently looks good but she does have pulmonary hypertension so I do agree with the oxygen.  Vital signs look very good as well.  She does not have any peripheral edema and a lungs sound good today.  Labs also look really good as her blood counts are stable and in the normal range.  She is eligible for a bone density scan which I would like her to get at some point in the near future.  She is up-to-date with all her immunizations as well.  I did refill a few medicines for her.  We will plan to see each other again in 6 months or sooner if need it.    Review of Systems       Occasional lots of secretions     Objective:      Physical Exam   Physical Exam       Lungs sound good     Vitals:   Vitals:    06/27/25 0943   BP: 122/68   Pulse: 80   SpO2: 96%   Weight: 79.9 kg (176 lb 2.4 oz)   Height: 5' 4" (1.626 m)        Assessment:           Plan:       Nicole Medina  was seen today for follow-up and may need lab work.    Diagnoses and all orders for this visit:    Nicole Carroll" was seen today for follow-up.    Diagnoses and all orders for this visit:    Essential hypertension  -     metoprolol succinate (TOPROL XL) 25 MG 24 hr tablet; Take 1 tablet (25 mg total) by mouth every 12 (twelve) hours.  Controlled with Metoprolol and Entresto     Menopause  -     DXA Bone Density Axial Skeleton 1 or more sites; Future    Paroxysmal atrial fibrillation  -     " metoprolol succinate (TOPROL XL) 25 MG 24 hr tablet; Take 1 tablet (25 mg total) by mouth every 12 (twelve) hours.  Controlled with Metoprolol     Gastroesophageal reflux disease without esophagitis  -     omeprazole (PRILOSEC) 40 MG capsule; Take 1 capsule (40 mg total) by mouth once daily.  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  Controlled with Compression socks     Morbid (severe) obesity with alveolar hypoventilation  Monitor weight     Interstitial lung disease  Stable with inhalers and oxygen     Sarcoidosis of lung  Stable with inhalers     Chronic diastolic congestive heart failure  Euvolemic with Entresto     Primary adenocarcinoma of lower lobe of right lung  Stable per Oncology     Visit today included increased complexity associated with the care of the episodic problem hypertension GERD Interstitial Lung Disease Obesity  addressed and managing the longitudinal care of the patient due to the serious and/or complex managed problem(s) .           Henok Medina MD

## 2025-07-31 ENCOUNTER — HOSPITAL ENCOUNTER (OUTPATIENT)
Dept: RADIOLOGY | Facility: HOSPITAL | Age: 80
Discharge: HOME | End: 2025-07-31
Attending: INTERNAL MEDICINE
Payer: MEDICARE

## 2025-07-31 DIAGNOSIS — Z78.0 MENOPAUSE: ICD-10-CM

## 2025-07-31 PROCEDURE — 77092 TBS I&R FX RSK QHP: CPT | Mod: ,,, | Performed by: RADIOLOGY

## 2025-07-31 PROCEDURE — 77091 TBS TECHL CALCULATION ONLY: CPT | Mod: PO

## 2025-07-31 PROCEDURE — 77080 DXA BONE DENSITY AXIAL: CPT | Mod: 26,,, | Performed by: RADIOLOGY

## 2025-08-22 ENCOUNTER — OFFICE VISIT (OUTPATIENT)
Dept: OPTOMETRY | Facility: CLINIC | Age: 80
End: 2025-08-22
Payer: MEDICARE

## 2025-08-22 DIAGNOSIS — Z83.518 FAMILY HISTORY OF MACULAR DEGENERATION: ICD-10-CM

## 2025-08-22 DIAGNOSIS — D86.0 SARCOIDOSIS OF LUNG: ICD-10-CM

## 2025-08-22 DIAGNOSIS — H52.7 REFRACTIVE ERROR: ICD-10-CM

## 2025-08-22 DIAGNOSIS — Z96.1 PSEUDOPHAKIA OF BOTH EYES: ICD-10-CM

## 2025-08-22 DIAGNOSIS — H04.123 BILATERAL DRY EYES: ICD-10-CM

## 2025-08-22 DIAGNOSIS — H43.812 POSTERIOR VITREOUS DETACHMENT OF LEFT EYE: Primary | ICD-10-CM

## 2025-08-22 PROCEDURE — 99999 PR PBB SHADOW E&M-EST. PATIENT-LVL II: CPT | Mod: PBBFAC,,, | Performed by: OPTOMETRIST

## (undated) DEVICE — GLOVE BIOGEL ECLIPSE SZ 6.5

## (undated) DEVICE — CONTAINER SPECIMEN STRL 4OZ

## (undated) DEVICE — TRAY MINOR GEN SURG

## (undated) DEVICE — PACK EYE CUSTOM COVINGTON.

## (undated) DEVICE — DRESSING TELFA N ADH 3X8

## (undated) DEVICE — DRAPE COLUMN DAVINCI XI

## (undated) DEVICE — CANNULA ANTERIOR CHAMBER 30G

## (undated) DEVICE — SUT VICRYL 3-0 27 SH

## (undated) DEVICE — SOL BSS BALANCED SALT

## (undated) DEVICE — SYR SLIP TIP 20CC

## (undated) DEVICE — DRAPE ABDOMINAL TIBURON 14X11

## (undated) DEVICE — KIT VUETIP TROCAR SWAB

## (undated) DEVICE — PACK OPHTHALMIC

## (undated) DEVICE — SUT SILK 0 STRANDS 30IN BLK

## (undated) DEVICE — GARTER EYE ADULT

## (undated) DEVICE — ELECTRODE BLADE INSULATED 1 IN

## (undated) DEVICE — DRESSING SURGICAL 1X3

## (undated) DEVICE — SEE MEDLINE ITEM 146313

## (undated) DEVICE — SEE MEDLINE ITEM 157128

## (undated) DEVICE — SET TRI-LUMEN FILTERED TUBE

## (undated) DEVICE — SEE L#120831

## (undated) DEVICE — SPONGE WEC CEL SPEARS

## (undated) DEVICE — DRESSING TEGADERM 4.4X5IN

## (undated) DEVICE — SUT MCRYL PLUS 4-0 PS2 27IN

## (undated) DEVICE — TAPE SILK 3IN

## (undated) DEVICE — KIT ANTIFOG

## (undated) DEVICE — CANNULA REDUCER 12-8MM

## (undated) DEVICE — SYR ONLY LUER LOCK 20CC

## (undated) DEVICE — DRESSING TRANS 2X2 TEGADERM

## (undated) DEVICE — SOL IRR STRL WATER 500ML

## (undated) DEVICE — NDL SPINAL 18GX3.5 SPINOCAN

## (undated) DEVICE — DRAPE INCISE IOBAN 2 23X17IN

## (undated) DEVICE — DRESSING TRANS 4X4 TEGADERM

## (undated) DEVICE — SPONGE IV DRAIN 4X4 STERILE

## (undated) DEVICE — ELECTRODE REM PLYHSV RETURN 9

## (undated) DEVICE — PORT AIRSEAL 12/120MM LPI

## (undated) DEVICE — COVER SURG LIGHT HANDLE

## (undated) DEVICE — CANNULA SEAL 12MM

## (undated) DEVICE — DRAPE ARM DAVINCI XI

## (undated) DEVICE — SOL WATER STRL IRR 1000ML

## (undated) DEVICE — DRAPE SCOPE PILLOW WARMER

## (undated) DEVICE — GLOVE BIOGEL SKINSENSE PI 7.5

## (undated) DEVICE — SOL BETADINE 5%

## (undated) DEVICE — SUT SILK 0 BLK BR FSL 18 IN

## (undated) DEVICE — COVER LIGHT HANDLE

## (undated) DEVICE — GOWN SMART IMP BREATHABLE XXLG

## (undated) DEVICE — SEAL UNIVERSAL 5MM-8MM XI